# Patient Record
Sex: FEMALE | Race: WHITE | NOT HISPANIC OR LATINO | Employment: OTHER | URBAN - METROPOLITAN AREA
[De-identification: names, ages, dates, MRNs, and addresses within clinical notes are randomized per-mention and may not be internally consistent; named-entity substitution may affect disease eponyms.]

---

## 2018-07-05 PROBLEM — Z79.01 CHRONIC ANTICOAGULATION: Status: ACTIVE | Noted: 2018-07-05

## 2018-07-05 PROBLEM — E11.8 DM TYPE 2, CONTROLLED, WITH COMPLICATION: Status: ACTIVE | Noted: 2018-07-05

## 2018-07-05 PROBLEM — R58 ECCHYMOSIS: Status: ACTIVE | Noted: 2018-07-05

## 2018-07-05 PROBLEM — I48.20 CHRONIC ATRIAL FIBRILLATION: Status: ACTIVE | Noted: 2018-07-05

## 2018-07-05 PROBLEM — I10 HTN, GOAL BELOW 140/90: Status: ACTIVE | Noted: 2018-07-05

## 2018-07-05 PROBLEM — E03.9 ACQUIRED HYPOTHYROIDISM: Status: ACTIVE | Noted: 2018-07-05

## 2018-07-05 PROBLEM — R41.3 MEMORY IMPAIRMENT: Status: ACTIVE | Noted: 2018-07-05

## 2018-07-09 ENCOUNTER — HOSPITAL ENCOUNTER (OUTPATIENT)
Dept: CARDIOLOGY | Facility: HOSPITAL | Age: 83
Discharge: HOME | End: 2018-07-09
Attending: INTERNAL MEDICINE
Payer: COMMERCIAL

## 2018-07-09 DIAGNOSIS — I48.20 CHRONIC ATRIAL FIBRILLATION: ICD-10-CM

## 2018-07-09 LAB
AORTIC VALVE STENOSIS: ABNORMAL
ESTIMATED PA SYSTOLIC PRESSURE: 44.47
MITRAL VALVE REGURGITATION: ABNORMAL
RETIRED EF AND QEF - SEE NOTES: 55 (ref 55–65)
TRICUSPID VALVE REGURGITATION: ABNORMAL

## 2018-07-09 PROCEDURE — 93306 TTE W/DOPPLER COMPLETE: CPT

## 2018-07-09 PROCEDURE — 93306 TTE W/DOPPLER COMPLETE: CPT | Mod: 26,,, | Performed by: INTERNAL MEDICINE

## 2018-07-19 PROBLEM — F41.9 ANXIETY: Status: ACTIVE | Noted: 2018-07-19

## 2018-09-10 ENCOUNTER — HOSPITAL ENCOUNTER (OUTPATIENT)
Facility: HOSPITAL | Age: 83
Discharge: HOME | End: 2018-09-12
Attending: EMERGENCY MEDICINE | Admitting: INTERNAL MEDICINE
Payer: MEDICARE

## 2018-09-10 DIAGNOSIS — S09.90XA CLOSED HEAD INJURY, INITIAL ENCOUNTER: ICD-10-CM

## 2018-09-10 DIAGNOSIS — R00.0 RAPID HEART BEAT: ICD-10-CM

## 2018-09-10 DIAGNOSIS — S51.012A LACERATION OF LEFT ELBOW, INITIAL ENCOUNTER: ICD-10-CM

## 2018-09-10 DIAGNOSIS — I48.91 ATRIAL FIBRILLATION WITH RVR: ICD-10-CM
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
## 2018-09-10 DIAGNOSIS — I48.91 ATRIAL FIBRILLATION WITH RAPID VENTRICULAR RESPONSE: Primary | ICD-10-CM

## 2018-09-10 DIAGNOSIS — R00.0 TACHYCARDIA: ICD-10-CM

## 2018-09-10 DIAGNOSIS — R42 DIZZINESS: ICD-10-CM

## 2018-09-10 DIAGNOSIS — E03.9 ACQUIRED HYPOTHYROIDISM: ICD-10-CM

## 2018-09-10 LAB
ALBUMIN SERPL BCP-MCNC: 4.2 G/DL
ALP SERPL-CCNC: 97 U/L
ALT SERPL W/O P-5'-P-CCNC: 14 U/L
ANION GAP SERPL CALC-SCNC: 11 MMOL/L
AST SERPL-CCNC: 17 U/L
BASOPHILS # BLD AUTO: 0 K/UL
BASOPHILS NFR BLD: 0 %
BILIRUB SERPL-MCNC: 0.6 MG/DL
BUN SERPL-MCNC: 13 MG/DL
CALCIUM SERPL-MCNC: 10 MG/DL
CHLORIDE SERPL-SCNC: 96 MMOL/L
CO2 SERPL-SCNC: 29 MMOL/L
CREAT SERPL-MCNC: 0.9 MG/DL
DIFFERENTIAL METHOD: ABNORMAL
EOSINOPHIL # BLD AUTO: 0 K/UL
EOSINOPHIL NFR BLD: 0.2 %
ERYTHROCYTE [DISTWIDTH] IN BLOOD BY AUTOMATED COUNT: 13.3 %
EST. GFR  (AFRICAN AMERICAN): >60 ML/MIN/1.73 M^2
EST. GFR  (NON AFRICAN AMERICAN): 58 ML/MIN/1.73 M^2
GLUCOSE SERPL-MCNC: 106 MG/DL
HCT VFR BLD AUTO: 35.2 %
HGB BLD-MCNC: 11.4 G/DL
INR PPP: 2.7
LYMPHOCYTES # BLD AUTO: 1.3 K/UL
LYMPHOCYTES NFR BLD: 28.7 %
MCH RBC QN AUTO: 28.8 PG
MCHC RBC AUTO-ENTMCNC: 32.4 G/DL
MCV RBC AUTO: 89 FL
MONOCYTES # BLD AUTO: 0.9 K/UL
MONOCYTES NFR BLD: 21 %
NEUTROPHILS # BLD AUTO: 2.2 K/UL
NEUTROPHILS NFR BLD: 50.1 %
PLATELET # BLD AUTO: 145 K/UL
PMV BLD AUTO: 11 FL
POCT GLUCOSE: 120 MG/DL (ref 70–110)
POCT GLUCOSE: 162 MG/DL (ref 70–110)
POTASSIUM SERPL-SCNC: 4.8 MMOL/L
PROT SERPL-MCNC: 7.2 G/DL
PROTHROMBIN TIME: 28.7 SEC
RBC # BLD AUTO: 3.96 M/UL
SODIUM SERPL-SCNC: 136 MMOL/L
TROPONIN I SERPL DL<=0.01 NG/ML-MCNC: <0.006 NG/ML
WBC # BLD AUTO: 4.42 K/UL

## 2018-09-10 PROCEDURE — 93005 ELECTROCARDIOGRAM TRACING: CPT

## 2018-09-10 PROCEDURE — 85025 COMPLETE CBC W/AUTO DIFF WBC: CPT

## 2018-09-10 PROCEDURE — 36415 COLL VENOUS BLD VENIPUNCTURE: CPT

## 2018-09-10 PROCEDURE — 93010 ELECTROCARDIOGRAM REPORT: CPT | Mod: ,,, | Performed by: INTERNAL MEDICINE

## 2018-09-10 PROCEDURE — G0378 HOSPITAL OBSERVATION PER HR: HCPCS

## 2018-09-10 PROCEDURE — 99285 EMERGENCY DEPT VISIT HI MDM: CPT | Mod: 25

## 2018-09-10 PROCEDURE — 96376 TX/PRO/DX INJ SAME DRUG ADON: CPT

## 2018-09-10 PROCEDURE — 25000003 PHARM REV CODE 250: Performed by: PHYSICIAN ASSISTANT

## 2018-09-10 PROCEDURE — 25000003 PHARM REV CODE 250: Performed by: INTERNAL MEDICINE

## 2018-09-10 PROCEDURE — 12002 RPR S/N/AX/GEN/TRNK2.6-7.5CM: CPT

## 2018-09-10 PROCEDURE — 82962 GLUCOSE BLOOD TEST: CPT

## 2018-09-10 PROCEDURE — 85610 PROTHROMBIN TIME: CPT

## 2018-09-10 PROCEDURE — 25000003 PHARM REV CODE 250: Performed by: EMERGENCY MEDICINE

## 2018-09-10 PROCEDURE — 80053 COMPREHEN METABOLIC PANEL: CPT

## 2018-09-10 PROCEDURE — 84484 ASSAY OF TROPONIN QUANT: CPT

## 2018-09-10 PROCEDURE — 96374 THER/PROPH/DIAG INJ IV PUSH: CPT | Mod: 59

## 2018-09-10 RX ORDER — LISINOPRIL 20 MG/1
40 TABLET ORAL DAILY
Status: DISCONTINUED | OUTPATIENT
Start: 2018-09-11 | End: 2018-09-12 | Stop reason: HOSPADM

## 2018-09-10 RX ORDER — DILTIAZEM HYDROCHLORIDE 5 MG/ML
10 INJECTION INTRAVENOUS
Status: COMPLETED | OUTPATIENT
Start: 2018-09-10 | End: 2018-09-10

## 2018-09-10 RX ORDER — SIMVASTATIN 40 MG/1
40 TABLET, FILM COATED ORAL NIGHTLY
Status: DISCONTINUED | OUTPATIENT
Start: 2018-09-10 | End: 2018-09-12 | Stop reason: HOSPADM

## 2018-09-10 RX ORDER — IBUPROFEN 200 MG
24 TABLET ORAL
Status: DISCONTINUED | OUTPATIENT
Start: 2018-09-10 | End: 2018-09-12 | Stop reason: HOSPADM

## 2018-09-10 RX ORDER — IBUPROFEN 200 MG
16 TABLET ORAL
Status: DISCONTINUED | OUTPATIENT
Start: 2018-09-10 | End: 2018-09-12 | Stop reason: HOSPADM

## 2018-09-10 RX ORDER — LORAZEPAM 0.5 MG/1
0.5 TABLET ORAL 2 TIMES DAILY
Status: DISCONTINUED | OUTPATIENT
Start: 2018-09-10 | End: 2018-09-12 | Stop reason: HOSPADM

## 2018-09-10 RX ORDER — INSULIN ASPART 100 [IU]/ML
0-5 INJECTION, SOLUTION INTRAVENOUS; SUBCUTANEOUS
Status: DISCONTINUED | OUTPATIENT
Start: 2018-09-10 | End: 2018-09-12 | Stop reason: HOSPADM

## 2018-09-10 RX ORDER — ACETAMINOPHEN 325 MG/1
650 TABLET ORAL EVERY 6 HOURS PRN
Status: DISCONTINUED | OUTPATIENT
Start: 2018-09-10 | End: 2018-09-12 | Stop reason: HOSPADM

## 2018-09-10 RX ORDER — ESCITALOPRAM OXALATE 10 MG/1
10 TABLET ORAL DAILY
Status: DISCONTINUED | OUTPATIENT
Start: 2018-09-11 | End: 2018-09-12 | Stop reason: HOSPADM

## 2018-09-10 RX ORDER — GLUCAGON 1 MG
1 KIT INJECTION
Status: DISCONTINUED | OUTPATIENT
Start: 2018-09-10 | End: 2018-09-12 | Stop reason: HOSPADM

## 2018-09-10 RX ORDER — LEVOTHYROXINE SODIUM 137 UG/1
1 CAPSULE ORAL DAILY
Status: DISCONTINUED | OUTPATIENT
Start: 2018-09-11 | End: 2018-09-10

## 2018-09-10 RX ORDER — METOPROLOL SUCCINATE 50 MG/1
100 TABLET, EXTENDED RELEASE ORAL DAILY
Status: DISCONTINUED | OUTPATIENT
Start: 2018-09-11 | End: 2018-09-11

## 2018-09-10 RX ORDER — GABAPENTIN 300 MG/1
300 CAPSULE ORAL 2 TIMES DAILY
Status: DISCONTINUED | OUTPATIENT
Start: 2018-09-10 | End: 2018-09-12 | Stop reason: HOSPADM

## 2018-09-10 RX ORDER — WARFARIN SODIUM 5 MG/1
5 TABLET ORAL DAILY
Status: DISCONTINUED | OUTPATIENT
Start: 2018-09-10 | End: 2018-09-11

## 2018-09-10 RX ORDER — LIDOCAINE HYDROCHLORIDE AND EPINEPHRINE 10; 10 MG/ML; UG/ML
10 INJECTION, SOLUTION INFILTRATION; PERINEURAL
Status: COMPLETED | OUTPATIENT
Start: 2018-09-10 | End: 2018-09-10

## 2018-09-10 RX ORDER — DILTIAZEM HYDROCHLORIDE 30 MG/1
60 TABLET, FILM COATED ORAL
Status: COMPLETED | OUTPATIENT
Start: 2018-09-10 | End: 2018-09-10

## 2018-09-10 RX ORDER — PANTOPRAZOLE SODIUM 40 MG/1
40 TABLET, DELAYED RELEASE ORAL DAILY
Status: DISCONTINUED | OUTPATIENT
Start: 2018-09-11 | End: 2018-09-12 | Stop reason: HOSPADM

## 2018-09-10 RX ADMIN — DILTIAZEM HYDROCHLORIDE 10 MG: 5 INJECTION INTRAVENOUS at 04:09

## 2018-09-10 RX ADMIN — SIMVASTATIN 40 MG: 40 TABLET, FILM COATED ORAL at 09:09

## 2018-09-10 RX ADMIN — DILTIAZEM HYDROCHLORIDE 10 MG: 5 INJECTION INTRAVENOUS at 02:09

## 2018-09-10 RX ADMIN — LORAZEPAM 0.5 MG: 0.5 TABLET ORAL at 09:09

## 2018-09-10 RX ADMIN — LIDOCAINE HYDROCHLORIDE AND EPINEPHRINE 10 ML: 10; 10 INJECTION, SOLUTION INFILTRATION; PERINEURAL at 11:09

## 2018-09-10 RX ADMIN — GABAPENTIN 300 MG: 300 CAPSULE ORAL at 09:09

## 2018-09-10 RX ADMIN — WARFARIN SODIUM 5 MG: 5 TABLET ORAL at 06:09

## 2018-09-10 RX ADMIN — DILTIAZEM HYDROCHLORIDE 60 MG: 30 TABLET, FILM COATED ORAL at 04:09

## 2018-09-10 RX ADMIN — ACETAMINOPHEN 650 MG: 325 TABLET, FILM COATED ORAL at 09:09

## 2018-09-10 NOTE — ED NOTES
Spoke with Ugo in lab, notified of specimen being sent for Troponin without requisition due to being lab draw. Specimen collected via butterfly needle, pt tolerated well

## 2018-09-10 NOTE — ED PROVIDER NOTES
"Encounter Date: 9/10/2018    SORT:  86 y.o. female presenting to ED for fall after becoming dizzy. Denies CP. Limited triage exam:  Non-toxic. If orders were placed, they are pending.     Sherif Pham PA-C  09/10/2018  10:33 AM   SCRIBE #1 NOTE: I, Chaparro Steele, am scribing for, and in the presence of,  Jh Connell MD. I have scribed the following portions of the note - Other sections scribed: HPI and ROS.       History     Chief Complaint   Patient presents with    Fall     pt states "I fell this morning and his my head" pt states she got dizzy before the fall; pt states she is on blood thinners; pt also c/o some arm pain      CC: Fall    HPI: This is a 86 y.o. female with past medical history DM, HTN, and GERD who presents after falling at home. Patient states she was ambulating with her walker to the bathroom when she suddenly felt dizzy causing her to fall on her buttocks and head. No LOC. She did not feel dizzy after getting out of bed. Denies neck pain.      The history is provided by the patient. No  was used.     Review of patient's allergies indicates:   Allergen Reactions    Sulfa (sulfonamide antibiotics) Hives     Past Medical History:   Diagnosis Date    Allergy     Anxiety     Arthritis     Cataract     Clotting disorder     Depression     Diabetes mellitus     Encounter for blood transfusion     GERD (gastroesophageal reflux disease)     Heart murmur     Hypertension     Thyroid disease      Past Surgical History:   Procedure Laterality Date    HYSTERECTOMY      TONSILLECTOMY      TOTAL HIP ARTHROPLASTY Left      Family History   Problem Relation Age of Onset    No Known Problems Mother     Diabetes Father     Stroke Father     Diabetes Sister     Diabetes Brother      Social History     Tobacco Use    Smoking status: Former Smoker     Last attempt to quit: 1988     Years since quittin.2    Smokeless tobacco: Never Used   Substance Use " Topics    Alcohol use: Yes     Alcohol/week: 1.8 - 2.4 oz     Types: 1 Glasses of wine, 2 - 3 Standard drinks or equivalent per week    Drug use: No     Review of Systems   Constitutional: Negative for chills, diaphoresis and fever.   HENT: Negative for rhinorrhea and sore throat.    Eyes: Negative for visual disturbance.   Respiratory: Negative for cough and shortness of breath.    Cardiovascular: Negative for chest pain.   Gastrointestinal: Negative for abdominal pain, constipation, diarrhea, nausea and vomiting.   Genitourinary: Negative for dysuria.   Musculoskeletal: Positive for myalgias. Negative for neck pain.   Neurological: Negative for syncope and headaches.   All other systems reviewed and are negative.      Physical Exam     Initial Vitals [09/10/18 1030]   BP Pulse Resp Temp SpO2   (!) 184/81 67 19 98 °F (36.7 °C) 96 %      MAP       --         Physical Exam    Nursing note and vitals reviewed.  HENT:   Head: Atraumatic.   Eyes: Conjunctivae and EOM are normal.   Neck: Normal range of motion.   Cardiovascular: Exam reveals no gallop and no friction rub.    No murmur heard.  Pulmonary/Chest: Breath sounds normal. No respiratory distress.   Abdominal: Soft. There is no tenderness.   Musculoskeletal: Normal range of motion. She exhibits no edema.   Neurological: She is alert and oriented to person, place, and time.   Skin:   Laceration L elbow   Psychiatric: She has a normal mood and affect.         ED Course   Lac Repair  Date/Time: 9/10/2018 2:03 PM  Performed by: Jim Gonzalez PA-C  Authorized by: Jh Connell MD   Body area: upper extremity  Location details: left elbow  Laceration length: 3.5 cm  Foreign bodies: no foreign bodies  Tendon involvement: none  Nerve involvement: superficial  Vascular damage: no  Anesthesia: local infiltration    Anesthesia:  Local Anesthetic: lidocaine 1% with epinephrine  Anesthetic total: 4 mL  Patient sedated: no  Preparation: Patient was prepped and draped  in the usual sterile fashion.  Irrigation solution: saline  Irrigation method: syringe  Amount of cleaning: standard  Debridement: minimal  Degree of undermining: none  Skin closure: 5-0 nylon  Number of sutures: 5  Technique: simple  Approximation: close  Approximation difficulty: simple  Dressing: dressing applied, gauze roll and non-stick sterile dressing  Patient tolerance: Patient tolerated the procedure well with no immediate complications        Labs Reviewed   CBC W/ AUTO DIFFERENTIAL - Abnormal; Notable for the following components:       Result Value    RBC 3.96 (*)     Hemoglobin 11.4 (*)     Hematocrit 35.2 (*)     Platelets 145 (*)     Mono% 21.0 (*)     All other components within normal limits   COMPREHENSIVE METABOLIC PANEL - Abnormal; Notable for the following components:    eGFR if non  58 (*)     All other components within normal limits   PROTIME-INR - Abnormal; Notable for the following components:    Prothrombin Time 28.7 (*)     INR 2.7 (*)     All other components within normal limits          Imaging Results          X-Ray Hip 2 View Right (Final result)  Result time 09/10/18 12:41:25    Final result by Constantin Cesar MD (09/10/18 12:41:25)                 Impression:      Postoperative changes related to prior right hip arthroplasty with the metallic prosthesis appearing in satisfactory position.    Fixation plate and cerclage wires along the lateral aspect of the proximal right femur likely related to prior attempted internal fixation of a fracture of the greater trochanter.  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  This appears to be chronic.  Correlation with prior imaging studies is recommended.      Electronically signed by: Constantin Cesar MD  Date:    09/10/2018  Time:    12:41             Narrative:    EXAMINATION:  XR HIP 2 VIEW RIGHT    CLINICAL  HISTORY:  Unspecified fall, initial encounter    TECHNIQUE:  AP view of the pelvis and frog leg lateral view of the right hip were performed.    COMPARISON:  None    FINDINGS:  Patient is s/p right hip arthroplasty with the metallic prosthesis appearing in satisfactory position.  There is a fixation plate with multiple cerclage wires along the lateral aspect of the proximal right femur related to attempted fixation of the greater trochanter.  However, there are fractures of the inferior cerclage wires and there is a nonunion fracture of the greater trochanter with lucency along the fixation plate suggesting possible loosening or infection of the fixation plate.  These findings may all be chronic and correlation with prior imaging findings is suggested.  Soft tissues are unremarkable.                               X-Ray Elbow Complete Left (Final result)  Result time 09/10/18 11:53:16    Final result by Jermaine Benton MD (09/10/18 11:53:16)                 Impression:      No acute fracture      Electronically signed by: Jermaine Benton MD  Date:    09/10/2018  Time:    11:53             Narrative:    EXAMINATION:  XR ELBOW COMPLETE 3 VIEW LEFT    CLINICAL HISTORY:  Unspecified fall, initial encounter    TECHNIQUE:  AP, lateral, and oblique views of the left elbow were performed.    COMPARISON:  None    FINDINGS:  Left elbow three view study demonstrates degenerative changes, but no fracture is identified.  No joint effusion is seen.  Small osteophytes are present.  There are vascular calcifications of the forearm consistent with atherosclerosis.                               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  No acute findings are seen.  No hemorrhage      Electronically signed by: Jermaine  MD Serenity  Date:    09/10/2018  Time:    11:30             Narrative:    EXAMINATION:  CT HEAD WITHOUT CONTRAST    CLINICAL HISTORY:  fall;    TECHNIQUE:  Low dose axial images were obtained through the head.  Coronal and sagittal reformations were also performed. Contrast was not administered.    COMPARISON:  None.    FINDINGS:  CT of the head demonstrates evidence of remote infarction involving the right PCA territory including posteromedial temporal lobe and medial occipital lobe.  There is also a small area of remote infarction in the right parietal lobe cortex and subcortical white matter.  White matter changes are present bilaterally, slightly more on the right that likely reflects small vessel ischemic disease.    No acute infarct, hemorrhage, mass hydrocephalus, or other acute finding is seen.  Atria and temporal horn of the right lateral ventricle are enlarged due to surrounding volume loss.  No extra-axial collections are identified, and the skull base structures are within normal limits.                               CT Cervical Spine Without Contrast (Final result)  Result time 09/10/18 11:39:34    Final result by Rafael Wang MD (09/10/18 11:39:34)                 Impression:      1. No acute fracture.  Primary subluxation deformity C7-T1.  2. Advanced degenerative disc spondylosis facet joint arthropathy is discussed above.      Electronically signed by: Rafael Wang MD  Date:    09/10/2018  Time:    11:39             Narrative:    EXAMINATION:  CT CERVICAL SPINE WITHOUT CONTRAST    CLINICAL HISTORY:  fall;    TECHNIQUE:  Low dose axial images, sagittal and coronal reformations were performed though the cervical spine.  Contrast was not administered.    COMPARISON:  None    FINDINGS:  Calcified plaque great vessels carotid bifurcations, distal internal carotid arteries.  Moderate DJD right TMJ    Advanced degenerative disc spondylosis, facet joint arthropathy C2 through C6, less  prominent DJD changes C2 and C7 through T2 with prominent posterior calcified plaque T1-T2, with partial ossification posterior spinal ligamentous structures at transverse ligament C1 C2, C2 through C4 and primary anterior subluxation C7 on T1.  Airway and soft tissues otherwise intact.    Mild moderate foraminal and spinal stenosis C3 through C6 levels.  No severe spinal or foramen stenosis.                                 Medical Decision Making:   Initial Assessment:   86-year-old female presenting status post fall where she says she felt dizzy with her walker.  Following fall, she denies any dizziness, chest pain or shortness of breath. She hit her head as well as right hip.  Imaging is unremarkable without evidence of fracture.  EKG reviewed interpreted myself shows no evidence of acute ischemia.  Labs unremarkable. I do not think this patient needs further workup.  Laceration repaired.  Primary care follow-up for suture removal.  Return instructions given.  Patient verbalized understanding and agreement with plan.    4:39 PM  Prior to discharge, patient developed a rapid ventricular rate associated with her atrial fibrillation.  She was given multiple doses of diltiazem as well as an oral dose with rate control however given the degree of treatment required for rate control, will treat for further management.            Scribe Attestation:   Scribe #1: I performed the above scribed service and the documentation accurately describes the services I performed. I attest to the accuracy of the note.    Attending Attestation:           Physician Attestation for Scribe:  Physician Attestation Statement for Scribe #1: I, Jh Connell MD, reviewed documentation, as scribed by Chaparro Steele in my presence, and it is both accurate and complete.                    Clinical Impression:   The primary encounter diagnosis was Closed head injury, initial encounter. Diagnoses of Fall, Dizziness, Laceration of left elbow,  initial encounter, and Atrial fibrillation with RVR were also pertinent to this visit.                             Jh Connell MD  09/10/18 1419       Jh Connell MD  09/10/18 1419       Jh Connell MD  09/10/18 1640

## 2018-09-10 NOTE — PLAN OF CARE
Discharge planning assessment completed with patient's assistance.  Patient from home with daughter and son in law.  Patient used a walker to assist with ambulation and also has a cane.  Patient reported that she moved here from [place] about three months ago.  She has seen Dr. Garcia once.  She will continue to f/u with him.  Preferred pharmacy is [facility] but she's not certain which location.         09/10/18 1705   Discharge Assessment   Assessment Type Discharge Planning Assessment   Confirmed/corrected address and phone number on facesheet? Yes   Assessment information obtained from? Patient   Communicated expected length of stay with patient/caregiver no   Prior to hospitilization cognitive status: Alert/Oriented   Prior to hospitalization functional status: Assistive Equipment   Current cognitive status: Alert/Oriented   Current Functional Status: Assistive Equipment   Facility Arrived From: home   Lives With child(jose), adult  (daughter and son in law)   Able to Return to Prior Arrangements yes   Is patient able to care for self after discharge? Yes  (Assistance from family.)   Who are your caregiver(s) and their phone number(s)? Sariah Hernandes; [phone]; daughter   or son in law; Erick Hernandes; [phone]   Patient's perception of discharge disposition (TBD)   Readmission Within The Last 30 Days no previous admission in last 30 days   Patient currently being followed by outpatient case management? No   Patient currently receives any other outside agency services? No   Equipment Currently Used at Home walker, rolling;cane, straight   Do you have any problems affording any of your prescribed medications? No  (Not sure [facility] her daughter uses.)   Is the patient taking medications as prescribed? yes   Does the patient have transportation home? Yes   Transportation Available family or friend will provide   Dialysis Name and Scheduled days n/a   Does the patient receive services at the Coumadin  Clinic? No   Discharge Plan A Home with family   Discharge Plan B Home Health   Patient/Family In Agreement With Plan no   Maryanne Barnhart LMSW, NELLY-DAVID, CCM  9/10/2018

## 2018-09-10 NOTE — ED NOTES
Dr Connell notified of blood pressure 208/88 and 203/88, instructed to wait, putting in another order for Cardizem IVP

## 2018-09-10 NOTE — ED TRIAGE NOTES
PT states sat up in the bed this morning and felt fine, after taking a couple steps, got dizzy and fell, hit back of head on floor and left arm on bed frame

## 2018-09-10 NOTE — ED NOTES
While attempting to discharge patient, noticie pts HR was elevated and Oxygen sats 91%. Called pts primary nurses and informed them of findings. Alerted ED charge nurse ASHLEY who came to pts bedside.

## 2018-09-10 NOTE — ED NOTES
Nurse for Nicole Dial called to give report, states she is not ready to receive report to call back in 10 minutes. Charge nurse called to give report

## 2018-09-10 NOTE — ED NOTES
Dr. Connell made aware of bp 190/94 with discharge orders home, verbalized understanding, reports pt is ok for discharge with bp of 190/94

## 2018-09-11 PROBLEM — I48.91 ATRIAL FIBRILLATION WITH RAPID VENTRICULAR RESPONSE: Status: ACTIVE | Noted: 2018-09-11
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
## 2018-09-11 PROBLEM — I48.20 CHRONIC A-FIB: Status: ACTIVE | Noted: 2018-09-11

## 2018-09-11 PROBLEM — F03.B0 MODERATE DEMENTIA WITHOUT BEHAVIORAL DISTURBANCE: Status: ACTIVE | Noted: 2018-09-11

## 2018-09-11 LAB
ALBUMIN SERPL BCP-MCNC: 3.6 G/DL
ALP SERPL-CCNC: 84 U/L
ALT SERPL W/O P-5'-P-CCNC: 12 U/L
ANION GAP SERPL CALC-SCNC: 9 MMOL/L
AST SERPL-CCNC: 13 U/L
BASOPHILS # BLD AUTO: 0 K/UL
BASOPHILS NFR BLD: 0 %
BILIRUB SERPL-MCNC: 0.7 MG/DL
BNP SERPL-MCNC: 327 PG/ML
BUN SERPL-MCNC: 12 MG/DL
CALCIUM SERPL-MCNC: 9.7 MG/DL
CHLORIDE SERPL-SCNC: 97 MMOL/L
CO2 SERPL-SCNC: 30 MMOL/L
CREAT SERPL-MCNC: 0.8 MG/DL
DIFFERENTIAL METHOD: ABNORMAL
EOSINOPHIL # BLD AUTO: 0 K/UL
EOSINOPHIL NFR BLD: 0.3 %
ERYTHROCYTE [DISTWIDTH] IN BLOOD BY AUTOMATED COUNT: 13.3 %
EST. GFR  (AFRICAN AMERICAN): >60 ML/MIN/1.73 M^2
EST. GFR  (NON AFRICAN AMERICAN): >60 ML/MIN/1.73 M^2
GLUCOSE SERPL-MCNC: 109 MG/DL
HCT VFR BLD AUTO: 34.2 %
HGB BLD-MCNC: 10.7 G/DL
INR PPP: 3.3
LYMPHOCYTES # BLD AUTO: 1 K/UL
LYMPHOCYTES NFR BLD: 29.3 %
MCH RBC QN AUTO: 28.1 PG
MCHC RBC AUTO-ENTMCNC: 31.3 G/DL
MCV RBC AUTO: 90 FL
MONOCYTES # BLD AUTO: 0.9 K/UL
MONOCYTES NFR BLD: 24.9 %
NEUTROPHILS # BLD AUTO: 1.6 K/UL
NEUTROPHILS NFR BLD: 45.2 %
PLATELET # BLD AUTO: 126 K/UL
PMV BLD AUTO: 10.7 FL
POCT GLUCOSE: 120 MG/DL (ref 70–110)
POCT GLUCOSE: 147 MG/DL (ref 70–110)
POCT GLUCOSE: 153 MG/DL (ref 70–110)
POCT GLUCOSE: 155 MG/DL (ref 70–110)
POTASSIUM SERPL-SCNC: 4 MMOL/L
PROT SERPL-MCNC: 6.6 G/DL
PROTHROMBIN TIME: 35 SEC
RBC # BLD AUTO: 3.81 M/UL
SODIUM SERPL-SCNC: 136 MMOL/L
TROPONIN I SERPL DL<=0.01 NG/ML-MCNC: 0.02 NG/ML
TROPONIN I SERPL DL<=0.01 NG/ML-MCNC: <0.006 NG/ML
TSH SERPL DL<=0.005 MIU/L-ACNC: 0.45 UIU/ML
WBC # BLD AUTO: 3.45 K/UL

## 2018-09-11 PROCEDURE — 85025 COMPLETE CBC W/AUTO DIFF WBC: CPT

## 2018-09-11 PROCEDURE — 25000003 PHARM REV CODE 250: Performed by: INTERNAL MEDICINE

## 2018-09-11 PROCEDURE — 25000003 PHARM REV CODE 250: Performed by: EMERGENCY MEDICINE

## 2018-09-11 PROCEDURE — 80053 COMPREHEN METABOLIC PANEL: CPT

## 2018-09-11 PROCEDURE — 99204 OFFICE O/P NEW MOD 45 MIN: CPT | Mod: ,,, | Performed by: INTERNAL MEDICINE

## 2018-09-11 PROCEDURE — 84484 ASSAY OF TROPONIN QUANT: CPT | Mod: 91

## 2018-09-11 PROCEDURE — 83880 ASSAY OF NATRIURETIC PEPTIDE: CPT

## 2018-09-11 PROCEDURE — G0378 HOSPITAL OBSERVATION PER HR: HCPCS

## 2018-09-11 PROCEDURE — 36415 COLL VENOUS BLD VENIPUNCTURE: CPT

## 2018-09-11 PROCEDURE — 84443 ASSAY THYROID STIM HORMONE: CPT

## 2018-09-11 PROCEDURE — 85610 PROTHROMBIN TIME: CPT

## 2018-09-11 RX ORDER — METOPROLOL SUCCINATE 50 MG/1
200 TABLET, EXTENDED RELEASE ORAL DAILY
Status: DISCONTINUED | OUTPATIENT
Start: 2018-09-12 | End: 2018-09-12

## 2018-09-11 RX ORDER — WARFARIN SODIUM 5 MG/1
5 TABLET ORAL DAILY
Status: DISCONTINUED | OUTPATIENT
Start: 2018-09-12 | End: 2018-09-12 | Stop reason: HOSPADM

## 2018-09-11 RX ORDER — METOPROLOL SUCCINATE 50 MG/1
100 TABLET, EXTENDED RELEASE ORAL ONCE
Status: COMPLETED | OUTPATIENT
Start: 2018-09-11 | End: 2018-09-11

## 2018-09-11 RX ORDER — AMIODARONE HYDROCHLORIDE 100 MG/1
100 TABLET ORAL 2 TIMES DAILY
Status: DISCONTINUED | OUTPATIENT
Start: 2018-09-11 | End: 2018-09-11

## 2018-09-11 RX ADMIN — SIMVASTATIN 40 MG: 40 TABLET, FILM COATED ORAL at 09:09

## 2018-09-11 RX ADMIN — LORAZEPAM 0.5 MG: 0.5 TABLET ORAL at 08:09

## 2018-09-11 RX ADMIN — ACETAMINOPHEN 650 MG: 325 TABLET, FILM COATED ORAL at 06:09

## 2018-09-11 RX ADMIN — ACETAMINOPHEN 650 MG: 325 TABLET, FILM COATED ORAL at 09:09

## 2018-09-11 RX ADMIN — METOPROLOL SUCCINATE 100 MG: 50 TABLET, EXTENDED RELEASE ORAL at 08:09

## 2018-09-11 RX ADMIN — METOPROLOL SUCCINATE 100 MG: 50 TABLET, EXTENDED RELEASE ORAL at 09:09

## 2018-09-11 RX ADMIN — ESCITALOPRAM OXALATE 10 MG: 10 TABLET ORAL at 08:09

## 2018-09-11 RX ADMIN — GABAPENTIN 300 MG: 300 CAPSULE ORAL at 08:09

## 2018-09-11 RX ADMIN — LISINOPRIL 40 MG: 20 TABLET ORAL at 08:09

## 2018-09-11 RX ADMIN — LORAZEPAM 0.5 MG: 0.5 TABLET ORAL at 09:09

## 2018-09-11 RX ADMIN — PANTOPRAZOLE SODIUM 40 MG: 40 TABLET, DELAYED RELEASE ORAL at 08:09

## 2018-09-11 RX ADMIN — LEVOTHYROXINE SODIUM 137 MCG: 25 TABLET ORAL at 06:09

## 2018-09-11 RX ADMIN — GABAPENTIN 300 MG: 300 CAPSULE ORAL at 09:09

## 2018-09-11 NOTE — H&P
"[facility]  History & Physical    SUBJECTIVE:     Chief Complaint/Reason for Admission: AFib and Fall     History of Present Illness:    Mrs. Muñoz is a pleasant 86 y.o. Lady with multiple medical conditions including chronic Afib, DM, HTN, mil to moderate [place] Dementia and GERD. She was in her usual state of health until yesterday. While ambulating to the bathroom with her rolling walker, she felt ? Dizzy" leading to fall backward. Did not sustain any injury but was brought to [facility] ED for further evaluation. Just before pt got discharged, she developed Afib with RVR and improved with tx but continue to have intermittent RVR. No recent changes in medications. Has been complaint with all meds.         PTA Medications   Medication Sig    escitalopram oxalate (LEXAPRO) 10 MG tablet Take 1 tablet (10 mg total) by mouth once daily.    gabapentin (NEURONTIN) 300 MG capsule Take 300 mg by mouth 2 (two) times daily.    levothyroxine 137 mcg Cap Take 1 tablet by mouth once daily.    lisinopril (PRINIVIL,ZESTRIL) 40 MG tablet Take 1 tablet (40 mg total) by mouth once daily.    LORazepam (ATIVAN) 0.5 MG tablet Take 0.5 mg by mouth 2 (two) times daily. Take half in the am and a whole at bedtime    metFORMIN (GLUCOPHAGE) 500 MG tablet Take 500 mg by mouth 2 (two) times daily with meals.    metoprolol succinate (TOPROL-XL) 100 MG 24 hr tablet Take 100 mg by mouth once daily.    omeprazole (PRILOSEC) 20 MG capsule Take 20 mg by mouth once daily.    simvastatin (ZOCOR) 40 MG tablet Take 40 mg by mouth every evening.    warfarin (COUMADIN) 5 MG tablet Take 5 mg by mouth. Only  5 ml On mon and thur. 4 ml tues, wed, fri sat and sun.       Review of patient's allergies indicates:   Allergen Reactions    Sulfa (sulfonamide antibiotics) Hives       Past Medical History:   Diagnosis Date    Allergy     Anticoagulant long-term use     Coumadin    Anxiety     Arthritis     Atrial fibrillation     Cataract  "    Closed head injury 09/10/2018    Clotting disorder     Depression     Diabetes mellitus     Encounter for blood transfusion     Fall 09/10/2018    GERD (gastroesophageal reflux disease)     Heart murmur     [place] (hard of hearing)     wears bilateral hearing aids    Hypertension     Thyroid disease     Uses roller walker      Past Surgical History:   Procedure Laterality Date    HYSTERECTOMY      JOINT REPLACEMENT Right     Hip    TONSILLECTOMY      TOTAL HIP ARTHROPLASTY Left 2004     Family History   Problem Relation Age of Onset    No Known Problems Mother     Diabetes Father     Stroke Father     Diabetes Sister     Diabetes Brother      Social History     Tobacco Use    Smoking status: Former Smoker     Last attempt to quit: 1988     Years since quittin.2    Smokeless tobacco: Never Used   Substance Use Topics    Alcohol use: Yes     Alcohol/week: 1.8 - 2.4 oz     Types: 1 Glasses of wine, 2 - 3 Standard drinks or equivalent per week    Drug use: No        Review of Systems:    Not able to get details     Constitutional: + fatigue   Eyes: no visual changes   ENT: no nasal congestion.    Respiratory: Intermittent sob  Cardiovascular: see HPI  Gastrointestinal: no n/v  Hematologic/Lymphatic: on chronic anticoagulation   Musculoskeletal: difficulty with ambulation   Neurological: memory impairment   Skin: No rashes or lesions  Psych: insomnia    OBJECTIVE:     Vital Signs (Most Recent):  Temp: 97.8 °F (36.6 °C) (18)  Pulse: (!) 118 (18)  Resp: 18 (18)  BP: (!) 129/93 (18)  SpO2: 98 % (18)    Physical Exam:    General: NAD, resting in bed  Head: normocephalic, atraumatic   Eyes: conjunctivae pink, anicteric sclera.   Throat: No erythema or post nasal discharge   Neck: supple, no LAD   Lungs: crackles at the bases    Heart: S1, S2, irregular, tachycardic, aortic murmur   Abdomen: + BS x 4 quadrants, soft, non tender.     Extremities: tila leg edema    Skin: turgor normal, no erythema or rashes.   MS: move all extremities  Neuro: A&O x 2  Psy: calm     Laboratory:  CBC:   Recent Labs   Lab  09/11/18   0551   WBC  3.45*   RBC  3.81*   HGB  10.7*   HCT  34.2*   PLT  126*   MCV  90   MCH  28.1   MCHC  31.3*     CMP:   Recent Labs   Lab  09/11/18   0551   GLU  109   CALCIUM  9.7   ALBUMIN  3.6   PROT  6.6   NA  136   K  4.0   CO2  30*   CL  97   BUN  12   CREATININE  0.8   ALKPHOS  84   ALT  12   AST  13   BILITOT  0.7     Coagulation:   Recent Labs   Lab  09/10/18   1446   LABPROT  28.7*   INR  2.7*     Cardiac markers:   Recent Labs   Lab  09/11/18   0551   TROPONINI  <0.006     ABGs: No results for input(s): PH, PCO2, PO2, HCO3, POCSATURATED, BE in the last 168 hours.  Microbiology Results (last 7 days)     ** No results found for the last 168 hours. **        Specimen (12h ago, onward)    None        No results for input(s): COLORU, CLARITYU, SPECGRAV, PHUR, PROTEINUA, GLUCOSEU, BILIRUBINCON, BLOODU, WBCU, RBCU, BACTERIA, MUCUS, NITRITE, LEUKOCYTESUR, UROBILINOGEN, HYALINECASTS in the last 168 hours.    Diagnostic Results:      Current Facility-Administered Medications   Medication Dose Route Frequency Provider Last Rate Last Dose    acetaminophen tablet 650 mg  650 mg Oral Q6H PRN Juliane Smith MD   650 mg at 09/11/18 0609    dextrose 50% injection 12.5 g  12.5 g Intravenous PRN Jh Connell MD        dextrose 50% injection 25 g  25 g Intravenous PRN Jh Connell MD        escitalopram oxalate tablet 10 mg  10 mg Oral Daily Jh Connell MD   10 mg at 09/11/18 0843    gabapentin capsule 300 mg  300 mg Oral BID Jh Connell MD   300 mg at 09/11/18 0843    glucagon (human recombinant) injection 1 mg  1 mg Intramuscular PRN Jh Connell MD        glucose chewable tablet 16 g  16 g Oral PRN Jh Connell MD        glucose chewable tablet 24 g  24 g Oral PRN Jh Connell MD        insulin aspart  U-100 pen 0-5 Units  0-5 Units Subcutaneous QID (AC + HS) PRN Jh Connell MD        levothyroxine tablet 137 mcg  137 mcg Oral Before breakfast Harish Garcia MD   137 mcg at 09/11/18 0607    lisinopril tablet 40 mg  40 mg Oral Daily Jh Connell MD   40 mg at 09/11/18 0843    LORazepam tablet 0.5 mg  0.5 mg Oral BID Jh Connell MD   0.5 mg at 09/11/18 0843    metoprolol succinate (TOPROL-XL) 24 hr tablet 100 mg  100 mg Oral Daily Jh Connell MD   100 mg at 09/11/18 0843    pantoprazole EC tablet 40 mg  40 mg Oral Daily Jh Connell MD   40 mg at 09/11/18 0843    simvastatin tablet 40 mg  40 mg Oral QHS Jh Connell MD   40 mg at 09/10/18 2111    warfarin (COUMADIN) tablet 5 mg  5 mg Oral Daily hJ Connell MD   5 mg at 09/10/18 1810         ASSESSMENT/PLAN:     Active Hospital Problems    Diagnosis  POA    *Atrial fibrillation with rapid ventricular response [I48.91]  - pt had been on BB and CCB- Diltazem was discontinued but family started pt back on it again?  - on long acting BB- continue   - pt's fall may be a syncopal episode   - start low dose of Amio  - On Coumadin - INR 2.7  - check TSH and BNP    Yes    Fall [W19.XXXA]- no bleeding     Yes    Moderate dementia without behavioral disturbance [F03.90]  - aspiration and fall precaution   Yes    Closed head injury [S09.90XA]  - no acute pathology    Yes    Anxiety [F41.9]- intermittent - mostly due to progressive dementia    Yes    Ecchymosis [R58]- no bleeding    Yes    HTN, goal below 140/90 [I10]  - resume home medications    Yes    Chronic anticoagulation [Z79.01]  INR 2.7  Not Applicable    DM type 2, controlled, with complication [E11.8]  - hold off Metformin    Yes      Resolved Hospital Problems   No resolved problems to display.     Hypothyroidism: check TSH   - continue current dose of Levothyroxine     Doug LE edema: check BNP   - may need to repeat 2- D Echo     Harish Garcia M.D  Internal Medicine  & Geriatric Medicine  Hematology & Oncology  Palliative Medicine    [address], [address]  [place] [address]  [phone] (Office)  [phone] (Fax)

## 2018-09-11 NOTE — PROGRESS NOTES
Dr. Oro called. MD notified of patient INR at 3.3 and stated to hold dose of coumadin. Also notified MD of paperwork sent over via family about patient DNR status, MD stated that he already know about that. Will notify pharmacy about coumain order to be held today because they wanted to contact MD as well.

## 2018-09-11 NOTE — PLAN OF CARE
Problem: Diabetes, Type 2 (Adult)  Goal: Signs and Symptoms of Listed Potential Problems Will be Absent, Minimized or Managed (Diabetes, Type 2)  Signs and symptoms of listed potential problems will be absent, minimized or managed by discharge/transition of care (reference Diabetes, Type 2 (Adult) CPG).  Outcome: Ongoing (interventions implemented as appropriate)   09/11/18 0628   Diabetes, Type 2   Problems Assessed (Type 2 Diabetes) situational response;hyperglycemia   Problems Present (Type 2 Diabetes) situational response;hyperglycemia       Problem: Fall Risk (Adult)  Goal: Identify Related Risk Factors and Signs and Symptoms  Related risk factors and signs and symptoms are identified upon initiation of Human Response Clinical Practice Guideline (CPG)  Outcome: Ongoing (interventions implemented as appropriate)   09/11/18 0628   Fall Risk   Related Risk Factors (Fall Risk) age-related changes;gait/mobility problems;fear of falling;fatigue/slow reaction;sleep pattern alteration;environment unfamiliar;objects hard to reach   Signs and Symptoms (Fall Risk) presence of risk factors     Goal: Absence of Falls  Patient will demonstrate the desired outcomes by discharge/transition of care.  Outcome: Ongoing (interventions implemented as appropriate)   09/11/18 0628   Fall Risk (Adult)   Absence of Falls making progress toward outcome       Problem: Pain, Acute (Adult)  Goal: Identify Related Risk Factors and Signs and Symptoms  Related risk factors and signs and symptoms are identified upon initiation of Human Response Clinical Practice Guideline (CPG)  Outcome: Ongoing (interventions implemented as appropriate)   09/11/18 0628   Pain, Acute   Related Risk Factors (Acute Pain) knowledge deficit;patient perception;positioning   Signs and Symptoms (Acute Pain) fatigue/weakness;sleep pattern alteration;verbalization of pain descriptors;questions meaning of pain     Goal: Acceptable Pain Control/Comfort Level  Patient  will demonstrate the desired outcomes by discharge/transition of care.  Outcome: Ongoing (interventions implemented as appropriate)   09/11/18 0628   Pain, Acute (Adult)   Acceptable Pain Control/Comfort Level making progress toward outcome

## 2018-09-11 NOTE — NURSING TRANSFER
Nursing Transfer Note      9/10/2018     Transfer From: ED    Transfer via bed    Transfer with  to O2, cardiac monitoring    Transported by transporter    Medicines sent: no    Chart send with patient: Yes    Notified: daughter    Patient reassessed at: switching shifts will be reassessed on next shift    Upon arrival to floor: cardiac monitor applied, patient oriented to room, call bell in reach and bed in lowest position

## 2018-09-11 NOTE — HPI
"86 y.o. female with past medical history DM, HTN, and GERD who presents after falling at home. Patient states she was ambulating with her walker to the bathroom when she suddenly felt dizzy causing her to fall on her buttocks and head. No LOC. She did not feel dizzy after getting out of bed. Denies neck pain.    Pt reports a hx of chr AF ("for years") on warfarin, but has never seen a cardiologist.  She reports a nonsyncopal fall yesterday.  Was noted to initially be in AF with controlled VR, but then developed RVR for which she was hospitalized.  RVR persists this am, although pt reports only minimal sxs (palps).  "

## 2018-09-11 NOTE — PROGRESS NOTES
Dr. Garcia paged again. This time it is regarding patient PT/INR level. It is elevated INR 3.3, trying to see if we should home scheduled dose of coumadin. Pharmacy also stated they called and still awaiting a call back. Will continue to wait or will call Dr. Kline as back up.

## 2018-09-11 NOTE — CONSULTS
"[facility]  Cardiology  Consult Note    Patient Name: Vashti Muñoz  MRN: [id number]  Admission Date: 9/10/2018  Hospital Length of Stay: 0 days  Code Status: Full Code   Attending Provider: Harish Garcia MD   Consulting Provider: Ede Kline MD  Primary Care Physician: Harish Garcia MD  Principal Problem:Atrial fibrillation with rapid ventricular response    Patient information was obtained from patient and ER records.     Inpatient consult to Cardiology  Consult performed by: Ede Kline MD  Consult ordered by: Harish Garcia MD  Reason for consult: AF/RVR        Subjective:     Chief Complaint:  Fall     HPI:   86 y.o. female with past medical history DM, HTN, and GERD who presents after falling at home. Patient states she was ambulating with her walker to the bathroom when she suddenly felt dizzy causing her to fall on her buttocks and head. No LOC. She did not feel dizzy after getting out of bed. Denies neck pain.    Pt reports a hx of chr AF ("for years") on warfarin, but has never seen a cardiologist.  She reports a nonsyncopal fall yesterday.  Was noted to initially be in AF with controlled VR, but then developed RVR for which she was hospitalized.  RVR persists this am, although pt reports only minimal sxs (palps).    Past Medical History:   Diagnosis Date    Allergy     Anticoagulant long-term use     Coumadin    Anxiety     Arthritis     Atrial fibrillation     Cataract     Closed head injury 09/10/2018    Clotting disorder     Depression     Diabetes mellitus     Encounter for blood transfusion     Fall 09/10/2018    GERD (gastroesophageal reflux disease)     Heart murmur     [place] (hard of hearing)     wears bilateral hearing aids    Hypertension     Thyroid disease     Uses roller walker        Past Surgical History:   Procedure Laterality Date    HYSTERECTOMY      JOINT REPLACEMENT Right     Hip    TONSILLECTOMY      TOTAL HIP ARTHROPLASTY " Left        Review of patient's allergies indicates:   Allergen Reactions    Sulfa (sulfonamide antibiotics) Hives       No current facility-administered medications on file prior to encounter.      Current Outpatient Medications on File Prior to Encounter   Medication Sig    escitalopram oxalate (LEXAPRO) 10 MG tablet Take 1 tablet (10 mg total) by mouth once daily.    gabapentin (NEURONTIN) 300 MG capsule Take 300 mg by mouth 2 (two) times daily.    levothyroxine 137 mcg Cap Take 1 tablet by mouth once daily.    lisinopril (PRINIVIL,ZESTRIL) 40 MG tablet Take 1 tablet (40 mg total) by mouth once daily.    LORazepam (ATIVAN) 0.5 MG tablet Take 0.5 mg by mouth 2 (two) times daily. Take half in the am and a whole at bedtime    metFORMIN (GLUCOPHAGE) 500 MG tablet Take 500 mg by mouth 2 (two) times daily with meals.    metoprolol succinate (TOPROL-XL) 100 MG 24 hr tablet Take 100 mg by mouth once daily.    omeprazole (PRILOSEC) 20 MG capsule Take 20 mg by mouth once daily.    simvastatin (ZOCOR) 40 MG tablet Take 40 mg by mouth every evening.    warfarin (COUMADIN) 5 MG tablet Take 5 mg by mouth. Only  5 ml On mon and thur. 4 ml tu, wed, fri sat and sun.     Family History     Problem Relation (Age of Onset)    Diabetes Father, Sister, Brother    No Known Problems Mother    Stroke Father        Tobacco Use    Smoking status: Former Smoker     Last attempt to quit: 1988     Years since quittin.2    Smokeless tobacco: Never Used   Substance and Sexual Activity    Alcohol use: Yes     Alcohol/week: 1.8 - 2.4 oz     Types: 1 Glasses of wine, 2 - 3 Standard drinks or equivalent per week    Drug use: No    Sexual activity: Not on file     Review of Systems   Constitution: Negative for chills, diaphoresis, fever, weakness and malaise/fatigue.   HENT: Negative for nosebleeds.    Eyes: Negative for blurred vision and double vision.   Cardiovascular: Positive for palpitations. Negative for chest  pain, claudication, cyanosis, dyspnea on exertion, leg swelling, orthopnea, paroxysmal nocturnal dyspnea and syncope.   Respiratory: Negative for cough, shortness of breath and wheezing.    Skin: Negative for dry skin and poor wound healing.   Musculoskeletal: Negative for back pain, joint swelling and myalgias.   Gastrointestinal: Negative for abdominal pain, nausea and vomiting.   Genitourinary: Negative for hematuria.   Neurological: Negative for dizziness, headaches, numbness and seizures.   Psychiatric/Behavioral: Negative for altered mental status and depression.     Objective:     Vital Signs (Most Recent):  Temp: 97.8 °F (36.6 °C) (09/11/18 0714)  Pulse: (!) 118 (09/11/18 0714)  Resp: 18 (09/11/18 0714)  BP: (!) 129/93 (09/11/18 0714)  SpO2: 98 % (09/11/18 0714) Vital Signs (24h Range):  Temp:  [97.5 °F (36.4 °C)-98.6 °F (37 °C)] 97.8 °F (36.6 °C)  Pulse:  [] 118  Resp:  [16-20] 18  SpO2:  [91 %-100 %] 98 %  BP: (129-209)/() 129/93     Weight: 84.3 kg (185 lb 13.6 oz)  Body mass index is 33.99 kg/m².    SpO2: 98 %  O2 Device (Oxygen Therapy): nasal cannula      Intake/Output Summary (Last 24 hours) at 9/11/2018 0910  Last data filed at 9/11/2018 0600  Gross per 24 hour   Intake 240 ml   Output 700 ml   Net -460 ml       Lines/Drains/Airways     Peripheral Intravenous Line                 Peripheral IV - Single Lumen 09/10/18 1446 Left Hand less than 1 day                Physical Exam   Constitutional: She is oriented to person, place, and time. She appears well-developed and well-nourished. No distress.   HENT:   Head: Normocephalic and atraumatic.   Mouth/Throat: No oropharyngeal exudate.   Eyes: Conjunctivae and EOM are normal. Pupils are equal, round, and reactive to light. No scleral icterus.   Neck: Normal range of motion. Neck supple. No JVD present. No tracheal deviation present. No thyromegaly present.   Cardiovascular: S1 normal and S2 normal. An irregularly irregular rhythm present.  Tachycardia present. Exam reveals no gallop and no friction rub.   Murmur heard.   Systolic murmur is present with a grade of 2/6.  Pulmonary/Chest: Effort normal and breath sounds normal. No respiratory distress. She has no wheezes. She has no rales. She exhibits no tenderness.   Abdominal: Soft. She exhibits no distension.   Musculoskeletal: Normal range of motion. She exhibits no edema.   Neurological: She is alert and oriented to person, place, and time. No cranial nerve deficit.   Skin: Skin is warm and dry. She is not diaphoretic.   Psychiatric: She has a normal mood and affect. Her behavior is normal. Judgment normal.       Current Medications:   amiodarone  100 mg Oral BID    escitalopram oxalate  10 mg Oral Daily    gabapentin  300 mg Oral BID    levothyroxine  137 mcg Oral Before breakfast    lisinopril  40 mg Oral Daily    LORazepam  0.5 mg Oral BID    metoprolol succinate  100 mg Oral Daily    pantoprazole  40 mg Oral Daily    simvastatin  40 mg Oral QHS    warfarin  5 mg Oral Daily       acetaminophen, dextrose 50%, dextrose 50%, glucagon (human recombinant), glucose, glucose, insulin aspart U-100    Laboratory:  CBC:  Recent Labs   Lab  07/05/18   1430  09/10/18   1119  09/11/18   0551   WHITE BLOOD CELL COUNT  3.7  4.42  3.45 L   HEMOGLOBIN  12.1  11.4 L  10.7 L   HEMATOCRIT  38.6  35.2 L  34.2 L   PLATELETS  161  145 L  126 L       CHEMISTRIES:  Recent Labs   Lab  07/05/18   1430  09/10/18   1119  09/11/18   0551   GLUCOSE  126 H  106  109   SODIUM  138  136  136   POTASSIUM  4.8  4.8  4.0   BUN BLD  20  13  12   CREATININE  1.07 H  0.9  0.8   EGFR IF    --   >60  >60   EGFR IF NON-  47 L  58 A  >60   CALCIUM  9.7  10.0  9.7       CARDIAC BIOMARKERS:  Recent Labs   Lab  09/10/18   1720  09/10/18   2334  09/11/18   0551   TROPONIN I  <0.006  <0.006  <0.006       COAGS:  Recent Labs   Lab  07/05/18   1430  07/11/18   1201  07/19/18   1408  09/10/18   1446   INR   5.1 >  1.5 H  2.0 H  2.7 H       LIPIDS/LFTS:  Recent Labs   Lab  07/05/18   1430  09/10/18   1119  09/11/18   0551   AST  18  17  13   ALT  12  14  12       BNP:        TSH:  Recent Labs   Lab  07/05/18   1429   TSH  4.690 H       Free T4:        Diagnostic Results:  ECG (personally reviewed tracings):  9/10/18 1045 AF 72, PRWP (no prior tracings)    Echo: 7/9/18    1 - Normal left ventricular systolic function (EF 55-60%).     2 - Concentric remodeling.     3 - Mild left atrial enlargement.     4 - Pulmonary hypertension. The estimated PA systolic pressure is 44 mmHg.     5 - Mild to moderate aortic stenosis, RAD = 1.3 cm2, AVAi = 0.69 cm2/m2, peak velocity = 2.92 m/s, mean gradient = 17 mmHg.     6 - Moderate mitral regurgitation.     7 - Mild tricuspid regurgitation.       Assessment and Plan:     * Atrial fibrillation with rapid ventricular response    Has chronic AF on warfarin and amio  Will stop amio given chronic AF and attempt rate control with inc doses of toprol  Cont warfarin  No plan for repeat echo or WHITNEY/DCCV  Check TSH  Likely home in 24 hrs assuming better rate control of AF        DM type 2, controlled, with complication    Per IM        Chronic anticoagulation    Goal INR 2.0-3.0        HTN, goal below 140/90    Cont med rx        Fall    No LOC, pt reports slip and fall.            VTE Risk Mitigation (From admission, onward)        Ordered     warfarin (COUMADIN) tablet 5 mg  Daily      09/10/18 1700          Thank you for your consult. I will follow-up with patient. Please contact us if you have any additional questions.    Ede Kline MD  Cardiology   [facility]

## 2018-09-11 NOTE — PROGRESS NOTES
Patient transport to tele floor to room 340A. Will notify family and reporting off to oncoming nurse RADHA Green.

## 2018-09-11 NOTE — CONSULTS
Discharge planning assessment completed with patient's assistance.  Patient from home with daughter and son in law.  Patient used a walker to assist with ambulation and also has a cane.  Patient reported that she moved here from [place] about three months ago.  She has seen Dr. Garcia once.  She will continue to f/u with him.  Preferred pharmacy is [facility] but she's not certain which location.         09/10/18 1705   Discharge Assessment   Assessment Type Discharge Planning Assessment   Confirmed/corrected address and phone number on facesheet? Yes   Assessment information obtained from? Patient   Communicated expected length of stay with patient/caregiver no   Prior to hospitilization cognitive status: Alert/Oriented   Prior to hospitalization functional status: Assistive Equipment   Current cognitive status: Alert/Oriented   Current Functional Status: Assistive Equipment   Facility Arrived From: home   Lives With child(jose), adult  (daughter and son in law)   Able to Return to Prior Arrangements yes   Is patient able to care for self after discharge? Yes  (Assistance from family.)   Who are your caregiver(s) and their phone number(s)? Sariah Hernandes; [phone]; daughter   or son in law; Erick Hernandes; [phone]   Patient's perception of discharge disposition (TBD)   Readmission Within The Last 30 Days no previous admission in last 30 days   Patient currently being followed by outpatient case management? No   Patient currently receives any other outside agency services? No   Equipment Currently Used at Home walker, rolling;cane, straight   Do you have any problems affording any of your prescribed medications? No  (Not sure which [facility] her daughter uses.)   Is the patient taking medications as prescribed? yes   Does the patient have transportation home? Yes   Transportation Available family or friend will provide   Dialysis Name and Scheduled days n/a   Does the patient receive services at the Coumadin  Clinic? No   Discharge Plan A Home with family   Discharge Plan B Home Health   Patient/Family In Agreement With Plan no   Maryanne Barnhart LMSW, NELLY-DAVID, CCM  9/10/2018

## 2018-09-11 NOTE — PROGRESS NOTES
Report received from off going nurse, RADHA Green. Patient AAO. No signs of distress noted. Call light in reach. Bed low and locked. Will continue to monitor.

## 2018-09-11 NOTE — PLAN OF CARE
Problem: Diabetes, Type 2 (Adult)  Intervention: Support/Optimize Psychosocial Response to Condition   09/11/18 1418   Coping/Psychosocial Interventions   Supportive Measures active listening utilized   Environmental Support calm environment promoted     Intervention: Optimize Glycemic Control   09/11/18 1418   Nutrition Interventions   Glycemic Management blood glucose monitoring       Goal: Signs and Symptoms of Listed Potential Problems Will be Absent, Minimized or Managed (Diabetes, Type 2)  Signs and symptoms of listed potential problems will be absent, minimized or managed by discharge/transition of care (reference Diabetes, Type 2 (Adult) CPG).  Outcome: Ongoing (interventions implemented as appropriate)   09/11/18 1418   Diabetes, Type 2   Problems Assessed (Type 2 Diabetes) all   Problems Present (Type 2 Diabetes) none

## 2018-09-11 NOTE — NURSING
Received report from RAUL Rivera. The pt is lying in bed talking with her family at the bedside. Tele monitor in progress with alarms set. Safety precautions maintained and bed locked in lowest position. Side rails up X2. Call bell and personal items within reach. Will continue to monitor pt for any changes,

## 2018-09-11 NOTE — ASSESSMENT & PLAN NOTE
Has chronic AF on warfarin and amio  Will stop amio given chronic AF and attempt rate control with inc doses of toprol  Cont warfarin  No plan for repeat echo or WHITNEY/DCCV  Check TSH  Likely home in 24 hrs assuming better rate control of AF

## 2018-09-11 NOTE — SUBJECTIVE & OBJECTIVE
Past Medical History:   Diagnosis Date    Allergy     Anticoagulant long-term use     Coumadin    Anxiety     Arthritis     Atrial fibrillation     Cataract     Closed head injury 09/10/2018    Clotting disorder     Depression     Diabetes mellitus     Encounter for blood transfusion     Fall 09/10/2018    GERD (gastroesophageal reflux disease)     Heart murmur     [place] (hard of hearing)     wears bilateral hearing aids    Hypertension     Thyroid disease     Uses roller walker        Past Surgical History:   Procedure Laterality Date    HYSTERECTOMY      JOINT REPLACEMENT Right     Hip    TONSILLECTOMY      TOTAL HIP ARTHROPLASTY Left 2004       Review of patient's allergies indicates:   Allergen Reactions    Sulfa (sulfonamide antibiotics) Hives       No current facility-administered medications on file prior to encounter.      Current Outpatient Medications on File Prior to Encounter   Medication Sig    escitalopram oxalate (LEXAPRO) 10 MG tablet Take 1 tablet (10 mg total) by mouth once daily.    gabapentin (NEURONTIN) 300 MG capsule Take 300 mg by mouth 2 (two) times daily.    levothyroxine 137 mcg Cap Take 1 tablet by mouth once daily.    lisinopril (PRINIVIL,ZESTRIL) 40 MG tablet Take 1 tablet (40 mg total) by mouth once daily.    LORazepam (ATIVAN) 0.5 MG tablet Take 0.5 mg by mouth 2 (two) times daily. Take half in the am and a whole at bedtime    metFORMIN (GLUCOPHAGE) 500 MG tablet Take 500 mg by mouth 2 (two) times daily with meals.    metoprolol succinate (TOPROL-XL) 100 MG 24 hr tablet Take 100 mg by mouth once daily.    omeprazole (PRILOSEC) 20 MG capsule Take 20 mg by mouth once daily.    simvastatin (ZOCOR) 40 MG tablet Take 40 mg by mouth every evening.    warfarin (COUMADIN) 5 MG tablet Take 5 mg by mouth. Only  5 ml On mon and thur. 4 ml tues, wed, fri sat and sun.     Family History     Problem Relation (Age of Onset)    Diabetes Father, Sister, Brother    No  Known Problems Mother    Stroke Father        Tobacco Use    Smoking status: Former Smoker     Last attempt to quit: 1988     Years since quittin.2    Smokeless tobacco: Never Used   Substance and Sexual Activity    Alcohol use: Yes     Alcohol/week: 1.8 - 2.4 oz     Types: 1 Glasses of wine, 2 - 3 Standard drinks or equivalent per week    Drug use: No    Sexual activity: Not on file     Review of Systems   Constitution: Negative for chills, diaphoresis, fever, weakness and malaise/fatigue.   HENT: Negative for nosebleeds.    Eyes: Negative for blurred vision and double vision.   Cardiovascular: Positive for palpitations. Negative for chest pain, claudication, cyanosis, dyspnea on exertion, leg swelling, orthopnea, paroxysmal nocturnal dyspnea and syncope.   Respiratory: Negative for cough, shortness of breath and wheezing.    Skin: Negative for dry skin and poor wound healing.   Musculoskeletal: Negative for back pain, joint swelling and myalgias.   Gastrointestinal: Negative for abdominal pain, nausea and vomiting.   Genitourinary: Negative for hematuria.   Neurological: Negative for dizziness, headaches, numbness and seizures.   Psychiatric/Behavioral: Negative for altered mental status and depression.     Objective:     Vital Signs (Most Recent):  Temp: 97.8 °F (36.6 °C) (18)  Pulse: (!) 118 (18)  Resp: 18 (18)  BP: (!) 129/93 (18)  SpO2: 98 % (18) Vital Signs (24h Range):  Temp:  [97.5 °F (36.4 °C)-98.6 °F (37 °C)] 97.8 °F (36.6 °C)  Pulse:  [] 118  Resp:  [16-20] 18  SpO2:  [91 %-100 %] 98 %  BP: (129-209)/() 129/93     Weight: 84.3 kg (185 lb 13.6 oz)  Body mass index is 33.99 kg/m².    SpO2: 98 %  O2 Device (Oxygen Therapy): nasal cannula      Intake/Output Summary (Last 24 hours) at 2018 0910  Last data filed at 2018 0600  Gross per 24 hour   Intake 240 ml   Output 700 ml   Net -460 ml       Lines/Drains/Airways      Peripheral Intravenous Line                 Peripheral IV - Single Lumen 09/10/18 1446 Left Hand less than 1 day                Physical Exam   Constitutional: She is oriented to person, place, and time. She appears well-developed and well-nourished. No distress.   HENT:   Head: Normocephalic and atraumatic.   Mouth/Throat: No oropharyngeal exudate.   Eyes: Conjunctivae and EOM are normal. Pupils are equal, round, and reactive to light. No scleral icterus.   Neck: Normal range of motion. Neck supple. No JVD present. No tracheal deviation present. No thyromegaly present.   Cardiovascular: S1 normal and S2 normal. An irregularly irregular rhythm present. Tachycardia present. Exam reveals no gallop and no friction rub.   Murmur heard.   Systolic murmur is present with a grade of 2/6.  Pulmonary/Chest: Effort normal and breath sounds normal. No respiratory distress. She has no wheezes. She has no rales. She exhibits no tenderness.   Abdominal: Soft. She exhibits no distension.   Musculoskeletal: Normal range of motion. She exhibits no edema.   Neurological: She is alert and oriented to person, place, and time. No cranial nerve deficit.   Skin: Skin is warm and dry. She is not diaphoretic.   Psychiatric: She has a normal mood and affect. Her behavior is normal. Judgment normal.       Current Medications:   amiodarone  100 mg Oral BID    escitalopram oxalate  10 mg Oral Daily    gabapentin  300 mg Oral BID    levothyroxine  137 mcg Oral Before breakfast    lisinopril  40 mg Oral Daily    LORazepam  0.5 mg Oral BID    metoprolol succinate  100 mg Oral Daily    pantoprazole  40 mg Oral Daily    simvastatin  40 mg Oral QHS    warfarin  5 mg Oral Daily       acetaminophen, dextrose 50%, dextrose 50%, glucagon (human recombinant), glucose, glucose, insulin aspart U-100    Laboratory:  CBC:  Recent Labs   Lab  07/05/18   1430  09/10/18   1119  09/11/18   0551   WHITE BLOOD CELL COUNT  3.7  4.42  3.45 L   HEMOGLOBIN   12.1  11.4 L  10.7 L   HEMATOCRIT  38.6  35.2 L  34.2 L   PLATELETS  161  145 L  126 L       CHEMISTRIES:  Recent Labs   Lab  07/05/18   1430  09/10/18   1119  09/11/18   0551   GLUCOSE  126 H  106  109   SODIUM  138  136  136   POTASSIUM  4.8  4.8  4.0   BUN BLD  20  13  12   CREATININE  1.07 H  0.9  0.8   EGFR IF    --   >60  >60   EGFR IF NON-  47 L  58 A  >60   CALCIUM  9.7  10.0  9.7       CARDIAC BIOMARKERS:  Recent Labs   Lab  09/10/18   1720  09/10/18   2334  09/11/18   0551   TROPONIN I  <0.006  <0.006  <0.006       COAGS:  Recent Labs   Lab  07/05/18   1430  07/11/18   1201  07/19/18   1408  09/10/18   1446   INR  5.1 >  1.5 H  2.0 H  2.7 H       LIPIDS/LFTS:  Recent Labs   Lab  07/05/18   1430  09/10/18   1119  09/11/18   0551   AST  18  17  13   ALT  12  14  12       BNP:        TSH:  Recent Labs   Lab  07/05/18   1429   TSH  4.690 H       Free T4:        Diagnostic Results:  ECG (personally reviewed tracings):  9/10/18 1045 AF 72, PRWP (no prior tracings)    Echo: 7/9/18    1 - Normal left ventricular systolic function (EF 55-60%).     2 - Concentric remodeling.     3 - Mild left atrial enlargement.     4 - Pulmonary hypertension. The estimated PA systolic pressure is 44 mmHg.     5 - Mild to moderate aortic stenosis, RAD = 1.3 cm2, AVAi = 0.69 cm2/m2, peak velocity = 2.92 m/s, mean gradient = 17 mmHg.     6 - Moderate mitral regurgitation.     7 - Mild tricuspid regurgitation.

## 2018-09-11 NOTE — PROGRESS NOTES
Dr. Oro office called to pauline CABRERA. Message left to inform MD of patient current code status and need of order change, paperwork sent over from family. Awaiting call back.

## 2018-09-12 VITALS
TEMPERATURE: 98 F | RESPIRATION RATE: 18 BRPM | HEIGHT: 62 IN | SYSTOLIC BLOOD PRESSURE: 139 MMHG | BODY MASS INDEX: 34.2 KG/M2 | WEIGHT: 185.88 LBS | DIASTOLIC BLOOD PRESSURE: 76 MMHG | OXYGEN SATURATION: 99 % | HEART RATE: 93 BPM

## 2018-09-12 LAB
INR PPP: 2.4
POCT GLUCOSE: 131 MG/DL (ref 70–110)
POCT GLUCOSE: 161 MG/DL (ref 70–110)
POCT GLUCOSE: 183 MG/DL (ref 70–110)
PROTHROMBIN TIME: 25.1 SEC

## 2018-09-12 PROCEDURE — 36415 COLL VENOUS BLD VENIPUNCTURE: CPT

## 2018-09-12 PROCEDURE — 25000003 PHARM REV CODE 250: Performed by: EMERGENCY MEDICINE

## 2018-09-12 PROCEDURE — 27000221 HC OXYGEN, UP TO 24 HOURS

## 2018-09-12 PROCEDURE — 94761 N-INVAS EAR/PLS OXIMETRY MLT: CPT

## 2018-09-12 PROCEDURE — G0378 HOSPITAL OBSERVATION PER HR: HCPCS

## 2018-09-12 PROCEDURE — 25000003 PHARM REV CODE 250: Performed by: INTERNAL MEDICINE

## 2018-09-12 PROCEDURE — 99213 OFFICE O/P EST LOW 20 MIN: CPT | Mod: ,,, | Performed by: INTERNAL MEDICINE

## 2018-09-12 PROCEDURE — 85610 PROTHROMBIN TIME: CPT

## 2018-09-12 RX ORDER — METOPROLOL SUCCINATE 200 MG/1
400 TABLET, EXTENDED RELEASE ORAL DAILY
Qty: 60 TABLET | Refills: 11 | Status: ON HOLD | OUTPATIENT
Start: 2018-09-13 | End: 2020-01-09 | Stop reason: HOSPADM

## 2018-09-12 RX ORDER — METOPROLOL SUCCINATE 50 MG/1
400 TABLET, EXTENDED RELEASE ORAL DAILY
Status: DISCONTINUED | OUTPATIENT
Start: 2018-09-12 | End: 2018-09-12 | Stop reason: HOSPADM

## 2018-09-12 RX ADMIN — PANTOPRAZOLE SODIUM 40 MG: 40 TABLET, DELAYED RELEASE ORAL at 10:09

## 2018-09-12 RX ADMIN — METOPROLOL SUCCINATE 400 MG: 50 TABLET, EXTENDED RELEASE ORAL at 06:09

## 2018-09-12 RX ADMIN — LISINOPRIL 40 MG: 20 TABLET ORAL at 10:09

## 2018-09-12 RX ADMIN — LEVOTHYROXINE SODIUM 137 MCG: 25 TABLET ORAL at 06:09

## 2018-09-12 RX ADMIN — ACETAMINOPHEN 650 MG: 325 TABLET, FILM COATED ORAL at 06:09

## 2018-09-12 RX ADMIN — GABAPENTIN 300 MG: 300 CAPSULE ORAL at 10:09

## 2018-09-12 RX ADMIN — ESCITALOPRAM OXALATE 10 MG: 10 TABLET ORAL at 10:09

## 2018-09-12 RX ADMIN — LORAZEPAM 0.5 MG: 0.5 TABLET ORAL at 10:09

## 2018-09-12 NOTE — PLAN OF CARE
Problem: Diabetes, Type 2 (Adult)  Goal: Signs and Symptoms of Listed Potential Problems Will be Absent, Minimized or Managed (Diabetes, Type 2)  Signs and symptoms of listed potential problems will be absent, minimized or managed by discharge/transition of care (reference Diabetes, Type 2 (Adult) CPG).  Outcome: Ongoing (interventions implemented as appropriate)   09/11/18 1418   Diabetes, Type 2   Problems Assessed (Type 2 Diabetes) all   Problems Present (Type 2 Diabetes) none       Problem: Fall Risk (Adult)  Goal: Identify Related Risk Factors and Signs and Symptoms  Related risk factors and signs and symptoms are identified upon initiation of Human Response Clinical Practice Guideline (CPG)  Outcome: Ongoing (interventions implemented as appropriate)   09/11/18 0628   Fall Risk   Related Risk Factors (Fall Risk) age-related changes;gait/mobility problems;fear of falling;fatigue/slow reaction;sleep pattern alteration;environment unfamiliar;objects hard to reach   Signs and Symptoms (Fall Risk) presence of risk factors     Goal: Absence of Falls  Patient will demonstrate the desired outcomes by discharge/transition of care.  Outcome: Ongoing (interventions implemented as appropriate)   09/11/18 0628   Fall Risk (Adult)   Absence of Falls making progress toward outcome       Problem: Pain, Acute (Adult)  Goal: Identify Related Risk Factors and Signs and Symptoms  Related risk factors and signs and symptoms are identified upon initiation of Human Response Clinical Practice Guideline (CPG)  Outcome: Ongoing (interventions implemented as appropriate)   09/11/18 0628   Pain, Acute   Related Risk Factors (Acute Pain) knowledge deficit;patient perception;positioning   Signs and Symptoms (Acute Pain) fatigue/weakness;sleep pattern alteration;verbalization of pain descriptors;questions meaning of pain     Goal: Acceptable Pain Control/Comfort Level  Patient will demonstrate the desired outcomes by discharge/transition of  care.  Outcome: Ongoing (interventions implemented as appropriate)   09/11/18 0628   Pain, Acute (Adult)   Acceptable Pain Control/Comfort Level making progress toward outcome

## 2018-09-12 NOTE — PLAN OF CARE
Problem: Fall Risk (Adult)  Goal: Identify Related Risk Factors and Signs and Symptoms  Related risk factors and signs and symptoms are identified upon initiation of Human Response Clinical Practice Guideline (CPG)  Outcome: Ongoing (interventions implemented as appropriate)   09/11/18 0628   Fall Risk   Related Risk Factors (Fall Risk) age-related changes;gait/mobility problems;fear of falling;fatigue/slow reaction;sleep pattern alteration;environment unfamiliar;objects hard to reach   Signs and Symptoms (Fall Risk) presence of risk factors     Goal: Absence of Falls  Patient will demonstrate the desired outcomes by discharge/transition of care.  Outcome: Ongoing (interventions implemented as appropriate)   09/11/18 0628   Fall Risk (Adult)   Absence of Falls making progress toward outcome       Problem: Pain, Acute (Adult)  Goal: Identify Related Risk Factors and Signs and Symptoms  Related risk factors and signs and symptoms are identified upon initiation of Human Response Clinical Practice Guideline (CPG)  Outcome: Ongoing (interventions implemented as appropriate)   09/11/18 0628   Pain, Acute   Related Risk Factors (Acute Pain) knowledge deficit;patient perception;positioning   Signs and Symptoms (Acute Pain) fatigue/weakness;sleep pattern alteration;verbalization of pain descriptors;questions meaning of pain

## 2018-09-12 NOTE — PLAN OF CARE
Problem: Diabetes, Type 2 (Adult)  Goal: Signs and Symptoms of Listed Potential Problems Will be Absent, Minimized or Managed (Diabetes, Type 2)  Signs and symptoms of listed potential problems will be absent, minimized or managed by discharge/transition of care (reference Diabetes, Type 2 (Adult) CPG).  Outcome: Ongoing (interventions implemented as appropriate)   09/12/18 1806   Diabetes, Type 2   Problems Assessed (Type 2 Diabetes) all   Problems Present (Type 2 Diabetes) none       Problem: Pressure Ulcer Risk (Bertrand Scale) (Adult,Obstetrics,Pediatric)  Goal: Skin Integrity  Patient will demonstrate the desired outcomes by discharge/transition of care.  Outcome: Ongoing (interventions implemented as appropriate)   09/12/18 1806   Pressure Ulcer Risk (Bertrand Scale) (Adult,Obstetrics,Pediatric)   Skin Integrity making progress toward outcome       Problem: Patient Care Overview  Goal: Plan of Care Review  Outcome: Ongoing (interventions implemented as appropriate)   09/12/18 1806   Coping/Psychosocial   Plan Of Care Reviewed With patient       Problem: Fall Risk (Adult)  Goal: Absence of Falls  Patient will demonstrate the desired outcomes by discharge/transition of care.  Outcome: Ongoing (interventions implemented as appropriate)   09/12/18 1806   Fall Risk (Adult)   Absence of Falls making progress toward outcome       Problem: Pain, Acute (Adult)  Goal: Acceptable Pain Control/Comfort Level  Patient will demonstrate the desired outcomes by discharge/transition of care.  Outcome: Ongoing (interventions implemented as appropriate)   09/12/18 1806   Pain, Acute (Adult)   Acceptable Pain Control/Comfort Level making progress toward outcome

## 2018-09-12 NOTE — HOSPITAL COURSE
9/11/18: adm with nonsyncopal fall.  Chr AF with inc VR noted.    Interval Hx: no cp/sob.  Case d/w RN.    Tele: AF -120s (pers rev)

## 2018-09-12 NOTE — PROGRESS NOTES
Patient awake, alert, oriented resting comfortably. Report given to oncoming nursePeter RN. 12hour chart check complete.

## 2018-09-12 NOTE — PROGRESS NOTES
Progress Note    Admit Date: 9/10/2018   LOS: 0 days     SUBJECTIVE:     Follow-up For:  AFib with RVR    09/12/178: feeling better. Denies cp, sob. Seen by cardiology      Scheduled Meds:   escitalopram oxalate  10 mg Oral Daily    gabapentin  300 mg Oral BID    levothyroxine  137 mcg Oral Before breakfast    lisinopril  40 mg Oral Daily    LORazepam  0.5 mg Oral BID    metoprolol succinate  400 mg Oral Daily    pantoprazole  40 mg Oral Daily    simvastatin  40 mg Oral QHS    warfarin  5 mg Oral Daily     Continuous Infusions:  PRN Meds:acetaminophen, dextrose 50%, dextrose 50%, glucagon (human recombinant), glucose, glucose, insulin aspart U-100    Review of patient's allergies indicates:   Allergen Reactions    Sulfa (sulfonamide antibiotics) Hives       Review of Systems    Constitutional: + fatigue   Eyes: no visual changes   ENT: no nasal congestion.    Respiratory: Intermittent sob  Cardiovascular: see HPI  Gastrointestinal: no n/v  Hematologic/Lymphatic: on chronic anticoagulation   Musculoskeletal: difficulty with ambulation   Neurological: memory impairment   Skin: No rashes or lesions  Psych: insomnia    OBJECTIVE:     Vital Signs (Most Recent)  Temp: 97.5 °F (36.4 °C) (09/12/18 0434)  Pulse: 78 (09/12/18 0434)  Resp: 18 (09/12/18 0434)  BP: (!) 169/83 (09/12/18 0434)  SpO2: 98 % (09/12/18 0434)    Vital Signs Range (Last 24H):  Temp:  [97.5 °F (36.4 °C)-99.7 °F (37.6 °C)]   Pulse:  []   Resp:  [18]   BP: (106-169)/(69-93)   SpO2:  [97 %-100 %]     I & O (Last 24H):    Intake/Output Summary (Last 24 hours) at 9/12/2018 0614  Last data filed at 9/11/2018 1800  Gross per 24 hour   Intake 350 ml   Output --   Net 350 ml     Physical Exam:    General: NAD, resting in bed  Head: normocephalic, atraumatic   Eyes: conjunctivae pink, anicteric sclera.   Throat: No erythema or post nasal discharge   Neck: supple, no LAD   Lungs: crackles at the bases    Heart: S1, S2, irregular, tachycardic, aortic  murmur   Abdomen: + BS x 4 quadrants, soft, non tender.    Extremities: tila leg edema    Skin: turgor normal, no erythema or rashes.   MS: move all extremities  Neuro: A&O x 2  Psy: calm     Laboratory:  CBC:   Recent Labs   Lab  09/11/18   0551   WBC  3.45*   RBC  3.81*   HGB  10.7*   HCT  34.2*   PLT  126*   MCV  90   MCH  28.1   MCHC  31.3*     CMP:   Recent Labs   Lab  09/11/18   0551   GLU  109   CALCIUM  9.7   ALBUMIN  3.6   PROT  6.6   NA  136   K  4.0   CO2  30*   CL  97   BUN  12   CREATININE  0.8   ALKPHOS  84   ALT  12   AST  13   BILITOT  0.7     Coagulation:   Recent Labs   Lab  09/12/18   0511   LABPROT  25.1*   INR  2.4*     ABGs: No results for input(s): PH, PCO2, PO2, HCO3, POCSATURATED, BE in the last 168 hours.  No results for input(s): COLORU, CLARITYU, SPECGRAV, PHUR, PROTEINUA, GLUCOSEU, BILIRUBINCON, BLOODU, WBCU, RBCU, BACTERIA, MUCUS, NITRITE, LEUKOCYTESUR, UROBILINOGEN, HYALINECASTS in the last 168 hours.    Diagnostic Results:      ASSESSMENT/PLAN:     Active Hospital Problems     Diagnosis   POA    *Atrial fibrillation with rapid ventricular response [I48.91]  - pt had been on BB and CCB- Diltazem was discontinued but family started pt back on it again?  - on long acting BB- continue   - pt's fall may be a syncopal episode   - start low dose of Amio  - On Coumadin -INR elevated- coumadin held   - Cardiology consulted: Increased dose of Toprolol XL         Yes    Fall [W19.XXXA]- no bleeding       Yes    Moderate dementia without behavioral disturbance [F03.90]  - aspiration and fall precaution    Yes    Closed head injury [S09.90XA]  - no acute pathology      Yes    Anxiety [F41.9]- intermittent - mostly due to progressive dementia      Yes    Ecchymosis [R58]- no bleeding      Yes    HTN, goal below 140/90 [I10]  - resume home medications      Yes    Chronic anticoagulation [Z79.01]  INR 3.3     Not Applicable    DM type 2, controlled, with complication [E11.8]  - hold off  Metformin      Yes       Resolved Hospital Problems   No resolved problems to display.      Hypothyroidism: - continue current dose of Levothyroxine      Doug LE edema: improving     Discussed with daughter: DC home today    Harish Garcia M.D  Internal Medicine & Geriatric Medicine  Hematology & Oncology  Palliative Medicine    [address] [facility], [address]  [place] [address]  [phone] (Office)  [phone] (Fax)

## 2018-09-12 NOTE — SUBJECTIVE & OBJECTIVE
Past Medical History:   Diagnosis Date    Allergy     Anticoagulant long-term use     Coumadin    Anxiety     Arthritis     Atrial fibrillation     Cataract     Closed head injury 09/10/2018    Clotting disorder     Depression     Diabetes mellitus     Encounter for blood transfusion     Fall 09/10/2018    GERD (gastroesophageal reflux disease)     Heart murmur     [place] (hard of hearing)     wears bilateral hearing aids    Hypertension     Thyroid disease     Uses roller walker        Past Surgical History:   Procedure Laterality Date    HYSTERECTOMY      JOINT REPLACEMENT Right     Hip    TONSILLECTOMY      TOTAL HIP ARTHROPLASTY Left 2004       Review of patient's allergies indicates:   Allergen Reactions    Sulfa (sulfonamide antibiotics) Hives       No current facility-administered medications on file prior to encounter.      Current Outpatient Medications on File Prior to Encounter   Medication Sig    escitalopram oxalate (LEXAPRO) 10 MG tablet Take 1 tablet (10 mg total) by mouth once daily.    gabapentin (NEURONTIN) 300 MG capsule Take 300 mg by mouth 2 (two) times daily.    levothyroxine 137 mcg Cap Take 1 tablet by mouth once daily.    lisinopril (PRINIVIL,ZESTRIL) 40 MG tablet Take 1 tablet (40 mg total) by mouth once daily.    LORazepam (ATIVAN) 0.5 MG tablet Take 0.5 mg by mouth 2 (two) times daily. Take half in the am and a whole at bedtime    metFORMIN (GLUCOPHAGE) 500 MG tablet Take 500 mg by mouth 2 (two) times daily with meals.    metoprolol succinate (TOPROL-XL) 100 MG 24 hr tablet Take 100 mg by mouth once daily.    omeprazole (PRILOSEC) 20 MG capsule Take 20 mg by mouth once daily.    simvastatin (ZOCOR) 40 MG tablet Take 40 mg by mouth every evening.    warfarin (COUMADIN) 5 MG tablet Take 5 mg by mouth. Only  5 ml On mon and thur. 4 ml tues, wed, fri sat and sun.     Family History     Problem Relation (Age of Onset)    Diabetes Father, Sister, Brother    No  Known Problems Mother    Stroke Father        Tobacco Use    Smoking status: Former Smoker     Last attempt to quit: 1988     Years since quittin.2    Smokeless tobacco: Never Used   Substance and Sexual Activity    Alcohol use: Yes     Alcohol/week: 1.8 - 2.4 oz     Types: 1 Glasses of wine, 2 - 3 Standard drinks or equivalent per week    Drug use: No    Sexual activity: Not on file     Review of Systems   Gastrointestinal: Negative for melena.   Genitourinary: Negative for hematuria.     Objective:     Vital Signs (Most Recent):  Temp: 97.5 °F (36.4 °C) (18)  Pulse: 78 (18)  Resp: 18 (18)  BP: (!) 169/83 (18)  SpO2: 98 % (18) Vital Signs (24h Range):  Temp:  [97.5 °F (36.4 °C)-99.7 °F (37.6 °C)] 97.5 °F (36.4 °C)  Pulse:  [] 78  Resp:  [18] 18  SpO2:  [97 %-100 %] 98 %  BP: (106-169)/(69-93) 169/83     Weight: 84.3 kg (185 lb 13.6 oz)  Body mass index is 33.99 kg/m².    SpO2: 98 %  O2 Device (Oxygen Therapy): nasal cannula      Intake/Output Summary (Last 24 hours) at 2018 0610  Last data filed at 2018 1800  Gross per 24 hour   Intake 350 ml   Output --   Net 350 ml       Lines/Drains/Airways     Peripheral Intravenous Line                 Peripheral IV - Single Lumen 09/10/18 1446 Left Hand 1 day                Physical Exam   Constitutional: She is oriented to person, place, and time. She appears well-developed and well-nourished. No distress.   HENT:   Head: Normocephalic and atraumatic.   Mouth/Throat: No oropharyngeal exudate.   Eyes: Conjunctivae and EOM are normal. Pupils are equal, round, and reactive to light. No scleral icterus.   Neck: Normal range of motion. Neck supple. No JVD present. No tracheal deviation present. No thyromegaly present.   Cardiovascular: S1 normal and S2 normal. An irregularly irregular rhythm present. Tachycardia present. Exam reveals no gallop and no friction rub.   Murmur heard.   Systolic  murmur is present with a grade of 2/6.  Pulmonary/Chest: Effort normal and breath sounds normal. No respiratory distress. She has no wheezes. She has no rales. She exhibits no tenderness.   Abdominal: Soft. She exhibits no distension.   Musculoskeletal: Normal range of motion. She exhibits no edema.   Neurological: She is alert and oriented to person, place, and time. No cranial nerve deficit.   Skin: Skin is warm and dry. She is not diaphoretic.   Psychiatric: She has a normal mood and affect. Her behavior is normal. Judgment normal.       Current Medications:   escitalopram oxalate  10 mg Oral Daily    gabapentin  300 mg Oral BID    levothyroxine  137 mcg Oral Before breakfast    lisinopril  40 mg Oral Daily    LORazepam  0.5 mg Oral BID    metoprolol succinate  200 mg Oral Daily    pantoprazole  40 mg Oral Daily    simvastatin  40 mg Oral QHS    warfarin  5 mg Oral Daily       acetaminophen, dextrose 50%, dextrose 50%, glucagon (human recombinant), glucose, glucose, insulin aspart U-100    Laboratory:  CBC:  Recent Labs   Lab  07/05/18   1430  09/10/18   1119  09/11/18   0551   WHITE BLOOD CELL COUNT  3.7  4.42  3.45 L   HEMOGLOBIN  12.1  11.4 L  10.7 L   HEMATOCRIT  38.6  35.2 L  34.2 L   PLATELETS  161  145 L  126 L       CHEMISTRIES:  Recent Labs   Lab  07/05/18   1430  09/10/18   1119  09/11/18   0551   GLUCOSE  126 H  106  109   SODIUM  138  136  136   POTASSIUM  4.8  4.8  4.0   BUN BLD  20  13  12   CREATININE  1.07 H  0.9  0.8   EGFR IF    --   >60  >60   EGFR IF NON-  47 L  58 A  >60   CALCIUM  9.7  10.0  9.7       CARDIAC BIOMARKERS:  Recent Labs   Lab  09/10/18   1720  09/10/18   2334  09/11/18   0551  09/11/18   1104  09/11/18   1658   TROPONIN I  <0.006  <0.006  <0.006  <0.006  0.018       COAGS:  Recent Labs   Lab  07/05/18   1430  07/11/18   1201  07/19/18   1408  09/10/18   1446  09/11/18   0932   INR  5.1 >  1.5 H  2.0 H  2.7 H  3.3 H        LIPIDS/LFTS:  Recent Labs   Lab  07/05/18   1430  09/10/18   1119  09/11/18   0551   AST  18  17  13   ALT  12  14  12       BNP:  Recent Labs   Lab  09/11/18   0932   BNP  327 H       TSH:  Recent Labs   Lab  07/05/18   1429  09/11/18   0932   TSH  4.690 H  0.452       Free T4:        Diagnostic Results:  ECG (personally reviewed tracings):  9/10/18 1045 AF 72, PRWP (no prior tracings)    Echo: 7/9/18    1 - Normal left ventricular systolic function (EF 55-60%).     2 - Concentric remodeling.     3 - Mild left atrial enlargement.     4 - Pulmonary hypertension. The estimated PA systolic pressure is 44 mmHg.     5 - Mild to moderate aortic stenosis, RAD = 1.3 cm2, AVAi = 0.69 cm2/m2, peak velocity = 2.92 m/s, mean gradient = 17 mmHg.     6 - Moderate mitral regurgitation.     7 - Mild tricuspid regurgitation.

## 2018-09-12 NOTE — ASSESSMENT & PLAN NOTE
Has chronic AF on warfarin and amio  Amio stopped given Chr AF  Cont warfarin  No plan for repeat echo or WHITNEY/DCCV  Titrate toprol XL to 400mg qd  Next drug: dilt CD +/- digoxin  Likely home in 24 hrs assuming better rate control of AF

## 2018-09-13 NOTE — DISCHARGE SUMMARY
[facility]  Discharge Summary      Patient Name: Vashti Muñoz  MRN: [id number]  Admission Date: 9/10/2018  Hospital Length of Stay: 0 days  Discharge Date and Time: 9/12/2018  6:30 PM  Attending Physician: No att. providers found   Discharging Provider: Harish Garcia MD  Primary Care Provider: Harish Garcia MD    HPI:     * No surgery found *     Hospital Course:     During this hospitalization, these following conditions were addressed and managed along with other comorbid conditions:      Active Hospital Problems     Diagnosis   POA    *Atrial fibrillation with rapid ventricular response [I48.91]  - pt had been on BB and CCB- Diltazem was discontinued but family started pt back on it again?  - on long acting BB- continue   - pt's fall may be a syncopal episode   - start low dose of Amio  - On Coumadin -INR elevated- coumadin held   - Cardiology consulted: Increased dose of Toprolol XL 400mg daily       Yes    Fall [W19.XXXA]- no bleeding       Yes    Moderate dementia without behavioral disturbance [F03.90]  - aspiration and fall precaution    Yes    Closed head injury [S09.90XA]  - no acute pathology      Yes    Anxiety [F41.9]- intermittent - mostly due to progressive dementia      Yes    Ecchymosis [R58]- no bleeding      Yes    HTN, goal below 140/90 [I10]  - resume home medications      Yes    Chronic anticoagulation [Z79.01]  INR 3.3     Not Applicable    DM type 2, controlled, with complication [E11.8]  - hold off Metformin      Yes       Resolved Hospital Problems   No resolved problems to display.      Hypothyroidism: - continue current dose of Levothyroxine      Doug LE edema: improving     Discussed with daughter: DC home today      Consults:   Consults (From admission, onward)        Status Ordering Provider     Inpatient consult to Cardiology  Once     Provider:  Ede Kline MD    Completed HARISH GARCIA     IP consult to case management  Once      Provider:  (Not yet assigned)    ELSLEY Maria          Significant Diagnostic Studies: see above    Pending Diagnostic Studies:     None        Final Active Diagnoses:    Diagnosis Date Noted POA    PRINCIPAL PROBLEM:  Atrial fibrillation with rapid ventricular response [I48.91] 09/11/2018 Yes    Chronic a-fib [I48.2] 09/11/2018 Yes    Fall [W19.XXXA] 09/11/2018 Yes    Moderate dementia without behavioral disturbance [F03.90] 09/11/2018 Yes    Closed head injury [S09.90XA] 09/10/2018 Yes    Anxiety [F41.9] 07/19/2018 Yes    Ecchymosis [R58] 07/05/2018 Yes    HTN, goal below 140/90 [I10] 07/05/2018 Yes    Chronic anticoagulation [Z79.01] 07/05/2018 Not Applicable    DM type 2, controlled, with complication [E11.8] 07/05/2018 Yes      Problems Resolved During this Admission:      Discharged Condition: stable    Disposition: Home or Self Care     Activity: as tolerated- fall precaution    Diet: Cardiac, diabetic     Follow Up:  Follow-up Information     Harish Garcia MD On 9/17/2018.    Specialties:  Internal Medicine, Oncology, Hematology and Oncology  Why:  @11:00am, for Hospital Follow up  Contact information:  [address]  [address]  [place] [id number]  [phone]             Ede Kline MD On 10/1/2018.    Specialties:  Cardiology, INTERVENTIONAL CARDIOLOGY  Why:  @9:20am, for Cardiology follow up  Contact information:  [address] [facility] [address]  [address]  [place] [id number]  [phone]                 Patient Instructions:   No discharge procedures on file.  Medications:  Reconciled Home Medications:      Medication List      CHANGE how you take these medications    metoprolol succinate 200 MG 24 hr tablet  Commonly known as:  TOPROL-XL  Take 2 tablets (400 mg total) by mouth once daily.  What changed:    · medication strength  · how much to take        CONTINUE taking these medications    escitalopram oxalate 10 MG tablet  Commonly known as:  LEXAPRO  Take 1 tablet (10 mg  total) by mouth once daily.     gabapentin 300 MG capsule  Commonly known as:  NEURONTIN  Take 300 mg by mouth 2 (two) times daily.     levothyroxine 137 mcg Cap  Commonly known as:  TIROSINT  Take 1 tablet by mouth once daily.     lisinopril 40 MG tablet  Commonly known as:  PRINIVIL,ZESTRIL  Take 1 tablet (40 mg total) by mouth once daily.     LORazepam 0.5 MG tablet  Commonly known as:  ATIVAN  Take 0.5 mg by mouth 2 (two) times daily. Take half in the am and a whole at bedtime     metFORMIN 500 MG tablet  Commonly known as:  GLUCOPHAGE  Take 500 mg by mouth 2 (two) times daily with meals.     omeprazole 20 MG capsule  Commonly known as:  PRILOSEC  Take 20 mg by mouth once daily.     simvastatin 40 MG tablet  Commonly known as:  ZOCOR  Take 40 mg by mouth every evening.     warfarin 5 MG tablet  Commonly known as:  COUMADIN  Take 5 mg by mouth. Only  5 ml On mon and thur. 4 ml tues, wed, fri sat and sun.            Harish Garcia MD  Hematology/Oncology  [facility]

## 2018-10-01 ENCOUNTER — ANTI-COAG VISIT (OUTPATIENT)
Dept: CARDIOLOGY | Facility: CLINIC | Age: 83
End: 2018-10-01

## 2018-10-01 ENCOUNTER — OFFICE VISIT (OUTPATIENT)
Dept: CARDIOLOGY | Facility: CLINIC | Age: 83
End: 2018-10-01
Payer: COMMERCIAL

## 2018-10-01 VITALS
SYSTOLIC BLOOD PRESSURE: 104 MMHG | OXYGEN SATURATION: 91 % | WEIGHT: 185.88 LBS | BODY MASS INDEX: 33.99 KG/M2 | HEART RATE: 86 BPM | DIASTOLIC BLOOD PRESSURE: 68 MMHG | RESPIRATION RATE: 16 BRPM

## 2018-10-01 DIAGNOSIS — Z79.01 LONG TERM (CURRENT) USE OF ANTICOAGULANTS: ICD-10-CM

## 2018-10-01 DIAGNOSIS — I48.20 CHRONIC ATRIAL FIBRILLATION: ICD-10-CM

## 2018-10-01 DIAGNOSIS — I48.20 CHRONIC ATRIAL FIBRILLATION: Primary | ICD-10-CM

## 2018-10-01 DIAGNOSIS — E66.9 NON MORBID OBESITY, UNSPECIFIED OBESITY TYPE: ICD-10-CM

## 2018-10-01 DIAGNOSIS — E78.2 MIXED HYPERLIPIDEMIA: ICD-10-CM

## 2018-10-01 DIAGNOSIS — Z79.01 CHRONIC ANTICOAGULATION: ICD-10-CM

## 2018-10-01 DIAGNOSIS — R60.9 EDEMA, UNSPECIFIED TYPE: ICD-10-CM

## 2018-10-01 DIAGNOSIS — I10 HTN, GOAL BELOW 140/90: ICD-10-CM

## 2018-10-01 DIAGNOSIS — E11.8 DM TYPE 2, CONTROLLED, WITH COMPLICATION: ICD-10-CM

## 2018-10-01 PROBLEM — I48.91 ATRIAL FIBRILLATION WITH RAPID VENTRICULAR RESPONSE: Status: RESOLVED | Noted: 2018-09-11 | Resolved: 2018-10-01

## 2018-10-01 PROCEDURE — 93010 ELECTROCARDIOGRAM REPORT: CPT | Mod: S$PBB,,, | Performed by: INTERNAL MEDICINE

## 2018-10-01 PROCEDURE — 93005 ELECTROCARDIOGRAM TRACING: CPT | Mod: PBBFAC | Performed by: INTERNAL MEDICINE

## 2018-10-01 PROCEDURE — 99214 OFFICE O/P EST MOD 30 MIN: CPT | Mod: S$PBB,,, | Performed by: INTERNAL MEDICINE

## 2018-10-01 PROCEDURE — 99999 PR PBB SHADOW E&M-EST. PATIENT-LVL III: CPT | Mod: PBBFAC,,, | Performed by: INTERNAL MEDICINE

## 2018-10-01 PROCEDURE — 99213 OFFICE O/P EST LOW 20 MIN: CPT | Mod: PBBFAC,25 | Performed by: INTERNAL MEDICINE

## 2018-10-01 NOTE — PROGRESS NOTES
85yo F with PMHx: Afib, clotting disorder, hx of Falls, GERD, heart murmur, anxiety, arthritis, cataract, closed head injury, depression, DM, hard of hearing, aortic stenosis, dementia and thyroid disease.    Per her med card, she was Warfarin 5mg tablets.  LMFCB - 10:42am  No answer - 2:46pm

## 2018-10-01 NOTE — PROGRESS NOTES
CARDIOVASCULAR PROGRESS NOTE    REASON FOR CONSULT:   Vashti Muñoz is a 86 y.o. female who presents for follow up of Chr AF, AS.    PCP: Radha  HISTORY OF PRESENT ILLNESS:   The patient returns for follow-up.  She was recently hospitalized with a nonsyncopal fall and found to be in atrial fibrillation.  She continues to use warfarin without any complication.  She denies any angina or dyspnea.  There has been no palpitations, lightheadedness, dizziness, or syncope.  She denies PND, orthopnea, melena, hematuria, or claudicant symptoms.  She does report somewhat recent onset left lower extremity edema. She is accompanied by family member to today's office visit.    CARDIOVASCULAR HISTORY:   AF, chronic on warfarin  AS, mod (echo 7/2018)    PAST MEDICAL HISTORY:     Past Medical History:   Diagnosis Date    Allergy     Anticoagulant long-term use     Coumadin    Anxiety     Arthritis     Atrial fibrillation     Cataract     Closed head injury 09/10/2018    Clotting disorder     Depression     Diabetes mellitus     Encounter for blood transfusion     Fall 09/10/2018    GERD (gastroesophageal reflux disease)     Heart murmur     Elim IRA (hard of hearing)     wears bilateral hearing aids    Hypertension     Thyroid disease     Uses roller walker        PAST SURGICAL HISTORY:     Past Surgical History:   Procedure Laterality Date    HYSTERECTOMY      JOINT REPLACEMENT Right     Hip    TONSILLECTOMY      TOTAL HIP ARTHROPLASTY Left 2004       ALLERGIES AND MEDICATION:     Review of patient's allergies indicates:   Allergen Reactions    Sulfa (sulfonamide antibiotics) Hives        Medication List           Accurate as of 10/1/18  9:44 AM. If you have any questions, ask your nurse or doctor.               CONTINUE taking these medications    escitalopram oxalate 10 MG tablet  Commonly known as:  LEXAPRO  Take 1 tablet (10 mg total) by mouth once daily.     gabapentin 300 MG capsule  Commonly known as:   NEURONTIN     levothyroxine 137 mcg Cap  Commonly known as:  TIROSINT     lisinopril 40 MG tablet  Commonly known as:  PRINIVIL,ZESTRIL  Take 1 tablet (40 mg total) by mouth once daily.     LORazepam 0.5 MG tablet  Commonly known as:  ATIVAN     metFORMIN 500 MG tablet  Commonly known as:  GLUCOPHAGE     metoprolol succinate 200 MG 24 hr tablet  Commonly known as:  TOPROL-XL  Take 2 tablets (400 mg total) by mouth once daily.     omeprazole 20 MG capsule  Commonly known as:  PRILOSEC     simvastatin 40 MG tablet  Commonly known as:  ZOCOR     warfarin 5 MG tablet  Commonly known as:  COUMADIN              SOCIAL HISTORY:     Social History     Socioeconomic History    Marital status:      Spouse name: Not on file    Number of children: 2    Years of education: 12    Highest education level: Not on file   Social Needs    Financial resource strain: Not on file    Food insecurity - worry: Not on file    Food insecurity - inability: Not on file    Transportation needs - medical: Not on file    Transportation needs - non-medical: Not on file   Occupational History    Occupation: retired     Comment: Cook   Tobacco Use    Smoking status: Former Smoker     Last attempt to quit: 1988     Years since quittin.2    Smokeless tobacco: Never Used   Substance and Sexual Activity    Alcohol use: Yes     Alcohol/week: 1.8 - 2.4 oz     Types: 1 Glasses of wine, 2 - 3 Standard drinks or equivalent per week    Drug use: No    Sexual activity: Not on file   Other Topics Concern    Not on file   Social History Narrative    Not on file       FAMILY HISTORY:     Family History   Problem Relation Age of Onset    No Known Problems Mother     Diabetes Father     Stroke Father     Diabetes Sister     Diabetes Brother        REVIEW OF SYSTEMS:   Review of Systems   Constitutional: Negative for chills, diaphoresis and fever.   HENT: Negative for nosebleeds.    Eyes: Negative for blurred vision, double  vision and photophobia.   Respiratory: Negative for hemoptysis, shortness of breath and wheezing.    Cardiovascular: Positive for leg swelling. Negative for chest pain, palpitations, orthopnea, claudication and PND.   Gastrointestinal: Negative for abdominal pain, blood in stool, heartburn, melena, nausea and vomiting.   Genitourinary: Negative for flank pain and hematuria.   Musculoskeletal: Negative for falls, myalgias and neck pain.   Skin: Negative for rash.   Neurological: Negative for dizziness, seizures, loss of consciousness, weakness and headaches.   Endo/Heme/Allergies: Negative for polydipsia. Does not bruise/bleed easily.   Psychiatric/Behavioral: Negative for depression and memory loss. The patient is not nervous/anxious.        PHYSICAL EXAM:     Vitals:    10/01/18 0926   BP: 104/68   Pulse: 86   Resp: 16    Body mass index is 33.99 kg/m².  Weight: 84.3 kg (185 lb 13.6 oz)         Physical Exam   Constitutional: She is oriented to person, place, and time. She appears well-developed and well-nourished. She is cooperative.  Non-toxic appearance. No distress.   HENT:   Head: Normocephalic and atraumatic.   Eyes: Conjunctivae and EOM are normal. Pupils are equal, round, and reactive to light. No scleral icterus.   Neck: Trachea normal and normal range of motion. Neck supple. Normal carotid pulses and no JVD present. Carotid bruit is not present. No neck rigidity. No tracheal deviation and no edema present. No thyromegaly present.   Cardiovascular: Normal rate, S1 normal and S2 normal. An irregularly irregular rhythm present. PMI is not displaced. Exam reveals no gallop and no friction rub.   No murmur heard.  Pulses:       Carotid pulses are 2+ on the right side, and 2+ on the left side.  Pulmonary/Chest: Effort normal and breath sounds normal. No stridor. No respiratory distress. She has no wheezes. She has no rales. She exhibits no tenderness.   Abdominal: Soft. She exhibits no distension. There is no  hepatosplenomegaly.   obese   Musculoskeletal: She exhibits no edema or tenderness.   Feet:   Right Foot:   Skin Integrity: Negative for ulcer.   Left Foot:   Skin Integrity: Negative for ulcer.   Neurological: She is alert and oriented to person, place, and time. No cranial nerve deficit.   Skin: Skin is warm and dry. No rash noted. No erythema.   Psychiatric: She has a normal mood and affect. Her speech is normal and behavior is normal.   Vitals reviewed.      DATA:   EKG: (personally reviewed tracing)  10/1/18 AF 86, PRWP/LAD, similar to 9/10/18    Laboratory:  CBC:  Recent Labs   Lab  07/05/18   1430  09/10/18   1119  09/11/18   0551   WHITE BLOOD CELL COUNT  3.7  4.42  3.45 L   HEMOGLOBIN  12.1  11.4 L  10.7 L   HEMATOCRIT  38.6  35.2 L  34.2 L   PLATELETS  161  145 L  126 L       CHEMISTRIES:  Recent Labs   Lab  07/05/18   1430  09/10/18   1119  09/11/18   0551   GLUCOSE  126 H  106  109   SODIUM  138  136  136   POTASSIUM  4.8  4.8  4.0   BUN BLD  20  13  12   CREATININE  1.07 H  0.9  0.8   EGFR IF    --   >60  >60   EGFR IF NON-  47 L  58 A  >60   CALCIUM  9.7  10.0  9.7       CARDIAC BIOMARKERS:  Recent Labs   Lab  09/11/18   0551  09/11/18   1104  09/11/18   1658   TROPONIN I  <0.006  <0.006  0.018       COAGS:  Recent Labs   Lab  09/10/18   1446  09/11/18   0932  09/12/18   0511   INR  2.7 H  3.3 H  2.4 H       LIPIDS/LFTS:  Recent Labs   Lab  07/05/18   1430  09/10/18   1119  09/11/18   0551   AST  18  17  13   ALT  12  14  12     Lab Results   Component Value Date    TSH 0.452 09/11/2018         Cardiovascular Testing:  Echo: 7/9/18    1 - Normal left ventricular systolic function (EF 55-60%).     2 - Concentric remodeling.     3 - Mild left atrial enlargement.     4 - Pulmonary hypertension. The estimated PA systolic pressure is 44 mmHg.     5 - Mild to moderate aortic stenosis, RAD = 1.3 cm2, AVAi = 0.69 cm2/m2, peak velocity = 2.92 m/s, mean gradient = 17 mmHg.     6 -  Moderate mitral regurgitation.     7 - Mild tricuspid regurgitation.     ASSESSMENT:   # AF, chronic, on coumadin, rates acceptable  # AS, mod  # HTN, controlled  # DM  # HLP on simva 40mg  # LLE edema  # BMI 34    PLAN:   Cont med rx  Enroll in coumadin clinic  Check LE venous US r/o DVT  RTC 6  Months or sooner prn  Repeat echo 7/2019      Ede Kline MD, FACC

## 2018-10-04 NOTE — PROGRESS NOTES
S/w daughter, Sariah, who reports patient is in the [facility] system and is being managed by them. They do not wish to change coumadin provider. She reports they were contacted today regarding recent INR by them and is active in their system. They were advised to contact Dr. Kline if anything changes and we will gladly enroll her into our services.

## 2019-02-12 ENCOUNTER — OFFICE VISIT (OUTPATIENT)
Dept: PAIN MEDICINE | Facility: CLINIC | Age: 84
End: 2019-02-12
Payer: COMMERCIAL

## 2019-02-12 ENCOUNTER — INITIAL CONSULT (OUTPATIENT)
Dept: OPHTHALMOLOGY | Facility: CLINIC | Age: 84
End: 2019-02-12
Payer: COMMERCIAL

## 2019-02-12 VITALS
SYSTOLIC BLOOD PRESSURE: 162 MMHG | DIASTOLIC BLOOD PRESSURE: 88 MMHG | BODY MASS INDEX: 35.75 KG/M2 | HEIGHT: 62 IN | WEIGHT: 194.25 LBS | TEMPERATURE: 98 F | HEART RATE: 66 BPM

## 2019-02-12 VITALS — SYSTOLIC BLOOD PRESSURE: 128 MMHG | HEART RATE: 67 BPM | DIASTOLIC BLOOD PRESSURE: 74 MMHG

## 2019-02-12 DIAGNOSIS — M48.061 SPINAL STENOSIS OF LUMBAR REGION WITHOUT NEUROGENIC CLAUDICATION: ICD-10-CM

## 2019-02-12 DIAGNOSIS — M54.50 LOW BACK PAIN, NON-SPECIFIC: ICD-10-CM

## 2019-02-12 DIAGNOSIS — H43.813 POSTERIOR VITREOUS DETACHMENT, BILATERAL: ICD-10-CM

## 2019-02-12 DIAGNOSIS — H35.3134 NONEXUDATIVE AGE-RELATED MACULAR DEGENERATION, BILATERAL, ADVANCED ATROPHIC WITH SUBFOVEAL INVOLVEMENT: ICD-10-CM

## 2019-02-12 DIAGNOSIS — M47.816 LUMBAR SPONDYLOSIS: ICD-10-CM

## 2019-02-12 DIAGNOSIS — H35.3232 EXUDATIVE AGE-RELATED MACULAR DEGENERATION OF BOTH EYES WITH INACTIVE CHOROIDAL NEOVASCULARIZATION: Primary | ICD-10-CM

## 2019-02-12 PROCEDURE — 92004 COMPRE OPH EXAM NEW PT 1/>: CPT | Mod: S$GLB,,, | Performed by: OPHTHALMOLOGY

## 2019-02-12 PROCEDURE — 92004 PR EYE EXAM, NEW PATIENT,COMPREHESV: ICD-10-PCS | Mod: S$GLB,,, | Performed by: OPHTHALMOLOGY

## 2019-02-12 PROCEDURE — 92134 CPTRZ OPH DX IMG PST SGM RTA: CPT | Mod: S$GLB,,, | Performed by: OPHTHALMOLOGY

## 2019-02-12 PROCEDURE — 92134 POSTERIOR SEGMENT OCT RETINA (OCULAR COHERENCE TOMOGRAPHY)-BOTH EYES: ICD-10-PCS | Mod: S$GLB,,, | Performed by: OPHTHALMOLOGY

## 2019-02-12 PROCEDURE — 99204 PR OFFICE/OUTPT VISIT, NEW, LEVL IV, 45-59 MIN: ICD-10-PCS | Mod: S$GLB,,, | Performed by: ANESTHESIOLOGY

## 2019-02-12 PROCEDURE — 99204 OFFICE O/P NEW MOD 45 MIN: CPT | Mod: S$GLB,,, | Performed by: ANESTHESIOLOGY

## 2019-02-12 PROCEDURE — 92225 PR SPECIAL EYE EXAM, INITIAL: CPT | Mod: RT,S$GLB,, | Performed by: OPHTHALMOLOGY

## 2019-02-12 PROCEDURE — 99999 PR PBB SHADOW E&M-EST. PATIENT-LVL IV: CPT | Mod: PBBFAC,,, | Performed by: ANESTHESIOLOGY

## 2019-02-12 PROCEDURE — 92225 PR SPECIAL EYE EXAM, INITIAL: ICD-10-PCS | Mod: RT,S$GLB,, | Performed by: OPHTHALMOLOGY

## 2019-02-12 PROCEDURE — 99999 PR PBB SHADOW E&M-EST. PATIENT-LVL IV: ICD-10-PCS | Mod: PBBFAC,,, | Performed by: ANESTHESIOLOGY

## 2019-02-12 PROCEDURE — 99999 PR PBB SHADOW E&M-EST. PATIENT-LVL III: ICD-10-PCS | Mod: PBBFAC,,, | Performed by: OPHTHALMOLOGY

## 2019-02-12 PROCEDURE — 99999 PR PBB SHADOW E&M-EST. PATIENT-LVL III: CPT | Mod: PBBFAC,,, | Performed by: OPHTHALMOLOGY

## 2019-02-12 RX ORDER — MEMANTINE HYDROCHLORIDE 5 MG/1
5 TABLET ORAL 2 TIMES DAILY
COMMUNITY

## 2019-02-12 RX ORDER — ACETAMINOPHEN 500 MG
500 TABLET ORAL EVERY 6 HOURS PRN
Status: ON HOLD | COMMUNITY
End: 2020-02-07 | Stop reason: SDUPTHER

## 2019-02-12 RX ORDER — CHOLECALCIFEROL (VITAMIN D3) 25 MCG
2000 TABLET ORAL DAILY
COMMUNITY

## 2019-02-12 RX ORDER — FLUTICASONE PROPIONATE 50 MCG
1 SPRAY, SUSPENSION (ML) NASAL DAILY
COMMUNITY

## 2019-02-12 NOTE — LETTER
February 12, 2019      Joaquina Pollock, APRN  [address] [facility] [address]           [facility] [address]  [address] [facility] [address]  Phone: [phone]  Fax: [phone]          Patient: Vashti Muñoz   MR Number: [id number]   YOB: 1932   Date of Visit: 2/12/2019       Dear Joaquina Pollock:    Thank you for referring Vashti Muñoz to me for evaluation. Attached you will find relevant portions of my assessment and plan of care.    If you have questions, please do not hesitate to call me. I look forward to following Vashti Muñoz along with you.    Sincerely,    Clayton Valle MD    Enclosure  CC:  No Recipients    If you would like to receive this communication electronically, please contact [email or web address] or [phone] to request more information on [employer] Link access.    For providers and/or their staff who would like to refer a patient to [facility], please contact us through our one-stop-shop provider referral line, [facility], at [phone].    If you feel you have received this communication in error or would no longer like to receive these types of communications, please e-mail [email or web address]

## 2019-02-12 NOTE — LETTER
February 12, 2019      Joaquina Pollock, APRN  [address] [facility] [address]           [facility]  [address]  [place] [address]  Phone: [phone]  Fax: [phone]          Patient: Vashti Muñoz   MR Number: [id number]   YOB: 1932   Date of Visit: 2/12/2019       Dear Joaquina Pollock:    Thank you for referring Vashti Muñoz to me for evaluation. Attached you will find relevant portions of my assessment and plan of care.    If you have questions, please do not hesitate to call me. I look forward to following Vashti Muñoz along with you.    Sincerely,    CASSIE Gallego MD    Enclosure  CC:  No Recipients    If you would like to receive this communication electronically, please contact [email or web address] or [phone] to request more information on [employer] Link access.    For providers and/or their staff who would like to refer a patient to [facility], please contact us through our one-stop-shop provider referral line, [facility], at [phone].    If you feel you have received this communication in error or would no longer like to receive these types of communications, please e-mail [email or web address]

## 2019-02-12 NOTE — PROGRESS NOTES
Chronic Pain - New Consult    Referring Physician: Joaquina Pollock APRN    Chief Complaint:   Chief Complaint   Patient presents with    Low-back Pain        SUBJECTIVE:    Vashti Muñoz presents to the clinic for the evaluation of back pain. The pain started years ago following no specific incident and symptoms have been unchanged.The pain is located in the lower back area and is localized .  The pain is described as aching, dull, sharp, tight band and constant and is rated as 7/10. The pain is rated with a score of  6/10 on the BEST day and a score of 9/10 on the WORST day.  Symptoms interfere with daily activity and sleeping. The pain is exacerbated by standing, walking, nighttime, and morning .  The pain is mitigated by injections. She reports spending 4-5 hours per day reclining. The patient reports 6-7 hours of uninterrupted sleep per night.    Patient has PMHx of Afib (on coumadin), clotting disorder, hx of Falls, GERD, heart murmur, anxiety, arthritis, cataract, closed head injury, depression, DM2, hard of hearing, aortic stenosis, dementia and thyroid disease. She presents to clinic as new patient for complaint of low back pain. She says low back pain is always there and is aggravated by activity. She gets around using a walker. It does not wake her up from sleep. She takes gabapentin and tylenol for the pain. She has had one PRANAV in the past which really helped her. Pain is worse on the right than on the left. No shooting pain or numbness down legs.       Patient denies night fever/night sweats, urinary incontinence, bowel incontinence, significant weight loss, significant motor weakness and loss of sensations.    Physical Therapy/Home Exercise: no      Pain Disability Index Review:  Last 3 PDI Scores 2/12/2019   Pain Disability Index (PDI) 36       Pain Medications:    - Opioids: None  - Adjuvant Medications: Neurontin (Gabapentin)  - Anti-Coagulants: Coumadin ( Warfarin)  - Others: See med list      report:  Not applicable    Pain Procedures: PRANAV- Jorge Doug    Imaging:   X-Ray Hip 2 View Right    Narrative     EXAMINATION:  XR HIP 2 VIEW RIGHT    CLINICAL HISTORY:  Unspecified fall, initial encounter    TECHNIQUE:  AP view of the pelvis and frog leg lateral view of the right hip were performed.    COMPARISON:  None    FINDINGS:  Patient is s/p right hip arthroplasty with the metallic prosthesis appearing in satisfactory position.  There is a fixation plate with multiple cerclage wires along the lateral aspect of the proximal right femur related to attempted fixation of the greater trochanter.  However, there are fractures of the inferior cerclage wires and there is a nonunion fracture of the greater trochanter with lucency along the fixation plate suggesting possible loosening or infection of the fixation plate.  These findings may all be chronic and correlation with prior imaging findings is suggested.  Soft tissues are unremarkable.      Impression       Postoperative changes related to prior right hip arthroplasty with the metallic prosthesis appearing in satisfactory position.    Fixation plate and cerclage wires along the lateral aspect of the proximal right femur likely related to prior attempted internal fixation of a fracture of the greater trochanter.  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  This appears to be chronic.  Correlation with prior imaging studies is recommended.      Electronically signed by: Constantin Cesar MD  Date: 09/10/2018  Time: 12:41         Past Medical History:   Diagnosis Date    Allergy     Anticoagulant long-term use     Coumadin    Anxiety     Arthritis     Atrial fibrillation     Cataract     Closed head injury 09/10/2018    Clotting disorder     Depression     Diabetes mellitus     Encounter for blood transfusion     Fall 09/10/2018    GERD  (gastroesophageal reflux disease)     Heart murmur     [place] (hard of hearing)     wears bilateral hearing aids    Hypertension     Thyroid disease     Uses roller walker      Past Surgical History:   Procedure Laterality Date    HYSTERECTOMY      JOINT REPLACEMENT Right     Hip    TONSILLECTOMY      TOTAL HIP ARTHROPLASTY Left 2004     Social History     Socioeconomic History    Marital status:      Spouse name: Not on file    Number of children: 2    Years of education: 12    Highest education level: Not on file   Social Needs    Financial resource strain: Not on file    Food insecurity - worry: Not on file    Food insecurity - inability: Not on file    Transportation needs - medical: Not on file    Transportation needs - non-medical: Not on file   Occupational History    Occupation: retired     Comment: [employer]   Tobacco Use    Smoking status: Former Smoker     Last attempt to quit: 1988     Years since quittin.6    Smokeless tobacco: Never Used   Substance and Sexual Activity    Alcohol use: Yes     Alcohol/week: 1.8 - 2.4 oz     Types: 1 Glasses of wine, 2 - 3 Standard drinks or equivalent per week    Drug use: No    Sexual activity: Not on file   Other Topics Concern    Not on file   Social History Narrative    Not on file     Family History   Problem Relation Age of Onset    No Known Problems Mother     Diabetes Father     Stroke Father     Diabetes Sister     Diabetes Brother        Review of patient's allergies indicates:   Allergen Reactions    Sulfa (sulfonamide antibiotics) Hives       Current Outpatient Medications   Medication Sig    escitalopram oxalate (LEXAPRO) 10 MG tablet Take 1 tablet (10 mg total) by mouth once daily.    gabapentin (NEURONTIN) 300 MG capsule Take 300 mg by mouth 2 (two) times daily.    levothyroxine 137 mcg Cap Take 1 tablet by mouth once daily.    lisinopril (PRINIVIL,ZESTRIL) 40 MG tablet Take 1 tablet (40 mg total) by mouth once  "daily.    LORazepam (ATIVAN) 0.5 MG tablet Take 0.5 mg by mouth 2 (two) times daily. Take half in the am and a whole at bedtime    metFORMIN (GLUCOPHAGE) 500 MG tablet Take 500 mg by mouth 2 (two) times daily with meals.    metoprolol succinate (TOPROL-XL) 200 MG 24 hr tablet Take 2 tablets (400 mg total) by mouth once daily.    omeprazole (PRILOSEC) 20 MG capsule Take 20 mg by mouth once daily.    simvastatin (ZOCOR) 40 MG tablet Take 40 mg by mouth every evening.    warfarin (COUMADIN) 5 MG tablet Take 5 mg by mouth. Only  5 ml On mon and thur. 4 ml tues, wed, fri sat and sun.     No current facility-administered medications for this visit.        REVIEW OF SYSTEMS:    GENERAL:  No weight loss, malaise or fevers.  HEENT:  Negative for frequent or significant headaches.  NECK:  Negative for lumps, goiter, pain and significant neck swelling.  RESPIRATORY:  Negative for cough, wheezing or shortness of breath.  CARDIOVASCULAR:  Negative for chest pain, leg swelling or palpitations.  GI:  Negative for abdominal discomfort, blood in stools or black stools or change in bowel habits.  MUSCULOSKELETAL:  See HPI.  SKIN:  Negative for lesions, rash, and itching.  PSYCH:  Negative for sleep disturbance, mood disorder and recent psychosocial stressors.  HEMATOLOGY/LYMPHOLOGY:  Negative for prolonged bleeding, bruising easily or swollen nodes.  NEURO:   No history of headaches, syncope, paralysis, seizures or tremors.  All other reviewed and negative other than HPI.    OBJECTIVE:    BP (!) 162/88   Pulse 66   Temp 98.3 °F (36.8 °C)   Ht 5' 2" (1.575 m)   Wt 88.1 kg (194 lb 3.6 oz)   LMP  (LMP Unknown)   BMI 35.52 kg/m²     PHYSICAL EXAMINATION:    General appearance: Well appearing, in no acute distress, alert and oriented x3.  Psych:  Mood and affect appropriate.  Skin: Skin color, texture, turgor normal, no rashes or lesions, in both upper and lower body.  Head/face:  Normocephalic, atraumatic. No palpable lymph " nodes.  Neck: No pain to palpation over the cervical paraspinous muscles. Spurling Negative. No pain with neck flexion, extension, or lateral flexion.   Cor: RRR  Pulm: CTA  GI:  Soft and non-tender.  Back: Point tenderness along lumbar spine; positive lumbar facet loading R>L. Limited ROM of the lumbar spine   Extremities: Peripheral joint ROM is full and pain free without obvious instability or laxity in all four extremities. No deformities, edema, or skin discoloration. Good capillary refill.  Musculoskeletal: Shoulder, hip, sacroiliac and knee provocative maneuvers are negative. Bilateral upper and lower extremity strength is normal and symmetric.  No atrophy or tone abnormalities are noted.  Neuro: Bilateral upper and lower extremity coordination and muscle stretch reflexes are physiologic and symmetric.  Plantar response are downgoing. No loss of sensation is noted.  Gait: Uses walker for ambulation.     ASSESSMENT: 86 y.o. year old female with PMHx of Afib (on coumadin), clotting disorder, hx of Falls, GERD, heart murmur, anxiety, arthritis, cataract, closed head injury, depression, DM2, hard of hearing, aortic stenosis, dementia and thyroid disease. She presents to clinic as new patient for complaint of low back pain. She says low back pain is always there and is aggravated by activity. She gets around using a walker. It does not wake her up from sleep. She takes gabapentin and tylenol for the pain. She has had one PRANAV in the past which really helped her.     1. Low back pain, non-specific  MRI Lumbar Spine Without Contrast    Ambulatory Referral to Physical/Occupational Therapy   2. Spinal stenosis of lumbar region without neurogenic claudication     3. Lumbar spondylosis           PLAN:     - I have stressed the importance of physical activity and a home exercise plan to help with pain and improve health.  - Patient can continue with medications for now since they are providing benefits, using them  appropriately, and without side effects.  - Patient and daughter will try to track down MRI records from when they lived in [place]  - Tentatively scheduled MRI Lumbar Spine which they can cancel if they find records  - Patient doesn't need Gabapentin refills currently  - Ambulatory referral for Outpatient PT  - Likely has facet arthropathy and lumbar arthritis and will need PRANAV and possible RFA in future  - RTC 4 weeks  - Counseled patient regarding the importance of physical therapy.    The above plan and management options were discussed at length with patient. Patient is in agreement with the above and verbalized understanding. It will be communicated with the referring physician via electronic record, fax, or mail.    Parth Royal MD  [facility]    I have reviewed and concur with the resident's history, physical, assessment, and plan.  I have personally interviewed and examined the patient at bedside.  See below addendum for my evaluation and additional findings.  86-year-old female with past medical history of atrial fibrillation on Coumadin, clotting disorder, diabetes mellitus, aortic stenosis and dementia presenting today with chronic lower back pain consistent with lumbar spinal stenosis and lumbar spondylosis.  On physical exam there is limited range of motion of lumbar spine with positive facet loading right more than left.  Patient is currently on gabapentin and Tylenol and reports adequate relief from this regimen with no side effects.  We asked the patient to obtain outside medical records including lumbar spine MRI to review at the next visit.  Referred her to physical therapy and ordered lumbar spine MRI without contrast if she can't obtain outside medical records.  We will consider lumbar epidural steroid injection. She will need to hold the Coumadin prior to epidural steroid injection after obtaining approval from the prescribing physician.  Considering positive facet  loading right more than left at the lumbar spine area consistent with lumbar spondylosis, we will consider lumbar medial branch block and possible lumbar medial branch radiofrequency ablation in the future.  Will follow up in 4 weeks.    Clayton Valle MD  2/12/2019

## 2019-02-12 NOTE — PROGRESS NOTES
HPI     Eye Problem      Additional comments: macular degeneration              Comments     Patient here to establish retina care. She moved from [place]. She was receiving injections OU. She is not sure of the medication. Her last injection July 2018.           Last edited by Ynes Ndiaye on 2/12/2019 11:09 AM. (History)        OCT - central atrophy OU  NO SRF      A/P    1. Wet AMD OU  Prior q 3 month injections in [place]  Stable since 7/18     Observe today    2. Dry AMD OU  AREDS/AG    3. PCIOL     4. PVD OU    5. Afib  On coumadin      6 month OCT

## 2019-02-13 ENCOUNTER — TELEPHONE (OUTPATIENT)
Dept: PAIN MEDICINE | Facility: CLINIC | Age: 84
End: 2019-02-13

## 2019-02-13 NOTE — TELEPHONE ENCOUNTER
----- Message from Katelynn Pace sent at 2/13/2019  1:19 PM CST -----  Contact: Chuyita Zuniga  Name of Who is Calling: Chuyita Zuniga      What is the request in detail: Chuyita is calling in regards to giving correct name of Doctor that order Pt MRI in [place]. Doctor name is Jorge Marsh. Please contact to further discuss and advise.       Can the clinic reply by [facility]: N      What Number to Call Back if not in [facility]: [phone]

## 2019-03-11 ENCOUNTER — HOSPITAL ENCOUNTER (OUTPATIENT)
Dept: RADIOLOGY | Facility: HOSPITAL | Age: 84
Discharge: HOME | End: 2019-03-11
Attending: ANESTHESIOLOGY
Payer: COMMERCIAL

## 2019-03-11 DIAGNOSIS — M54.50 LOW BACK PAIN, NON-SPECIFIC: ICD-10-CM

## 2019-03-11 PROCEDURE — 72148 MRI LUMBAR SPINE WITHOUT CONTRAST: ICD-10-PCS | Mod: 26,,, | Performed by: RADIOLOGY

## 2019-03-11 PROCEDURE — 72148 MRI LUMBAR SPINE W/O DYE: CPT | Mod: TC

## 2019-03-11 PROCEDURE — 72148 MRI LUMBAR SPINE W/O DYE: CPT | Mod: 26,,, | Performed by: RADIOLOGY

## 2019-03-12 ENCOUNTER — OFFICE VISIT (OUTPATIENT)
Dept: PAIN MEDICINE | Facility: CLINIC | Age: 84
End: 2019-03-12
Payer: COMMERCIAL

## 2019-03-12 VITALS
HEART RATE: 94 BPM | TEMPERATURE: 99 F | RESPIRATION RATE: 18 BRPM | BODY MASS INDEX: 35.3 KG/M2 | WEIGHT: 191.81 LBS | SYSTOLIC BLOOD PRESSURE: 173 MMHG | DIASTOLIC BLOOD PRESSURE: 93 MMHG | HEIGHT: 62 IN

## 2019-03-12 DIAGNOSIS — M48.061 SPINAL STENOSIS OF LUMBAR REGION WITHOUT NEUROGENIC CLAUDICATION: ICD-10-CM

## 2019-03-12 DIAGNOSIS — M47.816 LUMBAR FACET ARTHROPATHY: ICD-10-CM

## 2019-03-12 DIAGNOSIS — M47.816 LUMBAR SPONDYLOSIS: Primary | ICD-10-CM

## 2019-03-12 PROCEDURE — 99999 PR PBB SHADOW E&M-EST. PATIENT-LVL V: ICD-10-PCS | Mod: PBBFAC,,, | Performed by: ANESTHESIOLOGY

## 2019-03-12 PROCEDURE — 99213 PR OFFICE/OUTPT VISIT, EST, LEVL III, 20-29 MIN: ICD-10-PCS | Mod: S$GLB,,, | Performed by: ANESTHESIOLOGY

## 2019-03-12 PROCEDURE — 99213 OFFICE O/P EST LOW 20 MIN: CPT | Mod: S$GLB,,, | Performed by: ANESTHESIOLOGY

## 2019-03-12 PROCEDURE — 99999 PR PBB SHADOW E&M-EST. PATIENT-LVL V: CPT | Mod: PBBFAC,,, | Performed by: ANESTHESIOLOGY

## 2019-03-12 NOTE — H&P (VIEW-ONLY)
Chronic patient Established Note (Follow up visit)      SUBJECTIVE:    Vashti Muñoz presents to the clinic for the evaluation of back pain. The pain started years ago following no specific incident and symptoms have been unchanged.The pain is located in the lower back area and is localized .  The pain is described as aching, dull, sharp, tight band and constant and is rated as 7/10. The pain is rated with a score of  6/10 on the BEST day and a score of 9/10 on the WORST day.  Symptoms interfere with daily activity and sleeping. The pain is exacerbated by standing, walking, nighttime, and morning .  The pain is mitigated by injections. She reports spending 4-5 hours per day reclining. The patient reports 6-7 hours of uninterrupted sleep per night.     Patient has PMHx of Afib (on coumadin), clotting disorder, hx of Falls, GERD, heart murmur, anxiety, arthritis, cataract, closed head injury, depression, DM2, hard of hearing, aortic stenosis, dementia and thyroid disease. She presents to clinic as new patient for complaint of low back pain. She says low back pain is always there and is aggravated by activity. She gets around using a walker. It does not wake her up from sleep. She takes gabapentin and tylenol for the pain. She has had one PRANAV in the past which really helped her. Pain is worse on the right than on the left. No shooting pain or numbness down legs.     Interval history 3/12/2019:    Vashti Muñoz presents to the clinic for a follow-up appointment for 4 week follow up. Since the last visit, Vashti Muñoz states the pain has been persistant. Current pain intensity is 7/10.    Pain Disability Index Review:  Last 3 PDI Scores 3/12/2019 2/12/2019   Pain Disability Index (PDI) 9 36       Pain Medications:    - Opioids: None  - Adjuvant Medications: Neurontin (Gabapentin)  - Anti-Coagulants: Coumadin ( Warfarin)  - Others: See med list       Opioid Contract: not applicable     report:  Not  applicable    Pain Procedures:  [facility]      Physical Therapy/Home Exercise: no      Imaging:  X-Ray Hip 2 View Right     Narrative       EXAMINATION:  XR HIP 2 VIEW RIGHT    CLINICAL HISTORY:  Unspecified fall, initial encounter    TECHNIQUE:  AP view of the pelvis and frog leg lateral view of the right hip were performed.    COMPARISON:  None    FINDINGS:  Patient is s/p right hip arthroplasty with the metallic prosthesis appearing in satisfactory position.  There is a fixation plate with multiple cerclage wires along the lateral aspect of the proximal right femur related to attempted fixation of the greater trochanter.  However, there are fractures of the inferior cerclage wires and there is a nonunion fracture of the greater trochanter with lucency along the fixation plate suggesting possible loosening or infection of the fixation plate.  These findings may all be chronic and correlation with prior imaging findings is suggested.  Soft tissues are unremarkable.       Impression         Postoperative changes related to prior right hip arthroplasty with the metallic prosthesis appearing in satisfactory position.    Fixation plate and cerclage wires along the lateral aspect of the proximal right femur likely related to prior attempted internal fixation of a fracture of the greater trochanter.  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  This appears to be chronic.  Correlation with prior imaging studies is recommended.          Allergies:   Review of patient's allergies indicates:   Allergen Reactions    Sulfa (sulfonamide antibiotics) Hives       Current Medications:   Current Outpatient Medications   Medication Sig Dispense Refill    acetaminophen (TYLENOL) 500 MG tablet Take 500 mg by mouth every 6 (six) hours as needed for Pain.      escitalopram oxalate (LEXAPRO) 10 MG tablet Take 1 tablet  (10 mg total) by mouth once daily. 90 tablet 3    fluticasone (FLONASE) 50 mcg/actuation nasal spray 1 spray by Each Nare route once daily.      gabapentin (NEURONTIN) 300 MG capsule Take 300 mg by mouth 2 (two) times daily.      levothyroxine 137 mcg Cap Take 1 tablet by mouth once daily.      lisinopril (PRINIVIL,ZESTRIL) 40 MG tablet Take 1 tablet (40 mg total) by mouth once daily. 90 tablet 1    LORazepam (ATIVAN) 0.5 MG tablet Take 0.5 mg by mouth 2 (two) times daily. Take half in the am and a whole at bedtime      memantine (NAMENDA) 5 MG Tab Take 5 mg by mouth 2 (two) times daily.      menthol/camphor (BIOFREEZE-ILEX TOP) Apply topically.      metFORMIN (GLUCOPHAGE) 500 MG tablet Take 500 mg by mouth 2 (two) times daily with meals.      metoprolol succinate (TOPROL-XL) 200 MG 24 hr tablet Take 2 tablets (400 mg total) by mouth once daily. 60 tablet 11    simvastatin (ZOCOR) 40 MG tablet Take 40 mg by mouth every evening.      vitamin D (VITAMIN D3) 1000 units Tab Take 2,000 Units by mouth once daily.      warfarin (COUMADIN) 5 MG tablet Take 5 mg by mouth. Only  5 ml On mon and thur. 4 ml tues, wed, fri sat and sun.       No current facility-administered medications for this visit.        REVIEW OF SYSTEMS:    GENERAL:  No weight loss, malaise or fevers.  HEENT:  Negative for frequent or significant headaches.  NECK:  Negative for lumps, goiter, pain and significant neck swelling.  RESPIRATORY:  Negative for cough, wheezing or shortness of breath.  CARDIOVASCULAR:  Negative for chest pain, leg swelling or palpitations.  GI:  Negative for abdominal discomfort, blood in stools or black stools or change in bowel habits.  MUSCULOSKELETAL:  See HPI.  SKIN:  Negative for lesions, rash, and itching.  PSYCH:  Negative for sleep disturbance, mood disorder and recent psychosocial stressors.  HEMATOLOGY/LYMPHOLOGY:  Negative for prolonged bleeding, bruising easily or swollen nodes.  NEURO:   No history of  headaches, syncope, paralysis, seizures or tremors.  All other reviewed and negative other than HPI.    Past Medical History:  Past Medical History:   Diagnosis Date    Allergy     Anticoagulant long-term use     Coumadin    Anxiety     Arthritis     Atrial fibrillation     Cataract     Closed head injury 09/10/2018    Clotting disorder     Depression     Diabetes mellitus     Encounter for blood transfusion     Fall 09/10/2018    GERD (gastroesophageal reflux disease)     Heart murmur     [place] (hard of hearing)     wears bilateral hearing aids    Hypertension     Thyroid disease     Uses roller walker        Past Surgical History:  Past Surgical History:   Procedure Laterality Date    HYSTERECTOMY      JOINT REPLACEMENT Right     Hip    TONSILLECTOMY      TOTAL HIP ARTHROPLASTY Left 2004       Family History:  Family History   Problem Relation Age of Onset    No Known Problems Mother     Diabetes Father     Stroke Father     Diabetes Sister     Diabetes Brother        Social History:  Social History     Socioeconomic History    Marital status:      Spouse name: None    Number of children: 2    Years of education: 12    Highest education level: None   Social Needs    Financial resource strain: None    Food insecurity - worry: None    Food insecurity - inability: None    Transportation needs - medical: None    Transportation needs - non-medical: None   Occupational History    Occupation: retired     Comment: Cook   Tobacco Use    Smoking status: Former Smoker     Last attempt to quit: 1988     Years since quittin.7    Smokeless tobacco: Never Used   Substance and Sexual Activity    Alcohol use: Yes     Alcohol/week: 1.8 - 2.4 oz     Types: 1 Glasses of wine, 2 - 3 Standard drinks or equivalent per week    Drug use: No    Sexual activity: None   Other Topics Concern    None   Social History Narrative    None       OBJECTIVE:    BP (!) 173/93   Pulse 94    "Temp 98.5 °F (36.9 °C) (Oral)   Resp 18   Ht 5' 2" (1.575 m)   Wt 87 kg (191 lb 12.8 oz)   LMP  (LMP Unknown)   BMI 35.08 kg/m²     PHYSICAL EXAMINATION:    General appearance: Well appearing, in no acute distress, alert and oriented x3.  Psych:  Mood and affect appropriate.  Skin: Skin color, texture, turgor normal, no rashes or lesions, in both upper and lower body.  Head/face:  Atraumatic, normocephalic. No palpable lymph nodes  Neck: No pain to palpation over the cervical paraspinous muscles. Spurling Negative. No pain with neck flexion, extension, or lateral flexion. .  Cor: RRR  Pulm: CTA  GI: Abdomen soft and non-tender.  Back: Straight leg raising in the sitting and supine positions is negative to radicular pain. Positive pain to palpation over bilateral lumbar paraspinal muscles. Limited range of motion with positive facet loading bilateral.  Extremities: Peripheral joint ROM is full and pain free without obvious instability or laxity in all four extremities. No deformities, edema, or skin discoloration. Good capillary refill.  Musculoskeletal: Shoulder, hip, sacroiliac and knee provocative maneuvers are negative. Bilateral upper and lower extremity strength is normal and symmetric.  No atrophy or tone abnormalities are noted.  Neuro: Bilateral upper and lower extremity coordination and muscle stretch reflexes are physiologic and symmetric.  Plantar response are downgoing. No loss of sensation is noted.  Gait: Normal.    ASSESSMENT: 86 y.o. year old female with chronic lower back pain consistent with lumbar spondylosis.  Lumbar spine MRI results were reviewed today consistent with lumbar spinal stenosis and lumbar facet arthropathy significantly at L4/L5 levels with fluid in the facet joints.  We will schedule her for bilateral L4/L5 facet joint injection.  She is currently on Coumadin for anticoagulation.  She needs to hold Coumadin 5 days prior to the procedure after obtaining approval from the " prescribing physician.        1. Lumbar spondylosis     2. Lumbar facet arthropathy     3. Spinal stenosis of lumbar region without neurogenic claudication           PLAN:     - I have stressed the importance of physical activity and a home exercise plan to help with pain and improve health.  - continue current regimen for pain control.  - schedule for bilateral L4/L5 facet joint injection.   -referral was placed for physical therapy.  - RTC in 2 weeks with NP.   - Counseled patient regarding the importance of activity modification and physical therapy.    The above plan and management options were discussed at length with patient. Patient is in agreement with the above and verbalized understanding.    Clayton Valle  03/12/2019

## 2019-03-12 NOTE — PROGRESS NOTES
Chronic patient Established Note (Follow up visit)      SUBJECTIVE:    Vashti Muñoz presents to the clinic for the evaluation of back pain. The pain started years ago following no specific incident and symptoms have been unchanged.The pain is located in the lower back area and is localized .  The pain is described as aching, dull, sharp, tight band and constant and is rated as 7/10. The pain is rated with a score of  6/10 on the BEST day and a score of 9/10 on the WORST day.  Symptoms interfere with daily activity and sleeping. The pain is exacerbated by standing, walking, nighttime, and morning .  The pain is mitigated by injections. She reports spending 4-5 hours per day reclining. The patient reports 6-7 hours of uninterrupted sleep per night.     Patient has PMHx of Afib (on coumadin), clotting disorder, hx of Falls, GERD, heart murmur, anxiety, arthritis, cataract, closed head injury, depression, DM2, hard of hearing, aortic stenosis, dementia and thyroid disease. She presents to clinic as new patient for complaint of low back pain. She says low back pain is always there and is aggravated by activity. She gets around using a walker. It does not wake her up from sleep. She takes gabapentin and tylenol for the pain. She has had one PRANAV in the past which really helped her. Pain is worse on the right than on the left. No shooting pain or numbness down legs.     Interval history 3/12/2019:    Vashti Muñoz presents to the clinic for a follow-up appointment for 4 week follow up. Since the last visit, Vashti Muñoz states the pain has been persistant. Current pain intensity is 7/10.    Pain Disability Index Review:  Last 3 PDI Scores 3/12/2019 2/12/2019   Pain Disability Index (PDI) 9 36       Pain Medications:    - Opioids: None  - Adjuvant Medications: Neurontin (Gabapentin)  - Anti-Coagulants: Coumadin ( Warfarin)  - Others: See med list       Opioid Contract: not applicable     report:  Not  applicable    Pain Procedures:  [facility]      Physical Therapy/Home Exercise: no      Imaging:  X-Ray Hip 2 View Right     Narrative       EXAMINATION:  XR HIP 2 VIEW RIGHT    CLINICAL HISTORY:  Unspecified fall, initial encounter    TECHNIQUE:  AP view of the pelvis and frog leg lateral view of the right hip were performed.    COMPARISON:  None    FINDINGS:  Patient is s/p right hip arthroplasty with the metallic prosthesis appearing in satisfactory position.  There is a fixation plate with multiple cerclage wires along the lateral aspect of the proximal right femur related to attempted fixation of the greater trochanter.  However, there are fractures of the inferior cerclage wires and there is a nonunion fracture of the greater trochanter with lucency along the fixation plate suggesting possible loosening or infection of the fixation plate.  These findings may all be chronic and correlation with prior imaging findings is suggested.  Soft tissues are unremarkable.       Impression         Postoperative changes related to prior right hip arthroplasty with the metallic prosthesis appearing in satisfactory position.    Fixation plate and cerclage wires along the lateral aspect of the proximal right femur likely related to prior attempted internal fixation of a fracture of the greater trochanter.  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  This appears to be chronic.  Correlation with prior imaging studies is recommended.          Allergies:   Review of patient's allergies indicates:   Allergen Reactions    Sulfa (sulfonamide antibiotics) Hives       Current Medications:   Current Outpatient Medications   Medication Sig Dispense Refill    acetaminophen (TYLENOL) 500 MG tablet Take 500 mg by mouth every 6 (six) hours as needed for Pain.      escitalopram oxalate (LEXAPRO) 10 MG tablet Take 1 tablet  (10 mg total) by mouth once daily. 90 tablet 3    fluticasone (FLONASE) 50 mcg/actuation nasal spray 1 spray by Each Nare route once daily.      gabapentin (NEURONTIN) 300 MG capsule Take 300 mg by mouth 2 (two) times daily.      levothyroxine 137 mcg Cap Take 1 tablet by mouth once daily.      lisinopril (PRINIVIL,ZESTRIL) 40 MG tablet Take 1 tablet (40 mg total) by mouth once daily. 90 tablet 1    LORazepam (ATIVAN) 0.5 MG tablet Take 0.5 mg by mouth 2 (two) times daily. Take half in the am and a whole at bedtime      memantine (NAMENDA) 5 MG Tab Take 5 mg by mouth 2 (two) times daily.      menthol/camphor (BIOFREEZE-ILEX TOP) Apply topically.      metFORMIN (GLUCOPHAGE) 500 MG tablet Take 500 mg by mouth 2 (two) times daily with meals.      metoprolol succinate (TOPROL-XL) 200 MG 24 hr tablet Take 2 tablets (400 mg total) by mouth once daily. 60 tablet 11    simvastatin (ZOCOR) 40 MG tablet Take 40 mg by mouth every evening.      vitamin D (VITAMIN D3) 1000 units Tab Take 2,000 Units by mouth once daily.      warfarin (COUMADIN) 5 MG tablet Take 5 mg by mouth. Only  5 ml On mon and thur. 4 ml tues, wed, fri sat and sun.       No current facility-administered medications for this visit.        REVIEW OF SYSTEMS:    GENERAL:  No weight loss, malaise or fevers.  HEENT:  Negative for frequent or significant headaches.  NECK:  Negative for lumps, goiter, pain and significant neck swelling.  RESPIRATORY:  Negative for cough, wheezing or shortness of breath.  CARDIOVASCULAR:  Negative for chest pain, leg swelling or palpitations.  GI:  Negative for abdominal discomfort, blood in stools or black stools or change in bowel habits.  MUSCULOSKELETAL:  See HPI.  SKIN:  Negative for lesions, rash, and itching.  PSYCH:  Negative for sleep disturbance, mood disorder and recent psychosocial stressors.  HEMATOLOGY/LYMPHOLOGY:  Negative for prolonged bleeding, bruising easily or swollen nodes.  NEURO:   No history of  headaches, syncope, paralysis, seizures or tremors.  All other reviewed and negative other than HPI.    Past Medical History:  Past Medical History:   Diagnosis Date    Allergy     Anticoagulant long-term use     Coumadin    Anxiety     Arthritis     Atrial fibrillation     Cataract     Closed head injury 09/10/2018    Clotting disorder     Depression     Diabetes mellitus     Encounter for blood transfusion     Fall 09/10/2018    GERD (gastroesophageal reflux disease)     Heart murmur     Torres Martinez (hard of hearing)     wears bilateral hearing aids    Hypertension     Thyroid disease     Uses roller walker        Past Surgical History:  Past Surgical History:   Procedure Laterality Date    HYSTERECTOMY      JOINT REPLACEMENT Right     Hip    TONSILLECTOMY      TOTAL HIP ARTHROPLASTY Left 2004       Family History:  Family History   Problem Relation Age of Onset    No Known Problems Mother     Diabetes Father     Stroke Father     Diabetes Sister     Diabetes Brother        Social History:  Social History     Socioeconomic History    Marital status:      Spouse name: None    Number of children: 2    Years of education: 12    Highest education level: None   Social Needs    Financial resource strain: None    Food insecurity - worry: None    Food insecurity - inability: None    Transportation needs - medical: None    Transportation needs - non-medical: None   Occupational History    Occupation: retired     Comment: Cook   Tobacco Use    Smoking status: Former Smoker     Last attempt to quit: 1988     Years since quittin.7    Smokeless tobacco: Never Used   Substance and Sexual Activity    Alcohol use: Yes     Alcohol/week: 1.8 - 2.4 oz     Types: 1 Glasses of wine, 2 - 3 Standard drinks or equivalent per week    Drug use: No    Sexual activity: None   Other Topics Concern    None   Social History Narrative    None       OBJECTIVE:    BP (!) 173/93   Pulse 94    "Temp 98.5 °F (36.9 °C) (Oral)   Resp 18   Ht 5' 2" (1.575 m)   Wt 87 kg (191 lb 12.8 oz)   LMP  (LMP Unknown)   BMI 35.08 kg/m²     PHYSICAL EXAMINATION:    General appearance: Well appearing, in no acute distress, alert and oriented x3.  Psych:  Mood and affect appropriate.  Skin: Skin color, texture, turgor normal, no rashes or lesions, in both upper and lower body.  Head/face:  Atraumatic, normocephalic. No palpable lymph nodes  Neck: No pain to palpation over the cervical paraspinous muscles. Spurling Negative. No pain with neck flexion, extension, or lateral flexion. .  Cor: RRR  Pulm: CTA  GI: Abdomen soft and non-tender.  Back: Straight leg raising in the sitting and supine positions is negative to radicular pain. Positive pain to palpation over bilateral lumbar paraspinal muscles. Limited range of motion with positive facet loading bilateral.  Extremities: Peripheral joint ROM is full and pain free without obvious instability or laxity in all four extremities. No deformities, edema, or skin discoloration. Good capillary refill.  Musculoskeletal: Shoulder, hip, sacroiliac and knee provocative maneuvers are negative. Bilateral upper and lower extremity strength is normal and symmetric.  No atrophy or tone abnormalities are noted.  Neuro: Bilateral upper and lower extremity coordination and muscle stretch reflexes are physiologic and symmetric.  Plantar response are downgoing. No loss of sensation is noted.  Gait: Normal.    ASSESSMENT: 86 y.o. year old female with chronic lower back pain consistent with lumbar spondylosis.  Lumbar spine MRI results were reviewed today consistent with lumbar spinal stenosis and lumbar facet arthropathy significantly at L4/L5 levels with fluid in the facet joints.  We will schedule her for bilateral L4/L5 facet joint injection.  She is currently on Coumadin for anticoagulation.  She needs to hold Coumadin 5 days prior to the procedure after obtaining approval from the " prescribing physician.        1. Lumbar spondylosis     2. Lumbar facet arthropathy     3. Spinal stenosis of lumbar region without neurogenic claudication           PLAN:     - I have stressed the importance of physical activity and a home exercise plan to help with pain and improve health.  - continue current regimen for pain control.  - schedule for bilateral L4/L5 facet joint injection.   -referral was placed for physical therapy.  - RTC in 2 weeks with NP.   - Counseled patient regarding the importance of activity modification and physical therapy.    The above plan and management options were discussed at length with patient. Patient is in agreement with the above and verbalized understanding.    Clayton Valle  03/12/2019

## 2019-03-14 ENCOUNTER — TELEPHONE (OUTPATIENT)
Dept: PAIN MEDICINE | Facility: CLINIC | Age: 84
End: 2019-03-14

## 2019-03-14 NOTE — TELEPHONE ENCOUNTER
----- Message from Elena Camacho sent at 3/14/2019  8:37 AM CDT -----  Left message asking pt. To call to schedule procedure with Dr. Valle.

## 2019-03-27 DIAGNOSIS — Z79.01 LONG TERM (CURRENT) USE OF ANTICOAGULANTS: ICD-10-CM

## 2019-03-27 DIAGNOSIS — D68.318 CIRCULATING ANTICOAGULANT DISORDER: Primary | ICD-10-CM

## 2019-04-05 ENCOUNTER — HOSPITAL ENCOUNTER (OUTPATIENT)
Facility: OTHER | Age: 84
Discharge: HOME | End: 2019-04-05
Attending: ANESTHESIOLOGY | Admitting: ANESTHESIOLOGY
Payer: COMMERCIAL

## 2019-04-05 VITALS
TEMPERATURE: 98 F | WEIGHT: 190 LBS | DIASTOLIC BLOOD PRESSURE: 87 MMHG | HEIGHT: 62 IN | SYSTOLIC BLOOD PRESSURE: 183 MMHG | HEART RATE: 72 BPM | RESPIRATION RATE: 18 BRPM | OXYGEN SATURATION: 95 % | BODY MASS INDEX: 34.96 KG/M2

## 2019-04-05 DIAGNOSIS — M47.816 LUMBAR FACET ARTHROPATHY: Primary | ICD-10-CM

## 2019-04-05 DIAGNOSIS — G89.29 CHRONIC PAIN: ICD-10-CM

## 2019-04-05 LAB — POCT GLUCOSE: 103 MG/DL (ref 70–110)

## 2019-04-05 PROCEDURE — 64493 PR INJ DX/THER AGNT PARAVERT FACET JOINT,IMG GUIDE,LUMBAR/SAC,1ST LVL: ICD-10-PCS | Mod: 50,,, | Performed by: ANESTHESIOLOGY

## 2019-04-05 PROCEDURE — 64493 INJ PARAVERT F JNT L/S 1 LEV: CPT | Mod: 50,,, | Performed by: ANESTHESIOLOGY

## 2019-04-05 PROCEDURE — 99152 PR MOD CONSCIOUS SEDATION, SAME PHYS, 5+ YRS, FIRST 15 MIN: ICD-10-PCS | Mod: ,,, | Performed by: ANESTHESIOLOGY

## 2019-04-05 PROCEDURE — 99152 MOD SED SAME PHYS/QHP 5/>YRS: CPT | Mod: ,,, | Performed by: ANESTHESIOLOGY

## 2019-04-05 PROCEDURE — 64493 INJ PARAVERT F JNT L/S 1 LEV: CPT | Mod: 50 | Performed by: ANESTHESIOLOGY

## 2019-04-05 PROCEDURE — 25500020 PHARM REV CODE 255: Performed by: ANESTHESIOLOGY

## 2019-04-05 PROCEDURE — 63600175 PHARM REV CODE 636 W HCPCS: Performed by: ANESTHESIOLOGY

## 2019-04-05 PROCEDURE — 25000003 PHARM REV CODE 250: Performed by: ANESTHESIOLOGY

## 2019-04-05 RX ORDER — TRIAMCINOLONE ACETONIDE 40 MG/ML
INJECTION, SUSPENSION INTRA-ARTICULAR; INTRAMUSCULAR
Status: DISCONTINUED | OUTPATIENT
Start: 2019-04-05 | End: 2019-04-05 | Stop reason: HOSPADM

## 2019-04-05 RX ORDER — FENTANYL CITRATE 50 UG/ML
INJECTION, SOLUTION INTRAMUSCULAR; INTRAVENOUS
Status: DISCONTINUED | OUTPATIENT
Start: 2019-04-05 | End: 2019-04-05 | Stop reason: HOSPADM

## 2019-04-05 RX ORDER — BUPIVACAINE HYDROCHLORIDE 2.5 MG/ML
INJECTION, SOLUTION EPIDURAL; INFILTRATION; INTRACAUDAL
Status: DISCONTINUED | OUTPATIENT
Start: 2019-04-05 | End: 2019-04-05 | Stop reason: HOSPADM

## 2019-04-05 RX ORDER — SODIUM CHLORIDE 9 MG/ML
500 INJECTION, SOLUTION INTRAVENOUS CONTINUOUS
Status: DISCONTINUED | OUTPATIENT
Start: 2019-04-05 | End: 2019-04-05 | Stop reason: HOSPADM

## 2019-04-05 RX ORDER — MIDAZOLAM HYDROCHLORIDE 1 MG/ML
INJECTION INTRAMUSCULAR; INTRAVENOUS
Status: DISCONTINUED | OUTPATIENT
Start: 2019-04-05 | End: 2019-04-05 | Stop reason: HOSPADM

## 2019-04-05 RX ORDER — LIDOCAINE HYDROCHLORIDE 10 MG/ML
INJECTION INFILTRATION; PERINEURAL
Status: DISCONTINUED | OUTPATIENT
Start: 2019-04-05 | End: 2019-04-05 | Stop reason: HOSPADM

## 2019-04-05 RX ADMIN — SODIUM CHLORIDE 500 ML: 0.9 INJECTION, SOLUTION INTRAVENOUS at 10:04

## 2019-04-05 NOTE — PROGRESS NOTES
Pt's O2 is around 90-91%. Pt is alert and oriented, daughter is at bedside. Pt is in no acute distress and has no complaints and does not feel SOB. Pt is sitting on the side of the bed. Dr. Conrad is aware of pt's O2 sats and states when pt is consistently at 94% she can be discharged home.

## 2019-04-05 NOTE — DISCHARGE SUMMARY
Discharge Note  Short Stay      SUMMARY     Admit Date: 4/5/2019    Attending Physician: Roger Zarco      Discharge Physician: Roger Zarco      Discharge Date: 4/5/2019 11:02 AM    Procedure(s) (LRB):  INJECTION, FACET JOINT, L4-L5 (Bilateral)    Final Diagnosis: Lumbar spondylosis [M47.816]    Disposition: Home or self care    Patient Instructions:   Current Discharge Medication List      CONTINUE these medications which have NOT CHANGED    Details   acetaminophen (TYLENOL) 500 MG tablet Take 500 mg by mouth every 6 (six) hours as needed for Pain.      escitalopram oxalate (LEXAPRO) 10 MG tablet Take 1 tablet (10 mg total) by mouth once daily.  Qty: 90 tablet, Refills: 3    Associated Diagnoses: Anxiety      fluticasone (FLONASE) 50 mcg/actuation nasal spray 1 spray by Each Nare route once daily.      gabapentin (NEURONTIN) 300 MG capsule Take 300 mg by mouth 2 (two) times daily.      levothyroxine 137 mcg Cap Take 1 tablet by mouth once daily.      lisinopril (PRINIVIL,ZESTRIL) 40 MG tablet Take 1 tablet (40 mg total) by mouth once daily.  Qty: 90 tablet, Refills: 1    Associated Diagnoses: HTN, goal below 140/90      LORazepam (ATIVAN) 0.5 MG tablet Take 0.5 mg by mouth 2 (two) times daily. Take half in the am and a whole at bedtime      memantine (NAMENDA) 5 MG Tab Take 5 mg by mouth 2 (two) times daily.      menthol/camphor (BIOFREEZE-ILEX TOP) Apply topically.      metoprolol succinate (TOPROL-XL) 200 MG 24 hr tablet Take 2 tablets (400 mg total) by mouth once daily.  Qty: 60 tablet, Refills: 11    Associated Diagnoses: Atrial fibrillation with rapid ventricular response      simvastatin (ZOCOR) 40 MG tablet Take 40 mg by mouth every evening.      vitamin D (VITAMIN D3) 1000 units Tab Take 2,000 Units by mouth once daily.      warfarin (COUMADIN) 5 MG tablet Take 5 mg by mouth. Only  5 ml On mon and thur. 4 ml tues, wed, fri sat and sun.         STOP taking these medications       metFORMIN  (GLUCOPHAGE) 500 MG tablet Comments:   Reason for Stopping:                   Discharge Diagnosis: Lumbar spondylosis [M47.816]  Condition on Discharge: Stable with no complications to procedure   Diet on Discharge: Same as before.  Activity: as per instruction sheet.  Discharge to: Home with a responsible adult.  Follow up: 2-4 weeks

## 2019-04-05 NOTE — OP NOTE
"Patient Name: Vashti Muñoz  MRN: [id number]    INFORMED CONSENT: The procedure, risks, benefits and options were discussed with patient. There are no contraindications to the procedure. The patient expressed understanding and agreed to proceed. The personnel performing the procedure was discussed. I verify that I personally obtained Vashti's consent prior to the start of the procedure and the signed consent can be found on the patient's chart.    Procedure Date: 04/05/2019    Anesthesia: Topical    Pre Procedure diagnosis: Lumbar spondylosis [M47.816]  1. Lumbar facet arthropathy    2. Chronic pain      Post-Procedure diagnosis: SAME      Moderate Sedation: Yes - Fentanyl 50 mcg and Midazolam 1 mg    PROCEDURE: BL L4/5 FACET JOINT INJECTION      DESCRIPTION OF PROCEDURE:The patient was brought to the procedure room.  After performing time out. IV access was obtained prior to the procedure. The patient was positioned prone on the fluoroscopy table. Continuous hemodynamic monitoring was initiated including blood pressure, EKG, and pulse oximetry. The area of the lumbar spine was prepped chlorhexidine and draped into a sterile field.Fluoroscopy was used to identify the location of bilateral L4-5  joints respectivly. Skin anesthesia was achieved using 3 cc of Lidocaine 1% over the injection sites. A 25 gauge, 3 1/2" spinal needle was slowly inserted at each joint using APand oblique fluoroscopic imaging. Negative aspiration for blood or CSF was confirmed. 0.5 cc of Omnipaque  dye was inserted to confirm placement 1.5 ml of a  solution containing 2 ml of  bupivacaine 0.25% and 40 mg Kenalog was injected at each joint. The needles were removed and bleeding was nil. A sterile dressing was applied. No specimens collected. Vashti was taken back to the recovery room for further observation.     Blood Loss: Nill  Specimen: None    Discharge Diagnosis: Same as Pre and Post Procedure  Condition on Discharge: Stable.  Diet on " Discharge: Same as before.  Activity: as per instruction sheet.  Discharge to: Home with a responsible adult.  Follow up: as per Discharge instructions    Roger Zarco MD     I have reviewed the notes, assessments, and/or procedures performed by ESPINOZA, I concur with her/his documentation of Vashti Muñoz.    Clayton Valle MD

## 2019-04-05 NOTE — DISCHARGE INSTRUCTIONS
Thank you for allowing us to care for you today. You may receive a survey about the care we provided. Your feedback is valuable and helps us provide excellent care throughout the community. Adult Procedural Sedation Instructions    Recovery After Procedural Sedation (Adult)  You have been given medicine by vein to make you sleep during your surgery. This may have included both a pain medicine and sleeping medicine. Most of the effects have worn off. But you may still have some drowsiness for the next 6 to 8 hours.  Home care  Follow these guidelines when you get home:  · For the next 8 hours, you should be watched by a responsible adult. This person should make sure your condition is not getting worse.  · Don't drink any alcohol for the next 24 hours.  · Don't drive, operate dangerous machinery, or make important business or personal decisions during the next 24 hours.  Note: Your healthcare provider may tell you not to take any medicine by mouth for pain or sleep in the next 4 hours. These medicines may react with the medicines you were given in the hospital. This could cause a much stronger response than usual.  Follow-up care  Follow up with your healthcare provider if you are not alert and back to your usual level of activity within 12 hours.  When to seek medical advice  Call your healthcare provider right away if any of these occur:  · Drowsiness gets worse  · Weakness or dizziness gets worse  · Repeated vomiting  · You can't be awakened   Date Last Reviewed: 10/18/2016  © 0522-5021 [employer]. [address] [place] [address]. All rights reserved. This information is not intended as a substitute for professional medical care. Always follow your healthcare professional's instructions. Home Care Instructions Pain Management:    1. DIET:   You may resume your normal diet today.   2. BATHING:   You may shower with luke warm water.  3. DRESSING:   You may remove your bandage today.   4.  ACTIVITY LEVEL:   You may resume your normal activities 24 hrs after your procedure.  5. MEDICATIONS:   You may resume your normal medications today.   6. SPECIAL INSTRUCTIONS:   No heat to the injection site for 24 hrs including, bath or shower, heating pad, moist heat, or hot tubs.    Use ice pack to injection site for any pain or discomfort.  Apply ice packs for 20 minute intervals as needed.   If you have received any sedatives by mouth today you may not drive for 12 hours.    If you have received any sedation through your IV, you may not drive for 24 hrs.     PLEASE CALL YOUR DOCTOR IF:  1. Redness or swelling around the injection site.  2. Fever of 101 degrees  3. Drainage (pus) from the injection site.  4. For any continuous bleeding (some dried blood over the incision is normal.)    FOR EMERGENCIES:   If any unusual problems or difficulties occur during clinic hours, call [phone] or [phone].   Adult Procedural Sedation Instructions

## 2019-04-16 ENCOUNTER — OFFICE VISIT (OUTPATIENT)
Dept: CARDIOLOGY | Facility: CLINIC | Age: 84
End: 2019-04-16
Payer: COMMERCIAL

## 2019-04-16 VITALS
HEART RATE: 69 BPM | HEIGHT: 62 IN | DIASTOLIC BLOOD PRESSURE: 72 MMHG | BODY MASS INDEX: 34.98 KG/M2 | OXYGEN SATURATION: 96 % | RESPIRATION RATE: 15 BRPM | WEIGHT: 190.06 LBS | SYSTOLIC BLOOD PRESSURE: 136 MMHG

## 2019-04-16 DIAGNOSIS — Z79.01 CHRONIC ANTICOAGULATION: ICD-10-CM

## 2019-04-16 DIAGNOSIS — E66.9 NON MORBID OBESITY, UNSPECIFIED OBESITY TYPE: ICD-10-CM

## 2019-04-16 DIAGNOSIS — I48.20 CHRONIC ATRIAL FIBRILLATION: Primary | ICD-10-CM

## 2019-04-16 DIAGNOSIS — E78.2 MIXED HYPERLIPIDEMIA: ICD-10-CM

## 2019-04-16 DIAGNOSIS — I35.0 NONRHEUMATIC AORTIC VALVE STENOSIS: ICD-10-CM

## 2019-04-16 PROCEDURE — 99214 PR OFFICE/OUTPT VISIT, EST, LEVL IV, 30-39 MIN: ICD-10-PCS | Mod: S$GLB,,, | Performed by: INTERNAL MEDICINE

## 2019-04-16 PROCEDURE — 93000 ELECTROCARDIOGRAM COMPLETE: CPT | Mod: S$GLB,,, | Performed by: INTERNAL MEDICINE

## 2019-04-16 PROCEDURE — 99999 PR PBB SHADOW E&M-EST. PATIENT-LVL III: ICD-10-PCS | Mod: PBBFAC,,, | Performed by: INTERNAL MEDICINE

## 2019-04-16 PROCEDURE — 99214 OFFICE O/P EST MOD 30 MIN: CPT | Mod: S$GLB,,, | Performed by: INTERNAL MEDICINE

## 2019-04-16 PROCEDURE — 99999 PR PBB SHADOW E&M-EST. PATIENT-LVL III: CPT | Mod: PBBFAC,,, | Performed by: INTERNAL MEDICINE

## 2019-04-16 PROCEDURE — 93000 EKG 12-LEAD: ICD-10-PCS | Mod: S$GLB,,, | Performed by: INTERNAL MEDICINE

## 2019-04-16 NOTE — PROGRESS NOTES
CARDIOVASCULAR PROGRESS NOTE    REASON FOR CONSULT:   Vashti Muñoz is a 86 y.o. female who presents for follow up of Chr AF, AS.    PCP: Radha  HISTORY OF PRESENT ILLNESS:   The patient returns for follow-up.  She reports generally asymptomatic status without angina or dyspnea.  She has had no further left lower extremity swelling, apparently did not get her left lower extremity venous ultrasound as previously ordered.  She otherwise denies palpitations, lightheadedness, dizziness, or syncope.  There has been no PND, orthopnea, melena, hematuria, or claudicant symptoms.  She appears to be tolerating her anticoagulation without complications.  Her main complaint today appears to be low back pain for which she recently received a steroid injection.    CARDIOVASCULAR HISTORY:   AF, chronic on warfarin  AS, mod (echo 7/2018)    PAST MEDICAL HISTORY:     Past Medical History:   Diagnosis Date    Allergy     Anticoagulant long-term use     Coumadin    Anxiety     Arthritis     Atrial fibrillation     Cataract     Closed head injury 09/10/2018    Clotting disorder     Depression     Diabetes mellitus     Encounter for blood transfusion     Fall 09/10/2018    GERD (gastroesophageal reflux disease)     Heart murmur     [place] (hard of hearing)     wears bilateral hearing aids    Hypertension     Thyroid disease     Uses roller walker        PAST SURGICAL HISTORY:     Past Surgical History:   Procedure Laterality Date    HYSTERECTOMY      INJECTION, FACET JOINT, L4-L5 Bilateral 4/5/2019    Performed by Clayton Valle MD at [facility] PAIN MGT    JOINT REPLACEMENT Right     Hip    TONSILLECTOMY      TOTAL HIP ARTHROPLASTY Left 2004       ALLERGIES AND MEDICATION:     Review of patient's allergies indicates:   Allergen Reactions    Sulfa (sulfonamide antibiotics) Hives        Medication List           Accurate as of 4/16/19 11:54 AM. If you have any questions, ask your nurse or doctor.                CONTINUE taking these medications    acetaminophen 500 MG tablet  Commonly known as:  TYLENOL     BIOFREEZE-ILEX TOP     escitalopram oxalate 10 MG tablet  Commonly known as:  LEXAPRO  Take 1 tablet (10 mg total) by mouth once daily.     fluticasone 50 mcg/actuation nasal spray  Commonly known as:  FLONASE     gabapentin 300 MG capsule  Commonly known as:  NEURONTIN     levothyroxine 137 mcg Cap  Commonly known as:  TIROSINT     lisinopril 40 MG tablet  Commonly known as:  PRINIVIL,ZESTRIL  Take 1 tablet (40 mg total) by mouth once daily.     LORazepam 0.5 MG tablet  Commonly known as:  ATIVAN     memantine 5 MG Tab  Commonly known as:  NAMENDA     metoprolol succinate 200 MG 24 hr tablet  Commonly known as:  TOPROL-XL  Take 2 tablets (400 mg total) by mouth once daily.     simvastatin 40 MG tablet  Commonly known as:  ZOCOR     vitamin D 1000 units Tab  Commonly known as:  VITAMIN D3     warfarin 5 MG tablet  Commonly known as:  COUMADIN              SOCIAL HISTORY:     Social History     Socioeconomic History    Marital status:      Spouse name: Not on file    Number of children: 2    Years of education: 12    Highest education level: Not on file   Occupational History    Occupation: retired     Comment: [employer]   Social Needs    Financial resource strain: Not on file    Food insecurity:     Worry: Not on file     Inability: Not on file    Transportation needs:     Medical: Not on file     Non-medical: Not on file   Tobacco Use    Smoking status: Former Smoker     Last attempt to quit: 1988     Years since quittin.8    Smokeless tobacco: Never Used   Substance and Sexual Activity    Alcohol use: Yes     Alcohol/week: 1.8 - 2.4 oz     Types: 1 Glasses of wine, 2 - 3 Standard drinks or equivalent per week    Drug use: No    Sexual activity: Not on file   Lifestyle    Physical activity:     Days per week: Not on file     Minutes per session: Not on file    Stress: Not on file  "  Relationships    Social connections:     Talks on phone: Not on file     Gets together: Not on file     Attends Voodoo service: Not on file     Active member of club or organization: Not on file     Attends meetings of clubs or organizations: Not on file     Relationship status: Not on file   Other Topics Concern    Not on file   Social History Narrative    Not on file       FAMILY HISTORY:     Family History   Problem Relation Age of Onset    No Known Problems Mother     Diabetes Father     Stroke Father     Diabetes Sister     Diabetes Brother        REVIEW OF SYSTEMS:   Review of Systems   Constitutional: Negative for chills, diaphoresis and fever.   HENT: Negative for nosebleeds.    Eyes: Negative for blurred vision, double vision and photophobia.   Respiratory: Negative for hemoptysis, shortness of breath and wheezing.    Cardiovascular: Negative for chest pain, palpitations, orthopnea, claudication, leg swelling and PND.   Gastrointestinal: Negative for abdominal pain, blood in stool, heartburn, melena, nausea and vomiting.   Genitourinary: Negative for flank pain and hematuria.   Musculoskeletal: Negative for falls, myalgias and neck pain.   Skin: Negative for rash.   Neurological: Negative for dizziness, seizures, loss of consciousness, weakness and headaches.   Endo/Heme/Allergies: Negative for polydipsia. Does not bruise/bleed easily.   Psychiatric/Behavioral: Negative for depression and memory loss. The patient is not nervous/anxious.        PHYSICAL EXAM:     Vitals:    04/16/19 1133   BP: 136/72   Pulse: 69   Resp: 15    Body mass index is 34.76 kg/m².  Weight: 86.2 kg (190 lb 0.6 oz)   Height: 5' 2" (157.5 cm)     Physical Exam   Constitutional: She is oriented to person, place, and time. She appears well-developed and well-nourished. She is cooperative.  Non-toxic appearance. No distress.   HENT:   Head: Normocephalic and atraumatic.   Eyes: Pupils are equal, round, and reactive to light. " Conjunctivae and EOM are normal. No scleral icterus.   Neck: Trachea normal and normal range of motion. Neck supple. Normal carotid pulses and no JVD present. Carotid bruit is not present. No neck rigidity. No tracheal deviation and no edema present. No thyromegaly present.   Cardiovascular: Normal rate, S1 normal and S2 normal. An irregularly irregular rhythm present. PMI is not displaced. Exam reveals no gallop and no friction rub.   Murmur heard.   Systolic murmur is present with a grade of 2/6.  Pulses:       Carotid pulses are 2+ on the right side, and 2+ on the left side.  Pulmonary/Chest: Effort normal and breath sounds normal. No stridor. No respiratory distress. She has no wheezes. She has no rales. She exhibits no tenderness.   Abdominal: Soft. She exhibits no distension. There is no hepatosplenomegaly.   obese   Musculoskeletal: She exhibits no edema or tenderness.   Feet:   Right Foot:   Skin Integrity: Negative for ulcer.   Left Foot:   Skin Integrity: Negative for ulcer.   Neurological: She is alert and oriented to person, place, and time. No cranial nerve deficit.   Skin: Skin is warm and dry. No rash noted. No erythema.   Psychiatric: She has a normal mood and affect. Her speech is normal and behavior is normal.   Vitals reviewed.      DATA:   EKG: (personally reviewed tracing)  4/16/19 AF 69, PRWP/LAD, similar to 10/1/18    Laboratory:  CBC:  Recent Labs   Lab 07/05/18  1430 09/10/18  1119 09/11/18  0551   WHITE BLOOD CELL COUNT 3.7 4.42 3.45 L   HEMOGLOBIN 12.1 11.4 L 10.7 L   HEMATOCRIT 38.6 35.2 L 34.2 L   PLATELETS 161 145 L 126 L       CHEMISTRIES:  Recent Labs   Lab 07/05/18  1430 09/10/18  1119 09/11/18  0551   GLUCOSE 126 H 106 109   SODIUM 138 136 136   POTASSIUM 4.8 4.8 4.0   BUN BLD 20 13 12   CREATININE 1.07 H 0.9 0.8   EGFR IF   --  >60 >60   EGFR IF NON- 47 L 58 A >60   CALCIUM 9.7 10.0 9.7       CARDIAC BIOMARKERS:  Recent Labs   Lab 09/11/18  0551  09/11/18  1104 09/11/18  1658   TROPONIN I <0.006 <0.006 0.018       COAGS:  Recent Labs   Lab 09/11/18  0932 09/12/18  0511 04/05/19  0857   INR 3.3 H 2.4 H 1.0       LIPIDS/LFTS:  Recent Labs   Lab 07/05/18  1430 09/10/18  1119 09/11/18  0551   AST 18 17 13   ALT 12 14 12     Lab Results   Component Value Date    TSH 0.452 09/11/2018         Cardiovascular Testing:  Echo: 7/9/18    1 - Normal left ventricular systolic function (EF 55-60%).     2 - Concentric remodeling.     3 - Mild left atrial enlargement.     4 - Pulmonary hypertension. The estimated PA systolic pressure is 44 mmHg.     5 - Mild to moderate aortic stenosis, RAD = 1.3 cm2, AVAi = 0.69 cm2/m2, peak velocity = 2.92 m/s, mean gradient = 17 mmHg.     6 - Moderate mitral regurgitation.     7 - Mild tricuspid regurgitation.     ASSESSMENT:   # AF, chronic, on coumadin, rates acceptable  # AS, mod  # HTN, controlled  # DM  # HLP on simva 40mg  # BMI 35, up 1 unit(s) vs last OV    PLAN:   Cont med rx  RTC 6  Months or sooner prn  Consider repeat echo after next OV    Ede Kline MD, FACC

## 2019-07-09 ENCOUNTER — TELEPHONE (OUTPATIENT)
Dept: OPHTHALMOLOGY | Facility: CLINIC | Age: 84
End: 2019-07-09

## 2019-07-09 NOTE — TELEPHONE ENCOUNTER
Spoke with patient's daughter, and informed him that Dr. Barrera has passed away. And, that someone will be calling them to reschedule her mother's appointment, and establish a new provider.

## 2019-07-24 ENCOUNTER — TELEPHONE (OUTPATIENT)
Dept: SLEEP MEDICINE | Facility: CLINIC | Age: 84
End: 2019-07-24

## 2019-08-13 ENCOUNTER — OFFICE VISIT (OUTPATIENT)
Dept: OPHTHALMOLOGY | Facility: CLINIC | Age: 84
End: 2019-08-13
Payer: COMMERCIAL

## 2019-08-13 VITALS — SYSTOLIC BLOOD PRESSURE: 127 MMHG | DIASTOLIC BLOOD PRESSURE: 79 MMHG | HEART RATE: 76 BPM

## 2019-08-13 DIAGNOSIS — H35.3232 EXUDATIVE AGE-RELATED MACULAR DEGENERATION OF BOTH EYES WITH INACTIVE CHOROIDAL NEOVASCULARIZATION: Primary | ICD-10-CM

## 2019-08-13 DIAGNOSIS — H35.3134 NONEXUDATIVE AGE-RELATED MACULAR DEGENERATION, BILATERAL, ADVANCED ATROPHIC WITH SUBFOVEAL INVOLVEMENT: ICD-10-CM

## 2019-08-13 DIAGNOSIS — H43.813 POSTERIOR VITREOUS DETACHMENT, BILATERAL: ICD-10-CM

## 2019-08-13 PROCEDURE — 99999 PR PBB SHADOW E&M-EST. PATIENT-LVL III: CPT | Mod: PBBFAC,,, | Performed by: OPHTHALMOLOGY

## 2019-08-13 PROCEDURE — 67028 PR INJECT INTRAVITREAL PHARMCOLOGIC: ICD-10-PCS | Mod: LT,S$GLB,, | Performed by: OPHTHALMOLOGY

## 2019-08-13 PROCEDURE — 92134 CPTRZ OPH DX IMG PST SGM RTA: CPT | Mod: S$GLB,,, | Performed by: OPHTHALMOLOGY

## 2019-08-13 PROCEDURE — 92014 COMPRE OPH EXAM EST PT 1/>: CPT | Mod: 25,S$GLB,, | Performed by: OPHTHALMOLOGY

## 2019-08-13 PROCEDURE — 92014 PR EYE EXAM, EST PATIENT,COMPREHESV: ICD-10-PCS | Mod: 25,S$GLB,, | Performed by: OPHTHALMOLOGY

## 2019-08-13 PROCEDURE — 99999 PR PBB SHADOW E&M-EST. PATIENT-LVL III: ICD-10-PCS | Mod: PBBFAC,,, | Performed by: OPHTHALMOLOGY

## 2019-08-13 PROCEDURE — 67028 INJECTION EYE DRUG: CPT | Mod: LT,S$GLB,, | Performed by: OPHTHALMOLOGY

## 2019-08-13 PROCEDURE — 92134 POSTERIOR SEGMENT OCT RETINA (OCULAR COHERENCE TOMOGRAPHY)-BOTH EYES: ICD-10-PCS | Mod: S$GLB,,, | Performed by: OPHTHALMOLOGY

## 2019-08-13 RX ADMIN — Medication 1.25 MG: at 11:08

## 2019-08-20 ENCOUNTER — OFFICE VISIT (OUTPATIENT)
Dept: OTOLARYNGOLOGY | Facility: CLINIC | Age: 84
End: 2019-08-20
Payer: COMMERCIAL

## 2019-08-20 ENCOUNTER — CLINICAL SUPPORT (OUTPATIENT)
Dept: AUDIOLOGY | Facility: CLINIC | Age: 84
End: 2019-08-20
Payer: COMMERCIAL

## 2019-08-20 VITALS
DIASTOLIC BLOOD PRESSURE: 76 MMHG | WEIGHT: 196 LBS | BODY MASS INDEX: 35.85 KG/M2 | HEART RATE: 61 BPM | SYSTOLIC BLOOD PRESSURE: 143 MMHG

## 2019-08-20 DIAGNOSIS — H90.3 SENSORINEURAL HEARING LOSS, BILATERAL: Primary | ICD-10-CM

## 2019-08-20 DIAGNOSIS — H90.3 SENSORINEURAL HEARING LOSS (SNHL) OF BOTH EARS: Primary | ICD-10-CM

## 2019-08-20 DIAGNOSIS — H61.23 BILATERAL IMPACTED CERUMEN: ICD-10-CM

## 2019-08-20 DIAGNOSIS — H91.90 HEARING LOSS, UNSPECIFIED HEARING LOSS TYPE, UNSPECIFIED LATERALITY: ICD-10-CM

## 2019-08-20 PROCEDURE — 69210 EAR CERUMEN REMOVAL: ICD-10-PCS | Mod: S$GLB,,, | Performed by: NURSE PRACTITIONER

## 2019-08-20 PROCEDURE — 99999 PR PBB SHADOW E&M-EST. PATIENT-LVL III: CPT | Mod: PBBFAC,,, | Performed by: NURSE PRACTITIONER

## 2019-08-20 PROCEDURE — 92567 PR TYMPA2METRY: ICD-10-PCS | Mod: S$GLB,,, | Performed by: AUDIOLOGIST

## 2019-08-20 PROCEDURE — 92557 COMPREHENSIVE HEARING TEST: CPT | Mod: S$GLB,,, | Performed by: AUDIOLOGIST

## 2019-08-20 PROCEDURE — 69210 REMOVE IMPACTED EAR WAX UNI: CPT | Mod: S$GLB,,, | Performed by: NURSE PRACTITIONER

## 2019-08-20 PROCEDURE — 99203 PR OFFICE/OUTPT VISIT, NEW, LEVL III, 30-44 MIN: ICD-10-PCS | Mod: 25,S$GLB,, | Performed by: NURSE PRACTITIONER

## 2019-08-20 PROCEDURE — 99203 OFFICE O/P NEW LOW 30 MIN: CPT | Mod: 25,S$GLB,, | Performed by: NURSE PRACTITIONER

## 2019-08-20 PROCEDURE — 92557 PR COMPREHENSIVE HEARING TEST: ICD-10-PCS | Mod: S$GLB,,, | Performed by: AUDIOLOGIST

## 2019-08-20 PROCEDURE — 99999 PR PBB SHADOW E&M-EST. PATIENT-LVL III: ICD-10-PCS | Mod: PBBFAC,,, | Performed by: NURSE PRACTITIONER

## 2019-08-20 PROCEDURE — 92567 TYMPANOMETRY: CPT | Mod: S$GLB,,, | Performed by: AUDIOLOGIST

## 2019-08-20 NOTE — LETTER
August 20, 2019      Jael Anglin, BERNIEP-C  [address]  [facility] [address]           [facility] - Otorhinolaryngology  [address]  [facility] [address]  Phone: [phone]  Fax: [phone]          Patient: Vashti Muñoz   MR Number: [id number]   YOB: 1932   Date of Visit: 8/20/2019       Dear Jael Anglin:    Thank you for referring Vashti Muñoz to me for evaluation. Attached you will find relevant portions of my assessment and plan of care.    If you have questions, please do not hesitate to call me. I look forward to following Vashti Muñoz along with you.    Sincerely,    Rogers Sesay, NP    Enclosure  CC:  No Recipients    If you would like to receive this communication electronically, please contact [email or web address] or [phone] to request more information on [employer] Mobile Link access.    For providers and/or their staff who would like to refer a patient to [facility], please contact us through our one-stop-shop provider referral line, [facility], at [phone].    If you feel you have received this communication in error or would no longer like to receive these types of communications, please e-mail [email or web address]

## 2019-08-20 NOTE — PROGRESS NOTES
Vashti Muñoz was seen today in the clinic for an audiologic evaluation.  Pts main complaint was hearing loss.  Pt purchased hearing aids approximately one year ago when living in [place].  Pt has lost one aid.  She was referred to [facility] where they work with [employer] hearing aids.         Tympanometry revealed Type A in the right ear and Type A in the left ear.  Audiogram results revealed mild sloping to severe sensorineural hearing loss (SNHL) in the right ear and moderate sloping to severe SNHL in the left ear.  Speech reception thresholds were noted at 55 dB in the right ear and 70 dB in the left ear.  Speech discrimination scores were 60% in the right ear and 64% in the left ear.    Recommendations:  1. Otologic evaluation  2. Annual audiogram  3. Noise protection when in noise  4. Hearing aid follow-up

## 2019-08-20 NOTE — PROGRESS NOTES
Subjective:      Vashti Muñoz is a 87 y.o. female who was referred to me by Jael Anglin in consultation for hearing loss.    Ms. Muñoz reports difficulty with hearing for many years. She wears hearing aids and states she has lost her left hearing aid. She has had her pair of hearing aids for many years and got them in [place]. She has associated tinnitus. She denies ear pain or drainage.  There is not a family history of hearing loss at a young age.  There is not a prior history of ear surgery.  There is not a prior history of ear infections .  She denies a history of significant noise exposure.  She does wear hearing aids currently.  She has not had a hearing test recently.        Past Medical History  She has a past medical history of Allergy, Anticoagulant long-term use, Anxiety, Arthritis, Atrial fibrillation, Cataract, Closed head injury, Clotting disorder, Depression, Diabetes mellitus, Encounter for blood transfusion, Fall, GERD (gastroesophageal reflux disease), Heart murmur, [place] (hard of hearing), Hypertension, Thyroid disease, and Uses roller walker.    Past Surgical History  She has a past surgical history that includes Hysterectomy; Tonsillectomy; Total hip arthroplasty (Left, 2004); Joint replacement (Right); and Injection of facet joint (Bilateral, 4/5/2019).    Family History  Her family history includes Diabetes in her brother, father, and sister; No Known Problems in her mother; Stroke in her father.    Social History  She reports that she quit smoking about 31 years ago. She has never used smokeless tobacco. She reports that she drinks about 1.8 - 2.4 oz of alcohol per week. She reports that she does not use drugs.    Allergies  She is allergic to sulfa (sulfonamide antibiotics).    Medications  She has a current medication list which includes the following prescription(s): acetaminophen, fluticasone propionate, gabapentin, levothyroxine, lisinopril, lorazepam, memantine,  menthol/camphor, metoprolol succinate, simvastatin, vitamin d, warfarin, and escitalopram oxalate.    Review of Systems   Constitutional: Negative for chills, fever and unexpected weight change.   HENT: Positive for hearing loss and tinnitus. Negative for congestion, ear discharge, ear pain, facial swelling, postnasal drip, sinus pressure, sore throat and trouble swallowing.    Eyes: Negative for pain and visual disturbance.   Respiratory: Negative for apnea and shortness of breath.    Cardiovascular: Negative for chest pain and palpitations.   Gastrointestinal: Negative for abdominal pain and nausea.   Endocrine: Negative for cold intolerance and heat intolerance.   Musculoskeletal: Negative for joint swelling and neck stiffness.   Skin: Negative for color change and rash.   Neurological: Negative for dizziness, facial asymmetry and headaches.   Hematological: Negative for adenopathy. Does not bruise/bleed easily.   Psychiatric/Behavioral: Negative for agitation. The patient is not nervous/anxious.           Objective:     BP (!) 143/76   Pulse 61   Wt 88.9 kg (196 lb)   LMP  (LMP Unknown)   BMI 35.85 kg/m²      Constitutional:   Vital signs are normal. She appears well-developed and well-nourished.     Head:  Normocephalic and atraumatic.     Ears:    Right Ear: No lacerations. No drainage, swelling or tenderness. No foreign bodies. No mastoid tenderness. Tympanic membrane is not injected, not scarred, not perforated, not erythematous, not retracted and not bulging. Tympanic membrane mobility is normal. No middle ear effusion. No hemotympanum. Decreased hearing is noted.   Left Ear: No lacerations. No drainage, swelling or tenderness. No foreign bodies. No mastoid tenderness. Tympanic membrane is not injected, not scarred, not perforated, not erythematous, not retracted and not bulging. Tympanic membrane mobility is normal.  No middle ear effusion. No hemotympanum. Decreased hearing is noted.    Ears:      Nose:  Nose normal including turbinates, nasal mucosa, sinuses and nasal septum.     Neck:  Neck normal without thyromegaly masses, asymmetry, normal tracheal structure, crepitus, and tenderness and no adenopathy.     Cardiovascular:   Normal heart sounds and normal pulses.      Pulmonary/Chest:   Effort normal and breath sounds normal.     Psychiatric:   She has a normal mood and affect.       Procedure    Cerumen removal performed.  See procedure note.        Data Reviewed    WBC (K/uL)   Date Value   09/11/2018 3.45 (L)     Platelets (K/uL)   Date Value   09/11/2018 126 (L)      Creatinine (mg/dL)   Date Value   09/11/2018 0.8     TSH (uIU/mL)   Date Value   09/11/2018 0.452     Glucose (mg/dL)   Date Value   09/11/2018 109     Hemoglobin A1C (%)   Date Value   07/05/2018 5.7 (H)       I independently reviewed the tracings of the complete audiometric evaluation performed today.  I reviewed the audiogram with the patient as well.  Pertinent findings include bilateral down sloping moderate to severe SNHL with normal tymps.         Assessment:     1. Sensorineural hearing loss (SNHL) of both ears    2. Bilateral impacted cerumen    3. Hearing loss, unspecified hearing loss type, unspecified laterality         Plan:     I had a long discussion with the patient regarding her condition and the further workup and management options.    Cerumen impaction removed under microscope.  I recommend hearing aid evaluation for left hearing aid. Dee Jo's card provided to patient.  Follow up in one year with audiogram.    Follow up in about 1 year (around 8/20/2020).

## 2019-08-20 NOTE — PROCEDURES
Ear Cerumen Removal  Date/Time: 8/20/2019 11:07 AM  Performed by: Rogers Sesay NP  Authorized by: Rogers Sesay NP     Location details:  Both ears  Procedure type: curette    Cerumen  Removal Results:  Cerumen completely removed  Patient tolerance:  Patient tolerated the procedure well with no immediate complications     Procedure Note:    The patient was brought to the minor procedure room and placed under the operating microscope of the left ear canal which was cleaned of ceruminous debris. Using a combination of suction, curettes and cup forceps the patient's cerumen impaction was removed. The tympanic membrane was evaluated and was unremarkable. The patient tolerated the procedure well. There were no complications.  Procedure Note:    Patient was brought to the minor procedure room and using the operating microscope of the right ear canal which was cleaned of ceruminous debris. There was a significant cerumen impaction.  Using a combination of suction, curettes and cup forceps the patient's cerumen impaction was removed. Tympanic membrane intact. Pt tolerated well. There were no complications.

## 2019-09-10 ENCOUNTER — TELEPHONE (OUTPATIENT)
Dept: PAIN MEDICINE | Facility: CLINIC | Age: 84
End: 2019-09-10

## 2019-09-10 NOTE — TELEPHONE ENCOUNTER
----- Message from Julia Lopez sent at 9/10/2019  1:16 PM CDT -----  Contact: Donna bermudez/[employer]            Name of Who is Calling: Donna bermudez/[employer]      What is the request in detail: Donna states the pt had a recent joint injection and she needs to schedule a post op appt. Please contact to further discuss and advise.        Can the clinic reply by [facility]: N      What [facility] to [facility] Back [facility]: [phone]

## 2019-09-10 NOTE — TELEPHONE ENCOUNTER
----- Message from Julia Lopez sent at 9/10/2019  1:16 PM CDT -----  Contact: Donna bermudez/[employer]            Name of Who is Calling: Donna bermudez/[employer]      What is the request in detail: Donna states the pt had a recent joint injection and she needs to schedule a post op appt. Please contact to further discuss and advise.        Can the clinic reply by [facility]: N      What Number to Call Back [facility] not [facility]: [phone]

## 2019-09-25 ENCOUNTER — TELEPHONE (OUTPATIENT)
Dept: AUDIOLOGY | Facility: CLINIC | Age: 84
End: 2019-09-25

## 2019-09-26 ENCOUNTER — CLINICAL SUPPORT (OUTPATIENT)
Dept: AUDIOLOGY | Facility: CLINIC | Age: 84
End: 2019-09-26
Payer: COMMERCIAL

## 2019-09-26 DIAGNOSIS — H90.3 SENSORINEURAL HEARING LOSS, BILATERAL: Primary | ICD-10-CM

## 2019-09-26 PROCEDURE — 99499 UNLISTED E&M SERVICE: CPT | Mod: S$GLB,,, | Performed by: AUDIOLOGIST

## 2019-09-26 PROCEDURE — 99499 NO LOS: ICD-10-PCS | Mod: S$GLB,,, | Performed by: AUDIOLOGIST

## 2019-09-26 NOTE — PROGRESS NOTES
Ms. Muñoz was seen for a hearing aid consultation. She currently wears [employer] hearing aids but recently lost the left hearing aid. Discussed replacing both vs. replacing one. Discussed replacing LHA with unitron vs. another . Discussed that we do not have the software to program Unitron hearing aids. Patient elected to go to [place] where Dr. Cherri Garcia has experience with Unitron and software to adjust instrument. Patient requested information be shared with her helper to facilitate transportation for her appointment. Ms. Muñoz was encouraged to call if she has any issues/needs assistance prior to her appointment in [place].

## 2019-09-30 ENCOUNTER — OFFICE VISIT (OUTPATIENT)
Dept: PAIN MEDICINE | Facility: CLINIC | Age: 84
End: 2019-09-30
Payer: COMMERCIAL

## 2019-09-30 VITALS
WEIGHT: 201.06 LBS | TEMPERATURE: 98 F | HEIGHT: 62 IN | BODY MASS INDEX: 37 KG/M2 | DIASTOLIC BLOOD PRESSURE: 74 MMHG | RESPIRATION RATE: 18 BRPM | SYSTOLIC BLOOD PRESSURE: 135 MMHG | HEART RATE: 73 BPM

## 2019-09-30 DIAGNOSIS — M46.1 SACROILIITIS: Primary | ICD-10-CM

## 2019-09-30 DIAGNOSIS — M47.816 LUMBAR SPONDYLOSIS: ICD-10-CM

## 2019-09-30 DIAGNOSIS — G89.4 CHRONIC PAIN SYNDROME: ICD-10-CM

## 2019-09-30 DIAGNOSIS — M47.816 LUMBAR FACET ARTHROPATHY: ICD-10-CM

## 2019-09-30 PROCEDURE — 99214 OFFICE O/P EST MOD 30 MIN: CPT | Mod: S$GLB,,, | Performed by: ANESTHESIOLOGY

## 2019-09-30 PROCEDURE — 99214 PR OFFICE/OUTPT VISIT, EST, LEVL IV, 30-39 MIN: ICD-10-PCS | Mod: S$GLB,,, | Performed by: ANESTHESIOLOGY

## 2019-09-30 PROCEDURE — 99999 PR PBB SHADOW E&M-EST. PATIENT-LVL IV: CPT | Mod: PBBFAC,,, | Performed by: ANESTHESIOLOGY

## 2019-09-30 PROCEDURE — 99999 PR PBB SHADOW E&M-EST. PATIENT-LVL IV: ICD-10-PCS | Mod: PBBFAC,,, | Performed by: ANESTHESIOLOGY

## 2019-09-30 NOTE — PROGRESS NOTES
Chronic patient Established Note (Follow up visit)    SUBJECTIVE:     Vashti Muñoz presents to the clinic for the evaluation of back pain. The pain started years ago following no specific incident and symptoms have been unchanged.The pain is located in the lower back area and is localized .  The pain is described as aching, dull, sharp, tight band and constant and is rated as 7/10. The pain is rated with a score of  6/10 on the BEST day and a score of 9/10 on the WORST day.  Symptoms interfere with daily activity and sleeping. The pain is exacerbated by standing, walking, nighttime, and morning .  The pain is mitigated by injections. She reports spending 4-5 hours per day reclining. The patient reports 6-7 hours of uninterrupted sleep per night.     Patient has PMHx of Afib (on coumadin), clotting disorder, hx of Falls, GERD, heart murmur, anxiety, arthritis, cataract, closed head injury, depression, DM2, hard of hearing, aortic stenosis, dementia and thyroid disease. She presents to clinic as new patient for complaint of low back pain. She says low back pain is always there and is aggravated by activity. She gets around using a walker. It does not wake her up from sleep. She takes gabapentin and tylenol for the pain. She has had one PRANAV in the past which really helped her. Pain is worse on the right than on the left. No shooting pain or numbness down legs.      Interval history 3/12/2019:     Vashti Muñoz presents to the clinic for a follow-up appointment for 4 week follow up. Since the last visit, Vashti Muñoz states the pain has been persistant. Current pain intensity is 7/10.    Interval History 9/30/2019:      SUBJECTIVE:    Vashti Muñoz presents to the clinic for a follow-up appointment for back pain. Since the last visit, Vashti Muñoz states the pain has been improving. Current pain intensity is 8/10.    Pain Disability Index Review:  Last 3 PDI Scores 3/12/2019 2/12/2019   Pain Disability Index  (PDI) 9 36       Pain Medications:     - Opioids: None  - Adjuvant Medications: Neurontin (Gabapentin)  - Anti-Coagulants: Coumadin ( Warfarin)  - Others: See med list      Opioid Contract: not applicable     report:  Not applicable    Pain Procedures:   [facility]    Physical Therapy/Home Exercise: NO    Imaging:   X-Ray Hip 2 View Right     Narrative       EXAMINATION:  XR HIP 2 VIEW RIGHT    CLINICAL HISTORY:  Unspecified fall, initial encounter    TECHNIQUE:  AP view of the pelvis and frog leg lateral view of the right hip were performed.    COMPARISON:  None    FINDINGS:  Patient is s/p right hip arthroplasty with the metallic prosthesis appearing in satisfactory position.  There is a fixation plate with multiple cerclage wires along the lateral aspect of the proximal right femur related to attempted fixation of the greater trochanter.  However, there are fractures of the inferior cerclage wires and there is a nonunion fracture of the greater trochanter with lucency along the fixation plate suggesting possible loosening or infection of the fixation plate.  These findings may all be chronic and correlation with prior imaging findings is suggested.  Soft tissues are unremarkable.       Impression         Postoperative changes related to prior right hip arthroplasty with the metallic prosthesis appearing in satisfactory position.    Fixation plate and cerclage wires along the lateral aspect of the proximal right femur likely related to prior attempted internal fixation of a fracture of the greater trochanter.  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  This appears to be chronic.  Correlation with prior imaging studies is recommended.              Allergies:   Review of patient's allergies indicates:   Allergen Reactions    Sulfa (sulfonamide antibiotics) Hives       Current Medications:    Current Outpatient Medications   Medication Sig Dispense Refill    acetaminophen (TYLENOL) 500 MG tablet Take 500 mg by mouth every 6 (six) hours as needed for Pain.      fluticasone (FLONASE) 50 mcg/actuation nasal spray 1 spray by Each Nare route once daily.      gabapentin (NEURONTIN) 300 MG capsule Take 300 mg by mouth 2 (two) times daily.      levothyroxine 137 mcg Cap Take 1 tablet by mouth once daily.      lisinopril (PRINIVIL,ZESTRIL) 40 MG tablet Take 1 tablet (40 mg total) by mouth once daily. 90 tablet 1    LORazepam (ATIVAN) 0.5 MG tablet Take 0.5 mg by mouth 2 (two) times daily. Take half in the am and a whole at bedtime      memantine (NAMENDA) 5 MG Tab Take 5 mg by mouth 2 (two) times daily.      menthol/camphor (BIOFREEZE-ILEX TOP) Apply topically.      simvastatin (ZOCOR) 40 MG tablet Take 40 mg by mouth every evening.      vitamin D (VITAMIN D3) 1000 units Tab Take 2,000 Units by mouth once daily.      escitalopram oxalate (LEXAPRO) 10 MG tablet Take 1 tablet (10 mg total) by mouth once daily. 90 tablet 3    metoprolol succinate (TOPROL-XL) 200 MG 24 hr tablet Take 2 tablets (400 mg total) by mouth once daily. 60 tablet 11    warfarin (COUMADIN) 5 MG tablet Take 5 mg by mouth. Only  5 ml On mon and thur. 4 ml tues, wed, fri sat and sun.       No current facility-administered medications for this visit.        REVIEW OF SYSTEMS:    GENERAL:  No weight loss, malaise or fevers.  HEENT:  Negative for frequent or significant headaches.  NECK:  Negative for lumps, goiter, pain and significant neck swelling.  RESPIRATORY:  Negative for cough, wheezing or shortness of breath.  CARDIOVASCULAR:  Negative for chest pain, leg swelling or palpitations.  GI:  Negative for abdominal discomfort, blood in stools or black stools or change in bowel habits.  MUSCULOSKELETAL:  Low back pain.  SKIN:  Negative for lesions, rash, and itching.  PSYCH:  Negative for sleep disturbance, mood disorder and recent  psychosocial stressors. + Anxiety  HEMATOLOGY/LYMPHOLOGY:  Negative for prolonged bleeding, bruising easily or swollen nodes.  NEURO:   No history of headaches, syncope, paralysis, seizures or tremors. +Memory impairment.  All other reviewed and negative other than HPI.    Past Medical History:  Past Medical History:   Diagnosis Date    Allergy     Anticoagulant long-term use     Coumadin    Anxiety     Arthritis     Atrial fibrillation     Cataract     Closed head injury 09/10/2018    Clotting disorder     Depression     Diabetes mellitus     Encounter for blood transfusion     Fall 09/10/2018    GERD (gastroesophageal reflux disease)     Heart murmur     [place] (hard of hearing)     wears bilateral hearing aids    Hypertension     Thyroid disease     Uses roller walker        Past Surgical History:  Past Surgical History:   Procedure Laterality Date    HYSTERECTOMY      INJECTION OF FACET JOINT Bilateral 4/5/2019    Procedure: INJECTION, FACET JOINT, L4-L5;  Surgeon: Clayton Valle MD;  Location: [facility];  Service: Pain Management;  Laterality: Bilateral;    JOINT REPLACEMENT Right     Hip    TONSILLECTOMY      TOTAL HIP ARTHROPLASTY Left 2004       Family History:  Family History   Problem Relation Age of Onset    No Known Problems Mother     Diabetes Father     Stroke Father     Diabetes Sister     Diabetes Brother        Social History:  Social History     Socioeconomic History    Marital status:      Spouse name: Not on file    Number of children: 2    Years of education: 12    Highest education level: Not on file   Occupational History    Occupation: retired     Comment: [employer]   Social Needs    Financial resource strain: Not on file    Food insecurity:     Worry: Not on file     Inability: Not on file    Transportation needs:     Medical: Not on file     Non-medical: Not on file   Tobacco Use    Smoking status: Former Smoker     Last attempt to quit: 7/5/1988     " Years since quittin.2    Smokeless tobacco: Never Used   Substance and Sexual Activity    Alcohol use: Yes     Alcohol/week: 3.0 - 4.0 standard drinks     Types: 1 Glasses of wine, 2 - 3 Standard drinks or equivalent per week    Drug use: No    Sexual activity: Not on file   Lifestyle    Physical activity:     Days per week: Not on file     Minutes per session: Not on file    Stress: Not on file   Relationships    Social connections:     Talks on phone: Not on file     Gets together: Not on file     Attends Congregation service: Not on file     Active member of club or organization: Not on file     Attends meetings of clubs or organizations: Not on file     Relationship status: Not on file   Other Topics Concern    Not on file   Social History Narrative    Not on file       OBJECTIVE:    /74   Pulse 73   Temp 98 °F (36.7 °C)   Resp 18   Ht 5' 2" (1.575 m)   Wt 91.2 kg (201 lb 1 oz)   LMP  (LMP Unknown)   BMI 36.77 kg/m²     PHYSICAL EXAMINATION:    General appearance: Well appearing, in no acute distress, alert and oriented x3.  Psych:  Mood and affect appropriate.  Skin: Skin color, texture, turgor normal, no rashes or lesions, in both upper and lower body.  Head/face:  Atraumatic, normocephalic. No palpable lymph nodes  Neck: No pain to palpation over the cervical paraspinous muscles. Spurling Negative. No pain with neck flexion, extension, or lateral flexion. .  Cor: RRR  Pulm: CTA  GI: Abdomen soft and non-tender.  Back: Straight leg raising in the sitting and supine positions is negative to radicular pain. Tenderness to palpation over bilateral lumbar paraspinal muscles.  Limited range of motion of lumbar spine with positive facet loading bilateral.    Extremities: Peripheral joint ROM is full and pain free without obvious instability or laxity in all four extremities. No deformities, edema, or skin discoloration. Good capillary refill.  Musculoskeletal: Shoulder, hip and knee " provocative maneuvers are negative. Bilateral upper and lower extremity strength is normal and symmetric.  No atrophy or tone abnormalities are noted. Positive tenderness to palpation over bilateral SI joint area with positive Nicky bilateral.  Neuro: Bilateral upper and lower extremity coordination and muscle stretch reflexes are physiologic and symmetric.  Plantar response are downgoing. No loss of sensation is noted.  Gait:  Antalgic.    ASSESSMENT: 87 y.o. year old female with chronic lower back pain consistent with lumbar facet arthropathy, lumbar spondylosis and sacroiliitis.  She is here today for follow-up status post bilateral L4/L5 facet joint injection with more than 50% pain relief.  She continues to attend physical therapy.  She reports his usual pain and bilateral buttock area consistent with sacroiliitis.  We will schedule her for bilateral SI joint injection.     1. Sacroiliitis     2. Lumbar facet arthropathy     3. Lumbar spondylosis     4. Chronic pain syndrome           PLAN:     - I have stressed the importance of physical activity and a home exercise plan to help with pain and improve health.  - Schedule for bilateral SI joint injection.  - We will consider bilateral lumbar medial branch block and possible lumbar medial branch radiofrequency ablation in the future.  - RTC in 2 weeks after the procedure.   - Counseled patient regarding the importance of activity modification and physical therapy.    The above plan and management options were discussed at length with patient. Patient is in agreement with the above and verbalized understanding.    Clayton Valle  09/30/2019

## 2019-10-03 ENCOUNTER — CLINICAL SUPPORT (OUTPATIENT)
Dept: OTOLARYNGOLOGY | Facility: CLINIC | Age: 84
End: 2019-10-03
Payer: COMMERCIAL

## 2019-10-03 DIAGNOSIS — H90.3 SENSORINEURAL HEARING LOSS (SNHL), BILATERAL: Primary | ICD-10-CM

## 2019-10-03 PROCEDURE — 99499 NO LOS: ICD-10-PCS | Mod: S$GLB,,, | Performed by: AUDIOLOGIST-HEARING AID FITTER

## 2019-10-03 PROCEDURE — 99499 UNLISTED E&M SERVICE: CPT | Mod: S$GLB,,, | Performed by: AUDIOLOGIST-HEARING AID FITTER

## 2019-10-03 NOTE — PROGRESS NOTES
Jim Ga, CCC-A  [facility] [address]  [address]  [place] [address]      Patient: Vashti Muñoz   MRN: [id number]   : 1932  HURST: 10/03/2019    HEARING AID FOLLOW-UP    Vashti Muñoz was seen on the above date for a hearing aid visit. She was sent here by [facility] as they do not have an account with [employer]. She was fit binaurally with [employer] in [place]. She lost her left hearing aid. Fortunately, it is still covered under the Loss and Damage warranty. Her LD and repair  21. I will fax the L&D form today and receive a PO to order.     Update:  Patient changed her mind as she is not sure she can afford the $300 replacement fee. She would like to call her daughter, Audrey Hernandes to discuss finances first.    Will hold on order until patient calls me back to advise.       Jim Ga, CCC-A

## 2019-10-10 RX ORDER — LEVOTHYROXINE SODIUM 125 UG/1
TABLET ORAL
Qty: 90 TABLET | Refills: 0 | OUTPATIENT
Start: 2019-10-10

## 2019-10-11 ENCOUNTER — HOSPITAL ENCOUNTER (EMERGENCY)
Facility: OTHER | Age: 84
Discharge: HOME | End: 2019-10-11
Attending: EMERGENCY MEDICINE
Payer: COMMERCIAL

## 2019-10-11 VITALS
WEIGHT: 201.06 LBS | DIASTOLIC BLOOD PRESSURE: 98 MMHG | TEMPERATURE: 98 F | RESPIRATION RATE: 94 BRPM | SYSTOLIC BLOOD PRESSURE: 144 MMHG | OXYGEN SATURATION: 96 % | HEIGHT: 62 IN | HEART RATE: 109 BPM | BODY MASS INDEX: 37 KG/M2

## 2019-10-11 DIAGNOSIS — R00.0 TACHYCARDIA: ICD-10-CM

## 2019-10-11 DIAGNOSIS — I48.91 ATRIAL FIBRILLATION WITH RVR: Primary | ICD-10-CM

## 2019-10-11 DIAGNOSIS — R06.02 SHORTNESS OF BREATH: ICD-10-CM

## 2019-10-11 LAB
ALBUMIN SERPL BCP-MCNC: 4.1 G/DL (ref 3.5–5.2)
ALP SERPL-CCNC: 102 U/L (ref 55–135)
ALT SERPL W/O P-5'-P-CCNC: 10 U/L (ref 10–44)
ANION GAP SERPL CALC-SCNC: 13 MMOL/L (ref 8–16)
AST SERPL-CCNC: 17 U/L (ref 10–40)
BASOPHILS # BLD AUTO: 0 K/UL (ref 0–0.2)
BASOPHILS NFR BLD: 0 % (ref 0–1.9)
BILIRUB SERPL-MCNC: 1.1 MG/DL (ref 0.1–1)
BNP SERPL-MCNC: 377 PG/ML (ref 0–99)
BUN SERPL-MCNC: 11 MG/DL (ref 8–23)
CALCIUM SERPL-MCNC: 9.9 MG/DL (ref 8.7–10.5)
CHLORIDE SERPL-SCNC: 95 MMOL/L (ref 95–110)
CO2 SERPL-SCNC: 28 MMOL/L (ref 23–29)
CREAT SERPL-MCNC: 1 MG/DL (ref 0.5–1.4)
DIFFERENTIAL METHOD: ABNORMAL
EOSINOPHIL # BLD AUTO: 0 K/UL (ref 0–0.5)
EOSINOPHIL NFR BLD: 0 % (ref 0–8)
ERYTHROCYTE [DISTWIDTH] IN BLOOD BY AUTOMATED COUNT: 14.9 % (ref 11.5–14.5)
EST. GFR  (AFRICAN AMERICAN): 59 ML/MIN/1.73 M^2
EST. GFR  (NON AFRICAN AMERICAN): 51 ML/MIN/1.73 M^2
GIANT PLATELETS BLD QL SMEAR: PRESENT
GLUCOSE SERPL-MCNC: 140 MG/DL (ref 70–110)
HCT VFR BLD AUTO: 38.6 % (ref 37–48.5)
HGB BLD-MCNC: 12 G/DL (ref 12–16)
IMM GRANULOCYTES # BLD AUTO: 0.03 K/UL (ref 0–0.04)
IMM GRANULOCYTES NFR BLD AUTO: 0.7 % (ref 0–0.5)
INR PPP: 1 (ref 0.8–1.2)
LYMPHOCYTES # BLD AUTO: 1 K/UL (ref 1–4.8)
LYMPHOCYTES NFR BLD: 21.7 % (ref 18–48)
MCH RBC QN AUTO: 27.3 PG (ref 27–31)
MCHC RBC AUTO-ENTMCNC: 31.1 G/DL (ref 32–36)
MCV RBC AUTO: 88 FL (ref 82–98)
MONOCYTES # BLD AUTO: 0.8 K/UL (ref 0.3–1)
MONOCYTES NFR BLD: 17 % (ref 4–15)
NEUTROPHILS # BLD AUTO: 2.8 K/UL (ref 1.8–7.7)
NEUTROPHILS NFR BLD: 60.6 % (ref 38–73)
NRBC BLD-RTO: 0 /100 WBC
PLATELET # BLD AUTO: ABNORMAL K/UL (ref 150–350)
PLATELET BLD QL SMEAR: ABNORMAL
PMV BLD AUTO: ABNORMAL FL (ref 9.2–12.9)
POTASSIUM SERPL-SCNC: 4.3 MMOL/L (ref 3.5–5.1)
PROT SERPL-MCNC: 7.6 G/DL (ref 6–8.4)
PROTHROMBIN TIME: 10.7 SEC (ref 9–12.5)
RBC # BLD AUTO: 4.4 M/UL (ref 4–5.4)
SODIUM SERPL-SCNC: 136 MMOL/L (ref 136–145)
TROPONIN I SERPL DL<=0.01 NG/ML-MCNC: 0.01 NG/ML (ref 0–0.03)
WBC # BLD AUTO: 4.6 K/UL (ref 3.9–12.7)

## 2019-10-11 PROCEDURE — 25000003 PHARM REV CODE 250: Performed by: EMERGENCY MEDICINE

## 2019-10-11 PROCEDURE — 99284 EMERGENCY DEPT VISIT MOD MDM: CPT | Mod: 25

## 2019-10-11 PROCEDURE — 93010 EKG 12-LEAD: ICD-10-PCS | Mod: ,,, | Performed by: INTERNAL MEDICINE

## 2019-10-11 PROCEDURE — 96374 THER/PROPH/DIAG INJ IV PUSH: CPT

## 2019-10-11 PROCEDURE — 93010 ELECTROCARDIOGRAM REPORT: CPT | Mod: ,,, | Performed by: INTERNAL MEDICINE

## 2019-10-11 PROCEDURE — 85025 COMPLETE CBC W/AUTO DIFF WBC: CPT

## 2019-10-11 PROCEDURE — 80053 COMPREHEN METABOLIC PANEL: CPT

## 2019-10-11 PROCEDURE — 96376 TX/PRO/DX INJ SAME DRUG ADON: CPT

## 2019-10-11 PROCEDURE — 83880 ASSAY OF NATRIURETIC PEPTIDE: CPT

## 2019-10-11 PROCEDURE — 85610 PROTHROMBIN TIME: CPT

## 2019-10-11 PROCEDURE — 93005 ELECTROCARDIOGRAM TRACING: CPT

## 2019-10-11 PROCEDURE — 84484 ASSAY OF TROPONIN QUANT: CPT

## 2019-10-11 PROCEDURE — 96375 TX/PRO/DX INJ NEW DRUG ADDON: CPT

## 2019-10-11 RX ORDER — DILTIAZEM HYDROCHLORIDE 5 MG/ML
10 INJECTION INTRAVENOUS
Status: COMPLETED | OUTPATIENT
Start: 2019-10-11 | End: 2019-10-11

## 2019-10-11 RX ORDER — METOPROLOL SUCCINATE 50 MG/1
200 TABLET, EXTENDED RELEASE ORAL DAILY
Status: DISCONTINUED | OUTPATIENT
Start: 2019-10-11 | End: 2019-10-11 | Stop reason: HOSPADM

## 2019-10-11 RX ORDER — METOPROLOL TARTRATE 1 MG/ML
5 INJECTION, SOLUTION INTRAVENOUS
Status: COMPLETED | OUTPATIENT
Start: 2019-10-11 | End: 2019-10-11

## 2019-10-11 RX ORDER — ACETAMINOPHEN 325 MG/1
650 TABLET ORAL
Status: COMPLETED | OUTPATIENT
Start: 2019-10-11 | End: 2019-10-11

## 2019-10-11 RX ADMIN — METOPROLOL TARTRATE 5 MG: 1 INJECTION, SOLUTION INTRAVENOUS at 09:10

## 2019-10-11 RX ADMIN — ACETAMINOPHEN 650 MG: 325 TABLET, FILM COATED ORAL at 10:10

## 2019-10-11 RX ADMIN — METOPROLOL TARTRATE 5 MG: 1 INJECTION, SOLUTION INTRAVENOUS at 10:10

## 2019-10-11 RX ADMIN — METOPROLOL TARTRATE 5 MG: 1 INJECTION, SOLUTION INTRAVENOUS at 11:10

## 2019-10-11 RX ADMIN — METOPROLOL SUCCINATE 200 MG: 50 TABLET, EXTENDED RELEASE ORAL at 12:10

## 2019-10-11 RX ADMIN — DILTIAZEM HYDROCHLORIDE 10 MG: 5 INJECTION INTRAVENOUS at 12:10

## 2019-10-11 NOTE — ED NOTES
Pt tolerated diltiazem iv well. Heart rate dropped to low of 78 . Pt denies chest pain or sob. Daughter at bedside.

## 2019-10-11 NOTE — ED PROVIDER NOTES
"Encounter Date: 10/11/2019    SCRIBE #1 NOTE: I, Carlos Cueto, am scribing for, and in the presence of, Dr Serrato.       History     Chief Complaint   Patient presents with    Tachycardia     Pt escorted to Ed per pain mgt RN. Pt unable to have procedure due to tachycardia, P= 140s. Pt did not take beta blocker this am. Pt c/o feeling "nervous & shaky." Denies CP & SOB.     Time seen by provider: 9:17 AM    This is a 87 y.o. female who presents with complaint of tachycardia noted this morning causing her to be unable to have joint injection procedure done at pain management. The patient has a history of chronic atrial fibrillation, aortic stenosis, and is anticoagulated on Eliquis and taking metoprolol. The patient's daughter reports patient was complaining of back pain and fatigue from not sleeping well last night upon picking her up this morning from the center that she lives in. She also reports that the center did not yet administer the patient's normal medications this morning. The patient denies eating this morning. The patient denies fever, chills, rhinorrhea, sore throat, chest pain, shortness of breath, nausea, vomiting, or diarrhea. The patient reports history of HTN which she is compliant with medications and DM which is diet controlled. She denies history of heart disease or pulmonary difficulties. She reports surgical history of hysterectomy and hip replacement. She reports sulfa allergy. She reports tobacco use many years ago and denies alcohol or drug use.    The history is provided by the patient, a relative and medical records (Daughter.).     Review of patient's allergies indicates:   Allergen Reactions    Sulfa (sulfonamide antibiotics) Hives     Past Medical History:   Diagnosis Date    Allergy     Anticoagulant long-term use     Coumadin    Anxiety     Arthritis     Atrial fibrillation     Cataract     Closed head injury 09/10/2018    Clotting disorder     Depression     Diabetes " mellitus     Encounter for blood transfusion     Fall 09/10/2018    GERD (gastroesophageal reflux disease)     Heart murmur     [place] (hard of hearing)     wears bilateral hearing aids    Hypertension     Thyroid disease     Uses roller walker      Past Surgical History:   Procedure Laterality Date    HYSTERECTOMY      INJECTION OF FACET JOINT Bilateral 2019    Procedure: INJECTION, FACET JOINT, L4-L5;  Surgeon: Clayton Valle MD;  Location: [facility];  Service: Pain Management;  Laterality: Bilateral;    JOINT REPLACEMENT Right     Hip    TONSILLECTOMY      TOTAL HIP ARTHROPLASTY Left      Family History   Problem Relation Age of Onset    No Known Problems Mother     Diabetes Father     Stroke Father     Diabetes Sister     Diabetes Brother      Social History     Tobacco Use    Smoking status: Former Smoker     Last attempt to quit: 1988     Years since quittin.2    Smokeless tobacco: Never Used   Substance Use Topics    Alcohol use: Yes     Alcohol/week: 3.0 - 4.0 standard drinks     Types: 1 Glasses of wine, 2 - 3 Standard drinks or equivalent per week    Drug use: No     Review of Systems   Constitutional: Positive for fatigue. Negative for chills and fever.   HENT: Negative for rhinorrhea and sore throat.    Respiratory: Negative for cough and shortness of breath.    Cardiovascular: Negative for chest pain.        + Tachycardia.   Gastrointestinal: Negative for diarrhea, nausea and vomiting.   Genitourinary: Negative for dysuria.   Musculoskeletal: Positive for back pain.   Skin: Negative for rash.   Neurological: Negative for weakness.   Hematological: Does not bruise/bleed easily.   All other systems reviewed and are negative.      Physical Exam     Initial Vitals [10/11/19 0857]   BP Pulse Resp Temp SpO2   139/88 (!) 147 18 97.9 °F (36.6 °C) 96 %      MAP       --         Physical Exam    Nursing note and vitals reviewed.  Constitutional: She appears well-developed  and well-nourished. She is not diaphoretic. No distress.   HENT:   Head: Normocephalic and atraumatic.   Right Ear: External ear normal.   Left Ear: External ear normal.   Mouth/Throat: Oropharynx is clear and moist. No oropharyngeal exudate.   Eyes: Conjunctivae and EOM are normal. No scleral icterus.   Neck: Normal range of motion. Neck supple. No JVD present.   Cardiovascular: Tachycardia present.    Murmur heard.   Systolic murmur is present.  Pulmonary/Chest: Breath sounds normal. No respiratory distress. She has no wheezes. She has no rhonchi. She has no rales.   Musculoskeletal: Normal range of motion. She exhibits no edema or tenderness.   Neurological: She is alert.   Skin: Skin is warm and dry. No rash noted. No erythema. No pallor.         ED Course   Procedures  Labs Reviewed   CBC W/ AUTO DIFFERENTIAL - Abnormal; Notable for the following components:       Result Value    Mean Corpuscular Hemoglobin Conc 31.1 (*)     RDW 14.9 (*)     Immature Granulocytes 0.7 (*)     Mono% 17.0 (*)     Platelet Estimate Clumped (*)     All other components within normal limits   COMPREHENSIVE METABOLIC PANEL - Abnormal; Notable for the following components:    Glucose 140 (*)     Total Bilirubin 1.1 (*)     eGFR if  59 (*)     eGFR if non  51 (*)     All other components within normal limits   B-TYPE NATRIURETIC PEPTIDE - Abnormal; Notable for the following components:     (*)     All other components within normal limits   TROPONIN I   PROTIME-INR     EKG Readings: (Independently Interpreted)   Atrial fibrillation with rapid ventricular response at a rate of 140 bpm. No STEMI. Similar to previous on file.       Imaging Results          X-Ray Chest AP Portable (Final result)  Result time 10/11/19 10:23:19    Final result by Heidi Torres MD (10/11/19 10:23:19)                 Impression:      As above      Electronically signed by: Heidi Torres  MD  Date:    10/11/2019  Time:    10:23             Narrative:    EXAMINATION:  XR CHEST AP PORTABLE    CLINICAL HISTORY:  CHF;    TECHNIQUE:  Single frontal view of the chest was performed.    COMPARISON:  None    FINDINGS:  Heart size is mildly enlarged.  There is calcification along the wall of the aorta.  No significant pleural effusion.  No focal consolidation.  Mild prominence of interstitial markings in the lower lung zones is favored to relate to portable technique, with no prior exam for comparison.  Osseous structures show degenerative change.                              X-Rays:   Independently Interpreted Readings:   Chest X-Ray: Enlarged cardiac silhouette with central vascular prominence. No lobar infiltrate, effusion, or pneumothorax. Will defer to radiology.     Medical Decision Making:   Independently Interpreted Test(s):   I have ordered and independently interpreted X-rays - see prior notes.  I have ordered and independently interpreted EKG Reading(s) - see prior notes  Clinical Tests:   Lab Tests: Ordered and Reviewed  Radiological Study: Ordered and Reviewed  Medical Tests: Ordered and Reviewed  ED Management:  Emergent evaluation of 87-year-old female who presents with complaint of tachycardia noted at pain management clinic arrival.  Patient has history of AFib and is anticoagulated on Eliquis.  She did not take her home medications this morning which includes 400 mg of long-acting metoprolol.  Vital signs reveal heart rate 140s, afebrile.  EKG confirms atrial fibrillation with RVR.  Since she takes metoprolol at home she was given several doses of IV metoprolol with minimal improvement in rate.  She was then given IV Cardizem with improvement in rate.  Workup reveals mildly elevated BNP but no florid heart failure, negative troponin.  She was then given her oral metoprolol and was discharged in improved condition with heart rate in the 90s.  She was advised to take her home medications as  prescribed and to follow closely with her PCP and cardiologist, return to the ED for new or worsening symptoms.            Scribe Attestation:   Scribe #1: I performed the above scribed service and the documentation accurately describes the services I performed. I attest to the accuracy of the note.    Attending Attestation:           Physician Attestation for Scribe:  Physician Attestation Statement for Scribe #1: I, Dr. Serrato, reviewed documentation, as scribed by Carlos Cueto in my presence, and it is both accurate and complete.                    Clinical Impression:     1. Atrial fibrillation with RVR    2. Tachycardia    3. Shortness of breath                                   Jael Serrato MD  10/11/19 7200

## 2019-10-11 NOTE — ED NOTES
Pt to er with c/o tachycardia in pain clinic . Pt denies sob, chest pain or palpations. Pt aaox3 skin warm and dry pt hard of hearing .  Pt with  Fair air movement lower loads. Heart rate rapid and irregular blowing murmur noted the patient  With + 2 pitting edema  Above mark hose. Abdomin soft non tender.

## 2019-10-11 NOTE — ED NOTES
Pt resting quietly . Pt with c/o being hungry .  Third dose of iv metoprolol given sivp. Pt tolerated well.

## 2019-10-18 ENCOUNTER — OFFICE VISIT (OUTPATIENT)
Dept: CARDIOLOGY | Facility: CLINIC | Age: 84
End: 2019-10-18
Payer: COMMERCIAL

## 2019-10-18 VITALS
RESPIRATION RATE: 15 BRPM | DIASTOLIC BLOOD PRESSURE: 98 MMHG | OXYGEN SATURATION: 94 % | HEART RATE: 132 BPM | WEIGHT: 204.5 LBS | SYSTOLIC BLOOD PRESSURE: 160 MMHG | HEIGHT: 62 IN | BODY MASS INDEX: 37.63 KG/M2

## 2019-10-18 DIAGNOSIS — Z79.01 CHRONIC ANTICOAGULATION: ICD-10-CM

## 2019-10-18 DIAGNOSIS — E11.8 DM TYPE 2, CONTROLLED, WITH COMPLICATION: ICD-10-CM

## 2019-10-18 DIAGNOSIS — I35.0 NONRHEUMATIC AORTIC VALVE STENOSIS: ICD-10-CM

## 2019-10-18 DIAGNOSIS — I10 HTN, GOAL BELOW 140/90: ICD-10-CM

## 2019-10-18 DIAGNOSIS — I48.20 CHRONIC ATRIAL FIBRILLATION: Primary | ICD-10-CM

## 2019-10-18 DIAGNOSIS — E78.2 MIXED HYPERLIPIDEMIA: ICD-10-CM

## 2019-10-18 DIAGNOSIS — E66.9 NON MORBID OBESITY, UNSPECIFIED OBESITY TYPE: ICD-10-CM

## 2019-10-18 PROCEDURE — 99214 OFFICE O/P EST MOD 30 MIN: CPT | Mod: S$GLB,,, | Performed by: INTERNAL MEDICINE

## 2019-10-18 PROCEDURE — 99214 PR OFFICE/OUTPT VISIT, EST, LEVL IV, 30-39 MIN: ICD-10-PCS | Mod: S$GLB,,, | Performed by: INTERNAL MEDICINE

## 2019-10-18 PROCEDURE — 99999 PR PBB SHADOW E&M-EST. PATIENT-LVL III: CPT | Mod: PBBFAC,,, | Performed by: INTERNAL MEDICINE

## 2019-10-18 PROCEDURE — 99999 PR PBB SHADOW E&M-EST. PATIENT-LVL III: ICD-10-PCS | Mod: PBBFAC,,, | Performed by: INTERNAL MEDICINE

## 2019-10-18 RX ORDER — DILTIAZEM HYDROCHLORIDE 240 MG/1
240 CAPSULE, EXTENDED RELEASE ORAL DAILY
Qty: 90 CAPSULE | Refills: 3 | Status: SHIPPED | OUTPATIENT
Start: 2019-10-18 | End: 2019-12-12

## 2019-10-18 NOTE — PROGRESS NOTES
CARDIOVASCULAR PROGRESS NOTE    REASON FOR CONSULT:   Vashti Muñoz is a 87 y.o. female who presents for follow up of Chr AF, AS.    PCP: Sarika ([facility])  HISTORY OF PRESENT ILLNESS:   The patient returns for follow-up.  She was last seen in April this year.  She now apparently is in the [facility].  She was recently sent to the emergency room for an elevated heart rate and noted to be in rapid atrial fibrillation.  This was controlled with diltiazem, although the patient was not sent home on oral diltiazem.  She tells me that she has been taking her medications, but cannot tell me exactly what they are.  Metoprolol ER 40 mg daily is also on her medication list.  At present, she appears to be in rapid atrial fibrillation, although is asymptomatic from this perspective.  She specifically denies angina, palpitations, dyspnea, lightheadedness, or syncope.  There has been no PND, orthopnea, or lower extremity edema.  She denies melena, hematuria, or claudicant symptoms.  I did suggest the possibility of sending her back to the ER for acute rate control, but the patient is not particularly interested in doing this.  To that end, I suggested we start an oral calcium channel blocker in addition to her beta-blocker and I will send a prescription for diltiazem  mg daily with the patient today.  The patient's attendant was informed as to the need for this addition to the patient's medication regimen.    [facility] list [facility] via phone call today:  Eliquis 5 mg twice daily  Simvastatin 40 mg q.h.s.  Synthroid 125 mcg daily  Namenda 5 mg b.i.d.  Metoprolol 400 mg b.i.d.    CARDIOVASCULAR HISTORY:   AF, chronic on eliquis  AS, mod (echo 7/2018)    PAST MEDICAL HISTORY:     Past Medical History:   Diagnosis Date    Allergy     Anticoagulant long-term use     Coumadin    Anxiety     Arthritis     Atrial fibrillation     Cataract     Closed head injury 09/10/2018    Clotting disorder     Depression      Diabetes mellitus     Encounter for blood transfusion     Fall 09/10/2018    GERD (gastroesophageal reflux disease)     Heart murmur     [place] (hard of hearing)     wears bilateral hearing aids    Hypertension     Thyroid disease     Uses roller walker        PAST SURGICAL HISTORY:     Past Surgical History:   Procedure Laterality Date    HYSTERECTOMY      INJECTION OF FACET JOINT Bilateral 4/5/2019    Procedure: INJECTION, FACET JOINT, L4-L5;  Surgeon: Clayton Valle MD;  Location: [facility];  Service: Pain Management;  Laterality: Bilateral;    JOINT REPLACEMENT Right     Hip    TONSILLECTOMY      TOTAL HIP ARTHROPLASTY Left 2004       ALLERGIES AND MEDICATION:     Review of patient's allergies indicates:   Allergen Reactions    Sulfa (sulfonamide antibiotics) Hives        Medication List           Accurate as of October 18, 2019 10:36 AM. If you have any questions, ask your nurse or doctor.               CONTINUE taking these medications    acetaminophen 500 MG tablet  Commonly known as:  TYLENOL     BIOFREEZE-ILEX TOP     escitalopram oxalate 10 MG tablet  Commonly known as:  LEXAPRO  Take 1 tablet (10 mg total) by mouth once daily.     fluticasone propionate 50 mcg/actuation nasal spray  Commonly known as:  FLONASE     gabapentin 300 MG capsule  Commonly known as:  NEURONTIN     levothyroxine 137 mcg Cap  Commonly known as:  TIROSINT     lisinopril 40 MG tablet  Commonly known as:  PRINIVIL,ZESTRIL  Take 1 tablet (40 mg total) by mouth once daily.     LORazepam 0.5 MG tablet  Commonly known as:  ATIVAN     memantine 5 MG Tab  Commonly known as:  NAMENDA     metoprolol succinate 200 MG 24 hr tablet  Commonly known as:  TOPROL-XL  Take 2 tablets (400 mg total) by mouth once daily.     simvastatin 40 MG tablet  Commonly known as:  ZOCOR     vitamin D 1000 units Tab  Commonly known as:  VITAMIN D3     warfarin 5 MG tablet  Commonly known as:  COUMADIN              SOCIAL HISTORY:     Social  History     Socioeconomic History    Marital status:      Spouse name: Not on file    Number of children: 2    Years of education: 12    Highest education level: Not on file   Occupational History    Occupation: retired     Comment: [employer]   Social Needs    Financial resource strain: Not on file    Food insecurity:     Worry: Not on file     Inability: Not on file    Transportation needs:     Medical: Not on file     Non-medical: Not on file   Tobacco Use    Smoking status: Former Smoker     Last attempt to quit: 1988     Years since quittin.3    Smokeless tobacco: Never Used   Substance and Sexual Activity    Alcohol use: Yes     Alcohol/week: 3.0 - 4.0 standard drinks     Types: 1 Glasses of wine, 2 - 3 Standard drinks or equivalent per week    Drug use: No    Sexual activity: Not on file   Lifestyle    Physical activity:     Days per week: Not on file     Minutes per session: Not on file    Stress: Not on file   Relationships    Social connections:     Talks on phone: Not on file     Gets together: Not on file     Attends Mosque service: Not on file     Active member of club or organization: Not on file     Attends meetings of clubs or organizations: Not on file     Relationship status: Not on file   Other Topics Concern    Not on file   Social History Narrative    Not on file       FAMILY HISTORY:     Family History   Problem Relation Age of Onset    No Known Problems Mother     Diabetes Father     Stroke Father     Diabetes Sister     Diabetes Brother        REVIEW OF SYSTEMS:   Review of Systems   Constitutional: Negative for chills, diaphoresis and fever.   HENT: Negative for nosebleeds.    Eyes: Negative for blurred vision, double vision and photophobia.   Respiratory: Negative for hemoptysis, shortness of breath and wheezing.    Cardiovascular: Negative for chest pain, palpitations, orthopnea, claudication, leg swelling and PND.   Gastrointestinal: Negative for  "abdominal pain, blood in stool, heartburn, melena, nausea and vomiting.   Genitourinary: Negative for flank pain and hematuria.   Musculoskeletal: Negative for falls, myalgias and neck pain.   Skin: Negative for rash.   Neurological: Negative for dizziness, seizures, loss of consciousness, weakness and headaches.   Endo/Heme/Allergies: Negative for polydipsia. Does not bruise/bleed easily.   Psychiatric/Behavioral: Negative for depression and memory loss. The patient is not nervous/anxious.        PHYSICAL EXAM:     Vitals:    10/18/19 1024   BP: (!) 160/98   Pulse: (!) 132   Resp: 15    Body mass index is 37.4 kg/m².  Weight: 92.8 kg (204 lb 7.6 oz)   Height: 5' 2" (157.5 cm)     Physical Exam   Constitutional: She is oriented to person, place, and time. She appears well-developed and well-nourished. She is cooperative.  Non-toxic appearance. No distress.   HENT:   Head: Normocephalic and atraumatic.   Eyes: Pupils are equal, round, and reactive to light. Conjunctivae and EOM are normal. No scleral icterus.   Neck: Trachea normal and normal range of motion. Neck supple. Normal carotid pulses and no JVD present. Carotid bruit is not present. No neck rigidity. No tracheal deviation and no edema present. No thyromegaly present.   Cardiovascular: S1 normal and S2 normal. An irregularly irregular rhythm present. Tachycardia present. PMI is not displaced. Exam reveals no gallop and no friction rub.   Murmur heard.   Systolic murmur is present with a grade of 2/6.  Pulses:       Carotid pulses are 2+ on the right side, and 2+ on the left side.  Pulmonary/Chest: Effort normal and breath sounds normal. No stridor. No respiratory distress. She has no wheezes. She has no rales. She exhibits no tenderness.   Abdominal: Soft. She exhibits no distension. There is no hepatosplenomegaly.   obese   Musculoskeletal: She exhibits no edema or tenderness.   Feet:   Right Foot:   Skin Integrity: Negative for ulcer.   Left Foot:   Skin " Integrity: Negative for ulcer.   Neurological: She is alert and oriented to person, place, and time. No cranial nerve deficit.   Skin: Skin is warm and dry. No rash noted. No erythema.   Psychiatric: She has a normal mood and affect. Her speech is normal and behavior is normal.   Vitals reviewed.      DATA:   EKG: (personally reviewed tracing)  10/11/19 , septal MI-age indet    Laboratory:  CBC:  Recent Labs   Lab 09/10/18  1119 09/11/18  0551 10/11/19  0927   WBC 4.42 3.45 L 4.60   Hemoglobin 11.4 L 10.7 L 12.0   Hematocrit 35.2 L 34.2 L 38.6   Platelets 145 L 126 L SEE COMMENT       CHEMISTRIES:  Recent Labs   Lab 09/10/18  1119 09/11/18  0551 10/11/19  0927   Glucose 106 109 140 H   Sodium 136 136 136   Potassium 4.8 4.0 4.3   BUN, Bld 13 12 11   Creatinine 0.9 0.8 1.0   eGFR if  >60 >60 59 A   eGFR if non  58 A >60 51 A   Calcium 10.0 9.7 9.9       CARDIAC BIOMARKERS:  Recent Labs   Lab 09/11/18  1104 09/11/18  1658 10/11/19  0927   Troponin I <0.006 0.018 0.012       COAGS:  Recent Labs   Lab 09/12/18  0511 04/05/19  0857 10/11/19  0927   INR 2.4 H 1.0 1.0       LIPIDS/LFTS:  Recent Labs   Lab 09/10/18  1119 09/11/18  0551 10/11/19  0927   AST 17 13 17   ALT 14 12 10     Lab Results   Component Value Date    TSH 0.452 09/11/2018         Cardiovascular Testing:  Echo: 7/9/18    1 - Normal left ventricular systolic function (EF 55-60%).     2 - Concentric remodeling.     3 - Mild left atrial enlargement.     4 - Pulmonary hypertension. The estimated PA systolic pressure is 44 mmHg.     5 - Mild to moderate aortic stenosis, RAD = 1.3 cm2, AVAi = 0.69 cm2/m2, peak velocity = 2.92 m/s, mean gradient = 17 mmHg.     6 - Moderate mitral regurgitation.     7 - Mild tricuspid regurgitation.     ASSESSMENT:   # AF, chronic, on eliquis, HR elevated, but asymptomatic.  Recent ER visit for same (10/11/19), pt does not want to got back to ER.  # AS, mod  # HTN, uncontrolled  # DM  # HLP  on simva 40mg  # BMI 37, up 2 unit(s) vs last OV    PLAN:   Cont med rx  Cont eliquis 5mg bid  Cont toprol XL 400mg qd  Add dilt CD 240mg qd  RTC 1-2 weeks for reassessment of HR control  Consider repeat echo after next OV when HR better controlled    Ede Kline MD, FACC

## 2019-11-01 ENCOUNTER — HOSPITAL ENCOUNTER (OUTPATIENT)
Facility: OTHER | Age: 84
Discharge: HOME | End: 2019-11-01
Attending: ANESTHESIOLOGY | Admitting: ANESTHESIOLOGY
Payer: COMMERCIAL

## 2019-11-01 VITALS
OXYGEN SATURATION: 96 % | RESPIRATION RATE: 18 BRPM | WEIGHT: 204 LBS | DIASTOLIC BLOOD PRESSURE: 94 MMHG | SYSTOLIC BLOOD PRESSURE: 137 MMHG | HEIGHT: 62 IN | TEMPERATURE: 98 F | HEART RATE: 118 BPM | BODY MASS INDEX: 37.54 KG/M2

## 2019-11-01 DIAGNOSIS — G89.29 CHRONIC PAIN: ICD-10-CM

## 2019-11-01 DIAGNOSIS — M46.1 SACROILIITIS: Primary | ICD-10-CM

## 2019-11-01 LAB — POCT GLUCOSE: 105 MG/DL (ref 70–110)

## 2019-11-01 PROCEDURE — 27096 INJECT SACROILIAC JOINT: CPT | Mod: 50,,, | Performed by: ANESTHESIOLOGY

## 2019-11-01 PROCEDURE — 25500020 PHARM REV CODE 255: Performed by: ANESTHESIOLOGY

## 2019-11-01 PROCEDURE — 25000003 PHARM REV CODE 250: Performed by: ANESTHESIOLOGY

## 2019-11-01 PROCEDURE — 27096 PR INJECTION,SACROILIAC JOINT: ICD-10-PCS | Mod: 50,,, | Performed by: ANESTHESIOLOGY

## 2019-11-01 PROCEDURE — 63600175 PHARM REV CODE 636 W HCPCS: Performed by: ANESTHESIOLOGY

## 2019-11-01 PROCEDURE — 27096 INJECT SACROILIAC JOINT: CPT | Mod: 50 | Performed by: ANESTHESIOLOGY

## 2019-11-01 RX ORDER — TRIAMCINOLONE ACETONIDE 40 MG/ML
INJECTION, SUSPENSION INTRA-ARTICULAR; INTRAMUSCULAR
Status: DISCONTINUED | OUTPATIENT
Start: 2019-11-01 | End: 2019-11-01 | Stop reason: HOSPADM

## 2019-11-01 RX ORDER — BUPIVACAINE HYDROCHLORIDE 2.5 MG/ML
INJECTION, SOLUTION EPIDURAL; INFILTRATION; INTRACAUDAL
Status: DISCONTINUED | OUTPATIENT
Start: 2019-11-01 | End: 2019-11-01 | Stop reason: HOSPADM

## 2019-11-01 RX ORDER — LIDOCAINE HYDROCHLORIDE 10 MG/ML
INJECTION INFILTRATION; PERINEURAL
Status: DISCONTINUED | OUTPATIENT
Start: 2019-11-01 | End: 2019-11-01 | Stop reason: HOSPADM

## 2019-11-01 RX ORDER — ALPRAZOLAM 0.5 MG/1
0.5 TABLET ORAL
Status: COMPLETED | OUTPATIENT
Start: 2019-11-01 | End: 2019-11-01

## 2019-11-01 RX ORDER — ALPRAZOLAM 0.5 MG/1
0.5 TABLET ORAL
Status: DISCONTINUED | OUTPATIENT
Start: 2019-11-01 | End: 2019-11-01 | Stop reason: HOSPADM

## 2019-11-01 RX ADMIN — ALPRAZOLAM 0.5 MG: 0.5 TABLET ORAL at 11:11

## 2019-11-01 NOTE — DISCHARGE INSTRUCTIONS

## 2019-11-01 NOTE — OP NOTE
Patient Name: Vashti Muñoz  MRN: [id number]    INFORMED CONSENT: The procedure, risks, benefits and options were discussed with patient. There are no contraindications to the procedure. The patient expressed understanding and agreed to proceed. The personnel performing the procedure was discussed. I verify that I personally obtained Vashti's consent prior to the start of the procedure and the signed consent can be found on the patient's chart.    Procedure Date: 11/01/2019    Anesthesia: Topical    Pre Procedure diagnosis: Bilateral sacroiliitis [M46.1]  1. Sacroiliitis    2. Chronic pain      Post-Procedure diagnosis: SAME      Sedation: None    PROCEDURE: Bilateral SACROILIAC JOINT INJECTION    DESCRIPTION OF PROCEDURE: The patient was brought to the fluoroscopy suite. After performing time out  IV access was obtained prior to the procedure. The patient was positioned prone on the fluoroscopy table. Continuous hemodynamic monitoring was initiated including blood pressure, EKG, and pulse oximetry.  The lumbosacral area was prepped with chlorhexidine three times and draped into a sterile field. The skin overlying the bilateral side sacroiliac joint was anesthetized using 3cc of lidocaine 1%. The inferior pole of the sacroiliac joint was identified by cephalo-oblique fluoroscopy. A 25 gauge, 3 1/2 inch spinal needle was slowly advanced through the sacroiliac joint capsule under fluoroscopic guidance. The needle position was confirmed using oblique, AP and lateral fluoroscopic imaging.  Negative aspiration was confirmed. 0.5 cc of Omnipaque 300 was injected confirming intra-articular contrast spread. A combination of 3 cc of  A solution containing 7 ml Bupivacaine 0.25% and 40 mg kenalog was easily injected at each site. The needle was removed and bleeding was nil.  A sterile dressing was applied. PATIENT was taken to the Post-block Recovery Area for further observation.      Blood Loss: Nill  Specimen:  None    Clayton Valle MD

## 2019-11-01 NOTE — DISCHARGE SUMMARY
Discharge Note  Short Stay      SUMMARY     Admit Date: 11/1/2019    Attending Physician: Clayton Valle      Discharge Physician: Clayton Valle      Discharge Date: 11/1/2019 1:19 PM    Procedure(s) (LRB):  INJECTION, SACROILIAC (SI) JOINT need cosnent (Bilateral)    Final Diagnosis: Bilateral sacroiliitis [M46.1]    Disposition: Home or self care    Patient Instructions:   Discharge Medication List as of 11/1/2019 11:54 AM      CONTINUE these medications which have NOT CHANGED    Details   acetaminophen (TYLENOL) 500 MG tablet Take 500 mg by mouth every 6 (six) hours as needed for Pain., Historical Med      apixaban (ELIQUIS) 5 mg Tab Take 1 tablet (5 mg total) by mouth 2 (two) times daily., Starting Fri 10/18/2019, No Print      diltiaZEM (DILACOR XR) 240 MG CDCR Take 1 capsule (240 mg total) by mouth once daily., Starting Fri 10/18/2019, Until Sat 10/17/2020, Print      escitalopram oxalate (LEXAPRO) 10 MG tablet Take 1 tablet (10 mg total) by mouth once daily., Starting Thu 7/19/2018, Until Fri 10/11/2019, Normal      fluticasone (FLONASE) 50 mcg/actuation nasal spray 1 spray by Each Nare route once daily., Historical Med      gabapentin (NEURONTIN) 300 MG capsule Take 300 mg by mouth 2 (two) times daily., Historical Med      levothyroxine 137 mcg Cap Take 1 tablet by mouth once daily., Historical Med      lisinopril (PRINIVIL,ZESTRIL) 40 MG tablet Take 1 tablet (40 mg total) by mouth once daily., Starting Thu 7/19/2018, Normal      LORazepam (ATIVAN) 0.5 MG tablet Take 0.5 mg by mouth 2 (two) times daily. Take half in the am and a whole at bedtime, Historical Med      memantine (NAMENDA) 5 MG Tab Take 5 mg by mouth 2 (two) times daily., Historical Med      menthol/camphor (BIOFREEZE-ILEX TOP) Apply topically., Historical Med      metoprolol succinate (TOPROL-XL) 200 MG 24 hr tablet Take 2 tablets (400 mg total) by mouth once daily., Starting Thu 9/13/2018, Until Fri 10/11/2019, Normal      simvastatin  (ZOCOR) 40 MG tablet Take 40 mg by mouth every evening., Historical Med      vitamin D (VITAMIN D3) 1000 units Tab Take 2,000 Units by mouth once daily., Historical Med                 Discharge Diagnosis: Bilateral sacroiliitis [M46.1]  Condition on Discharge: Stable with no complications to procedure   Diet on Discharge: Same as before.  Activity: as per instruction sheet.  Discharge to: Home with a responsible adult.  Follow up: 2-4 weeks

## 2019-11-19 ENCOUNTER — PROCEDURE VISIT (OUTPATIENT)
Dept: OPHTHALMOLOGY | Facility: CLINIC | Age: 84
End: 2019-11-19
Payer: COMMERCIAL

## 2019-11-19 VITALS — HEART RATE: 107 BPM | SYSTOLIC BLOOD PRESSURE: 129 MMHG | DIASTOLIC BLOOD PRESSURE: 75 MMHG

## 2019-11-19 DIAGNOSIS — H35.3232 EXUDATIVE AGE-RELATED MACULAR DEGENERATION OF BOTH EYES WITH INACTIVE CHOROIDAL NEOVASCULARIZATION: Primary | ICD-10-CM

## 2019-11-19 DIAGNOSIS — H35.3134 NONEXUDATIVE AGE-RELATED MACULAR DEGENERATION, BILATERAL, ADVANCED ATROPHIC WITH SUBFOVEAL INVOLVEMENT: ICD-10-CM

## 2019-11-19 DIAGNOSIS — H43.813 POSTERIOR VITREOUS DETACHMENT, BILATERAL: ICD-10-CM

## 2019-11-19 PROCEDURE — 92134 CPTRZ OPH DX IMG PST SGM RTA: CPT | Mod: S$GLB,,, | Performed by: OPHTHALMOLOGY

## 2019-11-19 PROCEDURE — 92014 COMPRE OPH EXAM EST PT 1/>: CPT | Mod: 25,S$GLB,, | Performed by: OPHTHALMOLOGY

## 2019-11-19 PROCEDURE — 92014 PR EYE EXAM, EST PATIENT,COMPREHESV: ICD-10-PCS | Mod: 25,S$GLB,, | Performed by: OPHTHALMOLOGY

## 2019-11-19 PROCEDURE — 92134 POSTERIOR SEGMENT OCT RETINA (OCULAR COHERENCE TOMOGRAPHY)-BOTH EYES: ICD-10-PCS | Mod: S$GLB,,, | Performed by: OPHTHALMOLOGY

## 2019-11-19 PROCEDURE — 67028 PR INJECT INTRAVITREAL PHARMCOLOGIC: ICD-10-PCS | Mod: LT,S$GLB,, | Performed by: OPHTHALMOLOGY

## 2019-11-19 PROCEDURE — 67028 INJECTION EYE DRUG: CPT | Mod: LT,S$GLB,, | Performed by: OPHTHALMOLOGY

## 2019-11-19 RX ADMIN — Medication 1.25 MG: at 11:11

## 2019-11-19 NOTE — PROGRESS NOTES
HPI     3 mo OCT  DLS- 08/13/2019 Julián     Pt sts can see about the same but distance vision is still not good and   reading even worse   Denies pain   (-)Flashes (+)Floaters occasionally 1-2 x week   (-)Photophobia  (-)Glare    No gtts       HPI     DLS: 02/19/2019 Dr. Gallego    Patient states her vision has been stable since her last visit. (-)flashes   or floaters.       OCT - central atrophy OU  OS - small increase in heme and SRF      A/P    1. Wet AMD OU  Prior q 3 month injections in [place]  S/p Resumed Avastin OS x 1  Stable since 7/18 8/19 - increased SRF, SRF    Resume q3 months injections OS today    Avastin OS       2. Dry AMD OU  AREDS/AG    3. PCIOL     4. PVD OU    5. Afib  On coumadin      3 months OCT    Risks, benefits, and alternatives to treatment discussed in detail with the patient.  The patient voiced understanding and wished to proceed with the procedure    Injection Procedure Note:  Diagnosis: Wet AMD OS    Patient Identified and Time Out complete  Topical Proparacaine and Betadine.  Inject Avastin OS at 6:00 @ 3.5-4mm posterior to limbus  Post Operative Dx: Same  Complications: None  Follow up as above.

## 2019-12-10 ENCOUNTER — TELEPHONE (OUTPATIENT)
Dept: PAIN MEDICINE | Facility: CLINIC | Age: 84
End: 2019-12-10

## 2019-12-10 NOTE — TELEPHONE ENCOUNTER
Contact and left a message on voice mail for patient informing patient to report to the Neurology suite 8[address] on the 8th floor for appointment with CARLO Jane.

## 2019-12-11 ENCOUNTER — TELEPHONE (OUTPATIENT)
Dept: PAIN MEDICINE | Facility: CLINIC | Age: 84
End: 2019-12-11

## 2019-12-11 ENCOUNTER — OFFICE VISIT (OUTPATIENT)
Dept: PAIN MEDICINE | Facility: CLINIC | Age: 84
End: 2019-12-11
Payer: COMMERCIAL

## 2019-12-11 VITALS
TEMPERATURE: 98 F | OXYGEN SATURATION: 100 % | SYSTOLIC BLOOD PRESSURE: 175 MMHG | BODY MASS INDEX: 35.99 KG/M2 | HEIGHT: 62 IN | DIASTOLIC BLOOD PRESSURE: 101 MMHG | WEIGHT: 195.56 LBS | RESPIRATION RATE: 18 BRPM | HEART RATE: 116 BPM

## 2019-12-11 DIAGNOSIS — M47.816 LUMBAR SPONDYLOSIS: ICD-10-CM

## 2019-12-11 DIAGNOSIS — G89.4 CHRONIC PAIN SYNDROME: ICD-10-CM

## 2019-12-11 DIAGNOSIS — M47.816 FACET ARTHRITIS OF LUMBAR REGION: ICD-10-CM

## 2019-12-11 DIAGNOSIS — M46.1 SACROILIITIS: Primary | ICD-10-CM

## 2019-12-11 DIAGNOSIS — M51.36 DDD (DEGENERATIVE DISC DISEASE), LUMBAR: ICD-10-CM

## 2019-12-11 PROCEDURE — 99999 PR PBB SHADOW E&M-EST. PATIENT-LVL III: ICD-10-PCS | Mod: PBBFAC,,, | Performed by: NURSE PRACTITIONER

## 2019-12-11 PROCEDURE — 99999 PR PBB SHADOW E&M-EST. PATIENT-LVL III: CPT | Mod: PBBFAC,,, | Performed by: NURSE PRACTITIONER

## 2019-12-11 PROCEDURE — 1125F AMNT PAIN NOTED PAIN PRSNT: CPT | Mod: S$GLB,,, | Performed by: NURSE PRACTITIONER

## 2019-12-11 PROCEDURE — 1159F PR MEDICATION LIST DOCUMENTED IN MEDICAL RECORD: ICD-10-PCS | Mod: S$GLB,,, | Performed by: NURSE PRACTITIONER

## 2019-12-11 PROCEDURE — 1125F PR PAIN SEVERITY QUANTIFIED, PAIN PRESENT: ICD-10-PCS | Mod: S$GLB,,, | Performed by: NURSE PRACTITIONER

## 2019-12-11 PROCEDURE — 99213 OFFICE O/P EST LOW 20 MIN: CPT | Mod: S$GLB,,, | Performed by: NURSE PRACTITIONER

## 2019-12-11 PROCEDURE — 99213 PR OFFICE/OUTPT VISIT, EST, LEVL III, 20-29 MIN: ICD-10-PCS | Mod: S$GLB,,, | Performed by: NURSE PRACTITIONER

## 2019-12-11 PROCEDURE — 1159F MED LIST DOCD IN RCRD: CPT | Mod: S$GLB,,, | Performed by: NURSE PRACTITIONER

## 2019-12-11 NOTE — PROGRESS NOTES
Chronic patient Established Note (Follow up visit)    SUBJECTIVE:    Vashti Muñoz returns to clinic today for follow up. She is s/p bilateral SI joint injections on 11/1/2019. She reports 80% relief for 3 weeks. Since then, her pain has gradually returned to baseline. She continues to report low back and bilateral buttock pain. She denies any radiating leg pain. Her pain is worse with prolonged sitting, standing, and walking. She does endorse morning stiffness. She denies any other health changes. Her pain today is 5/10.      Pain Disability Index Review:  Last 3 PDI Scores 3/12/2019 2/12/2019   Pain Disability Index (PDI) 9 36       Pain Medications:     - Opioids: None  - Adjuvant Medications: Neurontin (Gabapentin)  - Anti-Coagulants: Coumadin ( Warfarin)  - Others: See med list      Opioid Contract: not applicable     report:  Not applicable    Pain Procedures:   PRANAV- Jorge Cmaacho  4/5/2019- Bilateral L4-5 facet joint injection  11/1/2019- Bilateral facet joint injections    Physical Therapy/Home Exercise: No    Imaging:   MRI Lumbar Spine 3/11/2019:  FINDINGS:  There is straightening of the normal lumbar lordosis.  The lumbar alignment is otherwise unremarkable.    The vertebral body heights are maintained.  The bone marrow signal is within normal limits.  There is hypertrophy of the posterior elements.  The sacroiliac joints are unremarkable.    The conus terminates at the level L1.  The conus is normal in morphology.  There is thickening of the cauda equina nerve roots.    There is multilevel disc desiccation.  This is most significant at the L1-L2 and the L5-S1 level.  Evaluation of the individual disc levels reveals the following:    T11-T12, there is a central disc protrusion.  No significant spinal canal narrowing is identified.  There is mild bilateral neural foraminal narrowing.    T12-L1, there is a disc bulge along with facet hypertrophy and ligamentum flavum hypertrophy.  There is superimposed  right paracentral disc protrusion.  There is mild spinal canal narrowing.  There is mild bilateral neural foraminal narrowing.    L1-L2, there is diffuse disc bulge along with facet hypertrophy and ligamentum flavum hypertrophy.  There is superimposed central disc protrusion.  There is mild narrowing of the spinal canal.  There is moderate bilateral neural foraminal narrowing.    L2-L3, there is diffuse disc bulge along with facet hypertrophy and ligamentum flavum hypertrophy.  There is moderate narrowing of the spinal canal.  There is moderate bilateral neural foraminal narrowing.    L3-L4, there is diffuse disc bulge along with facet hypertrophy and ligamentum flavum hypertrophy.  There is moderate narrowing of the spinal canal.  There is mild right and moderate left neural foraminal narrowing.    L4-L5, there is diffuse disc bulge along with facet hypertrophy and ligamentum flavum hypertrophy.  There is fluid within the facet joints.  There is moderate narrowing of the spinal canal.  There is moderate right and severe left neural foraminal narrowing.    L5-S1, there is a disc bulge along with facet hypertrophy and ligamentum flavum hypertrophy.  The spinal canal and neural foramina are within normal limits.    There is atrophy involving the paraspinous muscles.  There is no evidence of lymphadenopathy in the retroperitoneum.  The remainder of the paraspinal soft tissues are within normal limits.      Impression       No evidence of a compression fracture or abnormal bone marrow signal.    Advanced multilevel degenerative changes of the lumbar spine with moderate narrowing of the spinal canal extending from L1-L2 through the L4-L5 levels secondary to disc protrusions and facet/ligamentum flavum hypertrophy.    Marked facet hypertrophy and fluid within the facet joints at the L4-L5 level, in keeping with advanced disease.              Allergies:   Review of patient's allergies indicates:   Allergen Reactions     Sulfa (sulfonamide antibiotics) Hives       Current Medications:   Current Outpatient Medications   Medication Sig Dispense Refill    acetaminophen (TYLENOL) 500 MG tablet Take 500 mg by mouth every 6 (six) hours as needed for Pain.      apixaban (ELIQUIS) 5 mg Tab Take 1 tablet (5 mg total) by mouth 2 (two) times daily. 180 tablet 3    diltiaZEM (DILACOR XR) 240 MG CDCR Take 1 capsule (240 mg total) by mouth once daily. 90 capsule 3    escitalopram oxalate (LEXAPRO) 10 MG tablet Take 1 tablet (10 mg total) by mouth once daily. 90 tablet 3    fluticasone (FLONASE) 50 mcg/actuation nasal spray 1 spray by Each Nare route once daily.      gabapentin (NEURONTIN) 300 MG capsule Take 300 mg by mouth 2 (two) times daily.      levothyroxine 137 mcg Cap Take 1 tablet by mouth once daily.      lisinopril (PRINIVIL,ZESTRIL) 40 MG tablet Take 1 tablet (40 mg total) by mouth once daily. 90 tablet 1    LORazepam (ATIVAN) 0.5 MG tablet Take 0.5 mg by mouth 2 (two) times daily. Take half in the am and a whole at bedtime      memantine (NAMENDA) 5 MG Tab Take 5 mg by mouth 2 (two) times daily.      menthol/camphor (BIOFREEZE-ILEX TOP) Apply topically.      metoprolol succinate (TOPROL-XL) 200 MG 24 hr tablet Take 2 tablets (400 mg total) by mouth once daily. 60 tablet 11    simvastatin (ZOCOR) 40 MG tablet Take 40 mg by mouth every evening.      vitamin D (VITAMIN D3) 1000 units Tab Take 2,000 Units by mouth once daily.       No current facility-administered medications for this visit.        REVIEW OF SYSTEMS:    GENERAL:  No weight loss, malaise or fevers.  HEENT:  Negative for frequent or significant headaches.  NECK:  Negative for lumps, goiter, pain and significant neck swelling.  RESPIRATORY:  Negative for cough, wheezing or shortness of breath.  CARDIOVASCULAR:  Negative for chest pain, leg swelling or palpitations. A-fib, HTN, on Eliquis.   GI:  Negative for abdominal discomfort, blood in stools or black stools  or change in bowel habits.  MUSCULOSKELETAL:    SKIN:  Negative for lesions, rash, and itching.  PSYCH:  Negative for sleep disturbance, mood disorder and recent psychosocial stressors. + Anxiety  HEMATOLOGY/LYMPHOLOGY:  Negative for prolonged bleeding, bruising easily or swollen nodes. Eliquis.   NEURO:   No history of headaches, syncope, paralysis, seizures or tremors. +Memory impairment.  All other reviewed and negative other than HPI.    Past Medical History:  Past Medical History:   Diagnosis Date    Allergy     Anticoagulant long-term use     Coumadin    Anxiety     Arthritis     Atrial fibrillation     Cataract     Closed head injury 09/10/2018    Clotting disorder     Depression     Diabetes mellitus     Encounter for blood transfusion     Fall 09/10/2018    GERD (gastroesophageal reflux disease)     Heart murmur     [place] (hard of hearing)     wears bilateral hearing aids    Hypertension     Thyroid disease     Uses roller walker        Past Surgical History:  Past Surgical History:   Procedure Laterality Date    HYSTERECTOMY      INJECTION OF FACET JOINT Bilateral 4/5/2019    Procedure: INJECTION, FACET JOINT, L4-L5;  Surgeon: Clayton Valle MD;  Location: [facility];  Service: Pain Management;  Laterality: Bilateral;    JOINT REPLACEMENT Right     Hip    TONSILLECTOMY      TOTAL HIP ARTHROPLASTY Left 2004       Family History:  Family History   Problem Relation Age of Onset    No Known Problems Mother     Diabetes Father     Stroke Father     Diabetes Sister     Diabetes Brother        Social History:  Social History     Socioeconomic History    Marital status:      Spouse name: Not on file    Number of children: 2    Years of education: 12    Highest education level: Not on file   Occupational History    Occupation: retired     Comment: [employer]   Social Needs    Financial resource strain: Not on file    Food insecurity:     Worry: Not on file     Inability: Not  "on file    Transportation needs:     Medical: Not on file     Non-medical: Not on file   Tobacco Use    Smoking status: Former Smoker     Last attempt to quit: 1988     Years since quittin.4    Smokeless tobacco: Never Used   Substance and Sexual Activity    Alcohol use: Yes     Alcohol/week: 3.0 - 4.0 standard drinks     Types: 1 Glasses of wine, 2 - 3 Standard drinks or equivalent per week    Drug use: No    Sexual activity: Not on file   Lifestyle    Physical activity:     Days per week: Not on file     Minutes per session: Not on file    Stress: Not on file   Relationships    Social connections:     Talks on phone: Not on file     Gets together: Not on file     Attends Anabaptist service: Not on file     Active member of club or organization: Not on file     Attends meetings of clubs or organizations: Not on file     Relationship status: Not on file   Other Topics Concern    Not on file   Social History Narrative    Not on file       OBJECTIVE:    BP (!) 175/101   Pulse (!) 116   Temp 97.6 °F (36.4 °C)   Resp 18   Ht 5' 2" (1.575 m)   Wt 88.7 kg (195 lb 8.8 oz)   LMP  (LMP Unknown)   SpO2 100%   BMI 35.77 kg/m²     PHYSICAL EXAMINATION:    General appearance: Well appearing, in no acute distress, alert and oriented x3.  Psych:  Mood and affect appropriate.  Skin: Skin color, texture, turgor normal, no rashes or lesions, in both upper and lower body.  Head/face:  Atraumatic, normocephalic. No palpable lymph nodes.  Cor: RRR  Pulm: CTA  GI: Abdomen soft and non-tender.  Back: Straight leg raising in the sitting position is negative to radicular pain. There is pain with palpation over lumbar spine. Limited ROM with pain on extension. Positive facet loading bilaterally.   Extremities: Peripheral joint ROM is full and pain free without obvious instability or laxity in all four extremities. No deformities, edema, or skin discoloration. Good capillary refill.  Musculoskeletal: Shoulder, hip " and knee provocative maneuvers are negative. There is pain with palpation over bilateral SI joints. FABERs is positive bilaterally. Bilateral lower extremity strength is normal and symmetric.  No atrophy or tone abnormalities are noted.   Neuro: Bilateral lower extremity coordination and muscle stretch reflexes are physiologic and symmetric.  Plantar response are downgoing. No loss of sensation is noted.  Gait:  Antalgic- ambulates with rollator.    ASSESSMENT: 87 y.o. year old female with low back pain, consistent with the followin. Sacroiliitis     2. Lumbar spondylosis     3. Facet arthritis of lumbar region     4. DDD (degenerative disc disease), lumbar     5. Chronic pain syndrome           PLAN:     - Previous imaging was reviewed and discussed with the patient today.    - Schedule for right then left L5-S3 RFA, one side at a time, two weeks apart.   The procedure, risks, benefits and options were discussed with patient. There are no contraindications to the procedure. The patient expressed understanding and agreed to proceed.  Consent obtained today.    - She may be a candidate for SI fusion in the future.     - Consider lumbar medial branch blocks in the future.     - I have stressed the importance of physical activity and a home exercise plan to help with pain and improve health.    - Continue current medications.     - RTC 3 weeks after above procedures.     - Counseled patient regarding the importance of activity modification and physical therapy.    - Dr. Valle was consulted on the patient and agrees with this plan.    The above plan and management options were discussed at length with patient. Patient is in agreement with the above and verbalized understanding.    Dee Law NP  2019

## 2019-12-12 ENCOUNTER — OFFICE VISIT (OUTPATIENT)
Dept: CARDIOLOGY | Facility: CLINIC | Age: 84
End: 2019-12-12
Payer: COMMERCIAL

## 2019-12-12 VITALS
DIASTOLIC BLOOD PRESSURE: 90 MMHG | WEIGHT: 199.06 LBS | SYSTOLIC BLOOD PRESSURE: 162 MMHG | BODY MASS INDEX: 36.63 KG/M2 | OXYGEN SATURATION: 95 % | HEIGHT: 62 IN | RESPIRATION RATE: 15 BRPM | HEART RATE: 70 BPM

## 2019-12-12 DIAGNOSIS — I35.0 NONRHEUMATIC AORTIC VALVE STENOSIS: ICD-10-CM

## 2019-12-12 DIAGNOSIS — I48.20 CHRONIC ATRIAL FIBRILLATION: ICD-10-CM

## 2019-12-12 DIAGNOSIS — E78.2 MIXED HYPERLIPIDEMIA: ICD-10-CM

## 2019-12-12 DIAGNOSIS — E66.9 NON MORBID OBESITY, UNSPECIFIED OBESITY TYPE: ICD-10-CM

## 2019-12-12 DIAGNOSIS — E11.8 DM TYPE 2, CONTROLLED, WITH COMPLICATION: ICD-10-CM

## 2019-12-12 DIAGNOSIS — I10 HTN, GOAL BELOW 140/90: Primary | ICD-10-CM

## 2019-12-12 PROCEDURE — 93000 ELECTROCARDIOGRAM COMPLETE: CPT | Mod: S$GLB,,, | Performed by: INTERNAL MEDICINE

## 2019-12-12 PROCEDURE — 1126F AMNT PAIN NOTED NONE PRSNT: CPT | Mod: S$GLB,,, | Performed by: INTERNAL MEDICINE

## 2019-12-12 PROCEDURE — 99214 OFFICE O/P EST MOD 30 MIN: CPT | Mod: S$GLB,,, | Performed by: INTERNAL MEDICINE

## 2019-12-12 PROCEDURE — 99214 PR OFFICE/OUTPT VISIT, EST, LEVL IV, 30-39 MIN: ICD-10-PCS | Mod: S$GLB,,, | Performed by: INTERNAL MEDICINE

## 2019-12-12 PROCEDURE — 93000 EKG 12-LEAD: ICD-10-PCS | Mod: S$GLB,,, | Performed by: INTERNAL MEDICINE

## 2019-12-12 PROCEDURE — 1126F PR PAIN SEVERITY QUANTIFIED, NO PAIN PRESENT: ICD-10-PCS | Mod: S$GLB,,, | Performed by: INTERNAL MEDICINE

## 2019-12-12 PROCEDURE — 99999 PR PBB SHADOW E&M-EST. PATIENT-LVL III: CPT | Mod: PBBFAC,,, | Performed by: INTERNAL MEDICINE

## 2019-12-12 PROCEDURE — 99999 PR PBB SHADOW E&M-EST. PATIENT-LVL III: ICD-10-PCS | Mod: PBBFAC,,, | Performed by: INTERNAL MEDICINE

## 2019-12-12 PROCEDURE — 1159F PR MEDICATION LIST DOCUMENTED IN MEDICAL RECORD: ICD-10-PCS | Mod: S$GLB,,, | Performed by: INTERNAL MEDICINE

## 2019-12-12 PROCEDURE — 1159F MED LIST DOCD IN RCRD: CPT | Mod: S$GLB,,, | Performed by: INTERNAL MEDICINE

## 2019-12-12 RX ORDER — DILTIAZEM HYDROCHLORIDE 360 MG/1
360 CAPSULE, EXTENDED RELEASE ORAL DAILY
Qty: 90 CAPSULE | Refills: 3 | Status: ON HOLD | OUTPATIENT
Start: 2019-12-12 | End: 2020-01-09 | Stop reason: HOSPADM

## 2019-12-12 NOTE — PROGRESS NOTES
CARDIOVASCULAR PROGRESS NOTE    REASON FOR CONSULT:   Vashti Muñoz is a 87 y.o. female who presents for follow up of Chr AF, AS.    PCP: Sarika ([facility])  HISTORY OF PRESENT ILLNESS:   PATIENT IN [facility]    The patient returns for follow-up accompanied by her daughter.  She reports generally asymptomatic status without angina or dyspnea.  There has been no palpitations, lightheadedness, dizziness, or syncope.  She denies PND, orthopnea, lower extremity edema.  There has been no melena, hematuria, or claudicant symptoms.    Medication list per [facility] 12/11/19:  Cardizem  mg daily  Lorazepam 0.5 mg q.h.s.  Lorazepam 0.25 mg q.a.m.  Toprol XL 40 mg daily  Biofreeze gel  Lisinopril 40 mg daily  Cholecalciferol 2000 units daily  Escitalopram 10 mg daily  Colace 100 mg daily  Flonase 1 spray each nostril daily  Lidocaine cream applied a lower back daily  Synthroid 125 mcg daily  Melatonin 5 mg q.h.s.  Simvastatin 40 mg q.h.s.  Multivitamin 1 tablet twice daily  Neurontin 300 mg twice daily  Apixaban 5 mg twice daily  Memantine 5 mg b.i.d.  Tylenol p.r.n.  Preparation H p.r.n.    CARDIOVASCULAR HISTORY:   AF, chronic on eliquis  AS, mod (echo 7/2018)    PAST MEDICAL HISTORY:     Past Medical History:   Diagnosis Date    Allergy     Anticoagulant long-term use     Coumadin    Anxiety     Arthritis     Atrial fibrillation     Cataract     Closed head injury 09/10/2018    Clotting disorder     Depression     Diabetes mellitus     Encounter for blood transfusion     Fall 09/10/2018    GERD (gastroesophageal reflux disease)     Heart murmur     [place] (hard of hearing)     wears bilateral hearing aids    Hypertension     Thyroid disease     Uses roller walker        PAST SURGICAL HISTORY:     Past Surgical History:   Procedure Laterality Date    HYSTERECTOMY      INJECTION OF FACET JOINT Bilateral 4/5/2019    Procedure: INJECTION, FACET JOINT, L4-L5;  Surgeon: Clayton Valle MD;   [facility];  Service: Pain Management;  Laterality: Bilateral;    JOINT REPLACEMENT Right     Hip    TONSILLECTOMY      TOTAL HIP ARTHROPLASTY Left 2004       ALLERGIES AND MEDICATION:     Review of patient's allergies indicates:   Allergen Reactions    Sulfa (sulfonamide antibiotics) Hives        Medication List           Accurate as of December 12, 2019 11:17 AM. If you have any questions, ask your nurse or doctor.               CONTINUE taking these medications    acetaminophen 500 MG tablet  Commonly known as:  TYLENOL     apixaban 5 mg Tab  Commonly known as:  ELIQUIS  Take 1 tablet (5 mg total) by mouth 2 (two) times daily.     BIOFREEZE-ILEX TOP     diltiaZEM 240 MG Cdcr  Commonly known as:  DILACOR XR  Take 1 capsule (240 mg total) by mouth once daily.     escitalopram oxalate 10 MG tablet  Commonly known as:  Lexapro  Take 1 tablet (10 mg total) by mouth once daily.     fluticasone propionate 50 mcg/actuation nasal spray  Commonly known as:  FLONASE     gabapentin 300 MG capsule  Commonly known as:  NEURONTIN     levothyroxine 137 mcg Cap  Commonly known as:  TIROSINT     lisinopril 40 MG tablet  Commonly known as:  PRINIVIL,ZESTRIL  Take 1 tablet (40 mg total) by mouth once daily.     LORazepam 0.5 MG tablet  Commonly known as:  ATIVAN     memantine 5 MG Tab  Commonly known as:  NAMENDA     metoprolol succinate 200 MG 24 hr tablet  Commonly known as:  TOPROL-XL  Take 2 tablets (400 mg total) by mouth once daily.     simvastatin 40 MG tablet  Commonly known as:  ZOCOR     vitamin D 1000 units Tab  Commonly known as:  VITAMIN D3              SOCIAL HISTORY:     Social History     Socioeconomic History    Marital status:      Spouse name: Not on file    Number of children: 2    Years of education: 12    Highest education level: Not on file   Occupational History    Occupation: retired     Comment: [employer]   Social Needs    Financial resource strain: Not on file    Food insecurity:      Worry: Not on file     Inability: Not on file    Transportation needs:     Medical: Not on file     Non-medical: Not on file   Tobacco Use    Smoking status: Former Smoker     Last attempt to quit: 1988     Years since quittin.4    Smokeless tobacco: Never Used   Substance and Sexual Activity    Alcohol use: Yes     Alcohol/week: 3.0 - 4.0 standard drinks     Types: 1 Glasses of wine, 2 - 3 Standard drinks or equivalent per week    Drug use: No    Sexual activity: Not on file   Lifestyle    Physical activity:     Days per week: Not on file     Minutes per session: Not on file    Stress: Not on file   Relationships    Social connections:     Talks on phone: Not on file     Gets together: Not on file     Attends Worship service: Not on file     Active member of club or organization: Not on file     Attends meetings of clubs or organizations: Not on file     Relationship status: Not on file   Other Topics Concern    Not on file   Social History Narrative    Not on file       FAMILY HISTORY:     Family History   Problem Relation Age of Onset    No Known Problems Mother     Diabetes Father     Stroke Father     Diabetes Sister     Diabetes Brother        REVIEW OF SYSTEMS:   Review of Systems   Constitutional: Negative for chills, diaphoresis and fever.   HENT: Negative for nosebleeds.    Eyes: Negative for blurred vision, double vision and photophobia.   Respiratory: Negative for hemoptysis, shortness of breath and wheezing.    Cardiovascular: Negative for chest pain, palpitations, orthopnea, claudication, leg swelling and PND.   Gastrointestinal: Negative for abdominal pain, blood in stool, heartburn, melena, nausea and vomiting.   Genitourinary: Negative for flank pain and hematuria.   Musculoskeletal: Negative for falls, myalgias and neck pain.   Skin: Negative for rash.   Neurological: Negative for dizziness, seizures, loss of consciousness, weakness and headaches.  "  Endo/Heme/Allergies: Negative for polydipsia. Does not bruise/bleed easily.   Psychiatric/Behavioral: Negative for depression and memory loss. The patient is not nervous/anxious.        PHYSICAL EXAM:     Vitals:    12/12/19 1049   BP: (!) 162/90   Pulse: 70   Resp: 15    Body mass index is 36.41 kg/m².  Weight: 90.3 kg (199 lb 1.2 oz)   Height: 5' 2" (157.5 cm)     Physical Exam   Constitutional: She is oriented to person, place, and time. She appears well-developed and well-nourished. She is cooperative.  Non-toxic appearance. No distress.   HENT:   Head: Normocephalic and atraumatic.   Eyes: Pupils are equal, round, and reactive to light. Conjunctivae and EOM are normal. No scleral icterus.   Neck: Trachea normal and normal range of motion. Neck supple. Normal carotid pulses and no JVD present. Carotid bruit is not present. No neck rigidity. No tracheal deviation and no edema present. No thyromegaly present.   Cardiovascular: Normal rate, S1 normal and S2 normal. An irregularly irregular rhythm present. PMI is not displaced. Exam reveals no gallop and no friction rub.   Murmur heard.   Systolic murmur is present with a grade of 2/6.  Pulses:       Carotid pulses are 2+ on the right side, and 2+ on the left side.  Pulmonary/Chest: Effort normal and breath sounds normal. No stridor. No respiratory distress. She has no wheezes. She has no rales. She exhibits no tenderness.   Abdominal: Soft. She exhibits no distension. There is no hepatosplenomegaly.   obese   Musculoskeletal: She exhibits no edema or tenderness.   Feet:   Right Foot:   Skin Integrity: Negative for ulcer.   Left Foot:   Skin Integrity: Negative for ulcer.   Neurological: She is alert and oriented to person, place, and time. No cranial nerve deficit.   Skin: Skin is warm and dry. No rash noted. No erythema.   Psychiatric: She has a normal mood and affect. Her speech is normal and behavior is normal.   Vitals reviewed.      DATA:   EKG: (personally " reviewed tracing)  12/12/19 AF 70, low volt, PRWP    Laboratory:  CBC:  Recent Labs   Lab 09/10/18  1119 09/11/18  0551 10/11/19  0927   WBC 4.42 3.45 L 4.60   Hemoglobin 11.4 L 10.7 L 12.0   Hematocrit 35.2 L 34.2 L 38.6   Platelets 145 L 126 L SEE COMMENT       CHEMISTRIES:  Recent Labs   Lab 09/10/18  1119 09/11/18  0551 10/11/19  0927   Glucose 106 109 140 H   Sodium 136 136 136   Potassium 4.8 4.0 4.3   BUN, Bld 13 12 11   Creatinine 0.9 0.8 1.0   eGFR if  >60 >60 59 A   eGFR if non  58 A >60 51 A   Calcium 10.0 9.7 9.9       CARDIAC BIOMARKERS:  Recent Labs   Lab 09/11/18  1104 09/11/18  1658 10/11/19  0927   Troponin I <0.006 0.018 0.012       COAGS:  Recent Labs   Lab 09/12/18  0511 04/05/19  0857 10/11/19  0927   INR 2.4 H 1.0 1.0       LIPIDS/LFTS:  Recent Labs   Lab 09/10/18  1119 09/11/18  0551 10/11/19  0927   AST 17 13 17   ALT 14 12 10     Lab Results   Component Value Date    TSH 0.452 09/11/2018         Cardiovascular Testing:  Echo: 7/9/18    1 - Normal left ventricular systolic function (EF 55-60%).     2 - Concentric remodeling.     3 - Mild left atrial enlargement.     4 - Pulmonary hypertension. The estimated PA systolic pressure is 44 mmHg.     5 - Mild to moderate aortic stenosis, RAD = 1.3 cm2, AVAi = 0.69 cm2/m2, peak velocity = 2.92 m/s, mean gradient = 17 mmHg.     6 - Moderate mitral regurgitation.     7 - Mild tricuspid regurgitation.     ASSESSMENT:   # AF, chronic, on eliquis, HR controlled, asymptomatic.   # AS, mod (echo 7/2018)  # HTN, uncontrolled  # DM  # HLP on simva 40mg  # BMI 36, down 1 unit(s) vs last OV    PLAN:   Cont med rx  Cont eliquis 5mg bid  Cont toprol XL 400mg qd  Inc dilt CD 360mg qd (can titrate further if HR will allow)  Next drug(s): HCTZ +/- aldactone  RTC 6 months with echo (June 2020)    Ede Kline MD, FACC

## 2019-12-31 ENCOUNTER — HOSPITAL ENCOUNTER (INPATIENT)
Facility: HOSPITAL | Age: 84
LOS: 9 days | Discharge: NURSING, REHAB OR LONG-TERM CARE | DRG: 682 | End: 2020-01-09
Attending: EMERGENCY MEDICINE | Admitting: HOSPITALIST
Payer: COMMERCIAL

## 2019-12-31 ENCOUNTER — TELEPHONE (OUTPATIENT)
Dept: CARDIOLOGY | Facility: CLINIC | Age: 84
End: 2019-12-31

## 2019-12-31 DIAGNOSIS — I10 HTN, GOAL BELOW 140/90: ICD-10-CM

## 2019-12-31 DIAGNOSIS — E87.5 HYPERKALEMIA: ICD-10-CM

## 2019-12-31 DIAGNOSIS — R06.02 SOB (SHORTNESS OF BREATH) ON EXERTION: ICD-10-CM

## 2019-12-31 DIAGNOSIS — I48.91 ATRIAL FIBRILLATION: ICD-10-CM

## 2019-12-31 DIAGNOSIS — R00.1 SYMPTOMATIC BRADYCARDIA: Primary | ICD-10-CM

## 2019-12-31 DIAGNOSIS — R00.1 BRADYCARDIA: ICD-10-CM

## 2019-12-31 DIAGNOSIS — E87.1 HYPONATREMIA: ICD-10-CM

## 2019-12-31 LAB
ALBUMIN SERPL BCP-MCNC: 3.4 G/DL (ref 3.5–5.2)
ALLENS TEST: ABNORMAL
ALP SERPL-CCNC: 127 U/L (ref 55–135)
ALT SERPL W/O P-5'-P-CCNC: 343 U/L (ref 10–44)
ANION GAP SERPL CALC-SCNC: 13 MMOL/L (ref 8–16)
ANION GAP SERPL CALC-SCNC: 14 MMOL/L (ref 8–16)
AST SERPL-CCNC: 387 U/L (ref 10–40)
BASOPHILS # BLD AUTO: 0.01 K/UL (ref 0–0.2)
BASOPHILS NFR BLD: 0.2 % (ref 0–1.9)
BILIRUB SERPL-MCNC: 1.4 MG/DL (ref 0.1–1)
BNP SERPL-MCNC: 439 PG/ML (ref 0–99)
BUN SERPL-MCNC: 28 MG/DL (ref 8–23)
BUN SERPL-MCNC: 30 MG/DL (ref 6–30)
CALCIUM SERPL-MCNC: 8.4 MG/DL (ref 8.7–10.5)
CHLORIDE SERPL-SCNC: 91 MMOL/L (ref 95–110)
CHLORIDE SERPL-SCNC: 92 MMOL/L (ref 95–110)
CO2 SERPL-SCNC: 20 MMOL/L (ref 23–29)
CREAT SERPL-MCNC: 2 MG/DL (ref 0.5–1.4)
CREAT SERPL-MCNC: 2 MG/DL (ref 0.5–1.4)
DELSYS: ABNORMAL
DIFFERENTIAL METHOD: ABNORMAL
EOSINOPHIL # BLD AUTO: 0 K/UL (ref 0–0.5)
EOSINOPHIL NFR BLD: 0 % (ref 0–8)
ERYTHROCYTE [DISTWIDTH] IN BLOOD BY AUTOMATED COUNT: 16.2 % (ref 11.5–14.5)
EST. GFR  (AFRICAN AMERICAN): 25 ML/MIN/1.73 M^2
EST. GFR  (NON AFRICAN AMERICAN): 22 ML/MIN/1.73 M^2
FIO2: 100
FLOW: 15
GLUCOSE SERPL-MCNC: 192 MG/DL (ref 70–110)
GLUCOSE SERPL-MCNC: 202 MG/DL (ref 70–110)
GLUCOSE SERPL-MCNC: 315 MG/DL (ref 70–110)
HCO3 UR-SCNC: 24.1 MMOL/L (ref 24–28)
HCT VFR BLD AUTO: 36.5 % (ref 37–48.5)
HCT VFR BLD CALC: 34 %PCV (ref 36–54)
HGB BLD-MCNC: 10.8 G/DL (ref 12–16)
IMM GRANULOCYTES # BLD AUTO: 0.3 K/UL (ref 0–0.04)
IMM GRANULOCYTES NFR BLD AUTO: 4.6 % (ref 0–0.5)
LYMPHOCYTES # BLD AUTO: 2.8 K/UL (ref 1–4.8)
LYMPHOCYTES NFR BLD: 43 % (ref 18–48)
MAGNESIUM SERPL-MCNC: 2.1 MG/DL (ref 1.6–2.6)
MCH RBC QN AUTO: 27.2 PG (ref 27–31)
MCHC RBC AUTO-ENTMCNC: 29.6 G/DL (ref 32–36)
MCV RBC AUTO: 92 FL (ref 82–98)
MODE: ABNORMAL
MONOCYTES # BLD AUTO: 0.4 K/UL (ref 0.3–1)
MONOCYTES NFR BLD: 5.4 % (ref 4–15)
NEUTROPHILS # BLD AUTO: 3 K/UL (ref 1.8–7.7)
NEUTROPHILS NFR BLD: 46.8 % (ref 38–73)
NRBC BLD-RTO: 0 /100 WBC
PCO2 BLDA: 69.6 MMHG (ref 35–45)
PH SMN: 7.15 [PH] (ref 7.35–7.45)
PHOSPHATE SERPL-MCNC: 7.6 MG/DL (ref 2.7–4.5)
PLATELET # BLD AUTO: 180 K/UL (ref 150–350)
PMV BLD AUTO: 10.8 FL (ref 9.2–12.9)
PO2 BLDA: 408 MMHG (ref 80–100)
POC BE: -6 MMOL/L
POC IONIZED CALCIUM: 1.09 MMOL/L (ref 1.06–1.42)
POC SATURATED O2: 100 % (ref 95–100)
POC TCO2 (MEASURED): 26 MMOL/L (ref 23–27)
POC TCO2: 26 MMOL/L (ref 23–27)
POCT GLUCOSE: 199 MG/DL (ref 70–110)
POCT GLUCOSE: 315 MG/DL (ref 70–110)
POTASSIUM BLD-SCNC: 6.1 MMOL/L (ref 3.5–5.1)
POTASSIUM SERPL-SCNC: 6.6 MMOL/L (ref 3.5–5.1)
PROT SERPL-MCNC: 6.4 G/DL (ref 6–8.4)
RBC # BLD AUTO: 3.97 M/UL (ref 4–5.4)
SAMPLE: ABNORMAL
SAMPLE: ABNORMAL
SITE: ABNORMAL
SODIUM BLD-SCNC: 124 MMOL/L (ref 136–145)
SODIUM SERPL-SCNC: 125 MMOL/L (ref 136–145)
SP02: 94
TROPONIN I SERPL DL<=0.01 NG/ML-MCNC: 0.01 NG/ML (ref 0–0.03)
WBC # BLD AUTO: 6.49 K/UL (ref 3.9–12.7)

## 2019-12-31 PROCEDURE — 25000242 PHARM REV CODE 250 ALT 637 W/ HCPCS: Performed by: EMERGENCY MEDICINE

## 2019-12-31 PROCEDURE — 84295 ASSAY OF SERUM SODIUM: CPT

## 2019-12-31 PROCEDURE — 25000003 PHARM REV CODE 250: Performed by: EMERGENCY MEDICINE

## 2019-12-31 PROCEDURE — 25000003 PHARM REV CODE 250: Performed by: INTERNAL MEDICINE

## 2019-12-31 PROCEDURE — 99291 CRITICAL CARE FIRST HOUR: CPT | Mod: 25

## 2019-12-31 PROCEDURE — 83735 ASSAY OF MAGNESIUM: CPT

## 2019-12-31 PROCEDURE — 99291 CRITICAL CARE FIRST HOUR: CPT | Mod: ,,, | Performed by: INTERNAL MEDICINE

## 2019-12-31 PROCEDURE — 99900035 HC TECH TIME PER 15 MIN (STAT)

## 2019-12-31 PROCEDURE — 82330 ASSAY OF CALCIUM: CPT

## 2019-12-31 PROCEDURE — 84132 ASSAY OF SERUM POTASSIUM: CPT

## 2019-12-31 PROCEDURE — 85025 COMPLETE CBC W/AUTO DIFF WBC: CPT

## 2019-12-31 PROCEDURE — 27000221 HC OXYGEN, UP TO 24 HOURS

## 2019-12-31 PROCEDURE — 82962 GLUCOSE BLOOD TEST: CPT

## 2019-12-31 PROCEDURE — 99292 CRITICAL CARE ADDL 30 MIN: CPT

## 2019-12-31 PROCEDURE — 63600175 PHARM REV CODE 636 W HCPCS: Performed by: EMERGENCY MEDICINE

## 2019-12-31 PROCEDURE — 82565 ASSAY OF CREATININE: CPT

## 2019-12-31 PROCEDURE — 20000000 HC ICU ROOM

## 2019-12-31 PROCEDURE — 84100 ASSAY OF PHOSPHORUS: CPT

## 2019-12-31 PROCEDURE — C1894 INTRO/SHEATH, NON-LASER: HCPCS | Performed by: INTERNAL MEDICINE

## 2019-12-31 PROCEDURE — 33210 PR INSER HEART TEMP PACER ONE CHMBR: ICD-10-PCS | Mod: ,,, | Performed by: INTERNAL MEDICINE

## 2019-12-31 PROCEDURE — 33210 INSERT ELECTRD/PM CATH SNGL: CPT | Mod: ,,, | Performed by: INTERNAL MEDICINE

## 2019-12-31 PROCEDURE — 80053 COMPREHEN METABOLIC PANEL: CPT

## 2019-12-31 PROCEDURE — 36600 WITHDRAWAL OF ARTERIAL BLOOD: CPT

## 2019-12-31 PROCEDURE — 99291 PR CRITICAL CARE, E/M 30-74 MINUTES: ICD-10-PCS | Mod: ,,, | Performed by: INTERNAL MEDICINE

## 2019-12-31 PROCEDURE — 33210 INSERT ELECTRD/PM CATH SNGL: CPT | Performed by: INTERNAL MEDICINE

## 2019-12-31 PROCEDURE — 85014 HEMATOCRIT: CPT

## 2019-12-31 PROCEDURE — 84484 ASSAY OF TROPONIN QUANT: CPT

## 2019-12-31 PROCEDURE — 82803 BLOOD GASES ANY COMBINATION: CPT

## 2019-12-31 PROCEDURE — 94640 AIRWAY INHALATION TREATMENT: CPT

## 2019-12-31 PROCEDURE — 12000002 HC ACUTE/MED SURGE SEMI-PRIVATE ROOM

## 2019-12-31 PROCEDURE — 96375 TX/PRO/DX INJ NEW DRUG ADDON: CPT

## 2019-12-31 PROCEDURE — 83880 ASSAY OF NATRIURETIC PEPTIDE: CPT

## 2019-12-31 PROCEDURE — 96374 THER/PROPH/DIAG INJ IV PUSH: CPT

## 2019-12-31 RX ORDER — DEXTROSE 50 % IN WATER (D50W) INTRAVENOUS SYRINGE
50
Status: COMPLETED | OUTPATIENT
Start: 2019-12-31 | End: 2019-12-31

## 2019-12-31 RX ORDER — LIDOCAINE HYDROCHLORIDE 10 MG/ML
INJECTION INFILTRATION; PERINEURAL
Status: DISCONTINUED | OUTPATIENT
Start: 2019-12-31 | End: 2020-01-01 | Stop reason: HOSPADM

## 2019-12-31 RX ORDER — ATROPINE SULFATE 0.1 MG/ML
1 INJECTION INTRAVENOUS ONCE
Status: COMPLETED | OUTPATIENT
Start: 2019-12-31 | End: 2019-12-31

## 2019-12-31 RX ORDER — CALCIUM GLUCONATE 98 MG/ML
1 INJECTION, SOLUTION INTRAVENOUS
Status: COMPLETED | OUTPATIENT
Start: 2019-12-31 | End: 2019-12-31

## 2019-12-31 RX ORDER — ASPIRIN 325 MG
325 TABLET ORAL
Status: COMPLETED | OUTPATIENT
Start: 2019-12-31 | End: 2019-12-31

## 2019-12-31 RX ORDER — ACETAMINOPHEN 325 MG/1
325 TABLET ORAL EVERY 4 HOURS PRN
Status: DISCONTINUED | OUTPATIENT
Start: 2020-01-01 | End: 2020-01-01

## 2019-12-31 RX ORDER — ALBUTEROL SULFATE 2.5 MG/.5ML
10 SOLUTION RESPIRATORY (INHALATION)
Status: COMPLETED | OUTPATIENT
Start: 2019-12-31 | End: 2019-12-31

## 2019-12-31 RX ORDER — OXYCODONE AND ACETAMINOPHEN 5; 325 MG/1; MG/1
1 TABLET ORAL EVERY 4 HOURS PRN
Status: DISCONTINUED | OUTPATIENT
Start: 2020-01-01 | End: 2020-01-01

## 2019-12-31 RX ADMIN — ALBUTEROL SULFATE 10 MG: 2.5 SOLUTION RESPIRATORY (INHALATION) at 10:12

## 2019-12-31 RX ADMIN — ASPIRIN 325 MG ORAL TABLET 325 MG: 325 PILL ORAL at 10:12

## 2019-12-31 RX ADMIN — INSULIN HUMAN 8 UNITS: 100 INJECTION, SOLUTION PARENTERAL at 11:12

## 2019-12-31 RX ADMIN — Medication 50 G: at 11:12

## 2019-12-31 RX ADMIN — CALCIUM GLUCONATE 1 G: 98 INJECTION, SOLUTION INTRAVENOUS at 10:12

## 2019-12-31 RX ADMIN — ATROPINE SULFATE 1 MG: 0.1 INJECTION PARENTERAL at 09:12

## 2019-12-31 NOTE — TELEPHONE ENCOUNTER
----- Message from Lilian Silva sent at 12/31/2019  1:31 PM CST -----  Requesting clearance of Eliquis for 48 hrs prior. Patient will be scheduled for a sacral RFA with Dr Valle.    Please advise if patient may hold medication prior.    Respectfully,  Lilian

## 2020-01-01 PROBLEM — D63.8 ANEMIA OF CHRONIC DISEASE: Status: ACTIVE | Noted: 2020-01-01

## 2020-01-01 PROBLEM — F03.90 DEMENTIA WITHOUT BEHAVIORAL DISTURBANCE: Status: ACTIVE | Noted: 2020-01-01

## 2020-01-01 PROBLEM — F03.90 DEMENTIA WITHOUT BEHAVIORAL DISTURBANCE: Chronic | Status: ACTIVE | Noted: 2020-01-01

## 2020-01-01 PROBLEM — E87.5 HYPERKALEMIA: Status: ACTIVE | Noted: 2020-01-01

## 2020-01-01 PROBLEM — I48.20 CHRONIC ATRIAL FIBRILLATION: Chronic | Status: ACTIVE | Noted: 2018-07-05

## 2020-01-01 PROBLEM — E78.2 MIXED HYPERLIPIDEMIA: Chronic | Status: ACTIVE | Noted: 2018-10-01

## 2020-01-01 PROBLEM — Z79.01 CHRONIC ANTICOAGULATION: Chronic | Status: ACTIVE | Noted: 2018-07-05

## 2020-01-01 PROBLEM — I10 ESSENTIAL HYPERTENSION: Chronic | Status: ACTIVE | Noted: 2018-07-05

## 2020-01-01 PROBLEM — E03.9 ACQUIRED HYPOTHYROIDISM: Chronic | Status: ACTIVE | Noted: 2018-07-05

## 2020-01-01 PROBLEM — N17.9 ACUTE RENAL FAILURE: Status: ACTIVE | Noted: 2020-01-01

## 2020-01-01 PROBLEM — D63.8 ANEMIA OF CHRONIC DISEASE: Chronic | Status: ACTIVE | Noted: 2020-01-01

## 2020-01-01 LAB
ALBUMIN SERPL BCP-MCNC: 4 G/DL (ref 3.5–5.2)
ALP SERPL-CCNC: 156 U/L (ref 55–135)
ALT SERPL W/O P-5'-P-CCNC: 1759 U/L (ref 10–44)
ANION GAP SERPL CALC-SCNC: 10 MMOL/L (ref 8–16)
ANION GAP SERPL CALC-SCNC: 10 MMOL/L (ref 8–16)
ANION GAP SERPL CALC-SCNC: 13 MMOL/L (ref 8–16)
ANION GAP SERPL CALC-SCNC: 13 MMOL/L (ref 8–16)
AORTIC ROOT ANNULUS: 2.25 CM
AORTIC VALVE CUSP SEPERATION: 1.37 CM
APTT BLDCRRT: 35.6 SEC (ref 21–32)
ASCENDING AORTA: 2.47 CM
AST SERPL-CCNC: 2372 U/L (ref 10–40)
AV INDEX (PROSTH): 0.22
AV MEAN GRADIENT: 35 MMHG
AV PEAK GRADIENT: 60 MMHG
AV VALVE AREA: 0.64 CM2
AV VELOCITY RATIO: 0.21
BACTERIA #/AREA URNS HPF: ABNORMAL /HPF
BASOPHILS # BLD AUTO: 0.01 K/UL (ref 0–0.2)
BASOPHILS # BLD AUTO: 0.02 K/UL (ref 0–0.2)
BASOPHILS NFR BLD: 0.1 % (ref 0–1.9)
BASOPHILS NFR BLD: 0.1 % (ref 0–1.9)
BILIRUB SERPL-MCNC: 1 MG/DL (ref 0.1–1)
BILIRUB UR QL STRIP: NEGATIVE
BSA FOR ECHO PROCEDURE: 2.12 M2
BUN SERPL-MCNC: 28 MG/DL (ref 8–23)
BUN SERPL-MCNC: 29 MG/DL (ref 8–23)
BUN SERPL-MCNC: 30 MG/DL (ref 8–23)
BUN SERPL-MCNC: 30 MG/DL (ref 8–23)
CALCIUM SERPL-MCNC: 8.1 MG/DL (ref 8.7–10.5)
CALCIUM SERPL-MCNC: 8.7 MG/DL (ref 8.7–10.5)
CALCIUM SERPL-MCNC: 8.9 MG/DL (ref 8.7–10.5)
CALCIUM SERPL-MCNC: 9.1 MG/DL (ref 8.7–10.5)
CHLORIDE SERPL-SCNC: 93 MMOL/L (ref 95–110)
CHLORIDE SERPL-SCNC: 94 MMOL/L (ref 95–110)
CLARITY UR: ABNORMAL
CO2 SERPL-SCNC: 21 MMOL/L (ref 23–29)
CO2 SERPL-SCNC: 22 MMOL/L (ref 23–29)
CO2 SERPL-SCNC: 23 MMOL/L (ref 23–29)
CO2 SERPL-SCNC: 24 MMOL/L (ref 23–29)
COLOR UR: ABNORMAL
CREAT SERPL-MCNC: 2 MG/DL (ref 0.5–1.4)
CREAT SERPL-MCNC: 2 MG/DL (ref 0.5–1.4)
CREAT SERPL-MCNC: 2.1 MG/DL (ref 0.5–1.4)
CREAT SERPL-MCNC: 2.1 MG/DL (ref 0.5–1.4)
CREAT UR-MCNC: 61.7 MG/DL (ref 15–325)
CV ECHO LV RWT: 0.73 CM
DIFFERENTIAL METHOD: ABNORMAL
DIFFERENTIAL METHOD: ABNORMAL
DOP CALC AO PEAK VEL: 3.86 M/S
DOP CALC AO VTI: 111.88 CM
DOP CALC LVOT AREA: 3 CM2
DOP CALC LVOT DIAMETER: 1.95 CM
DOP CALC LVOT PEAK VEL: 0.81 M/S
DOP CALC LVOT STROKE VOLUME: 71.85 CM3
DOP CALCLVOT PEAK VEL VTI: 24.07 CM
E WAVE DECELERATION TIME: 147.63 MSEC
E/A RATIO: 1.97
E/E' RATIO: 61.67 M/S
ECHO LV POSTERIOR WALL: 1.42 CM (ref 0.6–1.1)
EOSINOPHIL # BLD AUTO: 0 K/UL (ref 0–0.5)
EOSINOPHIL # BLD AUTO: 0 K/UL (ref 0–0.5)
EOSINOPHIL NFR BLD: 0 % (ref 0–8)
EOSINOPHIL NFR BLD: 0.1 % (ref 0–8)
ERYTHROCYTE [DISTWIDTH] IN BLOOD BY AUTOMATED COUNT: 16 % (ref 11.5–14.5)
ERYTHROCYTE [DISTWIDTH] IN BLOOD BY AUTOMATED COUNT: 16.1 % (ref 11.5–14.5)
EST. GFR  (AFRICAN AMERICAN): 24 ML/MIN/1.73 M^2
EST. GFR  (AFRICAN AMERICAN): 24 ML/MIN/1.73 M^2
EST. GFR  (AFRICAN AMERICAN): 25 ML/MIN/1.73 M^2
EST. GFR  (AFRICAN AMERICAN): 25 ML/MIN/1.73 M^2
EST. GFR  (NON AFRICAN AMERICAN): 21 ML/MIN/1.73 M^2
EST. GFR  (NON AFRICAN AMERICAN): 21 ML/MIN/1.73 M^2
EST. GFR  (NON AFRICAN AMERICAN): 22 ML/MIN/1.73 M^2
EST. GFR  (NON AFRICAN AMERICAN): 22 ML/MIN/1.73 M^2
FRACTIONAL SHORTENING: 40 % (ref 28–44)
GLUCOSE SERPL-MCNC: 129 MG/DL (ref 70–110)
GLUCOSE SERPL-MCNC: 142 MG/DL (ref 70–110)
GLUCOSE SERPL-MCNC: 147 MG/DL (ref 70–110)
GLUCOSE SERPL-MCNC: 216 MG/DL (ref 70–110)
GLUCOSE UR QL STRIP: NEGATIVE
HCT VFR BLD AUTO: 34.9 % (ref 37–48.5)
HCT VFR BLD AUTO: 37.1 % (ref 37–48.5)
HGB BLD-MCNC: 10.5 G/DL (ref 12–16)
HGB BLD-MCNC: 11.3 G/DL (ref 12–16)
HGB UR QL STRIP: ABNORMAL
HYALINE CASTS #/AREA URNS LPF: 2 /LPF
IMM GRANULOCYTES # BLD AUTO: 0.22 K/UL (ref 0–0.04)
IMM GRANULOCYTES # BLD AUTO: 0.24 K/UL (ref 0–0.04)
IMM GRANULOCYTES NFR BLD AUTO: 1.4 % (ref 0–0.5)
IMM GRANULOCYTES NFR BLD AUTO: 2.1 % (ref 0–0.5)
INR PPP: 1.4 (ref 0.8–1.2)
INTERVENTRICULAR SEPTUM: 1.4 CM (ref 0.6–1.1)
IVRT: 0.1 MSEC
KETONES UR QL STRIP: ABNORMAL
LA MAJOR: 5.85 CM
LA MINOR: 4.88 CM
LA WIDTH: 4.03 CM
LEFT ATRIUM SIZE: 3.41 CM
LEFT ATRIUM VOLUME INDEX: 30.9 ML/M2
LEFT ATRIUM VOLUME: 62.16 CM3
LEFT INTERNAL DIMENSION IN SYSTOLE: 2.35 CM (ref 2.1–4)
LEFT VENTRICLE DIASTOLIC VOLUME INDEX: 33.02 ML/M2
LEFT VENTRICLE DIASTOLIC VOLUME: 66.49 ML
LEFT VENTRICLE MASS INDEX: 102 G/M2
LEFT VENTRICLE SYSTOLIC VOLUME INDEX: 9.5 ML/M2
LEFT VENTRICLE SYSTOLIC VOLUME: 19.03 ML
LEFT VENTRICULAR INTERNAL DIMENSION IN DIASTOLE: 3.91 CM (ref 3.5–6)
LEFT VENTRICULAR MASS: 204.48 G
LEUKOCYTE ESTERASE UR QL STRIP: ABNORMAL
LV LATERAL E/E' RATIO: 92.5 M/S
LV SEPTAL E/E' RATIO: 46.25 M/S
LYMPHOCYTES # BLD AUTO: 0.4 K/UL (ref 1–4.8)
LYMPHOCYTES # BLD AUTO: 0.5 K/UL (ref 1–4.8)
LYMPHOCYTES NFR BLD: 3.1 % (ref 18–48)
LYMPHOCYTES NFR BLD: 3.8 % (ref 18–48)
MAGNESIUM SERPL-MCNC: 1.8 MG/DL (ref 1.6–2.6)
MCH RBC QN AUTO: 26.7 PG (ref 27–31)
MCH RBC QN AUTO: 27.1 PG (ref 27–31)
MCHC RBC AUTO-ENTMCNC: 30.1 G/DL (ref 32–36)
MCHC RBC AUTO-ENTMCNC: 30.5 G/DL (ref 32–36)
MCV RBC AUTO: 88 FL (ref 82–98)
MCV RBC AUTO: 90 FL (ref 82–98)
MICROSCOPIC COMMENT: ABNORMAL
MONOCYTES # BLD AUTO: 1 K/UL (ref 0.3–1)
MONOCYTES # BLD AUTO: 1.6 K/UL (ref 0.3–1)
MONOCYTES NFR BLD: 10 % (ref 4–15)
MONOCYTES NFR BLD: 8.4 % (ref 4–15)
MV PEAK A VEL: 0.94 M/S
MV PEAK E VEL: 1.85 M/S
NEUTROPHILS # BLD AUTO: 13.6 K/UL (ref 1.8–7.7)
NEUTROPHILS # BLD AUTO: 9.7 K/UL (ref 1.8–7.7)
NEUTROPHILS NFR BLD: 85.3 % (ref 38–73)
NEUTROPHILS NFR BLD: 85.6 % (ref 38–73)
NITRITE UR QL STRIP: NEGATIVE
NRBC BLD-RTO: 0 /100 WBC
NRBC BLD-RTO: 0 /100 WBC
PH UR STRIP: 7 [PH] (ref 5–8)
PHOSPHATE SERPL-MCNC: 4.9 MG/DL (ref 2.7–4.5)
PISA TR MAX VEL: 3.93 M/S
PLATELET # BLD AUTO: 163 K/UL (ref 150–350)
PLATELET # BLD AUTO: 178 K/UL (ref 150–350)
PMV BLD AUTO: 10.9 FL (ref 9.2–12.9)
PMV BLD AUTO: 10.9 FL (ref 9.2–12.9)
POCT GLUCOSE: 133 MG/DL (ref 70–110)
POTASSIUM SERPL-SCNC: 5.4 MMOL/L (ref 3.5–5.1)
POTASSIUM SERPL-SCNC: 5.5 MMOL/L (ref 3.5–5.1)
POTASSIUM SERPL-SCNC: 5.6 MMOL/L (ref 3.5–5.1)
POTASSIUM SERPL-SCNC: 5.6 MMOL/L (ref 3.5–5.1)
PROT SERPL-MCNC: 7.7 G/DL (ref 6–8.4)
PROT UR QL STRIP: ABNORMAL
PROTHROMBIN TIME: 15 SEC (ref 9–12.5)
PULM VEIN S/D RATIO: 0.8
PV PEAK D VEL: 0.41 M/S
PV PEAK S VEL: 0.33 M/S
PV PEAK VELOCITY: 0.81 CM/S
RA MAJOR: 5.99 CM
RA PRESSURE: 8 MMHG
RA WIDTH: 4.25 CM
RBC # BLD AUTO: 3.87 M/UL (ref 4–5.4)
RBC # BLD AUTO: 4.24 M/UL (ref 4–5.4)
RBC #/AREA URNS HPF: 10 /HPF (ref 0–4)
RIGHT VENTRICULAR END-DIASTOLIC DIMENSION: 3.27 CM
RV TISSUE DOPPLER FREE WALL SYSTOLIC VELOCITY 1 (APICAL 4 CHAMBER VIEW): 10.17 CM/S
SINUS: 2.79 CM
SODIUM SERPL-SCNC: 126 MMOL/L (ref 136–145)
SODIUM SERPL-SCNC: 127 MMOL/L (ref 136–145)
SODIUM SERPL-SCNC: 128 MMOL/L (ref 136–145)
SODIUM SERPL-SCNC: 130 MMOL/L (ref 136–145)
SODIUM UR-SCNC: 43 MMOL/L (ref 20–250)
SP GR UR STRIP: 1.01 (ref 1–1.03)
STJ: 2.11 CM
TDI LATERAL: 0.02 M/S
TDI SEPTAL: 0.04 M/S
TDI: 0.03 M/S
TR MAX PG: 62 MMHG
TRICUSPID ANNULAR PLANE SYSTOLIC EXCURSION: 1.14 CM
TROPONIN I SERPL DL<=0.01 NG/ML-MCNC: 0.04 NG/ML (ref 0–0.03)
TV REST PULMONARY ARTERY PRESSURE: 70 MMHG
URN SPEC COLLECT METH UR: ABNORMAL
UROBILINOGEN UR STRIP-ACNC: NEGATIVE EU/DL
WBC # BLD AUTO: 11.32 K/UL (ref 3.9–12.7)
WBC # BLD AUTO: 15.95 K/UL (ref 3.9–12.7)
WBC #/AREA URNS HPF: >100 /HPF (ref 0–5)

## 2020-01-01 PROCEDURE — 63600175 PHARM REV CODE 636 W HCPCS: Performed by: INTERNAL MEDICINE

## 2020-01-01 PROCEDURE — 36415 COLL VENOUS BLD VENIPUNCTURE: CPT

## 2020-01-01 PROCEDURE — 87088 URINE BACTERIA CULTURE: CPT

## 2020-01-01 PROCEDURE — 63600175 PHARM REV CODE 636 W HCPCS: Performed by: EMERGENCY MEDICINE

## 2020-01-01 PROCEDURE — 93010 ELECTROCARDIOGRAM REPORT: CPT | Mod: ,,, | Performed by: INTERNAL MEDICINE

## 2020-01-01 PROCEDURE — 87077 CULTURE AEROBIC IDENTIFY: CPT

## 2020-01-01 PROCEDURE — 85730 THROMBOPLASTIN TIME PARTIAL: CPT

## 2020-01-01 PROCEDURE — 87186 SC STD MICRODIL/AGAR DIL: CPT

## 2020-01-01 PROCEDURE — 80048 BASIC METABOLIC PNL TOTAL CA: CPT

## 2020-01-01 PROCEDURE — 83735 ASSAY OF MAGNESIUM: CPT

## 2020-01-01 PROCEDURE — 27000221 HC OXYGEN, UP TO 24 HOURS

## 2020-01-01 PROCEDURE — 81000 URINALYSIS NONAUTO W/SCOPE: CPT

## 2020-01-01 PROCEDURE — 82570 ASSAY OF URINE CREATININE: CPT

## 2020-01-01 PROCEDURE — 84100 ASSAY OF PHOSPHORUS: CPT

## 2020-01-01 PROCEDURE — 80048 BASIC METABOLIC PNL TOTAL CA: CPT | Mod: 91

## 2020-01-01 PROCEDURE — 25000003 PHARM REV CODE 250: Performed by: INTERNAL MEDICINE

## 2020-01-01 PROCEDURE — 85610 PROTHROMBIN TIME: CPT

## 2020-01-01 PROCEDURE — 85025 COMPLETE CBC W/AUTO DIFF WBC: CPT | Mod: 91

## 2020-01-01 PROCEDURE — 93010 EKG 12-LEAD: ICD-10-PCS | Mod: ,,, | Performed by: INTERNAL MEDICINE

## 2020-01-01 PROCEDURE — 25000242 PHARM REV CODE 250 ALT 637 W/ HCPCS: Performed by: HOSPITALIST

## 2020-01-01 PROCEDURE — 94761 N-INVAS EAR/PLS OXIMETRY MLT: CPT

## 2020-01-01 PROCEDURE — 87086 URINE CULTURE/COLONY COUNT: CPT

## 2020-01-01 PROCEDURE — 93005 ELECTROCARDIOGRAM TRACING: CPT

## 2020-01-01 PROCEDURE — 20000000 HC ICU ROOM

## 2020-01-01 PROCEDURE — 80053 COMPREHEN METABOLIC PANEL: CPT

## 2020-01-01 PROCEDURE — 99233 SBSQ HOSP IP/OBS HIGH 50: CPT | Mod: 25,GT,, | Performed by: INTERNAL MEDICINE

## 2020-01-01 PROCEDURE — 99233 PR SUBSEQUENT HOSPITAL CARE,LEVL III: ICD-10-PCS | Mod: 25,GT,, | Performed by: INTERNAL MEDICINE

## 2020-01-01 PROCEDURE — 84484 ASSAY OF TROPONIN QUANT: CPT

## 2020-01-01 PROCEDURE — 84300 ASSAY OF URINE SODIUM: CPT

## 2020-01-01 RX ORDER — SIMVASTATIN 40 MG/1
40 TABLET, FILM COATED ORAL NIGHTLY
Status: DISCONTINUED | OUTPATIENT
Start: 2020-01-01 | End: 2020-01-08

## 2020-01-01 RX ORDER — DOPAMINE HYDROCHLORIDE 160 MG/100ML
INJECTION, SOLUTION INTRAVENOUS
Status: DISPENSED
Start: 2020-01-01 | End: 2020-01-01

## 2020-01-01 RX ORDER — TALC
9 POWDER (GRAM) TOPICAL NIGHTLY PRN
Status: DISCONTINUED | OUTPATIENT
Start: 2020-01-01 | End: 2020-01-09 | Stop reason: HOSPADM

## 2020-01-01 RX ORDER — MEMANTINE HYDROCHLORIDE 5 MG/1
5 TABLET ORAL 2 TIMES DAILY
Status: DISCONTINUED | OUTPATIENT
Start: 2020-01-01 | End: 2020-01-02

## 2020-01-01 RX ORDER — ACETAMINOPHEN 500 MG
500 TABLET ORAL EVERY 6 HOURS PRN
Status: DISPENSED | OUTPATIENT
Start: 2020-01-01 | End: 2020-01-04

## 2020-01-01 RX ORDER — MICONAZOLE NITRATE 2 %
POWDER (GRAM) TOPICAL 2 TIMES DAILY
Status: DISCONTINUED | OUTPATIENT
Start: 2020-01-01 | End: 2020-01-09 | Stop reason: HOSPADM

## 2020-01-01 RX ORDER — IPRATROPIUM BROMIDE AND ALBUTEROL SULFATE 2.5; .5 MG/3ML; MG/3ML
3 SOLUTION RESPIRATORY (INHALATION) EVERY 6 HOURS
Status: DISCONTINUED | OUTPATIENT
Start: 2020-01-01 | End: 2020-01-03

## 2020-01-01 RX ORDER — SODIUM CHLORIDE 0.9 % (FLUSH) 0.9 %
10 SYRINGE (ML) INJECTION
Status: DISCONTINUED | OUTPATIENT
Start: 2020-01-01 | End: 2020-01-01

## 2020-01-01 RX ORDER — ESCITALOPRAM OXALATE 10 MG/1
10 TABLET ORAL DAILY
Status: DISCONTINUED | OUTPATIENT
Start: 2020-01-01 | End: 2020-01-09 | Stop reason: HOSPADM

## 2020-01-01 RX ORDER — DOPAMINE HYDROCHLORIDE 160 MG/100ML
5 INJECTION, SOLUTION INTRAVENOUS CONTINUOUS
Status: DISCONTINUED | OUTPATIENT
Start: 2020-01-01 | End: 2020-01-02

## 2020-01-01 RX ORDER — AMOXICILLIN 250 MG
1 CAPSULE ORAL 2 TIMES DAILY PRN
Status: DISCONTINUED | OUTPATIENT
Start: 2020-01-01 | End: 2020-01-09 | Stop reason: HOSPADM

## 2020-01-01 RX ORDER — HEPARIN SODIUM 5000 [USP'U]/ML
5000 INJECTION, SOLUTION INTRAVENOUS; SUBCUTANEOUS EVERY 12 HOURS
Status: DISCONTINUED | OUTPATIENT
Start: 2020-01-01 | End: 2020-01-01

## 2020-01-01 RX ORDER — SODIUM CHLORIDE 9 MG/ML
1000 INJECTION, SOLUTION INTRAVENOUS CONTINUOUS
Status: DISCONTINUED | OUTPATIENT
Start: 2020-01-01 | End: 2020-01-02

## 2020-01-01 RX ORDER — ONDANSETRON 2 MG/ML
8 INJECTION INTRAMUSCULAR; INTRAVENOUS EVERY 8 HOURS PRN
Status: DISCONTINUED | OUTPATIENT
Start: 2020-01-01 | End: 2020-01-09 | Stop reason: HOSPADM

## 2020-01-01 RX ORDER — LEVOTHYROXINE SODIUM 137 UG/1
1 CAPSULE ORAL DAILY
Status: DISCONTINUED | OUTPATIENT
Start: 2020-01-01 | End: 2020-01-01 | Stop reason: CLARIF

## 2020-01-01 RX ORDER — SODIUM CHLORIDE 9 MG/ML
1000 INJECTION, SOLUTION INTRAVENOUS CONTINUOUS
Status: DISCONTINUED | OUTPATIENT
Start: 2020-01-01 | End: 2020-01-01

## 2020-01-01 RX ORDER — CLONIDINE HYDROCHLORIDE 0.1 MG/1
0.1 TABLET ORAL 3 TIMES DAILY PRN
Status: DISCONTINUED | OUTPATIENT
Start: 2020-01-01 | End: 2020-01-01

## 2020-01-01 RX ADMIN — IPRATROPIUM BROMIDE AND ALBUTEROL SULFATE 3 ML: .5; 3 SOLUTION RESPIRATORY (INHALATION) at 08:01

## 2020-01-01 RX ADMIN — SODIUM CHLORIDE 1000 ML: 0.9 INJECTION, SOLUTION INTRAVENOUS at 12:01

## 2020-01-01 RX ADMIN — SODIUM CHLORIDE 1000 ML: 0.9 INJECTION, SOLUTION INTRAVENOUS at 07:01

## 2020-01-01 RX ADMIN — SODIUM CHLORIDE 1000 ML: 0.9 INJECTION, SOLUTION INTRAVENOUS at 03:01

## 2020-01-01 RX ADMIN — ONDANSETRON HYDROCHLORIDE 8 MG: 2 SOLUTION INTRAMUSCULAR; INTRAVENOUS at 09:01

## 2020-01-01 RX ADMIN — DOPAMINE HYDROCHLORIDE IN DEXTROSE 7 MCG/KG/MIN: 1.6 INJECTION, SOLUTION INTRAVENOUS at 10:01

## 2020-01-01 RX ADMIN — CEFTRIAXONE 1 G: 1 INJECTION, SOLUTION INTRAVENOUS at 11:01

## 2020-01-01 RX ADMIN — SODIUM POLYSTYRENE SULFONATE 15 G: 15 SUSPENSION ORAL; RECTAL at 11:01

## 2020-01-01 RX ADMIN — DOPAMINE HYDROCHLORIDE IN DEXTROSE 10 MCG/KG/MIN: 1.6 INJECTION, SOLUTION INTRAVENOUS at 07:01

## 2020-01-01 RX ADMIN — Medication: at 11:01

## 2020-01-01 RX ADMIN — DOPAMINE HYDROCHLORIDE IN DEXTROSE 10 MCG/KG/MIN: 1.6 INJECTION, SOLUTION INTRAVENOUS at 03:01

## 2020-01-01 RX ADMIN — SIMVASTATIN 40 MG: 40 TABLET, FILM COATED ORAL at 09:01

## 2020-01-01 RX ADMIN — SODIUM POLYSTYRENE SULFONATE 15 G: 15 SUSPENSION ORAL; RECTAL at 03:01

## 2020-01-01 RX ADMIN — DOPAMINE HYDROCHLORIDE IN DEXTROSE 5 MCG/KG/MIN: 1.6 INJECTION, SOLUTION INTRAVENOUS at 01:01

## 2020-01-01 RX ADMIN — MEMANTINE HYDROCHLORIDE 5 MG: 5 TABLET ORAL at 09:01

## 2020-01-01 RX ADMIN — APIXABAN 5 MG: 5 TABLET, FILM COATED ORAL at 09:01

## 2020-01-01 RX ADMIN — Medication: at 09:01

## 2020-01-01 NOTE — ED NOTES
Per MD, began pacing patient: 70 mA and 70 PPM. Pt with positive response, became much more alert and talkative. Oriented to self and location.

## 2020-01-01 NOTE — ASSESSMENT & PLAN NOTE
Patient's urinalysis is significant for a specific gravity of 1.010, 3+ protein, trace ketones, 2+ occult blood, 2+ leukocytes, > 100 WBCs, and many bacteria.  Urine output has been fair.  Will obtain additional urine studies; provide aggressive IV fluid hydration; monitor the urine output; recheck the renal function in the morning; and avoid nephrotoxins.

## 2020-01-01 NOTE — ED PROVIDER NOTES
Encounter Date: 12/31/2019    SCRIBE #1 NOTE: I, Moshe Murphy, am scribing for, and in the presence of,  Parth Jin MD. I have scribed the following portions of the note - Other sections scribed: HPI, ROS.       History     Chief Complaint   Patient presents with    Bradycardia     from [facility]; last seen normal at 4:30 pm, called for AMS, no radial or pedal pulses upon EMS arrival; baseline is oriented to name and place; erasto in the 20s; 1 mg of atropine en route; on 4 L O2 NC, unable to obtain pulse ox; pt has DNR but not signed by MD     This is a 87 y.o. female with a PMHx of Chronic Atrial Fibrillation and Hypertension who presents to the Emergency Department with a cc of bradycardia that began five hours (1630) pta.  Pt arrived to the Emergency Department via EMS from [facility] with altered metal status, as well.  Pt had no radial pulses or pedal pulses upon arrival.  Pt was also AAOx1.  Pt's bradycardia was in the 20s and heart rate in the 40s upon EMS arrival.  Medical staff began pacing the pt with 70 MA and 70 PPM.  Pt became more talkative and alert, and oriented to self, time, and location.  Pt reports associated epigastric pain and left should pain.  PT has DNR, but documents have not been signed until it is discussed with the pt's family members.      The history is provided by the EMS personnel.     Review of patient's allergies indicates:   Allergen Reactions    Sulfa (sulfonamide antibiotics) Hives     Past Medical History:   Diagnosis Date    Allergy     Anticoagulant long-term use     Coumadin    Anxiety     Arthritis     Atrial fibrillation     Cataract     Closed head injury 09/10/2018    Clotting disorder     Depression     Diabetes mellitus     Encounter for blood transfusion     Fall 09/10/2018    GERD (gastroesophageal reflux disease)     Heart murmur     [place] (hard of hearing)     wears bilateral hearing aids    Hypertension     Thyroid disease      Uses roller walker      Past Surgical History:   Procedure Laterality Date    HYSTERECTOMY      INJECTION OF FACET JOINT Bilateral 2019    Procedure: INJECTION, FACET JOINT, L4-L5;  Surgeon: Clayton Valle MD;  [facility];  Service: Pain Management;  Laterality: Bilateral;    JOINT REPLACEMENT Right     Hip    TONSILLECTOMY      TOTAL HIP ARTHROPLASTY Left      Family History   Problem Relation Age of Onset    No Known Problems Mother     Diabetes Father     Stroke Father     Diabetes Sister     Diabetes Brother      Social History     Tobacco Use    Smoking status: Former Smoker     Last attempt to quit: 1988     Years since quittin.5    Smokeless tobacco: Never Used   Substance Use Topics    Alcohol use: Yes     Alcohol/week: 3.0 - 4.0 standard drinks     Types: 1 Glasses of wine, 2 - 3 Standard drinks or equivalent per week    Drug use: No     Review of Systems   Unable to perform ROS: Mental status change   Gastrointestinal: Abdominal pain: epigastric.   Musculoskeletal: Arthralgias: left shoulder.   Psychiatric/Behavioral: Confusion: AAOx1 upon EMS arrival.       Physical Exam     Initial Vitals   BP Pulse Resp Temp SpO2   19 2130 19 2130 19 2130 19 2157 19 2130   117/86 (!) 41 (!) 24 96.6 °F (35.9 °C) (!) 85 %      MAP       --                Physical Exam    Nursing note and vitals reviewed.  Constitutional: She is diaphoretic. She appears distressed.   HENT:   Head: Normocephalic and atraumatic.   Right Ear: External ear normal.   Left Ear: External ear normal.   Mouth/Throat: Oropharynx is clear and moist. No oropharyngeal exudate.   Eyes: Conjunctivae and EOM are normal. Pupils are equal, round, and reactive to light. Right eye exhibits no discharge. Left eye exhibits no discharge. No scleral icterus.   Neck: Normal range of motion. Neck supple. No thyromegaly present. No tracheal deviation present. No JVD present.    Cardiovascular: Normal heart sounds. Exam reveals no gallop and no friction rub.    No murmur heard.  HR in the 40's with a weak but palpable femoral pulse   Pulmonary/Chest: Breath sounds normal. No stridor. She is in respiratory distress. She has no wheezes. She has no rhonchi. She has no rales. She exhibits no tenderness.   Abdominal: Soft. She exhibits no distension and no mass. There is no tenderness. There is no rebound and no guarding.   Musculoskeletal: Normal range of motion. She exhibits no edema or tenderness.   Lymphadenopathy:     She has no cervical adenopathy.   Neurological: She is alert. She has normal strength.   LIANA with NGND's.  Will answer simple questions but confused   Skin: Skin is warm. Capillary refill takes less than 2 seconds. No rash and no abscess noted. No erythema. No pallor.   Psychiatric:   Confused but answering simple questions         ED Course   Critical Care  Date/Time: 12/31/2019 11:30 PM  Performed by: Parth Jin MD  Authorized by: Parth Jin MD   Direct patient critical care time: 45 minutes  Additional history critical care time: 10 minutes  Ordering / reviewing critical care time: 10 minutes  Documentation critical care time: 5 minutes  Consulting other physicians critical care time: 10 minutes  Consult with family critical care time: 10 minutes  Total critical care time (exclusive of procedural time) : 90 minutes  Critical care was necessary to treat or prevent imminent or life-threatening deterioration of the following conditions: cardiac failure, shock and metabolic crisis.  Critical care was time spent personally by me on the following activities: discussions with consultants, evaluation of patient's response to treatment, obtaining history from patient or surrogate, ordering and review of laboratory studies, pulse oximetry, review of old charts, development of treatment plan with patient or surrogate, interpretation of cardiac output measurements,  examination of patient, ordering and performing treatments and interventions, ordering and review of radiographic studies, re-evaluation of patient's condition and transcutaneous pacing.        Labs Reviewed   CBC W/ AUTO DIFFERENTIAL - Abnormal; Notable for the following components:       Result Value    RBC 3.97 (*)     Hemoglobin 10.8 (*)     Hematocrit 36.5 (*)     Mean Corpuscular Hemoglobin Conc 29.6 (*)     RDW 16.2 (*)     Immature Granulocytes 4.6 (*)     Immature Grans (Abs) 0.30 (*)     All other components within normal limits   COMPREHENSIVE METABOLIC PANEL - Abnormal; Notable for the following components:    Sodium 125 (*)     Potassium 6.6 (*)     Chloride 91 (*)     CO2 20 (*)     Glucose 192 (*)     BUN, Bld 28 (*)     Creatinine 2.0 (*)     Calcium 8.4 (*)     Albumin 3.4 (*)     Total Bilirubin 1.4 (*)      (*)      (*)     eGFR if  25 (*)     eGFR if non  22 (*)     All other components within normal limits    Narrative:     Potassium critical result(s) called and verbal readback obtained from   Terri Davila  by NELLY 12/31/2019 22:50   B-TYPE NATRIURETIC PEPTIDE - Abnormal; Notable for the following components:     (*)     All other components within normal limits   PHOSPHORUS - Abnormal; Notable for the following components:    Phosphorus 7.6 (*)     All other components within normal limits   POCT GLUCOSE - Abnormal; Notable for the following components:    POCT Glucose 199 (*)     All other components within normal limits   ISTAT PROCEDURE - Abnormal; Notable for the following components:    POC PH 7.148 (*)     POC PCO2 69.6 (*)     POC PO2 408 (*)     All other components within normal limits   ISTAT PROCEDURE - Abnormal; Notable for the following components:    POC Glucose 202 (*)     POC Creatinine 2.0 (*)     POC Sodium 124 (*)     POC Potassium 6.1 (*)     POC Chloride 92 (*)     POC Hematocrit 34 (*)     All other components within  normal limits   POCT GLUCOSE - Abnormal; Notable for the following components:    POCT Glucose 315 (*)     All other components within normal limits   TROPONIN I   MAGNESIUM   TROPONIN I   URINALYSIS, REFLEX TO URINE CULTURE   ISTAT CHEM8   POCT GLUCOSE MONITORING CONTINUOUS          Imaging Results          X-Ray Chest AP Portable (Final result)  Result time 12/31/19 22:08:20    Final result by Antonio Cruz MD (12/31/19 22:08:20)                 Impression:      No acute cardiopulmonary process identified.      Electronically signed by: Antonio Cruz MD  Date:    12/31/2019  Time:    22:08             Narrative:    EXAMINATION:  XR CHEST AP PORTABLE    CLINICAL HISTORY:  Chest Pain;    TECHNIQUE:  Single frontal view of the chest was performed.    COMPARISON:  10/11/2019.    FINDINGS:  Support pads overlie the left chest wall.  Cardiac silhouette is stable in size.  Lungs are symmetrically expanded.  No evidence of focal consolidative process, pneumothorax, or significant effusion.  No acute osseous abnormality identified.                                Pt arrived confused with SaO2 and BP undetectable with HR in the 40's.  Pt appeared in no respiratory distress and was breathing comfortably upon arrival.  Atropine given and transcutaneous pacing initiated.  BP improved to 120's / 60's and SpO2 improved to 100% on several liters of O2 via NC.  Pt's mental status improved as well.  Care discussed with family who did not want her DNR given there was a possible reversible condition.  Cardiology (Dr. Loza) called and requested we activate the cath lab for transvenous pacer placement.  K returned at 6.6 so Ca gluconate, insulin, D50 and albuterol given.  Hyperkalemia is likely secondary to dehydration given the hyponatremia in conjunction with the elevated BUN / Cr so will give IVF's and trend with serial BMP's.  Pt discussed with Dr. Tena and admitted to ICU for further evaluation and management.    Parth STOUT  Annabel CABRERA                      Scribe Attestation:   Scribe #1: I performed the above scribed service and the documentation accurately describes the services I performed. I attest to the accuracy of the note.                          Clinical Impression:       ICD-10-CM ICD-9-CM   1. Symptomatic bradycardia R00.1 427.89   2. Bradycardia R00.1 427.89   3. Hyperkalemia E87.5 276.7   4. Hyponatremia E87.1 276.1             I, Parth Jin, personally performed the services described in this documentation. All medical record entries made by the scribe were at my direction and in my presence.  I have reviewed the chart and agree that the record reflects my personal performance and is accurate and complete.                   Parth Jin MD  12/31/19 0881

## 2020-01-01 NOTE — PROCEDURES
Vashti Muñoz is a 87 y.o. female patient.    Temp: 97.6 °F (36.4 °C) (12/31/19 2318)  Pulse: (!) 59 (12/31/19 2318)  Resp: 19 (12/31/19 2304)  BP: 121/60 (12/31/19 2318)  SpO2: 97 % (12/31/19 2318)       Procedures     Temp pacing wire   Dr Loza  Pre-op bradycardia  Post-op same  Specimen none  EBL < 50 cc    12/31/19 Transvenous temporary pacing wire placed through right FV  Rate 80 MA 3    Correct electrolytes  Hold all rate lowering medication    Gregg Loza  12/31/2019

## 2020-01-01 NOTE — PROGRESS NOTES
[facility]  Cardiology  Progress Note    Patient Name: Vashti Muñoz  MRN: [id number]  Admission Date: 12/31/2019  Hospital Length of Stay: 1 days  Code Status: Full Code   Attending Physician: Harmeet Hollingsworth MD   Primary Care Physician: Alesha Baum MD  Expected Discharge Date:   Principal Problem:Symptomatic bradycardia    Subjective:     Hospital Course:   1-1 HR improved 70s. Denies CP    Interval History:     Review of Systems   Unable to perform ROS: dementia     Objective:     Vital Signs (Most Recent):  Temp: 96 °F (35.6 °C) (01/01/20 0700)  Pulse: 77 (01/01/20 0830)  Resp: 18 (01/01/20 0830)  BP: (!) 140/70 (01/01/20 0830)  SpO2: (!) 93 % (01/01/20 0830) Vital Signs (24h Range):  Temp:  [96 °F (35.6 °C)-97.9 °F (36.6 °C)] 96 °F (35.6 °C)  Pulse:  [36-95] 77  Resp:  [9-32] 18  SpO2:  [85 %-100 %] 93 %  BP: (105-160)/(58-92) 140/70     Weight: 102.5 kg (226 lb)  Body mass index is 41.34 kg/m².     SpO2: (!) 93 %  O2 Device (Oxygen Therapy): nasal cannula      Intake/Output Summary (Last 24 hours) at 1/1/2020 0919  Last data filed at 1/1/2020 0800  Gross per 24 hour   Intake 1170.1 ml   Output 60 ml   Net 1110.1 ml       Lines/Drains/Airways     Drain                 Urethral Catheter 01/01/20 0030 16 Fr. less than 1 day          Line                 Pacer Wires 01/01/20 0015 less than 1 day          Peripheral Intravenous Line                 Peripheral IV - Single Lumen 12/31/19 2140 18 G Left Hand less than 1 day         Peripheral IV - Single Lumen 12/31/19 2140 20 G Right;Anterior Forearm less than 1 day         Peripheral IV - Single Lumen 12/31/19 2143 18 G Right;Posterior Forearm less than 1 day                Physical Exam   Constitutional: She is oriented to person, place, and time. She appears well-developed and well-nourished.   HENT:   Head: Normocephalic and atraumatic.   Eyes: Pupils are equal, round, and reactive to light. Conjunctivae are normal.   Neck: Normal range of  motion. Neck supple.   Cardiovascular: Normal rate, normal heart sounds and intact distal pulses. An irregularly irregular rhythm present.   Pulmonary/Chest: Effort normal and breath sounds normal.   Abdominal: Soft. Bowel sounds are normal.   Musculoskeletal: Normal range of motion.   Neurological: She is alert and oriented to person, place, and time.   Skin: Skin is warm and dry.       Significant Labs: All pertinent lab results from the last 24 hours have been reviewed.    Significant Imaging: Echocardiogram:   2D echo with color flow doppler:   Results for orders placed or performed during the hospital encounter of 07/09/18   2D Echo w/ Color Flow Doppler   Result Value Ref Range    QEF 55 55 - 65    Mitral Valve Regurgitation MODERATE (A)     Aortic Valve Stenosis MILD TO MODERATE (A)     Est. PA Systolic Pressure 44.47 (A)     Tricuspid Valve Regurgitation MILD     Narrative    Date of Procedure: 07/09/2018        TEST DESCRIPTION       Aorta: The aortic root is normal in size, measuring 2.1 cm at sinotubular junction and 2.8 cm at Sinuses of Valsalva. The proximal ascending aorta is normal in size, measuring 3.0 cm across.     Left Atrium: The left atrial volume index is mildly enlarged, measuring 36.36 cc/m2.     Left Ventricle: The left ventricle is normal in size, with an end-diastolic diameter of 4.0 cm, and an end-systolic diameter of 2.7 cm. LV wall thickness is normal, with the septum and the posterior wall each measuring 1.0 cm across. Relative wall   thickness was increased at 0.50, and the LV mass index was 76.4 g/m2 consistent with concentric remodeling. There are no regional wall motion abnormalities. Left ventricular systolic function appears normal. Visually estimated ejection fraction is   55-60%. The LV Doppler derived stroke volume equals 68.0 ccs.     Diastolic indices: Diastolic function is indeterminate.     Right Atrium: The right atrium is normal in size, measuring 4.7 cm in length and  3.4 cm in width in the apical view.     Right Ventricle: The right ventricle is normal in size measuring 3.7 cm at the base in the apical right ventricle-focused view. Global right ventricular systolic function appears normal. Tricuspid annular plane systolic excursion (TAPSE) is 1.9 cm. The   estimated PA systolic pressure is 44 mmHg.     Aortic Valve:  The aortic valve is moderately sclerotic. The peak velocity obtained across the aortic valve is 2.92 m/s, which translates to a peak gradient of 34 mmHg. The mean gradient is 17 mmHg. Using a left ventricular outflow tract diameter of 2.1   cm, a left ventricular outflow tract velocity time integral of 19 cm, and a peak instantaneous transvalvular velocity time integral of 53 cm, the calculated aortic valve area is 1.3 cm2(AVAi is 0.69 cm2/m2), consistent with mild to moderate aortic   stenosis.     Mitral Valve:  There is moderate mitral regurgitation.     Tricuspid Valve:  There is mild tricuspid regurgitation.     IVC: IVC is normal in size and collapses > 50% with a sniff, suggesting normal right atrial pressure of 3 mmHg.     Intracavitary: There is no evidence of pericardial effusion, intracavity mass, thrombi, or vegetation.         CONCLUSIONS     1 - Normal left ventricular systolic function (EF 55-60%).     2 - Concentric remodeling.     3 - Mild left atrial enlargement.     4 - Pulmonary hypertension. The estimated PA systolic pressure is 44 mmHg.     5 - Mild to moderate aortic stenosis, RAD = 1.3 cm2, AVAi = 0.69 cm2/m2, peak velocity = 2.92 m/s, mean gradient = 17 mmHg.     6 - Moderate mitral regurgitation.     7 - Mild tricuspid regurgitation.             This document has been electronically    SIGNED BY: Gregg Loza MD On: 07/09/2018 16:41     Assessment and Plan:     Brief HPI:     Acute renal failure  K 6.6 initially - likely contributing to bradycardia - improved now    Mixed hyperlipidemia  On statin    Essential hypertension  Stable whiled  paced    DM type 2, controlled, with complication  Per primary    Memory impairment  Per primary    Chronic atrial fibrillation  Rates improved off cardizem and metoprolol - will add back as needed. Temp pacing wire removed. No need for PPM at this point. Check echo if stable will f/u prn        VTE Risk Mitigation (From admission, onward)         Ordered     heparin (porcine) injection 5,000 Units  Every 12 hours      01/01/20 0141     IP VTE HIGH RISK PATIENT  Once      01/01/20 0141                Gregg Loza MD  Cardiology  [facility]

## 2020-01-01 NOTE — CARE UPDATE
[facility]  ICU Multidisciplinary Bedside Rounds   SUMMARY     Date: 1/1/2020    Prehospitalization: Nursing Home  Admit Date / LOS : 12/31/2019/ 1 days    Diagnosis: Symptomatic bradycardia    Consults:        Active: Cardio       Needed: Palliative     Code Status: Full Code   Advanced Directive: Received    LDA: Salazar       Central Lines/Site/Justification:Patient Does Not Have Central Line       Urinary Cath/Order/Justification:Required Immobilization    Vasopressors/Infusions:    sodium chloride 0.9% 150 mL/hr at 01/01/20 0501    DOPamine 10.006 mcg/kg/min (01/01/20 0501)          GOALS: Volume/ Hemodynamic: MAP > 65                     RASS: 0  alert and calm    CAM ICU: Negative  Pain Management: none       Pain Controlled: not applicable     Rhythm: paced    Respiratory Device: Nasal Cannula                  Most Recent SBT/ SAT: Does not meet criteria       MOVE Screen: FAIL    VTE Prophylaxis: Mechanical  Mobility: Bedrest  Stress Ulcer Prophylaxis: No    Dietary: NPO  Tolerance: not applicable  /  Advancement: no    Isolation: No active isolations    Restraints: Yes    Significant Dates:  Post Op Date:12/31 Cath Lab- transvenous pacemaker placed  Rescue Date: N/A  Imaging/ Diagnostics:01/01/20 Post Cath Lab_ transvenous pacemaker is dislodged    Noteworthy Labs:  K+:5.5, AST:2372, ALT:1759    CBC/Anemia Labs: Coags:    Recent Labs   Lab 12/31/19 2142 12/31/19 2142 01/01/20 0051 01/01/20  0431   WBC 6.49  --  11.32 15.95*   HGB 10.8*  --  10.5* 11.3*   HCT 36.5* 34* 34.9* 37.1     --  163 178   MCV 92  --  90 88   RDW 16.2*  --  16.1* 16.0*    Recent Labs   Lab 01/01/20 0051   INR 1.4*   APTT 35.6*        Chemistries:   Recent Labs   Lab 12/31/19 2142 01/01/20 0051 01/01/20  0431   * 130* 127*   K 6.6* 5.6* 5.5*   CL 91* 94* 93*   CO2 20* 23 21*   BUN 28* 28* 29*   CREATININE 2.0* 2.0* 2.1*   CALCIUM 8.4* 8.9 9.1   PROT 6.4  --  7.7   BILITOT 1.4*  --  1.0   ALKPHOS  127  --  156*   *  --  1,759*   *  --  2,372*   MG 2.1  --  1.8   PHOS 7.6*  --  4.9*        Cardiac Enzymes: Ejection Fractions:    Recent Labs     12/31/19 2142 01/01/20  0051   TROPONINI 0.008 0.038*    No results found for: EF     POCT Glucose: HbA1c:    Recent Labs   Lab 12/31/19 2132 12/31/19  2309   POCTGLUCOSE 199* 315*    Hemoglobin A1C   Date Value Ref Range Status   07/05/2018 5.7 (H) 4.8 - 5.6 % Final     Comment:              Pre-diabetes: 5.7 - 6.4           Diabetes: >6.4           Glycemic control for adults with diabetes: <7.0          Needs from Care Team: Permanent Pacemaker or Palliative Care Consult, Change code status. Address Liver Enzymes, and need accu-checks with sliding scale.      ICU LOS 5h  Level of Care: Critical Care

## 2020-01-01 NOTE — ASSESSMENT & PLAN NOTE
Rates improved off cardizem and metoprolol - will add back as needed. Temp pacing wire removed. No need for PPM at this point. Check echo if stable will f/u prn

## 2020-01-01 NOTE — ASSESSMENT & PLAN NOTE
Patient's blood pressure is fairly-controlled; will hold her home regimen of diltiazem and metoprolol due to symptomatic bradycardia, and hold her lisinopril due to acute renal failure. Will provide as-needed clonidine.

## 2020-01-01 NOTE — ASSESSMENT & PLAN NOTE
Patient's potassium was found to be 6.6 mmol/L in the setting of acute renal failure.  Etiology is likely bradycardia causing on the perfusion of the kidneys and subsequent hyperkalemia.  This is also likely the cause of her bradycardia.  She has been given insulin/dextrose, albuterol, and calcium gluconate.  Repeat potassium was much better at 5.6 mmlol/L.  Will recheck her potassium level in the morning.

## 2020-01-01 NOTE — SUBJECTIVE & OBJECTIVE
Past Medical History:   Diagnosis Date    Acute renal failure 1/1/2020    Allergy     Anemia of chronic disease 1/1/2020    Anticoagulant long-term use     Coumadin    Anxiety     Arthritis     Atrial fibrillation     Cataract     Closed head injury 09/10/2018    Clotting disorder     Depression     Diabetes mellitus     Encounter for blood transfusion     Fall 09/10/2018    GERD (gastroesophageal reflux disease)     Heart murmur     [place] (hard of hearing)     wears bilateral hearing aids    Hypertension     Thyroid disease     Uses roller walker        Past Surgical History:   Procedure Laterality Date    HYSTERECTOMY      INJECTION OF FACET JOINT Bilateral 4/5/2019    Procedure: INJECTION, FACET JOINT, L4-L5;  Surgeon: Clayton Valle MD;  Location: [facility] PAIN [facility];  Service: Pain Management;  Laterality: Bilateral;    JOINT REPLACEMENT Right     Hip    TONSILLECTOMY      TOTAL HIP ARTHROPLASTY Left 2004       Review of patient's allergies indicates:   Allergen Reactions    Sulfa (sulfonamide antibiotics) Hives       No current facility-administered medications on file prior to encounter.      Current Outpatient Medications on File Prior to Encounter   Medication Sig    acetaminophen (TYLENOL) 500 MG tablet Take 500 mg by mouth every 6 (six) hours as needed for Pain.    apixaban (ELIQUIS) 5 mg Tab Take 1 tablet (5 mg total) by mouth 2 (two) times daily.    diltiaZEM (CARDIZEM CD) 360 MG 24 hr capsule Take 1 capsule (360 mg total) by mouth once daily.    escitalopram oxalate (LEXAPRO) 10 MG tablet Take 1 tablet (10 mg total) by mouth once daily.    fluticasone (FLONASE) 50 mcg/actuation nasal spray 1 spray by Each Nare route once daily.    gabapentin (NEURONTIN) 300 MG capsule Take 300 mg by mouth 2 (two) times daily.    levothyroxine 137 mcg Cap Take 1 tablet by mouth once daily.    lisinopril (PRINIVIL,ZESTRIL) 40 MG tablet Take 1 tablet (40 mg total) by mouth once daily.     LORazepam (ATIVAN) 0.5 MG tablet Take 0.5 mg by mouth 2 (two) times daily. Take half in the am and a whole at bedtime    memantine (NAMENDA) 5 MG Tab Take 5 mg by mouth 2 (two) times daily.    menthol/camphor (BIOFREEZE-ILEX TOP) Apply topically.    metoprolol succinate (TOPROL-XL) 200 MG 24 hr tablet Take 2 tablets (400 mg total) by mouth once daily.    simvastatin (ZOCOR) 40 MG tablet Take 40 mg by mouth every evening.    vitamin D (VITAMIN D3) 1000 units Tab Take 2,000 Units by mouth once daily.     Family History     Problem Relation (Age of Onset)    Diabetes Father, Sister, Brother    No Known Problems Mother    Stroke Father        Tobacco Use    Smoking status: Former Smoker     Last attempt to quit: 1988     Years since quittin.5    Smokeless tobacco: Never Used   Substance and Sexual Activity    Alcohol use: Yes     Alcohol/week: 3.0 - 4.0 standard drinks     Types: 1 Glasses of wine, 2 - 3 Standard drinks or equivalent per week    Drug use: No    Sexual activity: Not on file     Review of Systems   Unable to perform ROS: Dementia     Objective:     Vital Signs (Most Recent):  Temp: 97.4 °F (36.3 °C) (20 0012)  Pulse: (!) 41 (20)  Resp: 14 (20)  BP: (!) 160/90 (20 0100)  SpO2: 95 % (20) Vital Signs (24h Range):  Temp:  [96.6 °F (35.9 °C)-97.6 °F (36.4 °C)] 97.4 °F (36.3 °C)  Pulse:  [41-95] 41  Resp:  [10-32] 14  SpO2:  [85 %-100 %] 95 %  BP: (105-160)/(58-92) 160/90     Weight: 102.6 kg (226 lb 3.1 oz)  Body mass index is 41.37 kg/m².    Physical Exam   Constitutional: She appears well-developed and well-nourished. No distress.   HENT:   Head: Normocephalic and atraumatic.   Right Ear: External ear normal.   Left Ear: External ear normal.   Nose: Nose normal.   Eyes: Right eye exhibits no discharge. Left eye exhibits no discharge.   Neck: Normal range of motion.   Cardiovascular:   Irregularly irregular, no murmurs or gallops, being  transcutaneously paced   Pulmonary/Chest: Effort normal and breath sounds normal. No stridor. No respiratory distress. She has no wheezes. She has no rales. She exhibits no tenderness.   Abdominal: Soft. Bowel sounds are normal. She exhibits no distension. There is no tenderness. There is no rebound and no guarding.   Musculoskeletal: Normal range of motion. She exhibits no edema.   Neurological:   Minimally verbal, but alert   Skin: Skin is warm and dry. She is not diaphoretic. No erythema.   Nursing note and vitals reviewed.          Significant Labs: All pertinent labs within the past 24 hours have been reviewed.    Significant Imaging: I have reviewed and interpreted all pertinent imaging results/findings within the past 24 hours.

## 2020-01-01 NOTE — NURSING
Around 0115 the transvenous pacemaker that was recently placed stopped capturing on cardiac monitor and HR was in the 40's.  was made aware, and new order rec'd to temporary pace patient and turn PPM to 70.  0137  at bedside and ordered a Dopamine drip and a STAT CXR. CXR interpreted by  and transvenous pacemaker not in correct position.

## 2020-01-01 NOTE — H&P
[facility] Hospital Medicine  History & Physical    Patient Name: Vashti Muñoz  MRN: [id number]  Admission Date: 12/31/2019  Attending Physician: Anne Marie Kidd MD   Primary Care Provider: Alesha Baum MD         Patient information was obtained from ER records.     Subjective:     Principal Problem:Symptomatic bradycardia    Chief Complaint:  Confusion today.    HPI: Mrs. Vashti Muñoz is a 87 y.o. female [facility] resident with essential hypertension, hyperlipidemia (LDL unknown), chronic atrial fibrillation ([id number] score 4) on chronic anticoagulation, hypothyroidism (TSH 0.452 Sept 2018), anemia chronic disease, and dementia who presents to [facility] ED with complaints of altered mental status today.  She was noted to be bradycardic for which EMS was activated.  She was last seen in her normal state of health at around 4:30 PM today.  She was given atropine with positive response in her heart rate and thereafter transported to this facility.  She was then started on transcutaneous pacing with improvement of her mentation.  She was brought to the Catheterization Laband was placed a transvenous pacer per Cardiology.  Further history is otherwise limited at this time.    Chart Review:  Previous Hospitalizations  Date Hospital Diagnosis   Sept 2018 [facility] AFib with RVR     Outpatient Follow-Up  Date of Visit Physician Service   Dec 2019 Ede Kline MD Cardiology   Dec 2019 Dee Law NP Pain Medicine   Aug 2019 Rogers Sesay NP Otorhinolaryngology   Jul 2018 Harish Garcia MD Primary care     Past Medical History:   Diagnosis Date    Acute renal failure 1/1/2020    Allergy     Anemia of chronic disease 1/1/2020    Anticoagulant long-term use     Coumadin    Anxiety     Arthritis     Atrial fibrillation     Cataract     Closed head injury 09/10/2018    Clotting disorder     Depression     Diabetes mellitus     Encounter for blood transfusion     Fall 09/10/2018     GERD (gastroesophageal reflux disease)     Heart murmur     [place] (hard of hearing)     wears bilateral hearing aids    Hypertension     Thyroid disease     Uses roller walker        Past Surgical History:   Procedure Laterality Date    HYSTERECTOMY      INJECTION OF FACET JOINT Bilateral 4/5/2019    Procedure: INJECTION, FACET JOINT, L4-L5;  Surgeon: Clayton Valle MD;  Location: [facility] PAIN [facility];  Service: Pain Management;  Laterality: Bilateral;    JOINT REPLACEMENT Right     Hip    TONSILLECTOMY      TOTAL HIP ARTHROPLASTY Left 2004       Review of patient's allergies indicates:   Allergen Reactions    Sulfa (sulfonamide antibiotics) Hives       No current facility-administered medications on file prior to encounter.      Current Outpatient Medications on File Prior to Encounter   Medication Sig    acetaminophen (TYLENOL) 500 MG tablet Take 500 mg by mouth every 6 (six) hours as needed for Pain.    apixaban (ELIQUIS) 5 mg Tab Take 1 tablet (5 mg total) by mouth 2 (two) times daily.    diltiaZEM (CARDIZEM CD) 360 MG 24 hr capsule Take 1 capsule (360 mg total) by mouth once daily.    escitalopram oxalate (LEXAPRO) 10 MG tablet Take 1 tablet (10 mg total) by mouth once daily.    fluticasone (FLONASE) 50 mcg/actuation nasal spray 1 spray by Each Nare route once daily.    gabapentin (NEURONTIN) 300 MG capsule Take 300 mg by mouth 2 (two) times daily.    levothyroxine 137 mcg Cap Take 1 tablet by mouth once daily.    lisinopril (PRINIVIL,ZESTRIL) 40 MG tablet Take 1 tablet (40 mg total) by mouth once daily.    LORazepam (ATIVAN) 0.5 MG tablet Take 0.5 mg by mouth 2 (two) times daily. Take half in the am and a whole at bedtime    memantine (NAMENDA) 5 MG Tab Take 5 mg by mouth 2 (two) times daily.    menthol/camphor (BIOFREEZE-ILEX TOP) Apply topically.    metoprolol succinate (TOPROL-XL) 200 MG 24 hr tablet Take 2 tablets (400 mg total) by mouth once daily.    simvastatin (ZOCOR) 40 MG tablet  Take 40 mg by mouth every evening.    vitamin D (VITAMIN D3) 1000 units Tab Take 2,000 Units by mouth once daily.     Family History     Problem Relation (Age of Onset)    Diabetes Father, Sister, Brother    No Known Problems Mother    Stroke Father        Tobacco Use    Smoking status: Former Smoker     Last attempt to quit: 1988     Years since quittin.5    Smokeless tobacco: Never Used   Substance and Sexual Activity    Alcohol use: Yes     Alcohol/week: 3.0 - 4.0 standard drinks     Types: 1 Glasses of wine, 2 - 3 Standard drinks or equivalent per week    Drug use: No    Sexual activity: Not on file     Review of Systems   Unable to perform ROS: Dementia     Objective:     Vital Signs (Most Recent):  Temp: 97.4 °F (36.3 °C) (20 0012)  Pulse: (!) 41 (20)  Resp: 14 (20)  BP: (!) 160/90 (20 0100)  SpO2: 95 % (20) Vital Signs (24h Range):  Temp:  [96.6 °F (35.9 °C)-97.6 °F (36.4 °C)] 97.4 °F (36.3 °C)  Pulse:  [41-95] 41  Resp:  [10-32] 14  SpO2:  [85 %-100 %] 95 %  BP: (105-160)/(58-92) 160/90     Weight: 102.6 kg (226 lb 3.1 oz)  Body mass index is 41.37 kg/m².    Physical Exam   Constitutional: She appears well-developed and well-nourished. No distress.   HENT:   Head: Normocephalic and atraumatic.   Right Ear: External ear normal.   Left Ear: External ear normal.   Nose: Nose normal.   Eyes: Right eye exhibits no discharge. Left eye exhibits no discharge.   Neck: Normal range of motion.   Cardiovascular:   Irregularly irregular, no murmurs or gallops, being transcutaneously paced   Pulmonary/Chest: Effort normal and breath sounds normal. No stridor. No respiratory distress. She has no wheezes. She has no rales. She exhibits no tenderness.   Abdominal: Soft. Bowel sounds are normal. She exhibits no distension. There is no tenderness. There is no rebound and no guarding.   Musculoskeletal: Normal range of motion. She exhibits no edema.   Neurological:    Minimally verbal, but alert   Skin: Skin is warm and dry. She is not diaphoretic. No erythema.   Nursing note and vitals reviewed.          Significant Labs: All pertinent labs within the past 24 hours have been reviewed.    Significant Imaging: I have reviewed and interpreted all pertinent imaging results/findings within the past 24 hours.    Assessment/Plan:     * Symptomatic bradycardia  Patient was noted to be very bradycardic in the field and was given atropine with some response.  She was then transcutaneously paced in the ED with improvement of her mentation.  She was brought to the Catheterization Lab for transvenous pacer with Cardiology, Dr. Gregg Loza.  I believe the etiology is likely secondary to hyperkalemia as well as being on a couple AV teresita blockers.  Her metoprolol and diltiazem will be held for now and she has been given IV fluids for treatment of renal failure and by extension hyperkalemia.  She had also been given dextrose/insulin, albuterol, and calcium gluconate.  Will await further recommendations from Cardiology in the morning.    Acute renal failure  Patient's urinalysis is significant for a specific gravity of 1.010, 3+ protein, trace ketones, 2+ occult blood, 2+ leukocytes, > 100 WBCs, and many bacteria.  Urine output has been fair.  Will obtain additional urine studies; provide aggressive IV fluid hydration; monitor the urine output; recheck the renal function in the morning; and avoid nephrotoxins.    Hyperkalemia  Patient's potassium was found to be 6.6 mmol/L in the setting of acute renal failure.  Etiology is likely bradycardia causing on the perfusion of the kidneys and subsequent hyperkalemia.  This is also likely the cause of her bradycardia.  She has been given insulin/dextrose, albuterol, and calcium gluconate.  Repeat potassium was much better at 5.6 mmlol/L.  Will recheck her potassium level in the morning.    Essential hypertension  Patient's blood pressure is  fairly-controlled; will hold her home regimen of diltiazem and metoprolol due to symptomatic bradycardia, and hold her lisinopril due to acute renal failure. Will provide as-needed clonidine.    Mixed hyperlipidemia  Will continue her home regimen of simvastatin.    Chronic atrial fibrillation  As addressed above.    Chronic anticoagulation  As addressed above; will continue her home regimen of apixaban.    Acquired hypothyroidism  Well controlled; will continue her home regimen of levothyroxine.    Anemia of chronic disease  The patient's H/H is stable and consistent with previous laboratory measurements, and the patient exhibits no signs or symptoms of acute bleeding; there is no indication for transfusion.  Will continue to monitor.    Dementia without behavioral disturbance  Stable; will continue her home regimen of memantine.    VTE Risk Mitigation (From admission, onward)         Ordered     heparin (porcine) injection 5,000 Units  Every 12 hours      01/01/20 0141     IP VTE HIGH RISK PATIENT  Once      01/01/20 0141              Critical care time spent on the evaluation and treatment of severe organ dysfunction, review of pertinent labs and imaging studies, discussions with consulting providers and discussions with patient/family: 60 minutes.           The patient will be placed in inpatient status to the ICU.          Makenzie Tena M.D.  [facility]  Department of Hospital Medicine  [facility]  Pager: [phone]          N.B.: Portions of this note was dictated using M*Modal Fluency Direct--there may be voice recognition errors occasionally missed on review.

## 2020-01-01 NOTE — PROGRESS NOTES
Results reported to Dr Jin.   Results for AFSHAN TREVINO (MRN [id number]) as of 12/31/2019 22:44   Ref. Range 12/31/2019 21:43   POC PH Latest Ref Range: 7.35 - 7.45  7.148 (LL)   POC PCO2 Latest Ref Range: 35 - 45 mmHg 69.6 (HH)   POC PO2 Latest Ref Range: 80 - 100 mmHg 408 (H)   POC BE Latest Ref Range: -2 to 2 mmol/L -6   POC HCO3 Latest Ref Range: 24 - 28 mmol/L 24.1   POC SATURATED O2 Latest Ref Range: 95 - 100 % 100   POC TCO2 Latest Ref Range: 23 - 27 mmol/L 26   FiO2 Unknown 100   Flow Unknown 15   Sample Unknown ARTERIAL   DelSys Unknown NRB   Allens Test Unknown Pass   Site Unknown LR   Mode Unknown SPONT

## 2020-01-01 NOTE — PROGRESS NOTES
Results reported to Dr Jin.   Results for AFSHAN TREVINO (MRN [id number]) as of 12/31/2019 22:44   Ref. Range 12/31/2019 21:42   POC Sodium Latest Ref Range: 136 - 145 mmol/L 124 (L)   POC Potassium Latest Ref Range: 3.5 - 5.1 mmol/L 6.1 (H)   POC Chloride Latest Ref Range: 95 - 110 mmol/L 92 (L)   POC Anion Gap Latest Ref Range: 8 - 16 mmol/L 13   POC BUN Latest Ref Range: 6 - 30 mg/dL 30   POC Creatinine Latest Ref Range: 0.5 - 1.4 mg/dL 2.0 (H)   POC Glucose Latest Ref Range: 70 - 110 mg/dL 202 (H)   POC Ionized Calcium Latest Ref Range: 1.06 - 1.42 mmol/L 1.09   POC Hematocrit Latest Ref Range: 36 - 54 %PCV 34 (L)   POC TCO2 (MEASURED) Latest Ref Range: 23 - 27 mmol/L 26   Sample Unknown ARTERIAL

## 2020-01-01 NOTE — SUBJECTIVE & OBJECTIVE
Interval History:     Review of Systems   Unable to perform ROS: dementia     Objective:     Vital Signs (Most Recent):  Temp: 96 °F (35.6 °C) (01/01/20 0700)  Pulse: 77 (01/01/20 0830)  Resp: 18 (01/01/20 0830)  BP: (!) 140/70 (01/01/20 0830)  SpO2: (!) 93 % (01/01/20 0830) Vital Signs (24h Range):  Temp:  [96 °F (35.6 °C)-97.9 °F (36.6 °C)] 96 °F (35.6 °C)  Pulse:  [36-95] 77  Resp:  [9-32] 18  SpO2:  [85 %-100 %] 93 %  BP: (105-160)/(58-92) 140/70     Weight: 102.5 kg (226 lb)  Body mass index is 41.34 kg/m².     SpO2: (!) 93 %  O2 Device (Oxygen Therapy): nasal cannula      Intake/Output Summary (Last 24 hours) at 1/1/2020 0919  Last data filed at 1/1/2020 0800  Gross per 24 hour   Intake 1170.1 ml   Output 60 ml   Net 1110.1 ml       Lines/Drains/Airways     Drain                 Urethral Catheter 01/01/20 0030 16 Fr. less than 1 day          Line                 Pacer Wires 01/01/20 0015 less than 1 day          Peripheral Intravenous Line                 Peripheral IV - Single Lumen 12/31/19 2140 18 G Left Hand less than 1 day         Peripheral IV - Single Lumen 12/31/19 2140 20 G Right;Anterior Forearm less than 1 day         Peripheral IV - Single Lumen 12/31/19 2143 18 G Right;Posterior Forearm less than 1 day                Physical Exam   Constitutional: She is oriented to person, place, and time. She appears well-developed and well-nourished.   HENT:   Head: Normocephalic and atraumatic.   Eyes: Pupils are equal, round, and reactive to light. Conjunctivae are normal.   Neck: Normal range of motion. Neck supple.   Cardiovascular: Normal rate, normal heart sounds and intact distal pulses. An irregularly irregular rhythm present.   Pulmonary/Chest: Effort normal and breath sounds normal.   Abdominal: Soft. Bowel sounds are normal.   Musculoskeletal: Normal range of motion.   Neurological: She is alert and oriented to person, place, and time.   Skin: Skin is warm and dry.       Significant Labs: All  pertinent lab results from the last 24 hours have been reviewed.    Significant Imaging: Echocardiogram:   2D echo with color flow doppler:   Results for orders placed or performed during the hospital encounter of 07/09/18   2D Echo w/ Color Flow Doppler   Result Value Ref Range    QEF 55 55 - 65    Mitral Valve Regurgitation MODERATE (A)     Aortic Valve Stenosis MILD TO MODERATE (A)     Est. PA Systolic Pressure 44.47 (A)     Tricuspid Valve Regurgitation MILD     Narrative    Date of Procedure: 07/09/2018        TEST DESCRIPTION       Aorta: The aortic root is normal in size, measuring 2.1 cm at sinotubular junction and 2.8 cm at Sinuses of Valsalva. The proximal ascending aorta is normal in size, measuring 3.0 cm across.     Left Atrium: The left atrial volume index is mildly enlarged, measuring 36.36 cc/m2.     Left Ventricle: The left ventricle is normal in size, with an end-diastolic diameter of 4.0 cm, and an end-systolic diameter of 2.7 cm. LV wall thickness is normal, with the septum and the posterior wall each measuring 1.0 cm across. Relative wall   thickness was increased at 0.50, and the LV mass index was 76.4 g/m2 consistent with concentric remodeling. There are no regional wall motion abnormalities. Left ventricular systolic function appears normal. Visually estimated ejection fraction is   55-60%. The LV Doppler derived stroke volume equals 68.0 ccs.     Diastolic indices: Diastolic function is indeterminate.     Right Atrium: The right atrium is normal in size, measuring 4.7 cm in length and 3.4 cm in width in the apical view.     Right Ventricle: The right ventricle is normal in size measuring 3.7 cm at the base in the apical right ventricle-focused view. Global right ventricular systolic function appears normal. Tricuspid annular plane systolic excursion (TAPSE) is 1.9 cm. The   estimated PA systolic pressure is 44 mmHg.     Aortic Valve:  The aortic valve is moderately sclerotic. The peak  velocity obtained across the aortic valve is 2.92 m/s, which translates to a peak gradient of 34 mmHg. The mean gradient is 17 mmHg. Using a left ventricular outflow tract diameter of 2.1   cm, a left ventricular outflow tract velocity time integral of 19 cm, and a peak instantaneous transvalvular velocity time integral of 53 cm, the calculated aortic valve area is 1.3 cm2(AVAi is 0.69 cm2/m2), consistent with mild to moderate aortic   stenosis.     Mitral Valve:  There is moderate mitral regurgitation.     Tricuspid Valve:  There is mild tricuspid regurgitation.     IVC: IVC is normal in size and collapses > 50% with a sniff, suggesting normal right atrial pressure of 3 mmHg.     Intracavitary: There is no evidence of pericardial effusion, intracavity mass, thrombi, or vegetation.         CONCLUSIONS     1 - Normal left ventricular systolic function (EF 55-60%).     2 - Concentric remodeling.     3 - Mild left atrial enlargement.     4 - Pulmonary hypertension. The estimated PA systolic pressure is 44 mmHg.     5 - Mild to moderate aortic stenosis, RAD = 1.3 cm2, AVAi = 0.69 cm2/m2, peak velocity = 2.92 m/s, mean gradient = 17 mmHg.     6 - Moderate mitral regurgitation.     7 - Mild tricuspid regurgitation.             This document has been electronically    SIGNED BY: Gregg Loza MD On: 07/09/2018 16:41

## 2020-01-01 NOTE — CONSULTS
REQUESTING CONSULT:  Harmeet Hollingsworth M.D.    REASON FOR CONSULTATION:  Acute renal failure, hyperkalemia, _11____ acute   tubular necrosis due to inadequate cardiac output due to marked bradycardia.    HISTORY OF PRESENT ILLNESS:  This is an 87-year-old female, who is here from   [place], who has a history of chronic AFib, hypothyroidism, hypertension,   anemia of CKD and dementia, who presented with concerns of having decreased   mentation.  Family notes that she has not been feeling well for the past couple   of weeks.  The patient notes she has some burning when she pees and subsequently   she was found to have here on evaluation with significant bradycardia.  She was   placed in the ICU with dopamine given.  During her hospital course, she was   noted to have worsening renal function.  We were asked to provide assistance in   regards to the renal failure.  Gleaming from her records, she had a BUN and   creatinine of 11 and 1 back in October 2019.  Subsequently, on the date of   consult, her BUN and creatinine was elevated, was 29 and 2.1 with a potassium of   5.5 and a sodium of 127.  She noted that she had also been recently seen by the   nursing home doctor, who has prescribed diuretics.    PAST MEDICAL HISTORY:  Significant for AFib, hypertension, anemia of chronic   disease, history of anticoagulation, cataract, diabetes type 2, history of blood   transfusion, GERD, hypertension, and hypothyroidism.    PAST SURGICAL HISTORY:  Hysterectomy, ____, total hip arthroplasty.    SOCIAL HISTORY:  Pertinent for tobacco abuse in the past.  Alcohol use in the   past.  She is in a nursing home.    FAMILY HISTORY:  Noted for father with stroke, mother with diabetes.    REVIEW OF SYSTEMS:  A 10-point review of system pertinent for malaise, swelling,   dysuria, and diarrhea recently.    CURRENT MEDICATIONS:  ____, Rocephin, Lexapro, Synthroid, Namenda, miconazole,   simvastatin, normal saline, and dopamine.    PHYSICAL  EXAMINATION:  VITAL SIGNS:  Temperature 96, heart rate 72, respirations 19, and blood pressure   138/70.  GENERAL:  Well-developed female, in no acute distress.  HEENT:  Normocephalic.  Extraocular muscles intact.  Moist mucous membranes.  NECK:  Supple.  CARDIOVASCULAR:  S1, S2, regular.  PULMONARY:  No rales.  ABDOMEN:  Positive bowel sounds, soft, nondistended.  EXTREMITIES:  Shows 1+ edema.  NEUROLOGIC:  Awake.    LABORATORY DATA:  White count 15.9, H and H 11.3 and 37.1, platelet count 178,   and INR 1.4.  Sodium 127, potassium 5.5, bicarbonate 23, BUN and creatinine 29   and 2.1, calcium is 9.1, phosphorus 4.9, magnesium is 1.8, and AST is 2372.  UA   shows some leukocytes, bacteria, WBCs.  Sodium 143 and creatinine 61.  Urine   culture is pending.  Chest x-ray showed no pneumonia.    ASSESSMENT AND PLAN:  1.  Acute kidney injury, most likely secondary to acute tubular necrosis.  We   will follow clinically.  No acute indication for dialysis at this time.    Continue conservative therapy.  2.  Hyperkalemia, on Kayexalate.  Repeat potassium.  3.  Hyponatremia secondary to acute kidney injury.  Continue gentle hydration,   but decrease volume delivery.  4.  Bradycardia.  Watching for atrial fibrillation, currently off metoprolol and   Cardizem.  On dopamine.  High-risk for going to [facility].  5.  Hypertension.  No ACE or ARBs currently indicated at this time.  6.  Urinary tract infection.  Start Rocephin.  Check urine culture.  In   addition, we will get a renal ultrasound.  7.  Diabetes type 2, follow sugars.      KATHY  dd: 01/01/2020 11:31:04 (CST)  td: 01/01/2020 14:00:04 (CST)  Doc ID   #[id number]  Job ID #[id number]    CC:

## 2020-01-01 NOTE — SUBJECTIVE & OBJECTIVE
Past Medical History:   Diagnosis Date    Allergy     Anticoagulant long-term use     Coumadin    Anxiety     Arthritis     Atrial fibrillation     Cataract     Closed head injury 09/10/2018    Clotting disorder     Depression     Diabetes mellitus     Encounter for blood transfusion     Fall 09/10/2018    GERD (gastroesophageal reflux disease)     Heart murmur     [place] (hard of hearing)     wears bilateral hearing aids    Hypertension     Thyroid disease     Uses roller walker        Past Surgical History:   Procedure Laterality Date    HYSTERECTOMY      INJECTION OF FACET JOINT Bilateral 4/5/2019    Procedure: INJECTION, FACET JOINT, L4-L5;  Surgeon: Clayton Valle MD;  [facility];  Service: Pain Management;  Laterality: Bilateral;    JOINT REPLACEMENT Right     Hip    TONSILLECTOMY      TOTAL HIP ARTHROPLASTY Left 2004       Review of patient's allergies indicates:   Allergen Reactions    Sulfa (sulfonamide antibiotics) Hives       No current facility-administered medications on file prior to encounter.      Current Outpatient Medications on File Prior to Encounter   Medication Sig    acetaminophen (TYLENOL) 500 MG tablet Take 500 mg by mouth every 6 (six) hours as needed for Pain.    apixaban (ELIQUIS) 5 mg Tab Take 1 tablet (5 mg total) by mouth 2 (two) times daily.    diltiaZEM (CARDIZEM CD) 360 MG 24 hr capsule Take 1 capsule (360 mg total) by mouth once daily.    escitalopram oxalate (LEXAPRO) 10 MG tablet Take 1 tablet (10 mg total) by mouth once daily.    fluticasone (FLONASE) 50 mcg/actuation nasal spray 1 spray by Each Nare route once daily.    gabapentin (NEURONTIN) 300 MG capsule Take 300 mg by mouth 2 (two) times daily.    levothyroxine 137 mcg Cap Take 1 tablet by mouth once daily.    lisinopril (PRINIVIL,ZESTRIL) 40 MG tablet Take 1 tablet (40 mg total) by mouth once daily.    LORazepam (ATIVAN) 0.5 MG tablet Take 0.5 mg by mouth 2 (two) times daily.  Take half in the am and a whole at bedtime    memantine (NAMENDA) 5 MG Tab Take 5 mg by mouth 2 (two) times daily.    menthol/camphor (BIOFREEZE-ILEX TOP) Apply topically.    metoprolol succinate (TOPROL-XL) 200 MG 24 hr tablet Take 2 tablets (400 mg total) by mouth once daily.    simvastatin (ZOCOR) 40 MG tablet Take 40 mg by mouth every evening.    vitamin D (VITAMIN D3) 1000 units Tab Take 2,000 Units by mouth once daily.     Family History     Problem Relation (Age of Onset)    Diabetes Father, Sister, Brother    No Known Problems Mother    Stroke Father        Tobacco Use    Smoking status: Former Smoker     Last attempt to quit: 1988     Years since quittin.5    Smokeless tobacco: Never Used   Substance and Sexual Activity    Alcohol use: Yes     Alcohol/week: 3.0 - 4.0 standard drinks     Types: 1 Glasses of wine, 2 - 3 Standard drinks or equivalent per week    Drug use: No    Sexual activity: Not on file     Review of Systems   Unable to perform ROS: dementia     Objective:     Vital Signs (Most Recent):  Temp: 96.6 °F (35.9 °C)(MD notified) (19)  Pulse: 71 (19)  Resp: (!) 32 (19)  BP: (!) 129/58 (19)  SpO2: 96 % (19) Vital Signs (24h Range):  Temp:  [96.6 °F (35.9 °C)] 96.6 °F (35.9 °C)  Pulse:  [41-78] 71  Resp:  [21-32] 32  SpO2:  [85 %-100 %] 96 %  BP: (105-153)/(58-87) 129/58        There is no height or weight on file to calculate BMI.    SpO2: 96 %  O2 Device (Oxygen Therapy): nasal cannula    No intake or output data in the 24 hours ending 190    Lines/Drains/Airways     Peripheral Intravenous Line                 Peripheral IV - Single Lumen 19 18 G Left Hand less than 1 day         Peripheral IV - Single Lumen 19 20 G Right;Anterior Forearm less than 1 day         Peripheral IV - Single Lumen 12/31/19 2143 18 G Right;Posterior Forearm less than 1 day                Physical Exam    Constitutional: She is oriented to person, place, and time. She appears well-developed and well-nourished.   HENT:   Head: Normocephalic and atraumatic.   Eyes: Pupils are equal, round, and reactive to light. Conjunctivae are normal.   Neck: Normal range of motion. Neck supple.   Cardiovascular: Intact distal pulses. An irregularly irregular rhythm present. Bradycardia present.   Murmur heard.   Harsh midsystolic murmur is present with a grade of 2/6 at the upper right sternal border radiating to the neck.  Pulmonary/Chest: Effort normal and breath sounds normal.   Abdominal: Soft. Bowel sounds are normal.   Musculoskeletal: Normal range of motion.   Neurological: She is alert and oriented to person, place, and time.   Skin: Skin is warm and dry.       Significant Labs: All pertinent lab results from the last 24 hours have been reviewed.    Significant Imaging: Echocardiogram:   2D echo with color flow doppler:   Results for orders placed or performed during the hospital encounter of 07/09/18   2D Echo w/ Color Flow Doppler   Result Value Ref Range    QEF 55 55 - 65    Mitral Valve Regurgitation MODERATE (A)     Aortic Valve Stenosis MILD TO MODERATE (A)     Est. PA Systolic Pressure 44.47 (A)     Tricuspid Valve Regurgitation MILD     Narrative    Date of Procedure: 07/09/2018        TEST DESCRIPTION       Aorta: The aortic root is normal in size, measuring 2.1 cm at sinotubular junction and 2.8 cm at Sinuses of Valsalva. The proximal ascending aorta is normal in size, measuring 3.0 cm across.     Left Atrium: The left atrial volume index is mildly enlarged, measuring 36.36 cc/m2.     Left Ventricle: The left ventricle is normal in size, with an end-diastolic diameter of 4.0 cm, and an end-systolic diameter of 2.7 cm. LV wall thickness is normal, with the septum and the posterior wall each measuring 1.0 cm across. Relative wall   thickness was increased at 0.50, and the LV mass index was 76.4 g/m2 consistent with  concentric remodeling. There are no regional wall motion abnormalities. Left ventricular systolic function appears normal. Visually estimated ejection fraction is   55-60%. The LV Doppler derived stroke volume equals 68.0 ccs.     Diastolic indices: Diastolic function is indeterminate.     Right Atrium: The right atrium is normal in size, measuring 4.7 cm in length and 3.4 cm in width in the apical view.     Right Ventricle: The right ventricle is normal in size measuring 3.7 cm at the base in the apical right ventricle-focused view. Global right ventricular systolic function appears normal. Tricuspid annular plane systolic excursion (TAPSE) is 1.9 cm. The   estimated PA systolic pressure is 44 mmHg.     Aortic Valve:  The aortic valve is moderately sclerotic. The peak velocity obtained across the aortic valve is 2.92 m/s, which translates to a peak gradient of 34 mmHg. The mean gradient is 17 mmHg. Using a left ventricular outflow tract diameter of 2.1   cm, a left ventricular outflow tract velocity time integral of 19 cm, and a peak instantaneous transvalvular velocity time integral of 53 cm, the calculated aortic valve area is 1.3 cm2(AVAi is 0.69 cm2/m2), consistent with mild to moderate aortic   stenosis.     Mitral Valve:  There is moderate mitral regurgitation.     Tricuspid Valve:  There is mild tricuspid regurgitation.     IVC: IVC is normal in size and collapses > 50% with a sniff, suggesting normal right atrial pressure of 3 mmHg.     Intracavitary: There is no evidence of pericardial effusion, intracavity mass, thrombi, or vegetation.         CONCLUSIONS     1 - Normal left ventricular systolic function (EF 55-60%).     2 - Concentric remodeling.     3 - Mild left atrial enlargement.     4 - Pulmonary hypertension. The estimated PA systolic pressure is 44 mmHg.     5 - Mild to moderate aortic stenosis, RAD = 1.3 cm2, AVAi = 0.69 cm2/m2, peak velocity = 2.92 m/s, mean gradient = 17 mmHg.     6 - Moderate  mitral regurgitation.     7 - Mild tricuspid regurgitation.             This document has been electronically    SIGNED BY: Gregg Loza MD On: 07/09/2018 16:41

## 2020-01-01 NOTE — HPI
Pt presents to ED via EMS from [facility] with c/o altered mental status and bradycardia. Per EMS, pt was last seen normal at 4:30 pm today when they were called out for altered mental status. Per EMS, pt's baseline is oriented to name and place, was oriented only to self upon EMS arrival. Upon EMS arrival, pt had no radial or pedal pulses. Pt erasto in the 20s with EMS, given 1 mg of atropine en route. Arrived to ED with HR in the 40s. Pt has DNR but not signed by MD.    Currently externally paced 70s  Poor historian - reports feels bad for several days  Denies CP or SOB  EKG A-fib 32 no acute STT changes    Followed by Dr Kline    # AF, chronic, on eliquis, HR controlled, asymptomatic.   # AS, mod (echo 7/2018)  # HTN, uncontrolled  # DM  # HLP on simva 40mg  # BMI 36, down 1 unit(s) vs last OV    Cont med rx  Cont eliquis 5mg bid  Cont toprol XL 400mg qd  Inc dilt CD 360mg qd (can titrate further if HR will allow)  Next drug(s): HCTZ +/- aldactone  RTC 6 months with echo (June 2020)      Echo: 7/9/18    1 - Normal left ventricular systolic function (EF 55-60%).     2 - Concentric remodeling.     3 - Mild left atrial enlargement.     4 - Pulmonary hypertension. The estimated PA systolic pressure is 44 mmHg.     5 - Mild to moderate aortic stenosis, RAD = 1.3 cm2, AVAi = 0.69 cm2/m2, peak velocity = 2.92 m/s, mean gradient = 17 mmHg.     6 - Moderate mitral regurgitation.     7 - Mild tricuspid regurgitation.

## 2020-01-01 NOTE — CARE UPDATE
Patient is an 87-year-old woman with hypertension, dyslipidemia, chronic atrial fibrillation on apixaban, mild dementia, nursing home resident, who presents with symptomatic bradycardia with pulse of 41 beats per minute with evidence of systemic hypoperfusion with metabolic encephalopathy and acute renal failure with hyperkalemia.  Patient admitted to the intensive care unit for treated with percutaneous pacing along continuous dopamine infusion.  Cardiology consulted and placed a temporary transvenous pacemaker with improvement in her pulse.  Mental status improved with increased pulse.  Patient also challenged with intravenous fluids with improvement  hyperkalemia.  Hyperkalemia improved however still elevated serum potassium this morning.  Patient with poor urine output with 60 mL overnight and serum creatinine unchanged from yesterday.  Suspect patient suffered acute tubular necrosis secondary to systemic hypoperfusion.  Suspect patient's renal failure will recover however may need renal replacement therapy.  Will continue with intravenous fluids (albeit slow rate) and monitor for hypervolemia.  Will treat hyperkalemia with medical therapy. Will give empiric antibiotics for possible urinary tract infection.  Elevated liver function tests likely secondary from shock liver due to systemic hypoperfusion.  Will monitor liver enzymes.  Consulted nephrology (Dr. Naila Thompson) for management of renal failure.  Case discussed with Dr. Gregg Loza (cardiology) who removed temporary transvenous pacemaker this morning.  Will continue to closely monitor in the intensive care unit.  Dicussed goals of care with patient and family.  Patient will remain full code for now.

## 2020-01-01 NOTE — ED TRIAGE NOTES
Pt presents to ED via EMS from [facility] with c/o altered mental status and bradycardia. Per EMS, pt was last seen normal at 4:30 pm today; PTA they were called out for altered mental status. Per EMS, pt's baseline is oriented to name and place, was oriented only to self upon EMS arrival. Upon EMS arrival, pt had no radial or pedal pulses. Pt erasto in the 20s with EMS, given 1 mg of atropine en route. Arrived to ED with HR in the 40s. Pt has DNR but not signed by MD.

## 2020-01-01 NOTE — ED NOTES
Patient placed on continuous cardiac monitor, automatic blood pressure cuff, continuous pulse oximeter and pacer pads.

## 2020-01-01 NOTE — PLAN OF CARE
01/01/20 1020   Discharge Assessment   Assessment Type Discharge Planning Assessment   Confirmed/corrected address and phone number on facesheet? Yes   Assessment information obtained from? Patient;Caregiver  (Sariah-daughter assisted with interview)   Communicated expected length of stay with patient/caregiver no   Prior to hospitilization cognitive status: Alert/Oriented  (Patient was able to answer question; some information was not accurate)   Prior to hospitalization functional status: Assistive Equipment;Needs Assistance  ([facility] resident; Walker, wheelchair, O2-prn; staff assists with care)   Current cognitive status: Alert/Oriented  (Mostly alert)   Current Functional Status: Assistive Equipment;Needs Assistance  ([facility] resident; walker, wheelchair, O2-prn; staff assists)   [facility]ility [facility] From: [facility]   Lives With facility resident   Able to Return to Prior Arrangements yes   Is patient able to care for self after discharge? No   Who are your caregiver(s) and their phone number(s)? [facility] staff; Sariah-daughter: [phone]   Patient's perception of discharge disposition nursing home  ([facility]: return)   Readmission Within the Last 30 Days no previous admission in last 30 days   Patient currently being followed by outpatient case management? No   Patient currently receives any other outside agency services? No   Equipment Currently Used at Home walker, rolling;wheelchair;oxygen   Do you have any problems affording any of your prescribed medications? No   Is the patient taking medications as prescribed? yes   Does the patient have transportation home? Yes   Transportation Anticipated family or friend will provide;health plan transportation  ([facility] transport)   Does the patient receive services at the [facility]? No   Discharge Plan A Return to nursing home  ([facility]: return)   Discharge Plan B Other  (TBD)   DME Needed Upon Discharge  other (see comments)  (TBD)   Patient/Family in  Agreement with Plan yes   SW Role explained to patient; two patient identifiers recognized; SW contact information placed on Communication board. Discussed patient managing health care at home; determined who would be helping patient at home with recovery: [facility] staff helps; daughter-Sariah also helps    PCP: Alesha Baum MD     Extended Emergency Contact Information  Primary Emergency Contact: Sariah Hernandes  Address: [address] [place] [address]  Home Phone: [phone]  Relation: Daughter  Secondary Emergency Contact: Erick Hernandes   [facility]  Home Phone: [phone]  Relation: Relative     Nursing Home provides medications    [id number] [place] - [address] [facility]  [address] [address]  Phone: [phone] Fax: [phone]    [id number] - [place] - [address] [facility]  [address]  [place] [address]  Phone: [phone] Fax: [phone]    Payor: [facility] / Plan: PACE [facility] / Product Type: Medicare Advantage /

## 2020-01-01 NOTE — HOSPITAL COURSE
12/31/19 adm with symptomatic bradycardia in setting of ARF and hyperkalemia, had temp wire placed    Interval Hx: no cp/sob/palps/LH/LOC.  Case d/w RN at bedside.  No events overnight.    Tele: AF 90-110s (personally reviewed and interpreted)     done

## 2020-01-01 NOTE — CONSULTS
[facility]  Cardiology  Consult Note    Patient Name: Vashti Muñoz  MRN: [id number]  Admission Date: 12/31/2019  Hospital Length of Stay: 0 days  Code Status: Prior   Attending Provider: Parth Jin MD   Consulting Provider: Gregg Loza MD  Primary Care Physician: Alesha Baum MD  Principal Problem:<principal problem not specified>    Patient information was obtained from patient and ER records.     Consults  Subjective:     Chief Complaint:  Bradycardia     HPI:   Pt presents to ED via EMS from [facility] with c/o altered mental status and bradycardia. Per EMS, pt was last seen normal at 4:30 pm today when they were called out for altered mental status. Per EMS, pt's baseline is oriented to name and place, was oriented only to self upon EMS arrival. Upon EMS arrival, pt had no radial or pedal pulses. Pt erasot in the 20s with EMS, given 1 mg of atropine en route. Arrived to ED with HR in the 40s. Pt has DNR but not signed by MD.    Currently externally paced 70s  Poor historian - reports feels bad for several days  Denies CP or SOB  EKG A-fib 32 no acute STT changes    Followed by Dr Kline    # AF, chronic, on eliquis, HR controlled, asymptomatic.   # AS, mod (echo 7/2018)  # HTN, uncontrolled  # DM  # HLP on simva 40mg  # BMI 36, down 1 unit(s) vs last OV    Cont med rx  Cont eliquis 5mg bid  Cont toprol XL 400mg qd  Inc dilt CD 360mg qd (can titrate further if HR will allow)  Next drug(s): HCTZ +/- aldactone  RTC 6 months with echo (June 2020)      Echo: 7/9/18    1 - Normal left ventricular systolic function (EF 55-60%).     2 - Concentric remodeling.     3 - Mild left atrial enlargement.     4 - Pulmonary hypertension. The estimated PA systolic pressure is 44 mmHg.     5 - Mild to moderate aortic stenosis, RAD = 1.3 cm2, AVAi = 0.69 cm2/m2, peak velocity = 2.92 m/s, mean gradient = 17 mmHg.     6 - Moderate mitral regurgitation.     7 - Mild tricuspid  regurgitation.     Past Medical History:   Diagnosis Date    Allergy     Anticoagulant long-term use     Coumadin    Anxiety     Arthritis     Atrial fibrillation     Cataract     Closed head injury 09/10/2018    Clotting disorder     Depression     Diabetes mellitus     Encounter for blood transfusion     Fall 09/10/2018    GERD (gastroesophageal reflux disease)     Heart murmur     [place] (hard of hearing)     wears bilateral hearing aids    Hypertension     Thyroid disease     Uses roller walker        Past Surgical History:   Procedure Laterality Date    HYSTERECTOMY      INJECTION OF FACET JOINT Bilateral 4/5/2019    Procedure: INJECTION, FACET JOINT, L4-L5;  Surgeon: Clayton Valle MD;  [facility];  Service: Pain Management;  Laterality: Bilateral;    JOINT REPLACEMENT Right     Hip    TONSILLECTOMY      TOTAL HIP ARTHROPLASTY Left 2004       Review of patient's allergies indicates:   Allergen Reactions    Sulfa (sulfonamide antibiotics) Hives       No current facility-administered medications on file prior to encounter.      Current Outpatient Medications on File Prior to Encounter   Medication Sig    acetaminophen (TYLENOL) 500 MG tablet Take 500 mg by mouth every 6 (six) hours as needed for Pain.    apixaban (ELIQUIS) 5 mg Tab Take 1 tablet (5 mg total) by mouth 2 (two) times daily.    diltiaZEM (CARDIZEM CD) 360 MG 24 hr capsule Take 1 capsule (360 mg total) by mouth once daily.    escitalopram oxalate (LEXAPRO) 10 MG tablet Take 1 tablet (10 mg total) by mouth once daily.    fluticasone (FLONASE) 50 mcg/actuation nasal spray 1 spray by Each Nare route once daily.    gabapentin (NEURONTIN) 300 MG capsule Take 300 mg by mouth 2 (two) times daily.    levothyroxine 137 mcg Cap Take 1 tablet by mouth once daily.    lisinopril (PRINIVIL,ZESTRIL) 40 MG tablet Take 1 tablet (40 mg total) by mouth once daily.    LORazepam (ATIVAN) 0.5 MG tablet Take 0.5 mg by mouth 2  (two) times daily. Take half in the am and a whole at bedtime    memantine (NAMENDA) 5 MG Tab Take 5 mg by mouth 2 (two) times daily.    menthol/camphor (BIOFREEZE-ILEX TOP) Apply topically.    metoprolol succinate (TOPROL-XL) 200 MG 24 hr tablet Take 2 tablets (400 mg total) by mouth once daily.    simvastatin (ZOCOR) 40 MG tablet Take 40 mg by mouth every evening.    vitamin D (VITAMIN D3) 1000 units Tab Take 2,000 Units by mouth once daily.     Family History     Problem Relation (Age of Onset)    Diabetes Father, Sister, Brother    No Known Problems Mother    Stroke Father        Tobacco Use    Smoking status: Former Smoker     Last attempt to quit: 1988     Years since quittin.5    Smokeless tobacco: Never Used   Substance and Sexual Activity    Alcohol use: Yes     Alcohol/week: 3.0 - 4.0 standard drinks     Types: 1 Glasses of wine, 2 - 3 Standard drinks or equivalent per week    Drug use: No    Sexual activity: Not on file     Review of Systems   Unable to perform ROS: dementia     Objective:     Vital Signs (Most Recent):  Temp: 96.6 °F (35.9 °C)(MD notified) (19)  Pulse: 71 (19)  Resp: (!) 32 (19)  BP: (!) 129/58 (19)  SpO2: 96 % (19) Vital Signs (24h Range):  Temp:  [96.6 °F (35.9 °C)] 96.6 °F (35.9 °C)  Pulse:  [41-78] 71  Resp:  [21-32] 32  SpO2:  [85 %-100 %] 96 %  BP: (105-153)/(58-87) 129/58        There is no height or weight on file to calculate BMI.    SpO2: 96 %  O2 Device (Oxygen Therapy): nasal cannula    No intake or output data in the 24 hours ending 19    Lines/Drains/Airways     Peripheral Intravenous Line                 Peripheral IV - Single Lumen 19 18 G Left Hand less than 1 day         Peripheral IV - Single Lumen 19 20 G Right;Anterior Forearm less than 1 day         Peripheral IV - Single Lumen 19 18 G Right;Posterior Forearm less than 1 day                Physical  Exam   Constitutional: She is oriented to person, place, and time. She appears well-developed and well-nourished.   HENT:   Head: Normocephalic and atraumatic.   Eyes: Pupils are equal, round, and reactive to light. Conjunctivae are normal.   Neck: Normal range of motion. Neck supple.   Cardiovascular: Intact distal pulses. An irregularly irregular rhythm present. Bradycardia present.   Murmur heard.   Harsh midsystolic murmur is present with a grade of 2/6 at the upper right sternal border radiating to the neck.  Pulmonary/Chest: Effort normal and breath sounds normal.   Abdominal: Soft. Bowel sounds are normal.   Musculoskeletal: Normal range of motion.   Neurological: She is alert and oriented to person, place, and time.   Skin: Skin is warm and dry.       Significant Labs: All pertinent lab results from the last 24 hours have been reviewed.    Significant Imaging: Echocardiogram:   2D echo with color flow doppler:   Results for orders placed or performed during the hospital encounter of 07/09/18   2D Echo w/ Color Flow Doppler   Result Value Ref Range    QEF 55 55 - 65    Mitral Valve Regurgitation MODERATE (A)     Aortic Valve Stenosis MILD TO MODERATE (A)     Est. PA Systolic Pressure 44.47 (A)     Tricuspid Valve Regurgitation MILD     Narrative    Date of Procedure: 07/09/2018        TEST DESCRIPTION       Aorta: The aortic root is normal in size, measuring 2.1 cm at sinotubular junction and 2.8 cm at Sinuses of Valsalva. The proximal ascending aorta is normal in size, measuring 3.0 cm across.     Left Atrium: The left atrial volume index is mildly enlarged, measuring 36.36 cc/m2.     Left Ventricle: The left ventricle is normal in size, with an end-diastolic diameter of 4.0 cm, and an end-systolic diameter of 2.7 cm. LV wall thickness is normal, with the septum and the posterior wall each measuring 1.0 cm across. Relative wall   thickness was increased at 0.50, and the LV mass index was 76.4 g/m2 consistent  with concentric remodeling. There are no regional wall motion abnormalities. Left ventricular systolic function appears normal. Visually estimated ejection fraction is   55-60%. The LV Doppler derived stroke volume equals 68.0 ccs.     Diastolic indices: Diastolic function is indeterminate.     Right Atrium: The right atrium is normal in size, measuring 4.7 cm in length and 3.4 cm in width in the apical view.     Right Ventricle: The right ventricle is normal in size measuring 3.7 cm at the base in the apical right ventricle-focused view. Global right ventricular systolic function appears normal. Tricuspid annular plane systolic excursion (TAPSE) is 1.9 cm. The   estimated PA systolic pressure is 44 mmHg.     Aortic Valve:  The aortic valve is moderately sclerotic. The peak velocity obtained across the aortic valve is 2.92 m/s, which translates to a peak gradient of 34 mmHg. The mean gradient is 17 mmHg. Using a left ventricular outflow tract diameter of 2.1   cm, a left ventricular outflow tract velocity time integral of 19 cm, and a peak instantaneous transvalvular velocity time integral of 53 cm, the calculated aortic valve area is 1.3 cm2(AVAi is 0.69 cm2/m2), consistent with mild to moderate aortic   stenosis.     Mitral Valve:  There is moderate mitral regurgitation.     Tricuspid Valve:  There is mild tricuspid regurgitation.     IVC: IVC is normal in size and collapses > 50% with a sniff, suggesting normal right atrial pressure of 3 mmHg.     Intracavitary: There is no evidence of pericardial effusion, intracavity mass, thrombi, or vegetation.         CONCLUSIONS     1 - Normal left ventricular systolic function (EF 55-60%).     2 - Concentric remodeling.     3 - Mild left atrial enlargement.     4 - Pulmonary hypertension. The estimated PA systolic pressure is 44 mmHg.     5 - Mild to moderate aortic stenosis, RAD = 1.3 cm2, AVAi = 0.69 cm2/m2, peak velocity = 2.92 m/s, mean gradient = 17 mmHg.     6 -  Moderate mitral regurgitation.     7 - Mild tricuspid regurgitation.             This document has been electronically    SIGNED BY: Gregg Loza MD On: 07/09/2018 16:41     Assessment and Plan:     Mixed hyperlipidemia  On statin    HTN, goal below 140/90  Stable whiled paced    DM type 2, controlled, with complication  Per primary    Memory impairment  Per primary    Chronic atrial fibrillation  Currently bradycardic requiring external pacing to maintain BP. Discussed with family who wish to rescind DNR and have temp transvenous pacing wire placed. Hold eliquis. Bridge with lovenox starting tomorrow. Hold B-blocker and Ca++ blocker. Will discuss PPM if HR fails to improve. Repeat echo        VTE Risk Mitigation (From admission, onward)    None        40 minutes spent with patient in ER    Thank you for your consult. I will follow-up with patient. Please contact us if you have any additional questions.    Gregg Loza MD  Cardiology   [facility]

## 2020-01-01 NOTE — CONSULTS
Dictation #1  MRN:[id number]  CSN:[id number]  [id number]  tobin  Hyperkalemia  Hyponatremia  Bradycardia  htn  uti  dm2

## 2020-01-01 NOTE — ASSESSMENT & PLAN NOTE
Patient was noted to be very bradycardic in the field and was given atropine with some response.  She was then transcutaneously paced in the ED with improvement of her mentation.  She was brought to the Catheterization Lab for transvenous pacer with Cardiology, Dr. Gregg Loza.  I believe the etiology is likely secondary to hyperkalemia as well as being on a couple AV teresita blockers.  Her metoprolol and diltiazem will be held for now and she has been given IV fluids for treatment of renal failure and by extension hyperkalemia.  She had also been given dextrose/insulin, albuterol, and calcium gluconate.  Will await further recommendations from Cardiology in the morning.

## 2020-01-01 NOTE — ASSESSMENT & PLAN NOTE
Currently bradycardic requiring external pacing to maintain BP. Discussed with family who wish to rescind DNR and have temp transvenous pacing wire placed. Hold eliquis. Bridge with lovenox starting tomorrow. Hold B-blocker and Ca++ blocker. Will discuss PPM if HR fails to improve. Repeat echo

## 2020-01-01 NOTE — HPI
"Per Dr. Makenzie Tena:    "Mrs. Vashti Muñoz is a 87 y.o. female [facility] resident with essential hypertension, hyperlipidemia (LDL unknown), chronic atrial fibrillation ([id number]-CYSw score 4) on chronic anticoagulation, hypothyroidism (TSH 0.452 Sept 2018), anemia chronic disease, and dementia who presents to [facility] ED with complaints of altered mental status today.  She was noted to be bradycardic for which EMS was activated.  She was last seen in her normal state of health at around 4:30 PM today.  She was given atropine with positive response in her heart rate and thereafter transported to this facility.  She was then started on transcutaneous pacing with improvement of her mentation.  She was brought to the Catheterization Lab and was placed a transvenous pacer per Cardiology.  Further history is otherwise limited at this time."  "

## 2020-01-01 NOTE — NURSING
Spoke with  on phone after 12 lead EKG was reviewed by him. Turned MA on temporary pacemaker up to 20. Pacer is capturing on cardiac monitor post change.

## 2020-01-01 NOTE — PLAN OF CARE
Patient admitted from ER to Cath Lab to ICU. Patient arrives with temporary transvenous pacemaker. Once patient was connected to cardiac monitor in room , no signs of capturing was noted on 12 lead EKG. Dago aware. Changes were made. An hour later same issue with not capturing. Dago notified once again, and temporary pacing was initiated. Dopamine drip continues to infuse. 3L nasal cannula. Salazar catheter intact. Poor urine output. Q4hr BMP's ordered. Bilateral upper extremity restraints and also to right lower extremity. Moisture associated dermatitis and a puncture wound on abdomen noted. Mepilex placed on sacral area. SCD's intact. IVF of NS at 150mL/hr infusing. Plan of care is for  to come in to fix transvenous pacemaker.

## 2020-01-02 PROBLEM — E87.5 HYPERKALEMIA: Status: RESOLVED | Noted: 2020-01-01 | Resolved: 2020-01-02

## 2020-01-02 LAB
ALBUMIN SERPL BCP-MCNC: 3.3 G/DL (ref 3.5–5.2)
ALP SERPL-CCNC: 135 U/L (ref 55–135)
ALT SERPL W/O P-5'-P-CCNC: 1446 U/L (ref 10–44)
ANION GAP SERPL CALC-SCNC: 10 MMOL/L (ref 8–16)
ANION GAP SERPL CALC-SCNC: 10 MMOL/L (ref 8–16)
ANION GAP SERPL CALC-SCNC: 11 MMOL/L (ref 8–16)
ANION GAP SERPL CALC-SCNC: 8 MMOL/L (ref 8–16)
APTT BLDCRRT: 33.7 SEC (ref 21–32)
AST SERPL-CCNC: 1423 U/L (ref 10–40)
BASOPHILS # BLD AUTO: 0 K/UL (ref 0–0.2)
BASOPHILS NFR BLD: 0 % (ref 0–1.9)
BILIRUB SERPL-MCNC: 0.7 MG/DL (ref 0.1–1)
BUN SERPL-MCNC: 31 MG/DL (ref 8–23)
BUN SERPL-MCNC: 32 MG/DL (ref 8–23)
CALCIUM SERPL-MCNC: 7.8 MG/DL (ref 8.7–10.5)
CALCIUM SERPL-MCNC: 8.1 MG/DL (ref 8.7–10.5)
CALCIUM SERPL-MCNC: 8.3 MG/DL (ref 8.7–10.5)
CALCIUM SERPL-MCNC: 8.5 MG/DL (ref 8.7–10.5)
CHLORIDE SERPL-SCNC: 95 MMOL/L (ref 95–110)
CHLORIDE SERPL-SCNC: 96 MMOL/L (ref 95–110)
CO2 SERPL-SCNC: 22 MMOL/L (ref 23–29)
CO2 SERPL-SCNC: 23 MMOL/L (ref 23–29)
CO2 SERPL-SCNC: 26 MMOL/L (ref 23–29)
CO2 SERPL-SCNC: 27 MMOL/L (ref 23–29)
CREAT SERPL-MCNC: 1.5 MG/DL (ref 0.5–1.4)
CREAT SERPL-MCNC: 1.7 MG/DL (ref 0.5–1.4)
CREAT SERPL-MCNC: 1.7 MG/DL (ref 0.5–1.4)
CREAT SERPL-MCNC: 1.8 MG/DL (ref 0.5–1.4)
DIFFERENTIAL METHOD: ABNORMAL
EOSINOPHIL # BLD AUTO: 0 K/UL (ref 0–0.5)
EOSINOPHIL NFR BLD: 0.1 % (ref 0–8)
ERYTHROCYTE [DISTWIDTH] IN BLOOD BY AUTOMATED COUNT: 15.9 % (ref 11.5–14.5)
EST. GFR  (AFRICAN AMERICAN): 29 ML/MIN/1.73 M^2
EST. GFR  (AFRICAN AMERICAN): 31 ML/MIN/1.73 M^2
EST. GFR  (AFRICAN AMERICAN): 31 ML/MIN/1.73 M^2
EST. GFR  (AFRICAN AMERICAN): 36 ML/MIN/1.73 M^2
EST. GFR  (NON AFRICAN AMERICAN): 25 ML/MIN/1.73 M^2
EST. GFR  (NON AFRICAN AMERICAN): 27 ML/MIN/1.73 M^2
EST. GFR  (NON AFRICAN AMERICAN): 27 ML/MIN/1.73 M^2
EST. GFR  (NON AFRICAN AMERICAN): 31 ML/MIN/1.73 M^2
GLUCOSE SERPL-MCNC: 111 MG/DL (ref 70–110)
GLUCOSE SERPL-MCNC: 113 MG/DL (ref 70–110)
GLUCOSE SERPL-MCNC: 121 MG/DL (ref 70–110)
GLUCOSE SERPL-MCNC: 92 MG/DL (ref 70–110)
HCT VFR BLD AUTO: 31.5 % (ref 37–48.5)
HGB BLD-MCNC: 9.5 G/DL (ref 12–16)
IMM GRANULOCYTES # BLD AUTO: 0.05 K/UL (ref 0–0.04)
IMM GRANULOCYTES NFR BLD AUTO: 0.6 % (ref 0–0.5)
INR PPP: 1.3 (ref 0.8–1.2)
LYMPHOCYTES # BLD AUTO: 0.6 K/UL (ref 1–4.8)
LYMPHOCYTES NFR BLD: 6.7 % (ref 18–48)
MAGNESIUM SERPL-MCNC: 1.6 MG/DL (ref 1.6–2.6)
MCH RBC QN AUTO: 26.4 PG (ref 27–31)
MCHC RBC AUTO-ENTMCNC: 30.2 G/DL (ref 32–36)
MCV RBC AUTO: 88 FL (ref 82–98)
MONOCYTES # BLD AUTO: 0.4 K/UL (ref 0.3–1)
MONOCYTES NFR BLD: 4.8 % (ref 4–15)
NEUTROPHILS # BLD AUTO: 7.2 K/UL (ref 1.8–7.7)
NEUTROPHILS NFR BLD: 87.8 % (ref 38–73)
NRBC BLD-RTO: 0 /100 WBC
PHOSPHATE SERPL-MCNC: 4.3 MG/DL (ref 2.7–4.5)
PLATELET # BLD AUTO: 129 K/UL (ref 150–350)
PMV BLD AUTO: 10.9 FL (ref 9.2–12.9)
POCT GLUCOSE: 124 MG/DL (ref 70–110)
POCT GLUCOSE: 126 MG/DL (ref 70–110)
POCT GLUCOSE: 95 MG/DL (ref 70–110)
POTASSIUM SERPL-SCNC: 4.6 MMOL/L (ref 3.5–5.1)
POTASSIUM SERPL-SCNC: 5.1 MMOL/L (ref 3.5–5.1)
POTASSIUM SERPL-SCNC: 5.2 MMOL/L (ref 3.5–5.1)
POTASSIUM SERPL-SCNC: 5.9 MMOL/L (ref 3.5–5.1)
PROT SERPL-MCNC: 6.2 G/DL (ref 6–8.4)
PROTHROMBIN TIME: 13.4 SEC (ref 9–12.5)
RBC # BLD AUTO: 3.6 M/UL (ref 4–5.4)
SODIUM SERPL-SCNC: 129 MMOL/L (ref 136–145)
SODIUM SERPL-SCNC: 129 MMOL/L (ref 136–145)
SODIUM SERPL-SCNC: 131 MMOL/L (ref 136–145)
SODIUM SERPL-SCNC: 131 MMOL/L (ref 136–145)
WBC # BLD AUTO: 8.25 K/UL (ref 3.9–12.7)

## 2020-01-02 PROCEDURE — 25000003 PHARM REV CODE 250: Performed by: HOSPITALIST

## 2020-01-02 PROCEDURE — 94640 AIRWAY INHALATION TREATMENT: CPT

## 2020-01-02 PROCEDURE — 85025 COMPLETE CBC W/AUTO DIFF WBC: CPT

## 2020-01-02 PROCEDURE — 25000242 PHARM REV CODE 250 ALT 637 W/ HCPCS: Performed by: HOSPITALIST

## 2020-01-02 PROCEDURE — 80048 BASIC METABOLIC PNL TOTAL CA: CPT

## 2020-01-02 PROCEDURE — 84100 ASSAY OF PHOSPHORUS: CPT

## 2020-01-02 PROCEDURE — 27000221 HC OXYGEN, UP TO 24 HOURS

## 2020-01-02 PROCEDURE — 83735 ASSAY OF MAGNESIUM: CPT

## 2020-01-02 PROCEDURE — 83036 HEMOGLOBIN GLYCOSYLATED A1C: CPT

## 2020-01-02 PROCEDURE — 85730 THROMBOPLASTIN TIME PARTIAL: CPT

## 2020-01-02 PROCEDURE — 80053 COMPREHEN METABOLIC PANEL: CPT

## 2020-01-02 PROCEDURE — 63600175 PHARM REV CODE 636 W HCPCS: Performed by: INTERNAL MEDICINE

## 2020-01-02 PROCEDURE — 36415 COLL VENOUS BLD VENIPUNCTURE: CPT

## 2020-01-02 PROCEDURE — 85610 PROTHROMBIN TIME: CPT

## 2020-01-02 PROCEDURE — 25000003 PHARM REV CODE 250: Performed by: INTERNAL MEDICINE

## 2020-01-02 PROCEDURE — 21400001 HC TELEMETRY ROOM

## 2020-01-02 PROCEDURE — 94761 N-INVAS EAR/PLS OXIMETRY MLT: CPT

## 2020-01-02 RX ORDER — IBUPROFEN 200 MG
24 TABLET ORAL
Status: DISCONTINUED | OUTPATIENT
Start: 2020-01-02 | End: 2020-01-09 | Stop reason: HOSPADM

## 2020-01-02 RX ORDER — IBUPROFEN 200 MG
16 TABLET ORAL
Status: DISCONTINUED | OUTPATIENT
Start: 2020-01-02 | End: 2020-01-09 | Stop reason: HOSPADM

## 2020-01-02 RX ORDER — GLUCAGON 1 MG
1 KIT INJECTION
Status: DISCONTINUED | OUTPATIENT
Start: 2020-01-02 | End: 2020-01-09 | Stop reason: HOSPADM

## 2020-01-02 RX ORDER — METOPROLOL TARTRATE 50 MG/1
50 TABLET ORAL 2 TIMES DAILY
Status: DISCONTINUED | OUTPATIENT
Start: 2020-01-02 | End: 2020-01-04

## 2020-01-02 RX ORDER — INSULIN ASPART 100 [IU]/ML
0-5 INJECTION, SOLUTION INTRAVENOUS; SUBCUTANEOUS
Status: DISCONTINUED | OUTPATIENT
Start: 2020-01-02 | End: 2020-01-09 | Stop reason: HOSPADM

## 2020-01-02 RX ADMIN — SIMVASTATIN 40 MG: 40 TABLET, FILM COATED ORAL at 08:01

## 2020-01-02 RX ADMIN — Medication 9 MG: at 08:01

## 2020-01-02 RX ADMIN — LEVOTHYROXINE SODIUM 137 MCG: 25 TABLET ORAL at 06:01

## 2020-01-02 RX ADMIN — APIXABAN 5 MG: 5 TABLET, FILM COATED ORAL at 10:01

## 2020-01-02 RX ADMIN — CEFTRIAXONE 1 G: 1 INJECTION, SOLUTION INTRAVENOUS at 11:01

## 2020-01-02 RX ADMIN — ACETAMINOPHEN 500 MG: 500 TABLET, FILM COATED ORAL at 03:01

## 2020-01-02 RX ADMIN — APIXABAN 5 MG: 5 TABLET, FILM COATED ORAL at 08:01

## 2020-01-02 RX ADMIN — IPRATROPIUM BROMIDE AND ALBUTEROL SULFATE 3 ML: .5; 3 SOLUTION RESPIRATORY (INHALATION) at 12:01

## 2020-01-02 RX ADMIN — METOPROLOL TARTRATE 50 MG: 50 TABLET ORAL at 10:01

## 2020-01-02 RX ADMIN — IPRATROPIUM BROMIDE AND ALBUTEROL SULFATE 3 ML: .5; 3 SOLUTION RESPIRATORY (INHALATION) at 07:01

## 2020-01-02 RX ADMIN — ESCITALOPRAM OXALATE 10 MG: 10 TABLET ORAL at 10:01

## 2020-01-02 RX ADMIN — IPRATROPIUM BROMIDE AND ALBUTEROL SULFATE 3 ML: .5; 3 SOLUTION RESPIRATORY (INHALATION) at 02:01

## 2020-01-02 RX ADMIN — Medication: at 08:01

## 2020-01-02 RX ADMIN — METOPROLOL TARTRATE 50 MG: 50 TABLET ORAL at 08:01

## 2020-01-02 RX ADMIN — Medication: at 10:01

## 2020-01-02 RX ADMIN — IPRATROPIUM BROMIDE AND ALBUTEROL SULFATE 3 ML: .5; 3 SOLUTION RESPIRATORY (INHALATION) at 08:01

## 2020-01-02 NOTE — PLAN OF CARE
Transvenous and Transcutaneous pacemakers discontinued per Cardiology. Dopamine weaning as tolerated, infusing at 8mcg/kg/min. Remains hyperkalemic with K of 5.6 this pm. Nephrology consulted. NS decreased to 75cc/hr. Urine output concentrated and low at 200cc this shift. Family updated on plan of care. No complaints of pain. Intermittent nausea and vomiting x2 this shift. Remained NPO except meds. No falls, injuries or skin breakdown. Excoriated area under folds of abdomen noted, miconazole powder applied.

## 2020-01-02 NOTE — PLAN OF CARE
Pt remains in ICU. Dopamine gtt off since 0530, HR in the 90's. Pt AAO x3-4 at times. Fluids D/C. Breathing treatments ordered due to pt wheezing. US of kidneys completed this shift. UOP increased, le remains in place putting out 650 mL. BM x4, K WNL now. No falls injuries or skin breakdown this shift, precautions maintained for all.

## 2020-01-02 NOTE — PROGRESS NOTES
Date of Admission:12/31/2019    SUBJECTIVE:note feeling the need to urinate, eating jello    Current Facility-Administered Medications   Medication    acetaminophen tablet 500 mg    albuterol-ipratropium 2.5 mg-0.5 mg/3 mL nebulizer solution 3 mL    apixaban tablet 5 mg    cefTRIAXone (ROCEPHIN) 1 g in dextrose 5 % 50 mL IVPB    dextrose 50% injection 12.5 g    dextrose 50% injection 25 g    escitalopram oxalate tablet 10 mg    glucagon (human recombinant) injection 1 mg    glucose chewable tablet 16 g    glucose chewable tablet 24 g    insulin aspart U-100 pen 0-5 Units    levothyroxine tablet 137 mcg    melatonin tablet 9 mg    metoprolol tartrate (LOPRESSOR) tablet 50 mg    miconazole NITRATE 2 % top powder    ondansetron injection 8 mg    promethazine (PHENERGAN) 6.25 mg in dextrose 5 % 50 mL IVPB    senna-docusate 8.6-50 mg per tablet 1 tablet    simvastatin tablet 40 mg       Wt Readings from Last 3 Encounters:   01/01/20 102.5 kg (226 lb)   12/12/19 90.3 kg (199 lb 1.2 oz)   12/11/19 88.7 kg (195 lb 8.8 oz)     Temp Readings from Last 3 Encounters:   01/02/20 97.2 °F (36.2 °C) (Oral)   12/11/19 97.6 °F (36.4 °C)   11/01/19 97.9 °F (36.6 °C) (Oral)     BP Readings from Last 3 Encounters:   01/02/20 132/79   12/12/19 (!) 162/90   12/11/19 (!) 175/101     Pulse Readings from Last 3 Encounters:   01/02/20 109   12/12/19 70   12/11/19 (!) 116       Intake/Output Summary (Last 24 hours) at 1/2/2020 1125  Last data filed at 1/2/2020 1100  Gross per 24 hour   Intake 1956.78 ml   Output 1040 ml   Net 916.78 ml       PE:  GEN:wd female in nad  HEENT:ncat,eomi,mm  CVS:irrregular  PULM:ctab  ABD:+bs,soft,nd  EXT:1+leg edema  NEURO:awake    Recent Labs   Lab 01/02/20  0401  01/02/20  0820   *   < > 92   *   < > 129*   K 5.2*   < > 4.6   CL 96   < > 96   CO2 27   < > 23   BUN 32*   < > 31*   CREATININE 1.7*   < > 1.5*   CALCIUM 8.1*   < > 7.8*   MG 1.6  --   --     < > = values in this  interval not displayed.       Lab Results   Component Value Date    CALCIUM 7.8 (L) 01/02/2020    PHOS 4.3 01/02/2020       Recent Labs   Lab 01/02/20  0402   WBC 8.25   RBC 3.60*   HGB 9.5*   HCT 31.5*   *   MCV 88   MCH 26.4*   MCHC 30.2*         A/P:  1.tobin. Resolving. Decent uop. Ok to dc le.  2.uti. Cont tx.  3.anemia hg ok. No prbc indicated.  4.hyperkalemia. Resolved.  5.htn. Dc ivfs.  6.hyponatremia. Watching.  Should get better.   7.shock liver. lfts going down.  8.afib. Rate going up. Following.  Renal guillen better. Will sign off. Please reconsult as needed. Thanks.

## 2020-01-02 NOTE — CARE UPDATE
[facility]  ICU Multidisciplinary Bedside Rounds     UPDATE     Date: 1/2/2020      Plan of care reviewed with the following, Nurse and Physician.       Needs/ Goals for the day: diet started, restart beta blocker      Level of Care: OK to Transfer

## 2020-01-02 NOTE — NURSING
Patient has arrived on the floor via stretcher bed, on 2L via NC. NAD, without discomfort or pain. States the need to use the bathroom. Patient has been cleaned, pads changed, & repositioned. Bed in lowest position, wheel locked, alarm for safety, call light within reach, tele monitoring continued. Will continue to monitor

## 2020-01-02 NOTE — CARE UPDATE
[facility]  ICU Multidisciplinary Bedside Rounds   SUMMARY     Date: 1/2/2020    Prehospitalization: Nursing Home  Admit Date / LOS : 12/31/2019/ 2 days    Diagnosis: Symptomatic bradycardia    Consults:        Active: Cardio and Nephro       Needed: N/A     Code Status: Full Code   Advanced Directive: Received    LDA: Salazar and PIV       Central Lines/Site/Justification:Patient Does Not Have Central Line       Urinary Cath/Order/Justification:Critically Ill in ICU    Vasopressors/Infusions:    DOPamine Stopped (01/02/20 0535)            CAM ICU: Negative  Pain Management: PO       Pain Controlled: yes     Rhythm: A-Fib    Respiratory Device: Nasal Cannula                 VTE Prophylaxis: Pharm and Mechanical  Mobility: Bedrest  Stress Ulcer Prophylaxis: Yes    Dietary: NPO      Isolation: No active isolations    Restraints: No    Significant Dates:  Post Op Date: N/A  Rescue Date: N/A  Imaging/ Diagnostics: CXR 1/1/20, Kidney US 1/1/20    Noteworthy Labs:  Na 129,     CBC/Anemia Labs: Coags:    Recent Labs   Lab 01/01/20  0051 01/01/20  0431 01/02/20  0402   WBC 11.32 15.95* 8.25   HGB 10.5* 11.3* 9.5*   HCT 34.9* 37.1 31.5*    178 129*   MCV 90 88 88   RDW 16.1* 16.0* 15.9*    Recent Labs   Lab 01/01/20  0051 01/02/20  0402   INR 1.4* 1.3*   APTT 35.6* 33.7*        Chemistries:   Recent Labs   Lab 12/31/19  2142  01/01/20  0431  01/01/20  2330 01/02/20  0401 01/02/20  0402   *   < > 127*   < > 131* 131* 129*   K 6.6*   < > 5.5*   < > 5.9* 5.2* 5.1   CL 91*   < > 93*   < > 95 96 96   CO2 20*   < > 21*   < > 26 27 22*   BUN 28*   < > 29*   < > 31* 32* 31*   CREATININE 2.0*   < > 2.1*   < > 1.8* 1.7* 1.7*   CALCIUM 8.4*   < > 9.1   < > 8.5* 8.1* 8.3*   PROT 6.4  --  7.7  --   --  6.2  --    BILITOT 1.4*  --  1.0  --   --  0.7  --    ALKPHOS 127  --  156*  --   --  135  --    *  --  1,759*  --   --  1,446*  --    *  --  2,372*  --   --  1,423*  --    MG 2.1  --  1.8  --    --  1.6  --    PHOS 7.6*  --  4.9*  --   --  4.3  --     < > = values in this interval not displayed.        Cardiac Enzymes: Ejection Fractions:    Recent Labs     12/31/19 2142 01/01/20  0051   TROPONINI 0.008 0.038*    No results found for: EF     POCT Glucose: HbA1c:    Recent Labs   Lab 12/31/19 2132 12/31/19  2309 01/01/20  1752   POCTGLUCOSE 199* 315* 133*    Hemoglobin A1C   Date Value Ref Range Status   07/05/2018 5.7 (H) 4.8 - 5.6 % Final     Comment:              Pre-diabetes: 5.7 - 6.4           Diabetes: >6.4           Glycemic control for adults with diabetes: <7.0          Needs from Care Team: none     ICU LOS 1d 7h  Level of Care: OK to Transfer

## 2020-01-02 NOTE — EICU
Bedside RN called eICU regarding pt having  mild audible wheezing (without stethoscope), and asking  for an order for resp treatments.  Dr Patel notified via J[employer]ber.

## 2020-01-02 NOTE — NURSING TRANSFER
Nursing Transfer Note      1/2/2020     Transfer To: [address] b    Transfer via bed    Transfer with  to O2, cardiac monitoring    Transported by RN and transport    Medicines sent: yes    Chart send with patient: Yes    Notified: daughter    Patient reassessed at: 1/2/2020 1250     Upon arrival to floor: cardiac monitor applied, patient oriented to room, call bell in reach and bed in lowest position

## 2020-01-03 PROBLEM — I48.91 ATRIAL FIBRILLATION WITH RAPID VENTRICULAR RESPONSE: Status: ACTIVE | Noted: 2020-01-03

## 2020-01-03 PROBLEM — I48.20 CHRONIC ATRIAL FIBRILLATION WITH RAPID VENTRICULAR RESPONSE: Status: ACTIVE | Noted: 2020-01-03

## 2020-01-03 LAB
ALBUMIN SERPL BCP-MCNC: 3.4 G/DL (ref 3.5–5.2)
ALP SERPL-CCNC: 142 U/L (ref 55–135)
ALT SERPL W/O P-5'-P-CCNC: 990 U/L (ref 10–44)
ANION GAP SERPL CALC-SCNC: 12 MMOL/L (ref 8–16)
APTT BLDCRRT: 32.7 SEC (ref 21–32)
AST SERPL-CCNC: 438 U/L (ref 10–40)
BACTERIA UR CULT: ABNORMAL
BACTERIA UR CULT: ABNORMAL
BASOPHILS # BLD AUTO: 0 K/UL (ref 0–0.2)
BASOPHILS NFR BLD: 0 % (ref 0–1.9)
BILIRUB SERPL-MCNC: 0.8 MG/DL (ref 0.1–1)
BUN SERPL-MCNC: 25 MG/DL (ref 8–23)
CALCIUM SERPL-MCNC: 8.3 MG/DL (ref 8.7–10.5)
CHLORIDE SERPL-SCNC: 94 MMOL/L (ref 95–110)
CO2 SERPL-SCNC: 26 MMOL/L (ref 23–29)
CREAT SERPL-MCNC: 1.1 MG/DL (ref 0.5–1.4)
DIFFERENTIAL METHOD: ABNORMAL
EOSINOPHIL # BLD AUTO: 0 K/UL (ref 0–0.5)
EOSINOPHIL NFR BLD: 0 % (ref 0–8)
ERYTHROCYTE [DISTWIDTH] IN BLOOD BY AUTOMATED COUNT: 15.9 % (ref 11.5–14.5)
EST. GFR  (AFRICAN AMERICAN): 52 ML/MIN/1.73 M^2
EST. GFR  (NON AFRICAN AMERICAN): 45 ML/MIN/1.73 M^2
ESTIMATED AVG GLUCOSE: 134 MG/DL (ref 68–131)
GLUCOSE SERPL-MCNC: 170 MG/DL (ref 70–110)
HBA1C MFR BLD HPLC: 6.3 % (ref 4–5.6)
HCT VFR BLD AUTO: 33.7 % (ref 37–48.5)
HGB BLD-MCNC: 10.5 G/DL (ref 12–16)
IMM GRANULOCYTES # BLD AUTO: 0.1 K/UL (ref 0–0.04)
IMM GRANULOCYTES NFR BLD AUTO: 1.1 % (ref 0–0.5)
INR PPP: 1.1 (ref 0.8–1.2)
LYMPHOCYTES # BLD AUTO: 0.3 K/UL (ref 1–4.8)
LYMPHOCYTES NFR BLD: 3.8 % (ref 18–48)
MAGNESIUM SERPL-MCNC: 1.4 MG/DL (ref 1.6–2.6)
MCH RBC QN AUTO: 26.6 PG (ref 27–31)
MCHC RBC AUTO-ENTMCNC: 31.2 G/DL (ref 32–36)
MCV RBC AUTO: 85 FL (ref 82–98)
MONOCYTES # BLD AUTO: 0.6 K/UL (ref 0.3–1)
MONOCYTES NFR BLD: 6.3 % (ref 4–15)
NEUTROPHILS # BLD AUTO: 7.7 K/UL (ref 1.8–7.7)
NEUTROPHILS NFR BLD: 88.8 % (ref 38–73)
NRBC BLD-RTO: 0 /100 WBC
PHOSPHATE SERPL-MCNC: 2.7 MG/DL (ref 2.7–4.5)
PLATELET # BLD AUTO: 138 K/UL (ref 150–350)
PMV BLD AUTO: 11.2 FL (ref 9.2–12.9)
POCT GLUCOSE: 139 MG/DL (ref 70–110)
POCT GLUCOSE: 148 MG/DL (ref 70–110)
POCT GLUCOSE: 175 MG/DL (ref 70–110)
POCT GLUCOSE: 191 MG/DL (ref 70–110)
POTASSIUM SERPL-SCNC: 4.3 MMOL/L (ref 3.5–5.1)
PROT SERPL-MCNC: 6.4 G/DL (ref 6–8.4)
PROTHROMBIN TIME: 11.5 SEC (ref 9–12.5)
RBC # BLD AUTO: 3.95 M/UL (ref 4–5.4)
SODIUM SERPL-SCNC: 132 MMOL/L (ref 136–145)
T4 FREE SERPL-MCNC: 0.9 NG/DL (ref 0.71–1.51)
TSH SERPL DL<=0.005 MIU/L-ACNC: 4.63 UIU/ML (ref 0.4–4)
WBC # BLD AUTO: 8.7 K/UL (ref 3.9–12.7)

## 2020-01-03 PROCEDURE — 25000003 PHARM REV CODE 250: Performed by: HOSPITALIST

## 2020-01-03 PROCEDURE — 63600175 PHARM REV CODE 636 W HCPCS: Performed by: HOSPITALIST

## 2020-01-03 PROCEDURE — 85610 PROTHROMBIN TIME: CPT

## 2020-01-03 PROCEDURE — 84100 ASSAY OF PHOSPHORUS: CPT

## 2020-01-03 PROCEDURE — 93010 EKG 12-LEAD: ICD-10-PCS | Mod: ,,, | Performed by: INTERNAL MEDICINE

## 2020-01-03 PROCEDURE — 85025 COMPLETE CBC W/AUTO DIFF WBC: CPT

## 2020-01-03 PROCEDURE — 94640 AIRWAY INHALATION TREATMENT: CPT

## 2020-01-03 PROCEDURE — 25000242 PHARM REV CODE 250 ALT 637 W/ HCPCS: Performed by: HOSPITALIST

## 2020-01-03 PROCEDURE — 84443 ASSAY THYROID STIM HORMONE: CPT

## 2020-01-03 PROCEDURE — 94761 N-INVAS EAR/PLS OXIMETRY MLT: CPT

## 2020-01-03 PROCEDURE — 83735 ASSAY OF MAGNESIUM: CPT

## 2020-01-03 PROCEDURE — 20000000 HC ICU ROOM

## 2020-01-03 PROCEDURE — 80053 COMPREHEN METABOLIC PANEL: CPT

## 2020-01-03 PROCEDURE — 84481 FREE ASSAY (FT-3): CPT

## 2020-01-03 PROCEDURE — 36415 COLL VENOUS BLD VENIPUNCTURE: CPT

## 2020-01-03 PROCEDURE — 84439 ASSAY OF FREE THYROXINE: CPT

## 2020-01-03 PROCEDURE — 93010 ELECTROCARDIOGRAM REPORT: CPT | Mod: ,,, | Performed by: INTERNAL MEDICINE

## 2020-01-03 PROCEDURE — 85730 THROMBOPLASTIN TIME PARTIAL: CPT

## 2020-01-03 PROCEDURE — 93005 ELECTROCARDIOGRAM TRACING: CPT

## 2020-01-03 RX ORDER — METOPROLOL TARTRATE 50 MG/1
50 TABLET ORAL ONCE
Status: COMPLETED | OUTPATIENT
Start: 2020-01-03 | End: 2020-01-03

## 2020-01-03 RX ORDER — METOPROLOL TARTRATE 1 MG/ML
5 INJECTION, SOLUTION INTRAVENOUS EVERY 5 MIN PRN
Status: DISCONTINUED | OUTPATIENT
Start: 2020-01-03 | End: 2020-01-03

## 2020-01-03 RX ORDER — METOPROLOL TARTRATE 1 MG/ML
10 INJECTION, SOLUTION INTRAVENOUS ONCE
Status: COMPLETED | OUTPATIENT
Start: 2020-01-03 | End: 2020-01-03

## 2020-01-03 RX ORDER — LORAZEPAM 0.5 MG/1
0.5 TABLET ORAL EVERY 12 HOURS PRN
Status: DISCONTINUED | OUTPATIENT
Start: 2020-01-03 | End: 2020-01-07

## 2020-01-03 RX ORDER — DILTIAZEM HYDROCHLORIDE 180 MG/1
360 CAPSULE, COATED, EXTENDED RELEASE ORAL DAILY
Status: DISCONTINUED | OUTPATIENT
Start: 2020-01-03 | End: 2020-01-05

## 2020-01-03 RX ORDER — LEVALBUTEROL 1.25 MG/.5ML
1.25 SOLUTION, CONCENTRATE RESPIRATORY (INHALATION) EVERY 8 HOURS
Status: DISCONTINUED | OUTPATIENT
Start: 2020-01-03 | End: 2020-01-09 | Stop reason: HOSPADM

## 2020-01-03 RX ORDER — HYDRALAZINE HYDROCHLORIDE 20 MG/ML
10 INJECTION INTRAMUSCULAR; INTRAVENOUS ONCE
Status: COMPLETED | OUTPATIENT
Start: 2020-01-03 | End: 2020-01-03

## 2020-01-03 RX ORDER — FUROSEMIDE 10 MG/ML
40 INJECTION INTRAMUSCULAR; INTRAVENOUS ONCE
Status: COMPLETED | OUTPATIENT
Start: 2020-01-03 | End: 2020-01-03

## 2020-01-03 RX ORDER — LABETALOL HYDROCHLORIDE 5 MG/ML
10 INJECTION, SOLUTION INTRAVENOUS EVERY 6 HOURS PRN
Status: DISCONTINUED | OUTPATIENT
Start: 2020-01-03 | End: 2020-01-05

## 2020-01-03 RX ORDER — MAGNESIUM SULFATE 1 G/100ML
1 INJECTION INTRAVENOUS ONCE
Status: COMPLETED | OUTPATIENT
Start: 2020-01-03 | End: 2020-01-03

## 2020-01-03 RX ADMIN — ONDANSETRON HYDROCHLORIDE 8 MG: 2 SOLUTION INTRAMUSCULAR; INTRAVENOUS at 01:01

## 2020-01-03 RX ADMIN — LEVALBUTEROL HYDROCHLORIDE 1.25 MG: 1.25 SOLUTION, CONCENTRATE RESPIRATORY (INHALATION) at 08:01

## 2020-01-03 RX ADMIN — PROMETHAZINE HYDROCHLORIDE 6.25 MG: 25 INJECTION INTRAMUSCULAR; INTRAVENOUS at 03:01

## 2020-01-03 RX ADMIN — MAGNESIUM SULFATE 1 G: 1 INJECTION INTRAVENOUS at 06:01

## 2020-01-03 RX ADMIN — METOPROLOL TARTRATE 50 MG: 50 TABLET, FILM COATED ORAL at 03:01

## 2020-01-03 RX ADMIN — LORAZEPAM 0.5 MG: 0.5 TABLET ORAL at 02:01

## 2020-01-03 RX ADMIN — AMIODARONE HYDROCHLORIDE 0.5 MG/MIN: 1.8 INJECTION, SOLUTION INTRAVENOUS at 12:01

## 2020-01-03 RX ADMIN — METOPROLOL TARTRATE 50 MG: 50 TABLET ORAL at 08:01

## 2020-01-03 RX ADMIN — Medication: at 09:01

## 2020-01-03 RX ADMIN — IPRATROPIUM BROMIDE AND ALBUTEROL SULFATE 3 ML: .5; 3 SOLUTION RESPIRATORY (INHALATION) at 12:01

## 2020-01-03 RX ADMIN — LEVALBUTEROL HYDROCHLORIDE 1.25 MG: 1.25 SOLUTION, CONCENTRATE RESPIRATORY (INHALATION) at 03:01

## 2020-01-03 RX ADMIN — APIXABAN 5 MG: 5 TABLET, FILM COATED ORAL at 08:01

## 2020-01-03 RX ADMIN — LEVALBUTEROL HYDROCHLORIDE 1.25 MG: 1.25 SOLUTION, CONCENTRATE RESPIRATORY (INHALATION) at 11:01

## 2020-01-03 RX ADMIN — Medication: at 08:01

## 2020-01-03 RX ADMIN — HYDRALAZINE HYDROCHLORIDE 10 MG: 20 INJECTION INTRAMUSCULAR; INTRAVENOUS at 06:01

## 2020-01-03 RX ADMIN — CEFTRIAXONE 1 G: 1 INJECTION, SOLUTION INTRAVENOUS at 11:01

## 2020-01-03 RX ADMIN — METOPROLOL TARTRATE 5 MG: 1 INJECTION, SOLUTION INTRAVENOUS at 04:01

## 2020-01-03 RX ADMIN — Medication 9 MG: at 08:01

## 2020-01-03 RX ADMIN — AMIODARONE HYDROCHLORIDE 150 MG: 1.5 INJECTION, SOLUTION INTRAVENOUS at 11:01

## 2020-01-03 RX ADMIN — LEVOTHYROXINE SODIUM 137 MCG: 25 TABLET ORAL at 05:01

## 2020-01-03 RX ADMIN — SIMVASTATIN 40 MG: 40 TABLET, FILM COATED ORAL at 08:01

## 2020-01-03 RX ADMIN — FUROSEMIDE 40 MG: 10 INJECTION, SOLUTION INTRAVENOUS at 06:01

## 2020-01-03 RX ADMIN — METOPROLOL TARTRATE 10 MG: 5 INJECTION, SOLUTION INTRAVENOUS at 03:01

## 2020-01-03 RX ADMIN — APIXABAN 5 MG: 5 TABLET, FILM COATED ORAL at 09:01

## 2020-01-03 RX ADMIN — ACETAMINOPHEN 500 MG: 500 TABLET, FILM COATED ORAL at 11:01

## 2020-01-03 RX ADMIN — ESCITALOPRAM OXALATE 10 MG: 10 TABLET ORAL at 09:01

## 2020-01-03 RX ADMIN — AMIODARONE HYDROCHLORIDE 1 MG/MIN: 1.8 INJECTION, SOLUTION INTRAVENOUS at 06:01

## 2020-01-03 NOTE — PROGRESS NOTES
"[facility] Hospital Medicine  Progress Note    Patient Name: Vashti Muñoz  MRN: [id number]  Patient Class: IP- Inpatient   Admission Date: 12/31/2019  Length of Stay: 3 days  Attending Physician: Anne Marie Kidd MD  Primary Care Provider: Alesha Baum MD        Subjective:     Principal Problem:Chronic atrial fibrillation with rapid ventricular response        HPI:  Per Dr. Makenzie Tena:    "Mrs. Vashti Muñoz is a 87 y.o. female [facility] resident with essential hypertension, hyperlipidemia (LDL unknown), chronic atrial fibrillation ([id number] score 4) on chronic anticoagulation, hypothyroidism (TSH 0.452 Sept 2018), anemia chronic disease, and dementia who presents to [facility] ED with complaints of altered mental status today.  She was noted to be bradycardic for which EMS was activated.  She was last seen in her normal state of health at around 4:30 PM today.  She was given atropine with positive response in her heart rate and thereafter transported to this facility.  She was then started on transcutaneous pacing with improvement of her mentation.  She was brought to the Catheterization Lab and was placed a transvenous pacer per Cardiology.  Further history is otherwise limited at this time."    Overview/Hospital Course:  Patient is an 87-year-old woman with hypertension, dyslipidemia, chronic atrial fibrillation on apixaban, mild dementia, nursing home resident, who  was admitted to the intensive care unit with symptomatic bradycardia with pulse of 41 beats per minute with evidence of systemic hypoperfusion with metabolic encephalopathy and acute renal failure with hyperkalemia.  Patient treated with percutaneous pacing along continuous dopamine infusion.  Cardiology consulted and placed a temporary transvenous pacemaker with improvement in her pulse.  Mental status improved with increased pulse.  Patient also challenged with intravenous fluids with improvement  hyperkalemia  and renal " function.  Suspect patient suffered acute tubular necrosis secondary to systemic hypoperfusion.   Patient also treated with antibiotics for possible urinary tract infection.  Elevated liver function tests likely secondary from shock liver due to systemic hypoperfusion.   Liver enzymes improving.    Pt transferred from floor with afib rvr. Amiodarone bolus dose given with continuous rate. cardizem and lopressor resumed. Pt appears anxious and possibly withdrawal from ativan. Medication resumed.     Interval History: pt anxious and BP rising     Review of Systems   Constitutional: Negative for chills and fever.   Respiratory: Negative for shortness of breath and wheezing.    Cardiovascular: Negative for chest pain.   Gastrointestinal: Negative for abdominal distention, abdominal pain, constipation, diarrhea, nausea and vomiting.   Genitourinary: Negative for dysuria and frequency.   Musculoskeletal: Negative for arthralgias and myalgias.   Neurological: Negative for light-headedness.   Psychiatric/Behavioral: Negative for agitation and confusion.     Objective:     Vital Signs (Most Recent):  Temp: 98.2 °F (36.8 °C) (01/03/20 1527)  Pulse: 110 (01/03/20 1527)  Resp: (!) 25 (01/03/20 1527)  BP: (!) 181/91 (01/03/20 1430)  SpO2: 97 % (01/03/20 1527) Vital Signs (24h Range):  Temp:  [98 °F (36.7 °C)-98.6 °F (37 °C)] 98.2 °F (36.8 °C)  Pulse:  [] 110  Resp:  [18-36] 25  SpO2:  [89 %-98 %] 97 %  BP: (131-190)/() 181/91     Weight: 102.5 kg (226 lb)  Body mass index is 41.34 kg/m².    Intake/Output Summary (Last 24 hours) at 1/3/2020 1627  Last data filed at 1/3/2020 1500  Gross per 24 hour   Intake 506.06 ml   Output 2200 ml   Net -1693.94 ml      Physical Exam   Constitutional: She is oriented to person, place, and time. She appears well-developed and well-nourished. No distress.   HENT:   Head: Atraumatic.   Eyes: Conjunctivae are normal.   Neck: Neck supple.   Cardiovascular: Normal heart sounds.   No murmur  heard.  Irregular, pulse of 110 beats per minute.   Pulmonary/Chest: Effort normal and breath sounds normal. She has no wheezes.   Abdominal: Soft. Bowel sounds are normal. She exhibits no distension. There is no tenderness.   Genitourinary:   Genitourinary Comments: Salazar catheter with good urine output.   Musculoskeletal: Normal range of motion. She exhibits no edema or deformity.   Neurological: She is alert and oriented to person, place, and time.       Significant Labs:   BMP:   Recent Labs   Lab 01/03/20  0503   *   *   K 4.3   CL 94*   CO2 26   BUN 25*   CREATININE 1.1   CALCIUM 8.3*   MG 1.4*     CBC:   Recent Labs   Lab 01/02/20  0402 01/03/20  0503   WBC 8.25 8.70   HGB 9.5* 10.5*   HCT 31.5* 33.7*   * 138*       Significant Imaging: I have reviewed and interpreted all pertinent imaging results/findings within the past 24 hours.      Assessment/Plan:      * Chronic atrial fibrillation with rapid ventricular response  Amiodarone bolus and infusion  Lopressor and cardizem given        Atrial fibrillation with rapid ventricular response    Not rate controlled see above    Dementia without behavioral disturbance  Stable; will continue her home regimen of memantine.    Anemia of chronic disease  The patient's H/H is stable and consistent with previous laboratory measurements, and the patient exhibits no signs or symptoms of acute bleeding; there is no indication for transfusion.  Will continue to monitor.    Acute renal failure  Likely ATN from systemic hypoperfusion from bradycardia.  Improving.    Symptomatic bradycardia  Resolved.  Dopamine discontinued.  Now with atrial fibration with rate of 110 beats per minute.  Slowly restart beta-blocker therapy.    Mixed hyperlipidemia  Will continue her home regimen of simvastatin.    Essential hypertension  Reasonably controlled with current regimen.  Will continue with current regimen and continue to monitor.    Chronic anticoagulation  As  addressed above; will continue her home regimen of apixaban.    Acquired hypothyroidism  Well controlled; will continue her home regimen of levothyroxine.    Chronic atrial fibrillation  Restart beta-blocker therapy today and will slowly titrate.      VTE Risk Mitigation (From admission, onward)         Ordered     apixaban tablet 5 mg  2 times daily      01/01/20 0922     IP VTE HIGH RISK PATIENT  Once      01/01/20 0141                Critical care time spent on the evaluation and treatment of severe organ dysfunction, review of pertinent labs and imaging studies, discussions with consulting providers and discussions with patient/family: 35 minutes.      Anne Marie Kidd MD  Department of Hospital Medicine   [facility]

## 2020-01-03 NOTE — EICU
eICU Note : New Admit :notified by the [facility]:Readmit to  from Tele with Afib with RvR5:51 AM      Brief HPI:87-year-old female from nursing home with history of hypertension, hyperlipidemia, chronic atrial fibrillation on chronic anticoagulation, hypothyroidism, anemia, dementia presented to [facility] ED with complaints of altered mental status.  She was noted to be bradycardic for which EMS was activated.  She was started on transcutaneous pacing with improvement in her mentation.  Patient was brought to the cardiac Cath Lab and she was placed on transvenous pacer per cardiology.    Vital Signs :  Vitals:    01/03/20 0545 01/03/20 0600 01/03/20 0615 01/03/20 0630   BP: (!) 169/81 (!) 159/76 (!) 181/87 (!) 188/92   Pulse: (!) 117 (!) 116 (!) 124 (!) 120   Resp: (!) 23 (!) 23 (!) 23 (!) 24   Temp: 98.4 °F (36.9 °C)      TempSrc: Oral      SpO2: 96% 98% 97% 98%       Camera Assessment :Patient lying in bed in no apparent    Data:  WBC 6.43, hemoglobin 10.1, hematocrit 32.8, platelets 133  Sodium 134, potassium 4.1, chloride 91, CO2 36, anion gap 7, BUN 13, creatinine 0.8, EGFR greater than 60, glucose 111, calcium 8.3, phosphorus 2.9, magnesium 1.4, alkaline phosphatase 91, total protein 5.6, albumin 2.6, total bilirubin 0.5, AST 26,     Impression and recommendations:  1.Symptomatic bradycardia.  Patient received atropine in the field.  Status post Transvenous pacemaker insertion.  2.Acute renal failure.BUN is 13/creatinine 0.8, continue to monitor input and output  3.Chronic atrial fibrillation:Currently bradycardic, cardiology to consult  4.history of hypothyroidism:Replace Synthroid  5.anemia of chronic diseaseMonitor H&H  6.PUD DVT prophylaxis SCDs and PPI        Jo Ann Gómez M.D  eICU Physician

## 2020-01-03 NOTE — SUBJECTIVE & OBJECTIVE
Interval History: pt anxious and BP rising     Review of Systems   Constitutional: Negative for chills and fever.   Respiratory: Negative for shortness of breath and wheezing.    Cardiovascular: Negative for chest pain.   Gastrointestinal: Negative for abdominal distention, abdominal pain, constipation, diarrhea, nausea and vomiting.   Genitourinary: Negative for dysuria and frequency.   Musculoskeletal: Negative for arthralgias and myalgias.   Neurological: Negative for light-headedness.   Psychiatric/Behavioral: Negative for agitation and confusion.     Objective:     Vital Signs (Most Recent):  Temp: 98.2 °F (36.8 °C) (01/03/20 1527)  Pulse: 110 (01/03/20 1527)  Resp: (!) 25 (01/03/20 1527)  BP: (!) 181/91 (01/03/20 1430)  SpO2: 97 % (01/03/20 1527) Vital Signs (24h Range):  Temp:  [98 °F (36.7 °C)-98.6 °F (37 °C)] 98.2 °F (36.8 °C)  Pulse:  [] 110  Resp:  [18-36] 25  SpO2:  [89 %-98 %] 97 %  BP: (131-190)/() 181/91     Weight: 102.5 kg (226 lb)  Body mass index is 41.34 kg/m².    Intake/Output Summary (Last 24 hours) at 1/3/2020 1627  Last data filed at 1/3/2020 1500  Gross per 24 hour   Intake 506.06 ml   Output 2200 ml   Net -1693.94 ml      Physical Exam   Constitutional: She is oriented to person, place, and time. She appears well-developed and well-nourished. No distress.   HENT:   Head: Atraumatic.   Eyes: Conjunctivae are normal.   Neck: Neck supple.   Cardiovascular: Normal heart sounds.   No murmur heard.  Irregular, pulse of 110 beats per minute.   Pulmonary/Chest: Effort normal and breath sounds normal. She has no wheezes.   Abdominal: Soft. Bowel sounds are normal. She exhibits no distension. There is no tenderness.   Genitourinary:   Genitourinary Comments: Salazar catheter with good urine output.   Musculoskeletal: Normal range of motion. She exhibits no edema or deformity.   Neurological: She is alert and oriented to person, place, and time.       Significant Labs:   BMP:   Recent Labs    Lab 01/03/20  0503   *   *   K 4.3   CL 94*   CO2 26   BUN 25*   CREATININE 1.1   CALCIUM 8.3*   MG 1.4*     CBC:   Recent Labs   Lab 01/02/20  0402 01/03/20  0503   WBC 8.25 8.70   HGB 9.5* 10.5*   HCT 31.5* 33.7*   * 138*       Significant Imaging: I have reviewed and interpreted all pertinent imaging results/findings within the past 24 hours.

## 2020-01-03 NOTE — NURSING
Pt sustaining HR in the 120-140s and is complaining of nausea and SOB. Dr. Sim notified and orders given for IV metoprolol 10mg once, 50mg oral metoprolol once now, and change scheduled albuterol to xoponex 1.25mg Q8H. Will continue to monitor.

## 2020-01-03 NOTE — SIGNIFICANT EVENT
Hospital Medicine  Cross-Cover Note        Diagnosis leading to hospitalization: Symptomatic bradycardia    Length of Stay since admission: 3     HPI - Interval History:       Called by nursing staff for sustained heart rate greater than 120 to 130s.    Oral metoprolol as well as several rounds of IV metoprolol given with minimal improvement in rate control.  Patient has history of chronic atrial fibrillation.  May need amiodarone infusion and step up to ICU.  Additional laboratory studies ordered in the interim including thyroid function testing and electrolytes.    Vitals:  Vitals:    01/02/20 2330 01/03/20 0022 01/03/20 0228 01/03/20 0434   BP: (!) 145/77   (!) 169/80   BP Location: Left arm   Left arm   Patient Position: Lying   Sitting   Pulse: (!) 117 (!) 129 109 (!) 121   Resp: 18 18  18   Temp: 98 °F (36.7 °C)   98.2 °F (36.8 °C)   TempSrc: Oral   Oral   SpO2: 96% 95%  (!) 94%   Weight:       Height:         General:  Restless.  Moderately uncomfortable appearing.  Chest/lungs:  Increased work of breathing.  Shortness of breath at rest.  CV:  Rapid irregularly irregular heart rate and rhythm. S1-S2 present  Extremities:  Warm to touch.  Capillary refill less than 2 sec.    Current Medications:  Scheduled Meds:   apixaban  5 mg Oral BID    cefTRIAXone (ROCEPHIN) IVPB  1 g Intravenous Q24H    escitalopram oxalate  10 mg Oral Daily    levalbuterol  1.25 mg Nebulization Q8H    levothyroxine  137 mcg Oral Before breakfast    metoprolol tartrate  50 mg Oral BID    miconazole NITRATE 2 %   Topical (Top) BID    simvastatin  40 mg Oral QHS     Continuous Infusions:  PRN Meds:.acetaminophen, dextrose 50%, dextrose 50%, glucagon (human recombinant), glucose, glucose, insulin aspart U-100, melatonin, metoprolol, ondansetron, promethazine (PHENERGAN) IVPB, senna-docusate 8.6-50 mg    Lab Results:     CBC:  Recent Labs   Lab 01/01/20  0051 01/01/20  0431 01/02/20  0402   WBC 11.32 15.95* 8.25   HGB 10.5* 11.3*  9.5*    178 129*   MCV 90 88 88   MCH 27.1 26.7* 26.4*   MCHC 30.1* 30.5* 30.2*   RBC 3.87* 4.24 3.60*       CMP:  Recent Labs   Lab 12/31/19 2142 01/01/20  0431  01/01/20  1745 01/01/20  2330 01/02/20  0401 01/02/20  0402 01/02/20  0820   *   < > 127*   < > 128* 131* 131* 129* 129*   K 6.6*   < > 5.5*   < > 5.6* 5.9* 5.2* 5.1 4.6   CL 91*   < > 93*   < > 94* 95 96 96 96   CO2 20*   < > 21*   < > 24 26 27 22* 23   BUN 28*   < > 29*   < > 30* 31* 32* 31* 31*   CREATININE 2.0*   < > 2.1*   < > 2.0* 1.8* 1.7* 1.7* 1.5*   CALCIUM 8.4*   < > 9.1   < > 8.1* 8.5* 8.1* 8.3* 7.8*   MG 2.1  --  1.8  --   --   --  1.6  --   --    PHOS 7.6*  --  4.9*  --   --   --  4.3  --   --    PROT 6.4  --  7.7  --   --   --  6.2  --   --    BILITOT 1.4*  --  1.0  --   --   --  0.7  --   --    ALKPHOS 127  --  156*  --   --   --  135  --   --    *  --  1,759*  --   --   --  1,446*  --   --    *  --  2,372*  --   --   --  1,423*  --   --     < > = values in this interval not displayed.     Coagulantion studies  Recent Labs   Lab 01/01/20 0051 01/02/20  0402   INR 1.4* 1.3*     Cardiac Enzymes  Recent Labs   Lab 12/31/19 2142 01/01/20 0051   TROPONINI 0.008 0.038*     No results for input(s): LACTATE in the last 168 hours.      Consults:  IP CONSULT TO NEPHROLOGY     Assessment and Plan:    Active Hospital Problems    Diagnosis  POA    *Symptomatic bradycardia [R00.1]  Yes    Acute renal failure [N17.9]  Yes    Anemia of chronic disease [D63.8]  Yes     Chronic    Dementia without behavioral disturbance [F03.90]  Yes     Chronic    Mixed hyperlipidemia [E78.2]  Yes     Chronic    Chronic atrial fibrillation [I48.20]  Yes     Chronic    Essential hypertension [I10]  Yes     Chronic    Chronic anticoagulation [Z79.01]  Not Applicable     Chronic    Acquired hypothyroidism [E03.9]  Yes     Chronic      Resolved Hospital Problems    Diagnosis Date Resolved POA    Hyperkalemia [E87.5] 01/02/2020 Yes        Labs were reviewed.  Imaging was reviewed.  Problem listed reviewed and updated where needed.    Additional orders placed:    1. Lopressor IV  2. Magnesium sulfate 1 g IV  3. TSH, free T3, free T4  4. May need transfer to ICU and amiodarone infusion  5. 2D echocardiogram  6. Continue metoprolol tartrate  7. Continue apixaban    Ede Sim MD, MPH  [facility]  Pager: [phone]    Critical care was given for Vashti Muñoz who has a condition that poses threat to life and bodily function, including:  Atrial fibrillation rapid ventricular response     Critical care was time spent personally by me on the following activities: development of treatment plan with patient or surrogate and bedside caregivers, discussions with consultants, evaluation of patient's response to treatment, examination of patient, ordering and performing treatments and interventions, ordering and review of laboratory studies, ordering and review of radiographic studies, pulse oximetry, re-evaluation of patient's condition. This critical care time did not overlap with that of any other provider or involve time for any procedures.    Reviewed: previous chart and vitals  Reviewed previous: labs, x-ray, EKG  Interpretation: labs, EKG    Total Critical Care time: 35 minutes. This excludes time spent performing separately reportable procedures and services.  Counseling/Conference Time: 15 minutes

## 2020-01-03 NOTE — NURSING
Report given to oncoming nurse Klaudia RN, Patient is awake and alert. Bed is in lowest position, wheels locked, call light is in reach. Family at the bedside. 12 hour chart check completed

## 2020-01-03 NOTE — PROGRESS NOTES
"[facility] Hospital Medicine  Progress Note    Patient Name: Vashti Muñoz  MRN: [id number]  Patient Class: IP- Inpatient   Admission Date: 12/31/2019  Length of Stay: 2 days  Attending Physician: Harmeet Hollingsworth MD  Primary Care Provider: Alesha Baum MD        Subjective:     Principal Problem:Symptomatic bradycardia        HPI:  Per Dr. Makenzie Tena:    "Mrs. Vashti Muñoz is a 87 y.o. female [facility] resident with essential hypertension, hyperlipidemia (LDL unknown), chronic atrial fibrillation ([id number] score 4) on chronic anticoagulation, hypothyroidism (TSH 0.452 Sept 2018), anemia chronic disease, and dementia who presents to [facility] ED with complaints of altered mental status today.  She was noted to be bradycardic for which EMS was activated.  She was last seen in her normal state of health at around 4:30 PM today.  She was given atropine with positive response in her heart rate and thereafter transported to this facility.  She was then started on transcutaneous pacing with improvement of her mentation.  She was brought to the Catheterization Lab and was placed a transvenous pacer per Cardiology.  Further history is otherwise limited at this time."    Overview/Hospital Course:  Patient is an 87-year-old woman with hypertension, dyslipidemia, chronic atrial fibrillation on apixaban, mild dementia, nursing home resident, who  was admitted to the intensive care unit with symptomatic bradycardia with pulse of 41 beats per minute with evidence of systemic hypoperfusion with metabolic encephalopathy and acute renal failure with hyperkalemia.  Patient treated with percutaneous pacing along continuous dopamine infusion.  Cardiology consulted and placed a temporary transvenous pacemaker with improvement in her pulse.  Mental status improved with increased pulse.  Patient also challenged with intravenous fluids with improvement  hyperkalemia  and renal function.  Suspect patient suffered " acute tubular necrosis secondary to systemic hypoperfusion.   Patient also treated with antibiotics for possible urinary tract infection.  Elevated liver function tests likely secondary from shock liver due to systemic hypoperfusion.   Liver enzymes improving.    Interval History:  Renal function improving.  No further bradycardia.    Review of Systems   Constitutional: Negative for chills and fever.   Respiratory: Negative for shortness of breath and wheezing.    Cardiovascular: Negative for chest pain.   Gastrointestinal: Negative for abdominal distention, abdominal pain, constipation, diarrhea, nausea and vomiting.   Genitourinary: Negative for dysuria and frequency.   Musculoskeletal: Negative for arthralgias and myalgias.   Neurological: Negative for light-headedness.   Psychiatric/Behavioral: Negative for agitation and confusion.     Objective:     Vital Signs (Most Recent):  Temp: 98.4 °F (36.9 °C) (01/02/20 2015)  Pulse: (!) 113 (01/02/20 2024)  Resp: 18 (01/02/20 2024)  BP: 131/88 (01/02/20 2015)  SpO2: 96 % (01/02/20 2024) Vital Signs (24h Range):  Temp:  [97.2 °F (36.2 °C)-98.4 °F (36.9 °C)] 98.4 °F (36.9 °C)  Pulse:  [] 113  Resp:  [11-27] 18  SpO2:  [90 %-100 %] 96 %  BP: ()/(51-88) 131/88     Weight: 102.5 kg (226 lb)  Body mass index is 41.34 kg/m².    Intake/Output Summary (Last 24 hours) at 1/2/2020 2058  Last data filed at 1/2/2020 1744  Gross per 24 hour   Intake 1059.63 ml   Output 920 ml   Net 139.63 ml      Physical Exam   Constitutional: She is oriented to person, place, and time. She appears well-developed and well-nourished. No distress.   HENT:   Head: Atraumatic.   Eyes: Conjunctivae are normal.   Neck: Neck supple.   Cardiovascular: Normal heart sounds.   No murmur heard.  Irregular, pulse of 110 beats per minute.   Pulmonary/Chest: Effort normal and breath sounds normal. She has no wheezes.   Abdominal: Soft. Bowel sounds are normal. She exhibits no distension. There is no  tenderness.   Genitourinary:   Genitourinary Comments: Salazar catheter with good urine output.   Musculoskeletal: Normal range of motion. She exhibits no edema or deformity.   Neurological: She is alert and oriented to person, place, and time.       Significant Labs: All pertinent labs within the past 24 hours have been reviewed.    Significant Imaging: I have reviewed all pertinent imaging results/findings within the past 24 hours.      Assessment/Plan:      * Symptomatic bradycardia  Resolved.  Dopamine discontinued.  Now with atrial fibration with rate of 110 beats per minute.  Slowly restart beta-blocker therapy.    Acute renal failure  Likely ATN from systemic hypoperfusion from bradycardia.  Improving.    Chronic atrial fibrillation  Restart beta-blocker therapy today and will slowly titrate.    Dementia without behavioral disturbance  Stable; will continue her home regimen of memantine.    Anemia of chronic disease  The patient's H/H is stable and consistent with previous laboratory measurements, and the patient exhibits no signs or symptoms of acute bleeding; there is no indication for transfusion.  Will continue to monitor.    Mixed hyperlipidemia  Will continue her home regimen of simvastatin.    Essential hypertension  Reasonably controlled with current regimen.  Will continue with current regimen and continue to monitor.    Chronic anticoagulation  As addressed above; will continue her home regimen of apixaban.    Acquired hypothyroidism  Well controlled; will continue her home regimen of levothyroxine.    VTE Risk Mitigation (From admission, onward)         Ordered     apixaban tablet 5 mg  2 times daily      01/01/20 0922     IP VTE HIGH RISK PATIENT  Once      01/01/20 0141                      Harmeet Hollingsworth MD  Department of Hospital Medicine   [facility]

## 2020-01-03 NOTE — SUBJECTIVE & OBJECTIVE
Interval History:  Renal function improving.  No further bradycardia.    Review of Systems   Constitutional: Negative for chills and fever.   Respiratory: Negative for shortness of breath and wheezing.    Cardiovascular: Negative for chest pain.   Gastrointestinal: Negative for abdominal distention, abdominal pain, constipation, diarrhea, nausea and vomiting.   Genitourinary: Negative for dysuria and frequency.   Musculoskeletal: Negative for arthralgias and myalgias.   Neurological: Negative for light-headedness.   Psychiatric/Behavioral: Negative for agitation and confusion.     Objective:     Vital Signs (Most Recent):  Temp: 98.4 °F (36.9 °C) (01/02/20 2015)  Pulse: (!) 113 (01/02/20 2024)  Resp: 18 (01/02/20 2024)  BP: 131/88 (01/02/20 2015)  SpO2: 96 % (01/02/20 2024) Vital Signs (24h Range):  Temp:  [97.2 °F (36.2 °C)-98.4 °F (36.9 °C)] 98.4 °F (36.9 °C)  Pulse:  [] 113  Resp:  [11-27] 18  SpO2:  [90 %-100 %] 96 %  BP: ()/(51-88) 131/88     Weight: 102.5 kg (226 lb)  Body mass index is 41.34 kg/m².    Intake/Output Summary (Last 24 hours) at 1/2/2020 2058  Last data filed at 1/2/2020 1744  Gross per 24 hour   Intake 1059.63 ml   Output 920 ml   Net 139.63 ml      Physical Exam   Constitutional: She is oriented to person, place, and time. She appears well-developed and well-nourished. No distress.   HENT:   Head: Atraumatic.   Eyes: Conjunctivae are normal.   Neck: Neck supple.   Cardiovascular: Normal heart sounds.   No murmur heard.  Irregular, pulse of 110 beats per minute.   Pulmonary/Chest: Effort normal and breath sounds normal. She has no wheezes.   Abdominal: Soft. Bowel sounds are normal. She exhibits no distension. There is no tenderness.   Genitourinary:   Genitourinary Comments: Salazar catheter with good urine output.   Musculoskeletal: Normal range of motion. She exhibits no edema or deformity.   Neurological: She is alert and oriented to person, place, and time.       Significant Labs:  All pertinent labs within the past 24 hours have been reviewed.    Significant Imaging: I have reviewed all pertinent imaging results/findings within the past 24 hours.

## 2020-01-03 NOTE — EICU
Readmit to EICU and WB.  Camera rounding completed.  Pt lying quietly.  No acute distress noted.  Dr. Stephenson notified of admission.

## 2020-01-03 NOTE — CARE UPDATE
[facility]  ICU Multidisciplinary Bedside Rounds     UPDATE     Date: 1/3/2020      Plan of care reviewed with the following, Nurse, Charge Nurse, Physician, Pulm CC, , Resp. Therapist, Infection Prevention, Dietician and .       Needs/ Goals for the day: Pt currently on amiodarone gtt. Bolus not given today. Cardiology signed off 1/1, re-consult prn. Pt with [facility] prior to admit. Goals of care discussion today. Pt wheezing with exertion, lasix given, xray resulted, breathing treatments ordered.       Level of Care: Critical Care

## 2020-01-03 NOTE — PROGRESS NOTES
1000 Informed MD Prejeant of new urine culture results. Md will place orders as needed.  1330 Notified MD Prejeant of pt's /122. New orders received. Family and pt updated.

## 2020-01-03 NOTE — HOSPITAL COURSE
Ms. Muñoz was admitted to the ICU with symptomatic bradycardia with pulse of 41 beats per minute with evidence of systemic hypoperfusion with metabolic encephalopathy and acute renal failure with hyperkalemia.  Patient treated with percutaneous pacing along with continuous dopamine infusion.  Cardiology consulted and placed a temporary transvenous pacemaker with improvement in her pulse.  Mental status improved with increased pulse.  Patient also challenged with intravenous fluids with improvement of hyperkalemia and renal function.  Suspect patient suffered acute tubular necrosis secondary to systemic hypoperfusion.  Patient also treated with antibiotics for possible urinary tract infection.  Elevated liver function tests likely secondary from shock liver due to systemic hypoperfusion.  Liver enzymes improved. She was moved out the ICU and was on the floor when she developed Afib with RVR.  Transferred back to ICU and amiodarone bolus dose given with continuous rate. Cardizem and lopressor resumed. Pt appears anxious and possibly withdrawal from ativan. Medication resumed PRN for anxiety. On 1/5/20, amiodarone infusion stopped and patient was stepped down to floor.  BP improved and HR better controlled with increased metoprolol to 100 mg BID. Cardizem stopped. PT/OT consulted. Completed 7 days of ceftriaxone for urinary tract infection (UCx with pansensitive E coli (> 100,000 cfu/mL) and Klebsiella pneumoniae (10,000-49,999 cfu/mL). On 1/7 patient again with AFib with RVR despite increased dose of metoprolol. Metoprolol increased to 200 mg BID (home dose) and diltiazem added back slowly as blood pressure not elevated. Rate controlled with mentioned dose of metoprolol and diltiazem 180 mg daily. Lisinopril stopped as blood pressure would not tolerate. Patient discharged to [facility] SNF in stable condition. Cardiac diet. Activity as tolerated. Plan discussed with patient and daughter at bedside.

## 2020-01-03 NOTE — PROGRESS NOTES
Pt transferred to ICU from telemetry floor per MD Sim to be placed on continuous amiodarone infusion for A-fib with RVR.  Pt is alert and oriented to self and place, follows commands.  Denies SOB, chest pain, or nausea.  Afib per monitor with -120s, BP stable.      0645 - Pt complaining of SOB.  O2 sat 96% on 3 L nasal cannula.  Audible wheezing.  Respiratory therapist notified, will bring 0800 xoponex treatment.  MD Sim also at bedside to assess.  New orders received.

## 2020-01-03 NOTE — ASSESSMENT & PLAN NOTE
Resolved.  Dopamine discontinued.  Now with atrial fibration with rate of 110 beats per minute.  Slowly restart beta-blocker therapy.

## 2020-01-03 NOTE — PLAN OF CARE
Problem: Fall Injury Risk  Goal: Absence of Fall and Fall-Related Injury  Outcome: Ongoing, Progressing     Problem: Adult Inpatient Plan of Care  Goal: Patient-Specific Goal (Individualization)  Outcome: Ongoing, Progressing     Problem: Diabetes Comorbidity  Goal: Blood Glucose Level Within Desired Range  Outcome: Ongoing, Progressing     Problem: Infection  Goal: Infection Symptom Resolution  Outcome: Ongoing, Progressing     Problem: Skin Injury Risk Increased  Goal: Skin Health and Integrity  Outcome: Ongoing, Progressing     Pt with no injuries this shift. Pt with sustained HR in 120-140s afib with RVR. Medications given per MD order for HR and nausea. Bed in locked and low position with bed alarm set and call bell within reach, will continue with current plan of care.

## 2020-01-03 NOTE — PLAN OF CARE
Problem: Fall Injury Risk  Goal: Absence of Fall and Fall-Related Injury  Intervention: Identify and Manage Contributors to Fall Injury Risk  Flowsheets (Taken 1/2/2020 1833)  Self-Care Promotion: independence encouraged; BADL personal objects within reach; meal setup provided; BADL personal routines maintained  Medication Review/Management: medications reviewed  Intervention: Promote Injury-Free Environment  Flowsheets (Taken 1/2/2020 1833)  Safety Promotion/Fall Prevention: assistive device/personal item within reach; bed alarm set; nonskid shoes/socks when out of bed; instructed to call staff for mobility; side rails raised x 3  Environmental Safety Modification: assistive device/personal items within reach; clutter free environment maintained; room near unit station; room organization consistent

## 2020-01-04 LAB
ALBUMIN SERPL BCP-MCNC: 3.2 G/DL (ref 3.5–5.2)
ALP SERPL-CCNC: 139 U/L (ref 55–135)
ALT SERPL W/O P-5'-P-CCNC: 650 U/L (ref 10–44)
ANION GAP SERPL CALC-SCNC: 10 MMOL/L (ref 8–16)
APTT BLDCRRT: 31.3 SEC (ref 21–32)
AST SERPL-CCNC: 146 U/L (ref 10–40)
BASOPHILS # BLD AUTO: 0.01 K/UL (ref 0–0.2)
BASOPHILS NFR BLD: 0.1 % (ref 0–1.9)
BILIRUB SERPL-MCNC: 0.8 MG/DL (ref 0.1–1)
BUN SERPL-MCNC: 19 MG/DL (ref 8–23)
CALCIUM SERPL-MCNC: 8.8 MG/DL (ref 8.7–10.5)
CHLORIDE SERPL-SCNC: 88 MMOL/L (ref 95–110)
CK SERPL-CCNC: 60 U/L (ref 20–180)
CO2 SERPL-SCNC: 34 MMOL/L (ref 23–29)
CREAT SERPL-MCNC: 0.9 MG/DL (ref 0.5–1.4)
DIFFERENTIAL METHOD: ABNORMAL
EOSINOPHIL # BLD AUTO: 0 K/UL (ref 0–0.5)
EOSINOPHIL NFR BLD: 0 % (ref 0–8)
ERYTHROCYTE [DISTWIDTH] IN BLOOD BY AUTOMATED COUNT: 16 % (ref 11.5–14.5)
EST. GFR  (AFRICAN AMERICAN): >60 ML/MIN/1.73 M^2
EST. GFR  (NON AFRICAN AMERICAN): 58 ML/MIN/1.73 M^2
GLUCOSE SERPL-MCNC: 172 MG/DL (ref 70–110)
HCT VFR BLD AUTO: 33.1 % (ref 37–48.5)
HGB BLD-MCNC: 10.4 G/DL (ref 12–16)
IMM GRANULOCYTES # BLD AUTO: 0.07 K/UL (ref 0–0.04)
IMM GRANULOCYTES NFR BLD AUTO: 0.8 % (ref 0–0.5)
INR PPP: 1.1 (ref 0.8–1.2)
LYMPHOCYTES # BLD AUTO: 0.4 K/UL (ref 1–4.8)
LYMPHOCYTES NFR BLD: 4.7 % (ref 18–48)
MAGNESIUM SERPL-MCNC: 1.3 MG/DL (ref 1.6–2.6)
MCH RBC QN AUTO: 26.7 PG (ref 27–31)
MCHC RBC AUTO-ENTMCNC: 31.4 G/DL (ref 32–36)
MCV RBC AUTO: 85 FL (ref 82–98)
MONOCYTES # BLD AUTO: 0.6 K/UL (ref 0.3–1)
MONOCYTES NFR BLD: 6.6 % (ref 4–15)
NEUTROPHILS # BLD AUTO: 7.9 K/UL (ref 1.8–7.7)
NEUTROPHILS NFR BLD: 87.8 % (ref 38–73)
NRBC BLD-RTO: 0 /100 WBC
PHOSPHATE SERPL-MCNC: 2 MG/DL (ref 2.7–4.5)
PLATELET # BLD AUTO: 126 K/UL (ref 150–350)
PMV BLD AUTO: 10.5 FL (ref 9.2–12.9)
POCT GLUCOSE: 143 MG/DL (ref 70–110)
POCT GLUCOSE: 153 MG/DL (ref 70–110)
POCT GLUCOSE: 160 MG/DL (ref 70–110)
POCT GLUCOSE: 174 MG/DL (ref 70–110)
POTASSIUM SERPL-SCNC: 4.4 MMOL/L (ref 3.5–5.1)
PROT SERPL-MCNC: 6.2 G/DL (ref 6–8.4)
PROTHROMBIN TIME: 11.3 SEC (ref 9–12.5)
RBC # BLD AUTO: 3.89 M/UL (ref 4–5.4)
SODIUM SERPL-SCNC: 132 MMOL/L (ref 136–145)
WBC # BLD AUTO: 8.99 K/UL (ref 3.9–12.7)

## 2020-01-04 PROCEDURE — 94640 AIRWAY INHALATION TREATMENT: CPT

## 2020-01-04 PROCEDURE — 25000242 PHARM REV CODE 250 ALT 637 W/ HCPCS: Performed by: HOSPITALIST

## 2020-01-04 PROCEDURE — S0166 INJ OLANZAPINE 2.5MG: HCPCS | Performed by: HOSPITALIST

## 2020-01-04 PROCEDURE — 85610 PROTHROMBIN TIME: CPT

## 2020-01-04 PROCEDURE — 27000221 HC OXYGEN, UP TO 24 HOURS

## 2020-01-04 PROCEDURE — 25000003 PHARM REV CODE 250: Performed by: HOSPITALIST

## 2020-01-04 PROCEDURE — 36415 COLL VENOUS BLD VENIPUNCTURE: CPT

## 2020-01-04 PROCEDURE — 63600175 PHARM REV CODE 636 W HCPCS: Performed by: HOSPITALIST

## 2020-01-04 PROCEDURE — 94761 N-INVAS EAR/PLS OXIMETRY MLT: CPT

## 2020-01-04 PROCEDURE — 20000000 HC ICU ROOM

## 2020-01-04 PROCEDURE — 84100 ASSAY OF PHOSPHORUS: CPT

## 2020-01-04 PROCEDURE — 80053 COMPREHEN METABOLIC PANEL: CPT

## 2020-01-04 PROCEDURE — 82550 ASSAY OF CK (CPK): CPT

## 2020-01-04 PROCEDURE — 85025 COMPLETE CBC W/AUTO DIFF WBC: CPT

## 2020-01-04 PROCEDURE — 83735 ASSAY OF MAGNESIUM: CPT

## 2020-01-04 PROCEDURE — 85730 THROMBOPLASTIN TIME PARTIAL: CPT

## 2020-01-04 RX ORDER — FUROSEMIDE 10 MG/ML
20 INJECTION INTRAMUSCULAR; INTRAVENOUS 2 TIMES DAILY
Status: DISCONTINUED | OUTPATIENT
Start: 2020-01-04 | End: 2020-01-05

## 2020-01-04 RX ORDER — LISINOPRIL 20 MG/1
40 TABLET ORAL DAILY
Status: DISCONTINUED | OUTPATIENT
Start: 2020-01-04 | End: 2020-01-07

## 2020-01-04 RX ORDER — OLANZAPINE 10 MG/2ML
5 INJECTION, POWDER, FOR SOLUTION INTRAMUSCULAR ONCE AS NEEDED
Status: COMPLETED | OUTPATIENT
Start: 2020-01-04 | End: 2020-01-04

## 2020-01-04 RX ORDER — ACETAMINOPHEN 325 MG/1
650 TABLET ORAL EVERY 6 HOURS PRN
Status: DISCONTINUED | OUTPATIENT
Start: 2020-01-04 | End: 2020-01-09 | Stop reason: HOSPADM

## 2020-01-04 RX ORDER — METOPROLOL TARTRATE 50 MG/1
100 TABLET ORAL 2 TIMES DAILY
Status: DISCONTINUED | OUTPATIENT
Start: 2020-01-04 | End: 2020-01-07

## 2020-01-04 RX ADMIN — AMIODARONE HYDROCHLORIDE 0.5 MG/MIN: 1.8 INJECTION, SOLUTION INTRAVENOUS at 12:01

## 2020-01-04 RX ADMIN — Medication: at 08:01

## 2020-01-04 RX ADMIN — FUROSEMIDE 20 MG: 10 INJECTION, SOLUTION INTRAVENOUS at 04:01

## 2020-01-04 RX ADMIN — ACETAMINOPHEN 650 MG: 325 TABLET ORAL at 04:01

## 2020-01-04 RX ADMIN — METOPROLOL TARTRATE 50 MG: 50 TABLET ORAL at 08:01

## 2020-01-04 RX ADMIN — LISINOPRIL 40 MG: 20 TABLET ORAL at 04:01

## 2020-01-04 RX ADMIN — LEVALBUTEROL HYDROCHLORIDE 1.25 MG: 1.25 SOLUTION, CONCENTRATE RESPIRATORY (INHALATION) at 11:01

## 2020-01-04 RX ADMIN — ESCITALOPRAM OXALATE 10 MG: 10 TABLET ORAL at 08:01

## 2020-01-04 RX ADMIN — LEVALBUTEROL HYDROCHLORIDE 1.25 MG: 1.25 SOLUTION, CONCENTRATE RESPIRATORY (INHALATION) at 07:01

## 2020-01-04 RX ADMIN — DILTIAZEM HYDROCHLORIDE 360 MG: 180 CAPSULE, COATED, EXTENDED RELEASE ORAL at 08:01

## 2020-01-04 RX ADMIN — AMIODARONE HYDROCHLORIDE 0.5 MG/MIN: 1.8 INJECTION, SOLUTION INTRAVENOUS at 10:01

## 2020-01-04 RX ADMIN — Medication 9 MG: at 09:01

## 2020-01-04 RX ADMIN — APIXABAN 5 MG: 5 TABLET, FILM COATED ORAL at 08:01

## 2020-01-04 RX ADMIN — SIMVASTATIN 40 MG: 40 TABLET, FILM COATED ORAL at 08:01

## 2020-01-04 RX ADMIN — LEVALBUTEROL HYDROCHLORIDE 1.25 MG: 1.25 SOLUTION, CONCENTRATE RESPIRATORY (INHALATION) at 04:01

## 2020-01-04 RX ADMIN — LABETALOL HYDROCHLORIDE 100 MG: 5 INJECTION INTRAVENOUS at 12:01

## 2020-01-04 RX ADMIN — AMIODARONE HYDROCHLORIDE 0.5 MG/MIN: 1.8 INJECTION, SOLUTION INTRAVENOUS at 11:01

## 2020-01-04 RX ADMIN — METOPROLOL TARTRATE 100 MG: 50 TABLET, FILM COATED ORAL at 08:01

## 2020-01-04 RX ADMIN — LEVOTHYROXINE SODIUM 137 MCG: 25 TABLET ORAL at 06:01

## 2020-01-04 RX ADMIN — OLANZAPINE 5 MG: 10 INJECTION, POWDER, FOR SOLUTION INTRAMUSCULAR at 12:01

## 2020-01-04 RX ADMIN — CEFTRIAXONE 1 G: 1 INJECTION, SOLUTION INTRAVENOUS at 12:01

## 2020-01-04 NOTE — PLAN OF CARE
Intermittent confusion continues side rails up bed in low position for pt safety even though has made no attempt to get OOB.shift went okay if pt has her way.

## 2020-01-04 NOTE — ASSESSMENT & PLAN NOTE
Likely ATN from systemic hypoperfusion from bradycardia.  Improving.    Ok to resume acei  Add diuretic

## 2020-01-04 NOTE — PROGRESS NOTES
"[facility] Hospital Medicine  Progress Note    Patient Name: Vashti Muñoz  MRN: [id number]  Patient Class: IP- Inpatient   Admission Date: 12/31/2019  Length of Stay: 4 days  Attending Physician: Anne Marie Kidd MD  Primary Care Provider: Alesha Baum MD        Subjective:     Principal Problem:Chronic atrial fibrillation with rapid ventricular response        HPI:  Per Dr. Makenzie Tena:    "Mrs. Vashti Muñoz is a 87 y.o. female [facility] resident with essential hypertension, hyperlipidemia (LDL unknown), chronic atrial fibrillation (PAR1LW0-MBBd score 4) on chronic anticoagulation, hypothyroidism (TSH 0.452 Sept 2018), anemia chronic disease, and dementia who presents to [facility] ED with complaints of altered mental status today.  She was noted to be bradycardic for which EMS was activated.  She was last seen in her normal state of health at around 4:30 PM today.  She was given atropine with positive response in her heart rate and thereafter transported to this facility.  She was then started on transcutaneous pacing with improvement of her mentation.  She was brought to the Catheterization Lab and was placed a transvenous pacer per Cardiology.  Further history is otherwise limited at this time."    Overview/Hospital Course:  Patient is an 87-year-old woman with hypertension, dyslipidemia, chronic atrial fibrillation on apixaban, mild dementia, nursing home resident, who  was admitted to the intensive care unit with symptomatic bradycardia with pulse of 41 beats per minute with evidence of systemic hypoperfusion with metabolic encephalopathy and acute renal failure with hyperkalemia.  Patient treated with percutaneous pacing along continuous dopamine infusion.  Cardiology consulted and placed a temporary transvenous pacemaker with improvement in her pulse.  Mental status improved with increased pulse.  Patient also challenged with intravenous fluids with improvement  hyperkalemia  and renal " function.  Suspect patient suffered acute tubular necrosis secondary to systemic hypoperfusion.   Patient also treated with antibiotics for possible urinary tract infection.  Elevated liver function tests likely secondary from shock liver due to systemic hypoperfusion.   Liver enzymes improving.    Pt transferred from floor with afib rvr. Amiodarone bolus dose given with continuous rate. cardizem and lopressor resumed. Pt appears anxious and possibly withdrawal from ativan. Medication resumed.     HR still 100-110s.  BP elevated. Increase lopressor 100mg BID. Resume lisinopril. Add lasix IV BID      Interval History: pt has no new complaints     Review of Systems   Constitutional: Negative for chills and fever.   Respiratory: Negative for shortness of breath and wheezing.    Cardiovascular: Negative for chest pain.   Gastrointestinal: Negative for abdominal distention, abdominal pain, constipation, diarrhea, nausea and vomiting.   Genitourinary: Negative for dysuria and frequency.   Musculoskeletal: Negative for arthralgias and myalgias.   Neurological: Negative for light-headedness.   Psychiatric/Behavioral: Negative for agitation and confusion.     Objective:     Vital Signs (Most Recent):  Temp: 98.6 °F (37 °C) (01/04/20 1200)  Pulse: (!) 121 (01/04/20 1605)  Resp: 16 (01/04/20 1605)  BP: (!) 165/93 (01/04/20 1500)  SpO2: 99 % (01/04/20 1605) Vital Signs (24h Range):  Temp:  [98.3 °F (36.8 °C)-98.6 °F (37 °C)] 98.6 °F (37 °C)  Pulse:  [] 121  Resp:  [16-28] 16  SpO2:  [96 %-100 %] 99 %  BP: (133-213)/() 165/93     Weight: 97.2 kg (214 lb 4.6 oz)  Body mass index is 39.19 kg/m².    Intake/Output Summary (Last 24 hours) at 1/4/2020 1615  Last data filed at 1/4/2020 1300  Gross per 24 hour   Intake 920.7 ml   Output 2200 ml   Net -1279.3 ml      Physical Exam   Constitutional: She is oriented to person, place, and time. She appears well-developed and well-nourished. No distress.   HENT:   Head:  Atraumatic.   Eyes: Conjunctivae are normal.   Neck: Neck supple.   Cardiovascular: Normal heart sounds.   No murmur heard.  Irregular, pulse of 110 beats per minute.   Pulmonary/Chest: Effort normal and breath sounds normal. She has no wheezes.   Abdominal: Soft. Bowel sounds are normal. She exhibits no distension. There is no tenderness.   Genitourinary:   Genitourinary Comments: Salazar catheter with good urine output.   Musculoskeletal: Normal range of motion. She exhibits no edema or deformity.   Neurological: She is alert and oriented to person, place, and time.       Significant Labs:   BMP:   Recent Labs   Lab 01/04/20  0417   *   *   K 4.4   CL 88*   CO2 34*   BUN 19   CREATININE 0.9   CALCIUM 8.8   MG 1.3*     CBC:   Recent Labs   Lab 01/03/20  0503 01/04/20  0418   WBC 8.70 8.99   HGB 10.5* 10.4*   HCT 33.7* 33.1*   * 126*       Significant Imaging: I have reviewed and interpreted all pertinent imaging results/findings within the past 24 hours.      Assessment/Plan:      * Chronic atrial fibrillation with rapid ventricular response  Amiodarone bolus and infusion  Lopressor and cardizem given    Lopressor dose increased         Atrial fibrillation with rapid ventricular response    Not rate controlled see above    Dementia without behavioral disturbance  Stable; will continue her home regimen of memantine.    Anemia of chronic disease  The patient's H/H is stable and consistent with previous laboratory measurements, and the patient exhibits no signs or symptoms of acute bleeding; there is no indication for transfusion.  Will continue to monitor.    Acute renal failure  Likely ATN from systemic hypoperfusion from bradycardia.  Improving.    Ok to resume acei  Add diuretic     Symptomatic bradycardia  Resolved.  Dopamine discontinued.  Now with atrial fibration with rate of 110 beats per minute.  Slowly restart beta-blocker therapy.    Mixed hyperlipidemia  Will continue her home regimen of  simvastatin.    Essential hypertension  Reasonably controlled with current regimen.  Will continue with current regimen and continue to monitor.    Chronic anticoagulation  As addressed above; will continue her home regimen of apixaban.    Acquired hypothyroidism  Well controlled; will continue her home regimen of levothyroxine.    Chronic atrial fibrillation  Restart beta-blocker therapy today and will slowly titrate.      VTE Risk Mitigation (From admission, onward)         Ordered     apixaban tablet 5 mg  2 times daily      01/01/20 0922     IP VTE HIGH RISK PATIENT  Once      01/01/20 0141                Critical care time spent on the evaluation and treatment of severe organ dysfunction, review of pertinent labs and imaging studies, discussions with consulting providers and discussions with patient/family: 30 minutes.      Anne Marie Kidd MD  Department of Hospital Medicine   [facility]

## 2020-01-04 NOTE — NURSING
[facility]  ICU Multidisciplinary Bedside Rounds   SUMMARY     Date: 1/4/2020    Prehospitalization: Nursing Home  Admit Date / LOS : 12/31/2019/ 4 days    Diagnosis: Chronic atrial fibrillation with rapid ventricular response    Consults:        Active: Cardio and Pulm CC       Needed: N/A     Code Status: DNR   Advanced Directive: Received    LDA: PIV and Purewick       Central Lines/Site/Justification:Patient Does Not Have Central Line       Urinary Cath/Order/Justification:Patient Does Not Have Urinary Catheter    Vasopressors/Infusions:    amiodarone in dextrose 5% 0.5 mg/min (01/04/20 0600)          GOALS: Volume/ Hemodynamic: N/A                     RASS: -1  awakes to voice (eye opening/contact) > 10 seconds    CAM ICU: Positive  Pain Management: none       Pain Controlled: not applicable     Rhythm: A-Fib    Respiratory Device: Nasal Cannula                  Most Recent SBT/ SAT: N/A       VTE Prophylaxis: Mechanical  Mobility: Bedrest  Stress Ulcer Prophylaxis: Yes    Dietary: PO  Tolerance: not applicable  /  Advancement: no    Isolation: No active isolations    Restraints: No    Significant Dates:  Post Op Date: N/A  Rescue Date: N/A  Imaging/ Diagnostics: N/A    Noteworthy Labs:  none    CBC/Anemia Labs: Coags:    Recent Labs   Lab 01/02/20  0402 01/03/20  0503 01/04/20  0418   WBC 8.25 8.70 8.99   HGB 9.5* 10.5* 10.4*   HCT 31.5* 33.7* 33.1*   * 138* 126*   MCV 88 85 85   RDW 15.9* 15.9* 16.0*    Recent Labs   Lab 01/02/20  0402 01/03/20  0503 01/04/20  0418   INR 1.3* 1.1 1.1   APTT 33.7* 32.7* 31.3        Chemistries:   Recent Labs   Lab 01/02/20  0401  01/02/20  0820 01/03/20  0503 01/04/20  0417   *   < > 129* 132* 132*   K 5.2*   < > 4.6 4.3 4.4   CL 96   < > 96 94* 88*   CO2 27   < > 23 26 34*   BUN 32*   < > 31* 25* 19   CREATININE 1.7*   < > 1.5* 1.1 0.9   CALCIUM 8.1*   < > 7.8* 8.3* 8.8   PROT 6.2  --   --  6.4 6.2   BILITOT 0.7  --   --  0.8 0.8   ALKPHOS 135   --   --  142* 139*   ALT 1,446*  --   --  990* 650*   AST 1,423*  --   --  438* 146*   MG 1.6  --   --  1.4* 1.3*   PHOS 4.3  --   --  2.7 2.0*    < > = values in this interval not displayed.        Cardiac Enzymes: Ejection Fractions:    Recent Labs     01/04/20  0417   CPK 60    No results found for: EF     POCT Glucose: HbA1c:    Recent Labs   Lab 01/02/20 2020 01/03/20  0628 01/03/20  1205 01/03/20  1739 01/03/20 2052 01/04/20  0635   POCTGLUCOSE 126* 175* 191* 148* 139* 153*    Hemoglobin A1C   Date Value Ref Range Status   01/02/2020 6.3 (H) 4.0 - 5.6 % Final     Comment:     ADA Screening Guidelines:  5.7-6.4%  Consistent with prediabetes  >or=6.5%  Consistent with diabetes  High levels of fetal hemoglobin interfere with the HbA1C  assay. Heterozygous hemoglobin variants (HbS, HgC, etc)do  not significantly interfere with this assay.   However, presence of multiple variants may affect accuracy.     07/05/2018 5.7 (H) 4.8 - 5.6 % Final     Comment:              Pre-diabetes: 5.7 - 6.4           Diabetes: >6.4           Glycemic control for adults with diabetes: <7.0          Needs from Care Team: none     ICU LOS 1d 1h  Level of Care: OK to Transfer

## 2020-01-04 NOTE — PLAN OF CARE
Plan of care reviewed. No falls/injuries this shift. Skin intact. Diet advanced.poor appetite. Goals of care discussed. Blood sugar monitored. Amiodarone infusing. Daughters at bedside and updated. Remains in afib, rate improving.

## 2020-01-04 NOTE — SUBJECTIVE & OBJECTIVE
Interval History: pt has no new complaints     Review of Systems   Constitutional: Negative for chills and fever.   Respiratory: Negative for shortness of breath and wheezing.    Cardiovascular: Negative for chest pain.   Gastrointestinal: Negative for abdominal distention, abdominal pain, constipation, diarrhea, nausea and vomiting.   Genitourinary: Negative for dysuria and frequency.   Musculoskeletal: Negative for arthralgias and myalgias.   Neurological: Negative for light-headedness.   Psychiatric/Behavioral: Negative for agitation and confusion.     Objective:     Vital Signs (Most Recent):  Temp: 98.6 °F (37 °C) (01/04/20 1200)  Pulse: (!) 121 (01/04/20 1605)  Resp: 16 (01/04/20 1605)  BP: (!) 165/93 (01/04/20 1500)  SpO2: 99 % (01/04/20 1605) Vital Signs (24h Range):  Temp:  [98.3 °F (36.8 °C)-98.6 °F (37 °C)] 98.6 °F (37 °C)  Pulse:  [] 121  Resp:  [16-28] 16  SpO2:  [96 %-100 %] 99 %  BP: (133-213)/() 165/93     Weight: 97.2 kg (214 lb 4.6 oz)  Body mass index is 39.19 kg/m².    Intake/Output Summary (Last 24 hours) at 1/4/2020 1615  Last data filed at 1/4/2020 1300  Gross per 24 hour   Intake 920.7 ml   Output 2200 ml   Net -1279.3 ml      Physical Exam   Constitutional: She is oriented to person, place, and time. She appears well-developed and well-nourished. No distress.   HENT:   Head: Atraumatic.   Eyes: Conjunctivae are normal.   Neck: Neck supple.   Cardiovascular: Normal heart sounds.   No murmur heard.  Irregular, pulse of 110 beats per minute.   Pulmonary/Chest: Effort normal and breath sounds normal. She has no wheezes.   Abdominal: Soft. Bowel sounds are normal. She exhibits no distension. There is no tenderness.   Genitourinary:   Genitourinary Comments: Salazar catheter with good urine output.   Musculoskeletal: Normal range of motion. She exhibits no edema or deformity.   Neurological: She is alert and oriented to person, place, and time.       Significant Labs:   BMP:   Recent  Labs   Lab 01/04/20  0417   *   *   K 4.4   CL 88*   CO2 34*   BUN 19   CREATININE 0.9   CALCIUM 8.8   MG 1.3*     CBC:   Recent Labs   Lab 01/03/20  0503 01/04/20  0418   WBC 8.70 8.99   HGB 10.5* 10.4*   HCT 33.7* 33.1*   * 126*       Significant Imaging: I have reviewed and interpreted all pertinent imaging results/findings within the past 24 hours.

## 2020-01-04 NOTE — PLAN OF CARE
Patient sleeping well this morning with vital signs stable except heart rate remains elevated. Patient remains on Cordarone drip at 0.5 mg. Patient less confused and very pleasant and cooperative this am. Ate well for each meal at least half of tray and blood sugars all have been under 150.  Urine output has been much improved and tolerating the pure wick system without difficulty. No falls or injuries this shift. No further skin breakdown this shift. Medications have been reviewed by Dr. Kidd and readjusted. Plan of care reviewed with family and daughter at bedside.

## 2020-01-05 PROBLEM — R00.1 SYMPTOMATIC BRADYCARDIA: Status: ACTIVE | Noted: 2020-01-05

## 2020-01-05 PROBLEM — N17.9 ACUTE RENAL FAILURE: Status: RESOLVED | Noted: 2020-01-01 | Resolved: 2020-01-05

## 2020-01-05 PROBLEM — R00.1 SYMPTOMATIC BRADYCARDIA: Status: RESOLVED | Noted: 2019-12-31 | Resolved: 2020-01-05

## 2020-01-05 PROBLEM — I48.91 ATRIAL FIBRILLATION WITH RAPID VENTRICULAR RESPONSE: Status: RESOLVED | Noted: 2020-01-03 | Resolved: 2020-01-05

## 2020-01-05 LAB
ALBUMIN SERPL BCP-MCNC: 2.9 G/DL (ref 3.5–5.2)
ALP SERPL-CCNC: 110 U/L (ref 55–135)
ALT SERPL W/O P-5'-P-CCNC: 418 U/L (ref 10–44)
ANION GAP SERPL CALC-SCNC: 8 MMOL/L (ref 8–16)
AST SERPL-CCNC: 59 U/L (ref 10–40)
BASOPHILS # BLD AUTO: 0 K/UL (ref 0–0.2)
BASOPHILS NFR BLD: 0 % (ref 0–1.9)
BILIRUB SERPL-MCNC: 0.5 MG/DL (ref 0.1–1)
BUN SERPL-MCNC: 17 MG/DL (ref 8–23)
CALCIUM SERPL-MCNC: 8.4 MG/DL (ref 8.7–10.5)
CHLORIDE SERPL-SCNC: 93 MMOL/L (ref 95–110)
CO2 SERPL-SCNC: 33 MMOL/L (ref 23–29)
CREAT SERPL-MCNC: 0.9 MG/DL (ref 0.5–1.4)
DIFFERENTIAL METHOD: ABNORMAL
EOSINOPHIL # BLD AUTO: 0 K/UL (ref 0–0.5)
EOSINOPHIL NFR BLD: 0.1 % (ref 0–8)
ERYTHROCYTE [DISTWIDTH] IN BLOOD BY AUTOMATED COUNT: 16.1 % (ref 11.5–14.5)
EST. GFR  (AFRICAN AMERICAN): >60 ML/MIN/1.73 M^2
EST. GFR  (NON AFRICAN AMERICAN): 58 ML/MIN/1.73 M^2
GLUCOSE SERPL-MCNC: 127 MG/DL (ref 70–110)
HCT VFR BLD AUTO: 34.7 % (ref 37–48.5)
HGB BLD-MCNC: 10.6 G/DL (ref 12–16)
IMM GRANULOCYTES # BLD AUTO: 0.05 K/UL (ref 0–0.04)
IMM GRANULOCYTES NFR BLD AUTO: 0.7 % (ref 0–0.5)
LYMPHOCYTES # BLD AUTO: 1.3 K/UL (ref 1–4.8)
LYMPHOCYTES NFR BLD: 19.1 % (ref 18–48)
MAGNESIUM SERPL-MCNC: 1.2 MG/DL (ref 1.6–2.6)
MCH RBC QN AUTO: 26.4 PG (ref 27–31)
MCHC RBC AUTO-ENTMCNC: 30.5 G/DL (ref 32–36)
MCV RBC AUTO: 87 FL (ref 82–98)
MONOCYTES # BLD AUTO: 0.9 K/UL (ref 0.3–1)
MONOCYTES NFR BLD: 12.6 % (ref 4–15)
NEUTROPHILS # BLD AUTO: 4.6 K/UL (ref 1.8–7.7)
NEUTROPHILS NFR BLD: 67.5 % (ref 38–73)
NRBC BLD-RTO: 0 /100 WBC
PHOSPHATE SERPL-MCNC: 2.7 MG/DL (ref 2.7–4.5)
PLATELET # BLD AUTO: 148 K/UL (ref 150–350)
PMV BLD AUTO: 11.2 FL (ref 9.2–12.9)
POCT GLUCOSE: 138 MG/DL (ref 70–110)
POCT GLUCOSE: 141 MG/DL (ref 70–110)
POCT GLUCOSE: 151 MG/DL (ref 70–110)
POCT GLUCOSE: 160 MG/DL (ref 70–110)
POTASSIUM SERPL-SCNC: 3.6 MMOL/L (ref 3.5–5.1)
PROT SERPL-MCNC: 5.7 G/DL (ref 6–8.4)
RBC # BLD AUTO: 4.01 M/UL (ref 4–5.4)
SODIUM SERPL-SCNC: 134 MMOL/L (ref 136–145)
T3FREE SERPL-MCNC: <1 PG/ML (ref 2.3–4.2)
WBC # BLD AUTO: 6.82 K/UL (ref 3.9–12.7)

## 2020-01-05 PROCEDURE — 25000003 PHARM REV CODE 250: Performed by: HOSPITALIST

## 2020-01-05 PROCEDURE — 27000221 HC OXYGEN, UP TO 24 HOURS

## 2020-01-05 PROCEDURE — 84100 ASSAY OF PHOSPHORUS: CPT

## 2020-01-05 PROCEDURE — 36415 COLL VENOUS BLD VENIPUNCTURE: CPT

## 2020-01-05 PROCEDURE — 80053 COMPREHEN METABOLIC PANEL: CPT

## 2020-01-05 PROCEDURE — 63600175 PHARM REV CODE 636 W HCPCS: Performed by: HOSPITALIST

## 2020-01-05 PROCEDURE — 83735 ASSAY OF MAGNESIUM: CPT

## 2020-01-05 PROCEDURE — 85025 COMPLETE CBC W/AUTO DIFF WBC: CPT

## 2020-01-05 PROCEDURE — 21400001 HC TELEMETRY ROOM

## 2020-01-05 PROCEDURE — 25000242 PHARM REV CODE 250 ALT 637 W/ HCPCS: Performed by: HOSPITALIST

## 2020-01-05 PROCEDURE — 94640 AIRWAY INHALATION TREATMENT: CPT

## 2020-01-05 RX ORDER — MAGNESIUM SULFATE HEPTAHYDRATE 40 MG/ML
2 INJECTION, SOLUTION INTRAVENOUS ONCE
Status: COMPLETED | OUTPATIENT
Start: 2020-01-05 | End: 2020-01-05

## 2020-01-05 RX ADMIN — ESCITALOPRAM OXALATE 10 MG: 10 TABLET ORAL at 08:01

## 2020-01-05 RX ADMIN — FUROSEMIDE 20 MG: 10 INJECTION, SOLUTION INTRAVENOUS at 08:01

## 2020-01-05 RX ADMIN — Medication: at 09:01

## 2020-01-05 RX ADMIN — METOPROLOL TARTRATE 100 MG: 50 TABLET, FILM COATED ORAL at 09:01

## 2020-01-05 RX ADMIN — APIXABAN 5 MG: 5 TABLET, FILM COATED ORAL at 08:01

## 2020-01-05 RX ADMIN — Medication: at 08:01

## 2020-01-05 RX ADMIN — FUROSEMIDE 20 MG: 10 INJECTION, SOLUTION INTRAVENOUS at 05:01

## 2020-01-05 RX ADMIN — SIMVASTATIN 40 MG: 40 TABLET, FILM COATED ORAL at 09:01

## 2020-01-05 RX ADMIN — LEVOTHYROXINE SODIUM 137 MCG: 25 TABLET ORAL at 05:01

## 2020-01-05 RX ADMIN — Medication 9 MG: at 09:01

## 2020-01-05 RX ADMIN — MAGNESIUM SULFATE 2 G: 2 INJECTION INTRAVENOUS at 08:01

## 2020-01-05 RX ADMIN — LEVALBUTEROL HYDROCHLORIDE 1.25 MG: 1.25 SOLUTION, CONCENTRATE RESPIRATORY (INHALATION) at 11:01

## 2020-01-05 RX ADMIN — CEFTRIAXONE 1 G: 1 INJECTION, SOLUTION INTRAVENOUS at 12:01

## 2020-01-05 RX ADMIN — METOPROLOL TARTRATE 100 MG: 50 TABLET, FILM COATED ORAL at 08:01

## 2020-01-05 RX ADMIN — APIXABAN 5 MG: 5 TABLET, FILM COATED ORAL at 09:01

## 2020-01-05 RX ADMIN — LEVALBUTEROL HYDROCHLORIDE 1.25 MG: 1.25 SOLUTION, CONCENTRATE RESPIRATORY (INHALATION) at 03:01

## 2020-01-05 RX ADMIN — LEVALBUTEROL HYDROCHLORIDE 1.25 MG: 1.25 SOLUTION, CONCENTRATE RESPIRATORY (INHALATION) at 08:01

## 2020-01-05 NOTE — PLAN OF CARE
Patient awake and alert Amio drip off since 630 this am. Patient has chronic afib but rate is now controlled. Vital signs have been stable and patient afebrile. No falls or injuries this shift. No skin breakdown this shift. Patient has orders to transfer to tele floor after 6pm. Blood sugars have been in good control. Plan of care reviewed with patient and daughter at bedside without difficulty.

## 2020-01-05 NOTE — NURSING TRANSFER
Nursing Transfer Note      1/5/2020     Transfer To: Room 311A    Transfer via bed    Transfer with  O2, cardiac monitoring    Transported by Nurse and Transport    Medicines sent: yes  Chart send with patient: Yes  Notified: Daughter reassessed at: 01/05/2020 1600  Upon arrival to floor: cardiac monitor applied, patient oriented to room, call bell in reach and bed in lowest position

## 2020-01-05 NOTE — PLAN OF CARE
Patient calm all night, no anxiety noted. Melatonin given to help patient sleep and it was effective. Patient HR has been in the 60-70 range since taking lopressor 100mg last evening but remains in Atrial fib. Excoriation under abdominal folds improving with medicated powder. Patient was awake for bath but more drowsy than earlier in the shift. Urine output greater than 850. Pure wick in place and also wets blue pad sometimes. Continues on amiodarone 0.5.

## 2020-01-05 NOTE — CARE UPDATE
[facility]  ICU Multidisciplinary Bedside Rounds   SUMMARY     Date: 1/5/2020    Prehospitalization: Nursing Home  Admit Date / LOS : 12/31/2019/ 5 days    Diagnosis: Chronic atrial fibrillation with rapid ventricular response    Consults:        Active: Cardio       Needed: N/A     Code Status: DNR   Advanced Directive: Received    LDA: PIV and Purewick       Central Lines/Site/Justification:Patient Does Not Have Central Line       Urinary Cath/Order/Justification:Patient Does Not Have Urinary Catheter    Vasopressors/Infusions:    amiodarone in dextrose 5% 0.5 mg/min (01/05/20 0400)          GOALS: Volume/ Hemodynamic: N/A                     RASS: 0  alert and calm    CAM ICU: N/A  Pain Management: none       Pain Controlled: not applicable     Rhythm: A-Fib    Respiratory Device: Nasal Cannula                  Most Recent SBT/ SAT: N/A       MOVE Screen: PT Consult    VTE Prophylaxis: Mechanical  Mobility: Bedrest  Stress Ulcer Prophylaxis: Yes    Dietary: PO  Tolerance: yes  /  Advancement: yes    Isolation: No active isolations    Restraints: No    Significant Dates:  Post Op Date: N/A  Rescue Date: N/A  Imaging/ Diagnostics: N/A    Noteworthy Labs:  none    CBC/Anemia Labs: Coags:    Recent Labs   Lab 01/03/20  0503 01/04/20  0418 01/05/20  0404   WBC 8.70 8.99 6.82   HGB 10.5* 10.4* 10.6*   HCT 33.7* 33.1* 34.7*   * 126* 148*   MCV 85 85 87   RDW 15.9* 16.0* 16.1*    Recent Labs   Lab 01/02/20  0402 01/03/20  0503 01/04/20  0418   INR 1.3* 1.1 1.1   APTT 33.7* 32.7* 31.3        Chemistries:   Recent Labs   Lab 01/02/20  0401  01/02/20  0820 01/03/20  0503 01/04/20  0417   *   < > 129* 132* 132*   K 5.2*   < > 4.6 4.3 4.4   CL 96   < > 96 94* 88*   CO2 27   < > 23 26 34*   BUN 32*   < > 31* 25* 19   CREATININE 1.7*   < > 1.5* 1.1 0.9   CALCIUM 8.1*   < > 7.8* 8.3* 8.8   PROT 6.2  --   --  6.4 6.2   BILITOT 0.7  --   --  0.8 0.8   ALKPHOS 135  --   --  142* 139*   ALT 1,446*   --   --  990* 650*   AST 1,423*  --   --  438* 146*   MG 1.6  --   --  1.4* 1.3*   PHOS 4.3  --   --  2.7 2.0*    < > = values in this interval not displayed.        Cardiac Enzymes: Ejection Fractions:    Recent Labs     01/04/20  0417   CPK 60    No results found for: EF     POCT Glucose: HbA1c:    Recent Labs   Lab 01/03/20  1739 01/03/20  2052 01/04/20  0635 01/04/20  1149 01/04/20  1633 01/04/20  2148   POCTGLUCOSE 148* 139* 153* 174* 143* 160*    Hemoglobin A1C   Date Value Ref Range Status   01/02/2020 6.3 (H) 4.0 - 5.6 % Final     Comment:     ADA Screening Guidelines:  5.7-6.4%  Consistent with prediabetes  >or=6.5%  Consistent with diabetes  High levels of fetal hemoglobin interfere with the HbA1C  assay. Heterozygous hemoglobin variants (HbS, HgC, etc)do  not significantly interfere with this assay.   However, presence of multiple variants may affect accuracy.     07/05/2018 5.7 (H) 4.8 - 5.6 % Final     Comment:              Pre-diabetes: 5.7 - 6.4           Diabetes: >6.4           Glycemic control for adults with diabetes: <7.0          Needs from Care Team: Wean amiodarone and should transfer.     ICU LOS 1d 23h  Level of Care: OK to Transfer when amiodarone discontinued.

## 2020-01-06 LAB
ALBUMIN SERPL BCP-MCNC: 2.6 G/DL (ref 3.5–5.2)
ALP SERPL-CCNC: 99 U/L (ref 55–135)
ALT SERPL W/O P-5'-P-CCNC: 272 U/L (ref 10–44)
ANION GAP SERPL CALC-SCNC: 10 MMOL/L (ref 8–16)
AST SERPL-CCNC: 37 U/L (ref 10–40)
BASOPHILS # BLD AUTO: 0 K/UL (ref 0–0.2)
BASOPHILS NFR BLD: 0 % (ref 0–1.9)
BILIRUB SERPL-MCNC: 0.5 MG/DL (ref 0.1–1)
BUN SERPL-MCNC: 17 MG/DL (ref 8–23)
CALCIUM SERPL-MCNC: 8.2 MG/DL (ref 8.7–10.5)
CHLORIDE SERPL-SCNC: 93 MMOL/L (ref 95–110)
CO2 SERPL-SCNC: 33 MMOL/L (ref 23–29)
CREAT SERPL-MCNC: 0.8 MG/DL (ref 0.5–1.4)
DIFFERENTIAL METHOD: ABNORMAL
EOSINOPHIL # BLD AUTO: 0 K/UL (ref 0–0.5)
EOSINOPHIL NFR BLD: 0.2 % (ref 0–8)
ERYTHROCYTE [DISTWIDTH] IN BLOOD BY AUTOMATED COUNT: 16.3 % (ref 11.5–14.5)
EST. GFR  (AFRICAN AMERICAN): >60 ML/MIN/1.73 M^2
EST. GFR  (NON AFRICAN AMERICAN): >60 ML/MIN/1.73 M^2
GLUCOSE SERPL-MCNC: 123 MG/DL (ref 70–110)
HCT VFR BLD AUTO: 40.5 % (ref 37–48.5)
HGB BLD-MCNC: 12.7 G/DL (ref 12–16)
IMM GRANULOCYTES # BLD AUTO: 0.03 K/UL (ref 0–0.04)
IMM GRANULOCYTES NFR BLD AUTO: 0.5 % (ref 0–0.5)
LYMPHOCYTES # BLD AUTO: 0.9 K/UL (ref 1–4.8)
LYMPHOCYTES NFR BLD: 16.7 % (ref 18–48)
MAGNESIUM SERPL-MCNC: 1.5 MG/DL (ref 1.6–2.6)
MCH RBC QN AUTO: 26.9 PG (ref 27–31)
MCHC RBC AUTO-ENTMCNC: 31.4 G/DL (ref 32–36)
MCV RBC AUTO: 86 FL (ref 82–98)
MONOCYTES # BLD AUTO: 0.9 K/UL (ref 0.3–1)
MONOCYTES NFR BLD: 16.5 % (ref 4–15)
NEUTROPHILS # BLD AUTO: 3.6 K/UL (ref 1.8–7.7)
NEUTROPHILS NFR BLD: 66.1 % (ref 38–73)
NRBC BLD-RTO: 0 /100 WBC
PHOSPHATE SERPL-MCNC: 3.6 MG/DL (ref 2.7–4.5)
PLATELET # BLD AUTO: 90 K/UL (ref 150–350)
PMV BLD AUTO: 10.7 FL (ref 9.2–12.9)
POCT GLUCOSE: 122 MG/DL (ref 70–110)
POCT GLUCOSE: 126 MG/DL (ref 70–110)
POCT GLUCOSE: 158 MG/DL (ref 70–110)
POTASSIUM SERPL-SCNC: 3.9 MMOL/L (ref 3.5–5.1)
PROT SERPL-MCNC: 4.8 G/DL (ref 6–8.4)
RBC # BLD AUTO: 4.72 M/UL (ref 4–5.4)
SODIUM SERPL-SCNC: 136 MMOL/L (ref 136–145)
WBC # BLD AUTO: 5.5 K/UL (ref 3.9–12.7)

## 2020-01-06 PROCEDURE — 25000003 PHARM REV CODE 250: Performed by: HOSPITALIST

## 2020-01-06 PROCEDURE — 25000242 PHARM REV CODE 250 ALT 637 W/ HCPCS: Performed by: HOSPITALIST

## 2020-01-06 PROCEDURE — 80053 COMPREHEN METABOLIC PANEL: CPT

## 2020-01-06 PROCEDURE — 21400001 HC TELEMETRY ROOM

## 2020-01-06 PROCEDURE — 94640 AIRWAY INHALATION TREATMENT: CPT

## 2020-01-06 PROCEDURE — 63600175 PHARM REV CODE 636 W HCPCS: Performed by: HOSPITALIST

## 2020-01-06 PROCEDURE — 94761 N-INVAS EAR/PLS OXIMETRY MLT: CPT

## 2020-01-06 PROCEDURE — 97161 PT EVAL LOW COMPLEX 20 MIN: CPT

## 2020-01-06 PROCEDURE — 97166 OT EVAL MOD COMPLEX 45 MIN: CPT

## 2020-01-06 PROCEDURE — 85025 COMPLETE CBC W/AUTO DIFF WBC: CPT

## 2020-01-06 PROCEDURE — 83735 ASSAY OF MAGNESIUM: CPT

## 2020-01-06 PROCEDURE — 97535 SELF CARE MNGMENT TRAINING: CPT

## 2020-01-06 PROCEDURE — 84100 ASSAY OF PHOSPHORUS: CPT

## 2020-01-06 RX ADMIN — LEVALBUTEROL HYDROCHLORIDE 1.25 MG: 1.25 SOLUTION, CONCENTRATE RESPIRATORY (INHALATION) at 07:01

## 2020-01-06 RX ADMIN — APIXABAN 5 MG: 5 TABLET, FILM COATED ORAL at 09:01

## 2020-01-06 RX ADMIN — ESCITALOPRAM OXALATE 10 MG: 10 TABLET ORAL at 09:01

## 2020-01-06 RX ADMIN — LISINOPRIL 40 MG: 20 TABLET ORAL at 09:01

## 2020-01-06 RX ADMIN — ACETAMINOPHEN 650 MG: 325 TABLET ORAL at 04:01

## 2020-01-06 RX ADMIN — Medication: at 09:01

## 2020-01-06 RX ADMIN — LEVOTHYROXINE SODIUM 137 MCG: 25 TABLET ORAL at 05:01

## 2020-01-06 RX ADMIN — LEVALBUTEROL HYDROCHLORIDE 1.25 MG: 1.25 SOLUTION, CONCENTRATE RESPIRATORY (INHALATION) at 03:01

## 2020-01-06 RX ADMIN — ACETAMINOPHEN 650 MG: 325 TABLET ORAL at 05:01

## 2020-01-06 RX ADMIN — METOPROLOL TARTRATE 100 MG: 50 TABLET, FILM COATED ORAL at 09:01

## 2020-01-06 RX ADMIN — LORAZEPAM 0.5 MG: 0.5 TABLET ORAL at 12:01

## 2020-01-06 RX ADMIN — Medication 9 MG: at 09:01

## 2020-01-06 RX ADMIN — Medication: at 04:01

## 2020-01-06 RX ADMIN — CEFTRIAXONE 1 G: 1 INJECTION, SOLUTION INTRAVENOUS at 12:01

## 2020-01-06 RX ADMIN — SIMVASTATIN 40 MG: 40 TABLET, FILM COATED ORAL at 09:01

## 2020-01-06 NOTE — PLAN OF CARE
01/06/20 1601   Discharge Reassessment   Assessment Type Discharge Planning Reassessment   Provided patient/caregiver education on the expected discharge date and the discharge plan Yes   Do you have any problems affording any of your prescribed medications? No   Discharge Plan A Return to nursing home;Skilled Nursing Facility   Discharge Plan B Return to Nursing Home   DME Needed Upon Discharge  other (see comments)  (tbd)   Anticipated Discharge Disposition SNF   Can the patient answer the patient profile reliably? No, cognitively impaired   How does the patient rate their overall health at the present time? Fair   Describe the patient's ability to walk at the present time. Walks with the help of equipment   How often would a person be available to care for the patient? Often   Number of comorbid conditions (as recorded on the chart) Two   During the past month, has the patient often been bothered by feeling down, depressed or hopeless? No   During the past month, has the patient often been bothered by little interest or pleasure in doing things? No   Post-Acute Status   Post-Acute Authorization Placement  (return to [facility])   Patient choice form signed by patient/caregiver   (returning to [facility] snf when returned)   Discharge Delays None known at this time   Selina Cano RN, BSN, STN CCM  1/6/2020

## 2020-01-06 NOTE — PROGRESS NOTES
"[facility] Hospital Medicine  Progress Note    Patient Name: Vashti Muñoz  MRN: [id number]  Patient Class: IP- Inpatient   Admission Date: 12/31/2019  Length of Stay: 5 days  Attending Physician: Anne Marie Kidd MD  Primary Care Provider: Alesha Baum MD        Subjective:     Principal Problem:Chronic atrial fibrillation with rapid ventricular response        HPI:  Per Dr. Makenzie Tena:    "Mrs. Vashti Muñoz is a 87 y.o. female [facility] resident with essential hypertension, hyperlipidemia (LDL unknown), chronic atrial fibrillation ([id number] score 4) on chronic anticoagulation, hypothyroidism (TSH 0.452 Sept 2018), anemia chronic disease, and dementia who presents to [facility] ED with complaints of altered mental status today.  She was noted to be bradycardic for which EMS was activated.  She was last seen in her normal state of health at around 4:30 PM today.  She was given atropine with positive response in her heart rate and thereafter transported to this facility.  She was then started on transcutaneous pacing with improvement of her mentation.  She was brought to the Catheterization Lab and was placed a transvenous pacer per Cardiology.  Further history is otherwise limited at this time."    Overview/Hospital Course:  Patient is an 87-year-old woman with hypertension, dyslipidemia, chronic atrial fibrillation on apixaban, mild dementia, nursing home resident, who  was admitted to the intensive care unit with symptomatic bradycardia with pulse of 41 beats per minute with evidence of systemic hypoperfusion with metabolic encephalopathy and acute renal failure with hyperkalemia.  Patient treated with percutaneous pacing along continuous dopamine infusion.  Cardiology consulted and placed a temporary transvenous pacemaker with improvement in her pulse.  Mental status improved with increased pulse.  Patient also challenged with intravenous fluids with improvement  hyperkalemia  and renal " function.  Suspect patient suffered acute tubular necrosis secondary to systemic hypoperfusion.   Patient also treated with antibiotics for possible urinary tract infection.  Elevated liver function tests likely secondary from shock liver due to systemic hypoperfusion.   Liver enzymes improving.    Pt transferred from floor with afib rvr. Amiodarone bolus dose given with continuous rate. cardizem and lopressor resumed. Pt appears anxious and possibly withdrawal from ativan. Medication resumed.     HR still 100-110s.  BP elevated. Increase lopressor 100mg BID. Resume lisinopril. Add lasix IV BID    1/5- amiodarone infusion dc'd.  BP improved. Ok to transfer to floor. HR better controlled with increased lopressor. DC cardizem. PT/OT consulted for dc planning. Rocephin #5/7 - UTI      Interval History: HR improved     Review of Systems   Constitutional: Negative for chills and fever.   Respiratory: Negative for shortness of breath and wheezing.    Cardiovascular: Negative for chest pain.   Gastrointestinal: Negative for abdominal distention, abdominal pain, constipation, diarrhea, nausea and vomiting.   Genitourinary: Negative for dysuria and frequency.   Musculoskeletal: Negative for arthralgias and myalgias.   Neurological: Negative for light-headedness.   Psychiatric/Behavioral: Negative for agitation and confusion.     Objective:     Vital Signs (Most Recent):  Temp: 98.4 °F (36.9 °C) (01/05/20 1600)  Pulse: (!) 125 (01/05/20 1800)  Resp: 19 (01/05/20 1800)  BP: (!) 157/71 (01/05/20 1800)  SpO2: 100 % (01/05/20 1800) Vital Signs (24h Range):  Temp:  [98 °F (36.7 °C)-98.7 °F (37.1 °C)] 98.4 °F (36.9 °C)  Pulse:  [] 125  Resp:  [15-22] 19  SpO2:  [90 %-100 %] 100 %  BP: ()/() 157/71     Weight: 97.2 kg (214 lb 4.6 oz)  Body mass index is 39.19 kg/m².    Intake/Output Summary (Last 24 hours) at 1/5/2020 1807  Last data filed at 1/5/2020 1800  Gross per 24 hour   Intake 847.1 ml   Output 2150 ml   Net  -1302.9 ml      Physical Exam   Constitutional: She is oriented to person, place, and time. She appears well-developed and well-nourished. No distress.   HENT:   Head: Atraumatic.   Eyes: Conjunctivae are normal.   Neck: Neck supple.   Cardiovascular: Normal heart sounds.   No murmur heard.  Irregular, pulse of 110 beats per minute.   Pulmonary/Chest: Effort normal and breath sounds normal. She has no wheezes.   Abdominal: Soft. Bowel sounds are normal. She exhibits no distension. There is no tenderness.   Genitourinary:   Genitourinary Comments: Salazar catheter with good urine output.   Musculoskeletal: Normal range of motion. She exhibits no edema or deformity.   Neurological: She is alert and oriented to person, place, and time.       Significant Labs: All pertinent labs within the past 24 hours have been reviewed.    Significant Imaging: I have reviewed and interpreted all pertinent imaging results/findings within the past 24 hours.      Assessment/Plan:      * Chronic atrial fibrillation with rapid ventricular response  Amiodarone bolus and infusion  Lopressor and cardizem given    Lopressor dose increased         Dementia without behavioral disturbance  Stable; will continue her home regimen of memantine.    Anemia of chronic disease  The patient's H/H is stable and consistent with previous laboratory measurements, and the patient exhibits no signs or symptoms of acute bleeding; there is no indication for transfusion.  Will continue to monitor.    Mixed hyperlipidemia  Will continue her home regimen of simvastatin.    Essential hypertension  Reasonably controlled with current regimen.  Will continue with current regimen and continue to monitor.    Chronic anticoagulation  As addressed above; will continue her home regimen of apixaban.    Acquired hypothyroidism  Well controlled; will continue her home regimen of levothyroxine.    Chronic atrial fibrillation  Restart beta-blocker therapy today and will slowly  titrate.      VTE Risk Mitigation (From admission, onward)         Ordered     apixaban tablet 5 mg  2 times daily      01/01/20 0922     IP VTE HIGH RISK PATIENT  Once      01/01/20 0141                Critical care time spent on the evaluation and treatment of severe organ dysfunction, review of pertinent labs and imaging studies, discussions with consulting providers and discussions with patient/family: 30 minutes.      Anne Marie Kidd MD  Department of Hospital Medicine   [facility]

## 2020-01-06 NOTE — NURSING
Report called to Simran on Third floor. Patient going to room 340A. Room was changed at the last minute.

## 2020-01-06 NOTE — NURSING
1/5/2020 19:30 Received bedside report from RADHA Khalil .Patient alert  and oriented to self and palce. Family at bedside. On 2L of oxygen. Cardiac monitor in palce. Purewick in place - attached to suction at 70mmhg pressure. Call bell given on her reach, instructed to call for assistance all the time. Bed alarm on. Bed on lowest position. Safety maintained.

## 2020-01-06 NOTE — PLAN OF CARE
Problem: Occupational Therapy Goal  Goal: Occupational Therapy Goal  Description  Goals to be met by: 01/20/20     Patient will increase functional independence with ADLs by performing:    Feeding with Set-up Assistance.  UE Dressing with Set-up Assistance.  LE Dressing with Minimal Assistance.  Grooming while standing with Contact Guard Assistance.  Toileting from bedside commode with Minimal Assistance for hygiene and clothing management.   Sitting at edge of bed x15 minutes with Supervision.  Rolling to Bilateral with Contact Guard Assistance.   Supine to sit with Contact Guard Assistance.  Step transfer with Stand-by Assistance  Toilet transfer to bedside commode with Stand-by Assistance.  Upper extremity exercise program x15 reps per handout, with assistance as needed.     Outcome: Ongoing, Progressing  Pt will continue to benefit from acute OT in order to increase safety awareness and independence with ADLs and all aspects of functional mobility. OT rec. SNF at d/c to regain PLOF and reduce risk of falls, unplanned readmission, and morbidity.     TOTAL A for bed mobility. Pt took a few sidesteps at EOB using RW with MIN A.

## 2020-01-06 NOTE — SUBJECTIVE & OBJECTIVE
Interval History: HR improved     Review of Systems   Constitutional: Negative for chills and fever.   Respiratory: Negative for shortness of breath and wheezing.    Cardiovascular: Negative for chest pain.   Gastrointestinal: Negative for abdominal distention, abdominal pain, constipation, diarrhea, nausea and vomiting.   Genitourinary: Negative for dysuria and frequency.   Musculoskeletal: Negative for arthralgias and myalgias.   Neurological: Negative for light-headedness.   Psychiatric/Behavioral: Negative for agitation and confusion.     Objective:     Vital Signs (Most Recent):  Temp: 98.4 °F (36.9 °C) (01/05/20 1600)  Pulse: (!) 125 (01/05/20 1800)  Resp: 19 (01/05/20 1800)  BP: (!) 157/71 (01/05/20 1800)  SpO2: 100 % (01/05/20 1800) Vital Signs (24h Range):  Temp:  [98 °F (36.7 °C)-98.7 °F (37.1 °C)] 98.4 °F (36.9 °C)  Pulse:  [] 125  Resp:  [15-22] 19  SpO2:  [90 %-100 %] 100 %  BP: ()/() 157/71     Weight: 97.2 kg (214 lb 4.6 oz)  Body mass index is 39.19 kg/m².    Intake/Output Summary (Last 24 hours) at 1/5/2020 1807  Last data filed at 1/5/2020 1800  Gross per 24 hour   Intake 847.1 ml   Output 2150 ml   Net -1302.9 ml      Physical Exam   Constitutional: She is oriented to person, place, and time. She appears well-developed and well-nourished. No distress.   HENT:   Head: Atraumatic.   Eyes: Conjunctivae are normal.   Neck: Neck supple.   Cardiovascular: Normal heart sounds.   No murmur heard.  Irregular, pulse of 110 beats per minute.   Pulmonary/Chest: Effort normal and breath sounds normal. She has no wheezes.   Abdominal: Soft. Bowel sounds are normal. She exhibits no distension. There is no tenderness.   Genitourinary:   Genitourinary Comments: Salazar catheter with good urine output.   Musculoskeletal: Normal range of motion. She exhibits no edema or deformity.   Neurological: She is alert and oriented to person, place, and time.       Significant Labs: All pertinent labs within  the past 24 hours have been reviewed.    Significant Imaging: I have reviewed and interpreted all pertinent imaging results/findings within the past 24 hours.

## 2020-01-06 NOTE — PLAN OF CARE
Problem: Skin Injury Risk Increased  Goal: Skin Health and Integrity  Intervention: Optimize Skin Protection  Flowsheets (Taken 1/6/2020 0625)  Pressure Reduction Techniques: frequent weight shift encouraged; rest period provided between sit times  Pressure Reduction Devices: pressure-redistributing mattress utilized  Skin Protection: incontinence pads utilized  Head of Bed (HOB): HOB elevated     Problem: Skin Injury Risk Increased  Goal: Skin Health and Integrity  Intervention: Promote and Optimize Oral Intake  Flowsheets (Taken 1/6/2020 0625)  Oral Nutrition Promotion: rest periods promoted

## 2020-01-06 NOTE — NURSING
Patient stated that she feels shaky and anxious - HR 98 bpm  on cardiac monitor. PRN anxiety meds give as per patient request.       Patient refused to lie on her side. Pads checked regularly, turn to sides very well with minimal assistance.

## 2020-01-07 PROBLEM — R53.81 DEBILITY: Status: ACTIVE | Noted: 2020-01-07

## 2020-01-07 PROBLEM — N39.0 URINARY TRACT INFECTION WITHOUT HEMATURIA: Status: ACTIVE | Noted: 2020-01-07

## 2020-01-07 LAB
ALBUMIN SERPL BCP-MCNC: 2.6 G/DL (ref 3.5–5.2)
ALP SERPL-CCNC: 94 U/L (ref 55–135)
ALT SERPL W/O P-5'-P-CCNC: 193 U/L (ref 10–44)
ANION GAP SERPL CALC-SCNC: 7 MMOL/L (ref 8–16)
AST SERPL-CCNC: 25 U/L (ref 10–40)
BASOPHILS # BLD AUTO: 0.01 K/UL (ref 0–0.2)
BASOPHILS NFR BLD: 0.2 % (ref 0–1.9)
BILIRUB SERPL-MCNC: 0.5 MG/DL (ref 0.1–1)
BUN SERPL-MCNC: 15 MG/DL (ref 8–23)
CALCIUM SERPL-MCNC: 8.4 MG/DL (ref 8.7–10.5)
CHLORIDE SERPL-SCNC: 90 MMOL/L (ref 95–110)
CO2 SERPL-SCNC: 37 MMOL/L (ref 23–29)
CREAT SERPL-MCNC: 0.8 MG/DL (ref 0.5–1.4)
DIFFERENTIAL METHOD: ABNORMAL
EOSINOPHIL # BLD AUTO: 0.1 K/UL (ref 0–0.5)
EOSINOPHIL NFR BLD: 2 % (ref 0–8)
ERYTHROCYTE [DISTWIDTH] IN BLOOD BY AUTOMATED COUNT: 16.1 % (ref 11.5–14.5)
EST. GFR  (AFRICAN AMERICAN): >60 ML/MIN/1.73 M^2
EST. GFR  (NON AFRICAN AMERICAN): >60 ML/MIN/1.73 M^2
GLUCOSE SERPL-MCNC: 108 MG/DL (ref 70–110)
HCT VFR BLD AUTO: 32.8 % (ref 37–48.5)
HGB BLD-MCNC: 10.1 G/DL (ref 12–16)
IMM GRANULOCYTES # BLD AUTO: 0.04 K/UL (ref 0–0.04)
IMM GRANULOCYTES NFR BLD AUTO: 0.6 % (ref 0–0.5)
LYMPHOCYTES # BLD AUTO: 1.4 K/UL (ref 1–4.8)
LYMPHOCYTES NFR BLD: 21.5 % (ref 18–48)
MAGNESIUM SERPL-MCNC: 1.3 MG/DL (ref 1.6–2.6)
MCH RBC QN AUTO: 26.8 PG (ref 27–31)
MCHC RBC AUTO-ENTMCNC: 30.8 G/DL (ref 32–36)
MCV RBC AUTO: 87 FL (ref 82–98)
MONOCYTES # BLD AUTO: 1.2 K/UL (ref 0.3–1)
MONOCYTES NFR BLD: 18.2 % (ref 4–15)
NEUTROPHILS # BLD AUTO: 3.7 K/UL (ref 1.8–7.7)
NEUTROPHILS NFR BLD: 57.5 % (ref 38–73)
NRBC BLD-RTO: 0 /100 WBC
PHOSPHATE SERPL-MCNC: 3.3 MG/DL (ref 2.7–4.5)
PLATELET # BLD AUTO: 133 K/UL (ref 150–350)
PMV BLD AUTO: 11.3 FL (ref 9.2–12.9)
POCT GLUCOSE: 110 MG/DL (ref 70–110)
POCT GLUCOSE: 116 MG/DL (ref 70–110)
POCT GLUCOSE: 154 MG/DL (ref 70–110)
POCT GLUCOSE: 205 MG/DL (ref 70–110)
POTASSIUM SERPL-SCNC: 3.6 MMOL/L (ref 3.5–5.1)
PROT SERPL-MCNC: 5.4 G/DL (ref 6–8.4)
RBC # BLD AUTO: 3.77 M/UL (ref 4–5.4)
SODIUM SERPL-SCNC: 134 MMOL/L (ref 136–145)
WBC # BLD AUTO: 6.43 K/UL (ref 3.9–12.7)

## 2020-01-07 PROCEDURE — 36415 COLL VENOUS BLD VENIPUNCTURE: CPT

## 2020-01-07 PROCEDURE — 25000003 PHARM REV CODE 250: Performed by: HOSPITALIST

## 2020-01-07 PROCEDURE — 84100 ASSAY OF PHOSPHORUS: CPT

## 2020-01-07 PROCEDURE — 25000003 PHARM REV CODE 250: Performed by: INTERNAL MEDICINE

## 2020-01-07 PROCEDURE — 97110 THERAPEUTIC EXERCISES: CPT | Mod: CQ

## 2020-01-07 PROCEDURE — 99232 PR SUBSEQUENT HOSPITAL CARE,LEVL II: ICD-10-PCS | Mod: ,,, | Performed by: INTERNAL MEDICINE

## 2020-01-07 PROCEDURE — 63600175 PHARM REV CODE 636 W HCPCS: Performed by: HOSPITALIST

## 2020-01-07 PROCEDURE — 27000221 HC OXYGEN, UP TO 24 HOURS

## 2020-01-07 PROCEDURE — 94761 N-INVAS EAR/PLS OXIMETRY MLT: CPT

## 2020-01-07 PROCEDURE — 80053 COMPREHEN METABOLIC PANEL: CPT

## 2020-01-07 PROCEDURE — 63600175 PHARM REV CODE 636 W HCPCS: Performed by: INTERNAL MEDICINE

## 2020-01-07 PROCEDURE — 21400001 HC TELEMETRY ROOM

## 2020-01-07 PROCEDURE — 97530 THERAPEUTIC ACTIVITIES: CPT | Mod: CQ

## 2020-01-07 PROCEDURE — 94640 AIRWAY INHALATION TREATMENT: CPT

## 2020-01-07 PROCEDURE — 25000242 PHARM REV CODE 250 ALT 637 W/ HCPCS: Performed by: HOSPITALIST

## 2020-01-07 PROCEDURE — 99232 SBSQ HOSP IP/OBS MODERATE 35: CPT | Mod: ,,, | Performed by: INTERNAL MEDICINE

## 2020-01-07 PROCEDURE — 85025 COMPLETE CBC W/AUTO DIFF WBC: CPT

## 2020-01-07 PROCEDURE — 83735 ASSAY OF MAGNESIUM: CPT

## 2020-01-07 RX ORDER — METOPROLOL TARTRATE 50 MG/1
150 TABLET ORAL 2 TIMES DAILY
Status: DISCONTINUED | OUTPATIENT
Start: 2020-01-07 | End: 2020-01-08

## 2020-01-07 RX ORDER — METOPROLOL TARTRATE 50 MG/1
50 TABLET ORAL ONCE
Status: COMPLETED | OUTPATIENT
Start: 2020-01-07 | End: 2020-01-07

## 2020-01-07 RX ORDER — METOPROLOL TARTRATE 1 MG/ML
5 INJECTION, SOLUTION INTRAVENOUS ONCE
Status: COMPLETED | OUTPATIENT
Start: 2020-01-07 | End: 2020-01-07

## 2020-01-07 RX ORDER — LORAZEPAM 0.5 MG/1
0.5 TABLET ORAL EVERY 6 HOURS PRN
Status: DISCONTINUED | OUTPATIENT
Start: 2020-01-07 | End: 2020-01-09 | Stop reason: HOSPADM

## 2020-01-07 RX ADMIN — Medication: at 08:01

## 2020-01-07 RX ADMIN — LISINOPRIL 40 MG: 20 TABLET ORAL at 08:01

## 2020-01-07 RX ADMIN — APIXABAN 5 MG: 5 TABLET, FILM COATED ORAL at 08:01

## 2020-01-07 RX ADMIN — Medication: at 09:01

## 2020-01-07 RX ADMIN — LEVALBUTEROL HYDROCHLORIDE 1.25 MG: 1.25 SOLUTION, CONCENTRATE RESPIRATORY (INHALATION) at 08:01

## 2020-01-07 RX ADMIN — ACETAMINOPHEN 650 MG: 325 TABLET ORAL at 11:01

## 2020-01-07 RX ADMIN — LEVALBUTEROL HYDROCHLORIDE 1.25 MG: 1.25 SOLUTION, CONCENTRATE RESPIRATORY (INHALATION) at 12:01

## 2020-01-07 RX ADMIN — METOPROLOL TARTRATE 5 MG: 1 INJECTION, SOLUTION INTRAVENOUS at 10:01

## 2020-01-07 RX ADMIN — ESCITALOPRAM OXALATE 10 MG: 10 TABLET ORAL at 08:01

## 2020-01-07 RX ADMIN — LEVOTHYROXINE SODIUM 137 MCG: 25 TABLET ORAL at 06:01

## 2020-01-07 RX ADMIN — METOPROLOL TARTRATE 150 MG: 50 TABLET, FILM COATED ORAL at 09:01

## 2020-01-07 RX ADMIN — INSULIN ASPART 2 UNITS: 100 INJECTION, SOLUTION INTRAVENOUS; SUBCUTANEOUS at 03:01

## 2020-01-07 RX ADMIN — LEVALBUTEROL HYDROCHLORIDE 1.25 MG: 1.25 SOLUTION, CONCENTRATE RESPIRATORY (INHALATION) at 03:01

## 2020-01-07 RX ADMIN — APIXABAN 5 MG: 5 TABLET, FILM COATED ORAL at 09:01

## 2020-01-07 RX ADMIN — Medication 9 MG: at 09:01

## 2020-01-07 RX ADMIN — METOPROLOL TARTRATE 50 MG: 50 TABLET, FILM COATED ORAL at 01:01

## 2020-01-07 RX ADMIN — CEFTRIAXONE 1 G: 1 INJECTION, SOLUTION INTRAVENOUS at 11:01

## 2020-01-07 RX ADMIN — LEVALBUTEROL HYDROCHLORIDE 1.25 MG: 1.25 SOLUTION, CONCENTRATE RESPIRATORY (INHALATION) at 11:01

## 2020-01-07 RX ADMIN — SIMVASTATIN 40 MG: 40 TABLET, FILM COATED ORAL at 09:01

## 2020-01-07 RX ADMIN — METOPROLOL TARTRATE 100 MG: 50 TABLET, FILM COATED ORAL at 08:01

## 2020-01-07 NOTE — ASSESSMENT & PLAN NOTE
AF is chronic, on eliquis  Adm with symptomatic bradycardia in setting of ARF and hyperkalemia, now resolved.  AF rates uncontrolled, was prev on high dose BBL/CCB, held with bradycardia.  Will attempt to uptitrate BBL  Hope to avoid PPM

## 2020-01-07 NOTE — NURSING
19:05 Received bedside report from RADHA Kunz. Patient alert and oriented. No pain. No sign of distress.  IV line intact over Rt hand and Lt FA - saline locked. On 2L of oxygen. Cardiac monitor in place. Call bell given on her reach, instructed to call for assistance all the time. Bed on lowest position. Bed alarm on. Light adjusted. Safety maintained.    Patient taken kat crackers before settling down as per request.

## 2020-01-07 NOTE — ASSESSMENT & PLAN NOTE
S/p Amiodarone bolus and infusion  Lopressor and cardizem given  Lopressor dose increased and cardizem stopped  Continue apixaban

## 2020-01-07 NOTE — PLAN OF CARE
Problem: Physical Therapy Goal  Goal: Physical Therapy Goal  Description  Goals to be met by: 2020     Patient will increase functional independence with mobility by performin. Supine to sit with Stand-by Assistance  2. Rolling to Left and Right with Supervision.  3. Sit to stand transfer with Contact Guard Assistance  4. Gait  x  feet with Stand-by Assistance using Rolling Walker.   5. Lower extremity exercise program x10 reps per handout, with supervision     Outcome: Ongoing, Progressing   Pt will benefit from further skilled therapy in order to return to PLOF.

## 2020-01-07 NOTE — PROGRESS NOTES
"[facility] Hospital Medicine  Progress Note    Patient Name: Vashti Muñoz  MRN: [id number]  Patient Class: IP- Inpatient   Admission Date: 12/31/2019  Length of Stay: 7 days  Attending Physician: Collette Bautista MD  Primary Care Provider: Alesha Baum MD        Subjective:     Principal Problem:Chronic atrial fibrillation with rapid ventricular response        HPI:  Per Dr. Makenzie Tena:    "Mrs. Vashti Muñoz is a 87 y.o. female [facility] resident with essential hypertension, hyperlipidemia (LDL unknown), chronic atrial fibrillation ([id number] score 4) on chronic anticoagulation, hypothyroidism (TSH 0.452 Sept 2018), anemia chronic disease, and dementia who presents to [facility] ED with complaints of altered mental status today.  She was noted to be bradycardic for which EMS was activated.  She was last seen in her normal state of health at around 4:30 PM today.  She was given atropine with positive response in her heart rate and thereafter transported to this facility.  She was then started on transcutaneous pacing with improvement of her mentation.  She was brought to the Catheterization Lab and was placed a transvenous pacer per Cardiology.  Further history is otherwise limited at this time."    Overview/Hospital Course:  Ms. Muñoz was admitted to the ICU with symptomatic bradycardia with pulse of 41 beats per minute with evidence of systemic hypoperfusion with metabolic encephalopathy and acute renal failure with hyperkalemia.  Patient treated with percutaneous pacing along with continuous dopamine infusion.  Cardiology consulted and placed a temporary transvenous pacemaker with improvement in her pulse.  Mental status improved with increased pulse.  Patient also challenged with intravenous fluids with improvement of hyperkalemia and renal function.  Suspect patient suffered acute tubular necrosis secondary to systemic hypoperfusion.   Patient also treated with antibiotics for possible " urinary tract infection.  Elevated liver function tests likely secondary from shock liver due to systemic hypoperfusion.   Liver enzymes improved. She was moved out the ICU and was on the floor when she developed Afib with RVR.  Transferred back to ICU and amiodarone bolus dose given with continuous rate. Cardizem and lopressor resumed. Pt appears anxious and possibly withdrawal from ativan. Medication resumed PRN for anxiety. On 1/5/20, amiodarone infusion stopped and patient was stepped down to floor.  BP improved and HR better controlled with increased metoprolol to 100 mg BID. Cardizem stopped. PT/OT consulted. Completed 7 days of ceftriaxone for urinary tract infection (UCx with pansensitive E coli (> 100,000 cfu/mL) and Klebsiella pneumoniae (10,000-49,999 cfu/mL). On 1/7 patient again with AFib with RVR despite increased dose of metoprolol.     Interval History: feels ok. Has no complaints. Daughter concerned about left PIV site where she has linear violaceous streak along IV site that is non tender, indurated nor warm. Back in AFib with RVR in 130s-140s.     Review of Systems   Respiratory: Negative.    Cardiovascular: Positive for leg swelling (better ).   Gastrointestinal: Negative.    Neurological: Positive for weakness.     Objective:     Vital Signs (Most Recent):  Temp: 99.1 °F (37.3 °C) (01/07/20 0739)  Pulse: (!) 115 (01/07/20 0800)  Resp: 18 (01/07/20 0800)  BP: 119/80 (01/07/20 0739)  SpO2: 95 % (01/07/20 0800) Vital Signs (24h Range):  Temp:  [97.8 °F (36.6 °C)-99.4 °F (37.4 °C)] 99.1 °F (37.3 °C)  Pulse:  [] 115  Resp:  [18-20] 18  SpO2:  [95 %-99 %] 95 %  BP: (117-158)/(70-92) 119/80     Weight: 94 kg (207 lb 3.7 oz)  Body mass index is 37.9 kg/m².    Intake/Output Summary (Last 24 hours) at 1/7/2020 1009  Last data filed at 1/7/2020 0600  Gross per 24 hour   Intake 320 ml   Output 300 ml   Net 20 ml      Physical Exam   Constitutional: She is oriented to person, place, and time. She  appears well-developed. No distress.   Cardiovascular: An irregularly irregular rhythm present. Tachycardia present.   Pulmonary/Chest: Effort normal and breath sounds normal.   Abdominal: Soft. Bowel sounds are normal.   Musculoskeletal: She exhibits edema (pitting to arms, hand and legs (improved)).   Neurological: She is alert and oriented to person, place, and time.   Skin: She is not diaphoretic.   Psychiatric: She has a normal mood and affect. Her behavior is normal. Judgment and thought content normal.   Nursing note and vitals reviewed.      Significant Labs: All pertinent labs within the past 24 hours have been reviewed.    Significant Imaging: I have reviewed all pertinent imaging results/findings within the past 24 hours.  I have reviewed and interpreted all pertinent imaging results/findings within the past 24 hours.      Assessment/Plan:      * Chronic atrial fibrillation with rapid ventricular response  S/p Amiodarone bolus and infusion  Lopressor and cardizem given  Lopressor dose increased and cardizem stopped  Continue apixaban  Patient back in AFib with RVR today despite increased dose of metoprolol  Patient denies chest pain and SOB  Will re-consult Cardiology. Will give one dose of metoprolol IV pending consultation  Hold off on diltiazem as SBP in the 110s    Atrial fibrillation with rapid ventricular response  S/p amiodarone gtt  Initially rate control with metoprolol and anticoagulated with apixaban  RVR again today. Cardiology re-consulted    Debility  PT/OT  Plan for SNF at [facility] when ready for discharge    Urinary tract infection without hematuria  Due to E. Coli and Klebsiella  Last dose ceftriaxone due today to complete total of 7 days of abx treatment    Symptomatic bradycardia  2/2 hyperkalemia  Resolved       Dementia without behavioral disturbance  Stable; will continue her home regimen of memantine.    Anemia of chronic disease  H/H is stable and consistent with previous  laboratory measurements  No signs of bleeding  Monitor    Hyperkalemia  Patient's potassium was found to be 6.6 mmol/L in the setting of acute renal failure.    This is also likely the cause of her bradycardia.    She has been given insulin/dextrose, albuterol, and calcium gluconate.  Repeat potassium was much better at 5.6 mmlol/L.  Resolved    Acute renal failure  Likely ATN from systemic hypoperfusion from bradycardia.    Resolved      Mixed hyperlipidemia  Will continue her home regimen of simvastatin.    Essential hypertension  BP low side likely due to AFib with RVR. Hold lisinopril for now    Chronic anticoagulation  Continue her home regimen of apixaban.    Acquired hypothyroidism  Well controlled; will continue her home regimen of levothyroxine.    Chronic atrial fibrillation  As above    VTE Risk Mitigation (From admission, onward)         Ordered     apixaban tablet 5 mg  2 times daily      01/01/20 0922     IP VTE HIGH RISK PATIENT  Once      01/01/20 0141                  Dispo: to [facility] for SNF then resume long-term when ready for discharge    Collette Trejo MD  Department of Hospital Medicine   [facility]

## 2020-01-07 NOTE — SUBJECTIVE & OBJECTIVE
Interval History: SOB better, but reports feeling very weak.    Review of Systems   Constitutional: Negative for chills and fever.   Respiratory: Positive for shortness of breath.    Cardiovascular: Negative for chest pain.     Objective:     Vital Signs (Most Recent):  Temp: 98.3 °F (36.8 °C) (01/07/20 0329)  Pulse: 109 (01/07/20 0329)  Resp: 18 (01/07/20 0329)  BP: 117/70 (01/07/20 0329)  SpO2: 96 % (01/07/20 0329) Vital Signs (24h Range):  Temp:  [97.8 °F (36.6 °C)-99.4 °F (37.4 °C)] 98.3 °F (36.8 °C)  Pulse:  [] 109  Resp:  [16-20] 18  SpO2:  [96 %-99 %] 96 %  BP: (117-158)/(60-92) 117/70     Weight: 94 kg (207 lb 3.7 oz)  Body mass index is 37.9 kg/m².    Intake/Output Summary (Last 24 hours) at 1/7/2020 0514  Last data filed at 1/7/2020 0340  Gross per 24 hour   Intake 220 ml   Output 300 ml   Net -80 ml      Physical Exam   Constitutional: She is oriented to person, place, and time. She appears well-developed and well-nourished. No distress.   Chronically ill appearing   HENT:   Head: Atraumatic.   Eyes: Conjunctivae are normal.   Neck: Neck supple.   Cardiovascular: Normal heart sounds.   No murmur heard.  Irregular, slightly tachycardic   Pulmonary/Chest: Effort normal and breath sounds normal. She has no wheezes.   Abdominal: Soft. Bowel sounds are normal. She exhibits no distension. There is no tenderness.   Genitourinary:   Genitourinary Comments: Salaazr catheter with good urine output.   Musculoskeletal: Normal range of motion. She exhibits no edema or deformity.   Neurological: She is alert and oriented to person, place, and time.       Significant Labs: All pertinent labs within the past 24 hours have been reviewed.    Significant Imaging: I have reviewed and interpreted all pertinent imaging results/findings within the past 24 hours.

## 2020-01-07 NOTE — ASSESSMENT & PLAN NOTE
Likely ATN from systemic hypoperfusion from bradycardia.    Resolved  Resumed ACEI   Continue to monitor

## 2020-01-07 NOTE — PLAN OF CARE
Patient is currently on a 2 lpm nasal cannula with oxygen saturation of 98%.  No apparent distress noted at present time.  Will administer Respiratory treatments as ordered.  Will continue to monitor.

## 2020-01-07 NOTE — ASSESSMENT & PLAN NOTE
S/p amiodarone gtt  Initially rate control with metoprolol and anticoagulated with apixaban  RVR again today. Cardiology re-consulted

## 2020-01-07 NOTE — SUBJECTIVE & OBJECTIVE
Review of Systems   Gastrointestinal: Negative for melena.   Genitourinary: Negative for hematuria.     Objective:     Vital Signs (Most Recent):  Temp: 99.1 °F (37.3 °C) (01/07/20 0739)  Pulse: (!) 115 (01/07/20 0800)  Resp: 18 (01/07/20 0800)  BP: 119/80 (01/07/20 0739)  SpO2: 95 % (01/07/20 0800) Vital Signs (24h Range):  Temp:  [97.8 °F (36.6 °C)-99.4 °F (37.4 °C)] 99.1 °F (37.3 °C)  Pulse:  [] 115  Resp:  [18-20] 18  SpO2:  [95 %-99 %] 95 %  BP: (117-158)/(70-92) 119/80     Weight: 94 kg (207 lb 3.7 oz)  Body mass index is 37.9 kg/m².     SpO2: 95 %  O2 Device (Oxygen Therapy): nasal cannula      Intake/Output Summary (Last 24 hours) at 1/7/2020 1053  Last data filed at 1/7/2020 0600  Gross per 24 hour   Intake 320 ml   Output 300 ml   Net 20 ml       Lines/Drains/Airways     Drain            Female External Urinary Catheter 01/03/20 0000 4 days          Peripheral Intravenous Line                 Peripheral IV - Single Lumen 01/03/20 20 G Right Hand 4 days                Physical Exam   Constitutional: She is oriented to person, place, and time. She appears well-developed and well-nourished. No distress.   HENT:   Head: Normocephalic and atraumatic.   Mouth/Throat: No oropharyngeal exudate.   Eyes: Pupils are equal, round, and reactive to light. Conjunctivae and EOM are normal. No scleral icterus.   Neck: Normal range of motion. Neck supple. No JVD present. No tracheal deviation present. No thyromegaly present.   Cardiovascular: S1 normal and S2 normal. An irregularly irregular rhythm present. Tachycardia present. Exam reveals distant heart sounds. Exam reveals no gallop and no friction rub.   Murmur heard.   Systolic murmur is present with a grade of 2/6.  Pulmonary/Chest: Effort normal and breath sounds normal. No respiratory distress. She has no wheezes. She has no rales. She exhibits no tenderness.   Abdominal: Soft. She exhibits no distension.   obese   Musculoskeletal: Normal range of motion. She  exhibits no edema.   Neurological: She is alert and oriented to person, place, and time. No cranial nerve deficit.   Skin: Skin is warm and dry. She is not diaphoretic.   Psychiatric: She has a normal mood and affect. Her behavior is normal. Judgment normal.       Current Medications:   apixaban  5 mg Oral BID    cefTRIAXone (ROCEPHIN) IVPB  1 g Intravenous Q24H    escitalopram oxalate  10 mg Oral Daily    levalbuterol  1.25 mg Nebulization Q8H    levothyroxine  137 mcg Oral Before breakfast    metoprolol tartrate  100 mg Oral BID    miconazole NITRATE 2 %   Topical (Top) BID    simvastatin  40 mg Oral QHS       acetaminophen, dextrose 50%, dextrose 50%, glucagon (human recombinant), glucose, glucose, insulin aspart U-100, LORazepam, melatonin, ondansetron, promethazine (PHENERGAN) IVPB, senna-docusate 8.6-50 mg    Laboratory (all labs reviewed):  CBC:  Recent Labs   Lab 01/03/20  0503 01/04/20  0418 01/05/20  0404 01/06/20  0313 01/07/20  0306   WBC 8.70 8.99 6.82 5.50 6.43   Hemoglobin 10.5 L 10.4 L 10.6 L 12.7 10.1 L   Hematocrit 33.7 L 33.1 L 34.7 L 40.5 32.8 L   Platelets 138 L 126 L 148 L 90 L 133 L       CHEMISTRIES:  Recent Labs   Lab 01/03/20  0503 01/04/20  0417 01/05/20  0403 01/06/20  0313 01/07/20  0306   Glucose 170 H 172 H 127 H 123 H 108   Sodium 132 L 132 L 134 L 136 134 L   Potassium 4.3 4.4 3.6 3.9 3.6   BUN, Bld 25 H 19 17 17 15   Creatinine 1.1 0.9 0.9 0.8 0.8   eGFR if  52 A >60 >60 >60 >60   eGFR if non  45 A 58 A 58 A >60 >60   Calcium 8.3 L 8.8 8.4 L 8.2 L 8.4 L   Magnesium 1.4 L 1.3 L 1.2 L 1.5 L 1.3 L       CARDIAC BIOMARKERS:  Recent Labs   Lab 09/11/18  1104 09/11/18  1658 10/11/19  0927 12/31/19  2142 01/01/20  0051 01/04/20  0417   CPK  --   --   --   --   --  60   Troponin I <0.006 0.018 0.012 0.008 0.038 H  --        COAGS:  Recent Labs   Lab 10/11/19  0927 01/01/20  0051 01/02/20  0402 01/03/20  0503 01/04/20  0418   INR 1.0 1.4 H 1.3 H 1.1  1.1       LIPIDS/LFTS:  Recent Labs   Lab 01/03/20  0503 01/04/20  0417 01/05/20  0403 01/06/20  0313 01/07/20  0306    H 146 H 59 H 37 25    H 650 H 418 H 272 H 193 H       BNP:  Recent Labs   Lab 09/11/18  0932 10/11/19  0927 12/31/19  2142    H 377 H 439 H       TSH:  Recent Labs   Lab 07/05/18  1429 09/11/18  0932 01/03/20  0503   TSH 4.690 H 0.452 4.633 H       Free T4:  Recent Labs   Lab 01/03/20  0503   Free T4 0.90       Diagnostic Results:  ECG (personally reviewed and interpreted tracing(s)):  1/3/20 0552     Echo: 1/1/20  · Normal left ventricular systolic function. The estimated ejection fraction is 70%  · Concentric left ventricular hypertrophy.  · Grade II (moderate) left ventricular diastolic dysfunction consistent with pseudonormalization.  · Normal right ventricular systolic function.  · Moderate-to-severe aortic valve stenosis.  · Aortic valve area is 0.64 cm2; peak velocity is 3.86 m/s; mean gradient is 35 mmHg.  · Mild mitral regurgitation.  · Mild tricuspid regurgitation.  · The estimated PA systolic pressure is 70 mm Hg  · Pulmonary hypertension present.

## 2020-01-07 NOTE — PT/OT/SLP PROGRESS
Occupational Therapy      Patient Name:  Vashti Muñoz   MRN:  [id number]    Patient not seen today secondary to Nursing care(Pt with PCT. Offered assistance for ADLs, but they reported no need at this current time.). Will follow-up later as able.    Tonie Mandujano, OT  1/7/2020

## 2020-01-07 NOTE — PLAN OF CARE
Problem: Diabetes Comorbidity  Goal: Blood Glucose Level Within Desired Range  Intervention: Maintain Glycemic Control  Flowsheets (Taken 1/7/2020 0550)  Glycemic Management: blood glucose monitoring     Problem: Skin Injury Risk Increased  Goal: Skin Health and Integrity  Outcome: Ongoing, Progressing  Intervention: Optimize Skin Protection  Flowsheets (Taken 1/7/2020 0550)  Pressure Reduction Techniques: frequent weight shift encouraged; heels elevated off bed; weight shift assistance provided; rest period provided between sit times  Pressure Reduction Devices: pressure-redistributing mattress utilized  Skin Protection: incontinence pads utilized  Head of Bed (HOB): HOB elevated     Problem: Skin Injury Risk Increased  Goal: Skin Health and Integrity  Intervention: Optimize Skin Protection  Flowsheets (Taken 1/7/2020 0550)  Pressure Reduction Techniques: frequent weight shift encouraged; heels elevated off bed; weight shift assistance provided; rest period provided between sit times  Pressure Reduction Devices: pressure-redistributing mattress utilized  Skin Protection: incontinence pads utilized  Head of Bed (HOB): HOB elevated

## 2020-01-07 NOTE — ASSESSMENT & PLAN NOTE
Patient's potassium was found to be 6.6 mmol/L in the setting of acute renal failure.    This is also likely the cause of her bradycardia.    She has been given insulin/dextrose, albuterol, and calcium gluconate.  Repeat potassium was much better at 5.6 mmlol/L.  Resolved

## 2020-01-07 NOTE — PROGRESS NOTES
[facility]  Cardiology  Progress Note    Patient Name: Vashti Muñoz  MRN: [id number]  Admission Date: 12/31/2019  Hospital Length of Stay: 7 days  Code Status: DNR   Attending Physician: Collette Bautista MD   Primary Care Physician: Alesha Baum MD  Expected Discharge Date:   Principal Problem:Chronic atrial fibrillation with rapid ventricular response    Subjective:     Hospital Course:   12/31/19 adm with symptomatic bradycardia in setting of ARF and hyperkalemia, had temp wire placed    Interval Hx: reconsulted for AF/RVR.  Denies cp/sob.  Daughter at bedside.  Pt was prev on high dose BBL/CCB,     Tele: AF 90s, 120-130s overnight (personally reviewed and interpreted)        Review of Systems   Gastrointestinal: Negative for melena.   Genitourinary: Negative for hematuria.     Objective:     Vital Signs (Most Recent):  Temp: 99.1 °F (37.3 °C) (01/07/20 0739)  Pulse: (!) 115 (01/07/20 0800)  Resp: 18 (01/07/20 0800)  BP: 119/80 (01/07/20 0739)  SpO2: 95 % (01/07/20 0800) Vital Signs (24h Range):  Temp:  [97.8 °F (36.6 °C)-99.4 °F (37.4 °C)] 99.1 °F (37.3 °C)  Pulse:  [] 115  Resp:  [18-20] 18  SpO2:  [95 %-99 %] 95 %  BP: (117-158)/(70-92) 119/80     Weight: 94 kg (207 lb 3.7 oz)  Body mass index is 37.9 kg/m².     SpO2: 95 %  O2 Device (Oxygen Therapy): nasal cannula      Intake/Output Summary (Last 24 hours) at 1/7/2020 1053  Last data filed at 1/7/2020 0600  Gross per 24 hour   Intake 320 ml   Output 300 ml   Net 20 ml       Lines/Drains/Airways     Drain            Female External Urinary Catheter 01/03/20 0000 4 days          Peripheral Intravenous Line                 Peripheral IV - Single Lumen 01/03/20 20 G Right Hand 4 days                Physical Exam   Constitutional: She is oriented to person, place, and time. She appears well-developed and well-nourished. No distress.   HENT:   Head: Normocephalic and atraumatic.   Mouth/Throat: No oropharyngeal exudate.   Eyes: Pupils are  equal, round, and reactive to light. Conjunctivae and EOM are normal. No scleral icterus.   Neck: Normal range of motion. Neck supple. No JVD present. No tracheal deviation present. No thyromegaly present.   Cardiovascular: S1 normal and S2 normal. An irregularly irregular rhythm present. Tachycardia present. Exam reveals distant heart sounds. Exam reveals no gallop and no friction rub.   Murmur heard.   Systolic murmur is present with a grade of 2/6.  Pulmonary/Chest: Effort normal and breath sounds normal. No respiratory distress. She has no wheezes. She has no rales. She exhibits no tenderness.   Abdominal: Soft. She exhibits no distension.   obese   Musculoskeletal: Normal range of motion. She exhibits no edema.   Neurological: She is alert and oriented to person, place, and time. No cranial nerve deficit.   Skin: Skin is warm and dry. She is not diaphoretic.   Psychiatric: She has a normal mood and affect. Her behavior is normal. Judgment normal.       Current Medications:   apixaban  5 mg Oral BID    cefTRIAXone (ROCEPHIN) IVPB  1 g Intravenous Q24H    escitalopram oxalate  10 mg Oral Daily    levalbuterol  1.25 mg Nebulization Q8H    levothyroxine  137 mcg Oral Before breakfast    metoprolol tartrate  100 mg Oral BID    miconazole NITRATE 2 %   Topical (Top) BID    simvastatin  40 mg Oral QHS       acetaminophen, dextrose 50%, dextrose 50%, glucagon (human recombinant), glucose, glucose, insulin aspart U-100, LORazepam, melatonin, ondansetron, promethazine (PHENERGAN) IVPB, senna-docusate 8.6-50 mg    Laboratory (all labs reviewed):  CBC:  Recent Labs   Lab 01/03/20  0503 01/04/20  0418 01/05/20  0404 01/06/20  0313 01/07/20  0306   WBC 8.70 8.99 6.82 5.50 6.43   Hemoglobin 10.5 L 10.4 L 10.6 L 12.7 10.1 L   Hematocrit 33.7 L 33.1 L 34.7 L 40.5 32.8 L   Platelets 138 L 126 L 148 L 90 L 133 L       CHEMISTRIES:  Recent Labs   Lab 01/03/20  0503 01/04/20  0417 01/05/20  0403 01/06/20  0313  01/07/20  0306   Glucose 170 H 172 H 127 H 123 H 108   Sodium 132 L 132 L 134 L 136 134 L   Potassium 4.3 4.4 3.6 3.9 3.6   BUN, Bld 25 H 19 17 17 15   Creatinine 1.1 0.9 0.9 0.8 0.8   eGFR if  52 A >60 >60 >60 >60   eGFR if non  45 A 58 A 58 A >60 >60   Calcium 8.3 L 8.8 8.4 L 8.2 L 8.4 L   Magnesium 1.4 L 1.3 L 1.2 L 1.5 L 1.3 L       CARDIAC BIOMARKERS:  Recent Labs   Lab 09/11/18  1104 09/11/18  1658 10/11/19  0927 12/31/19  2142 01/01/20  0051 01/04/20  0417   CPK  --   --   --   --   --  60   Troponin I <0.006 0.018 0.012 0.008 0.038 H  --        COAGS:  Recent Labs   Lab 10/11/19  0927 01/01/20  0051 01/02/20  0402 01/03/20  0503 01/04/20  0418   INR 1.0 1.4 H 1.3 H 1.1 1.1       LIPIDS/LFTS:  Recent Labs   Lab 01/03/20  0503 01/04/20  0417 01/05/20  0403 01/06/20  0313 01/07/20  0306    H 146 H 59 H 37 25    H 650 H 418 H 272 H 193 H       BNP:  Recent Labs   Lab 09/11/18  0932 10/11/19  0927 12/31/19  2142    H 377 H 439 H       TSH:  Recent Labs   Lab 07/05/18  1429 09/11/18  0932 01/03/20  0503   TSH 4.690 H 0.452 4.633 H       Free T4:  Recent Labs   Lab 01/03/20  0503   Free T4 0.90       Diagnostic Results:  ECG (personally reviewed and interpreted tracing(s)):  1/3/20 0552     Echo: 1/1/20  · Normal left ventricular systolic function. The estimated ejection fraction is 70%  · Concentric left ventricular hypertrophy.  · Grade II (moderate) left ventricular diastolic dysfunction consistent with pseudonormalization.  · Normal right ventricular systolic function.  · Moderate-to-severe aortic valve stenosis.  · Aortic valve area is 0.64 cm2; peak velocity is 3.86 m/s; mean gradient is 35 mmHg.  · Mild mitral regurgitation.  · Mild tricuspid regurgitation.  · The estimated PA systolic pressure is 70 mm Hg  · Pulmonary hypertension present.        Assessment and Plan:     * Chronic atrial fibrillation with rapid ventricular response  AF is chronic,  on eliquis  Adm with symptomatic bradycardia in setting of ARF and hyperkalemia, now resolved.  AF rates uncontrolled, was prev on high dose BBL/CCB, held with bradycardia.  Will attempt to uptitrate BBL  Hope to avoid PPM    DM type 2, controlled, with complication  Per primary    Chronic anticoagulation  For AF    Essential hypertension  Stable     Acute renal failure  K 6.6 initially - likely contributing to bradycardia - improved now    Mixed hyperlipidemia  On statin    Memory impairment  Per primary    Chronic atrial fibrillation  As above        VTE Risk Mitigation (From admission, onward)         Ordered     apixaban tablet 5 mg  2 times daily      01/01/20 0922     IP VTE HIGH RISK PATIENT  Once      01/01/20 0141                Ede Kline MD  Cardiology  [facility]

## 2020-01-07 NOTE — SUBJECTIVE & OBJECTIVE
Interval History: feels ok. Has no complaints. Daughter concerned about left PIV site where she has linear violaceous streak along IV site that is non tender, indurated nor warm. Back in AFib with RVR in 130s-140s.     Review of Systems   Respiratory: Negative.    Cardiovascular: Positive for leg swelling (better ).   Gastrointestinal: Negative.    Neurological: Positive for weakness.     Objective:     Vital Signs (Most Recent):  Temp: 99.1 °F (37.3 °C) (01/07/20 0739)  Pulse: (!) 115 (01/07/20 0800)  Resp: 18 (01/07/20 0800)  BP: 119/80 (01/07/20 0739)  SpO2: 95 % (01/07/20 0800) Vital Signs (24h Range):  Temp:  [97.8 °F (36.6 °C)-99.4 °F (37.4 °C)] 99.1 °F (37.3 °C)  Pulse:  [] 115  Resp:  [18-20] 18  SpO2:  [95 %-99 %] 95 %  BP: (117-158)/(70-92) 119/80     Weight: 94 kg (207 lb 3.7 oz)  Body mass index is 37.9 kg/m².    Intake/Output Summary (Last 24 hours) at 1/7/2020 1009  Last data filed at 1/7/2020 0600  Gross per 24 hour   Intake 320 ml   Output 300 ml   Net 20 ml      Physical Exam   Constitutional: She is oriented to person, place, and time. She appears well-developed. No distress.   Cardiovascular: An irregularly irregular rhythm present. Tachycardia present.   Pulmonary/Chest: Effort normal and breath sounds normal.   Abdominal: Soft. Bowel sounds are normal.   Musculoskeletal: She exhibits edema (pitting to arms, hand and legs (improved)).   Neurological: She is alert and oriented to person, place, and time.   Skin: She is not diaphoretic.   Psychiatric: She has a normal mood and affect. Her behavior is normal. Judgment and thought content normal.   Nursing note and vitals reviewed.      Significant Labs: All pertinent labs within the past 24 hours have been reviewed.    Significant Imaging: I have reviewed all pertinent imaging results/findings within the past 24 hours.  I have reviewed and interpreted all pertinent imaging results/findings within the past 24 hours.

## 2020-01-07 NOTE — PROGRESS NOTES
"[facility] Hospital Medicine  Progress Note    Patient Name: Vashti Muñoz  MRN: [id number]  Patient Class: IP- Inpatient   Admission Date: 12/31/2019  Length of Stay: 7 days  Attending Physician: Zonia Cross MD  Primary Care Provider: Alesha Baum MD        Subjective:     Principal Problem:Chronic atrial fibrillation with rapid ventricular response        HPI:  Per Dr. Makenzie Tena:    "Mrs. Vashti Muñoz is a 87 y.o. female [facility] resident with essential hypertension, hyperlipidemia (LDL unknown), chronic atrial fibrillation ([id number] score 4) on chronic anticoagulation, hypothyroidism (TSH 0.452 Sept 2018), anemia chronic disease, and dementia who presents to [facility] ED with complaints of altered mental status today.  She was noted to be bradycardic for which EMS was activated.  She was last seen in her normal state of health at around 4:30 PM today.  She was given atropine with positive response in her heart rate and thereafter transported to this facility.  She was then started on transcutaneous pacing with improvement of her mentation.  She was brought to the Catheterization Lab and was placed a transvenous pacer per Cardiology.  Further history is otherwise limited at this time."    Overview/Hospital Course:  Ms. Muñoz was admitted to the ICU with symptomatic bradycardia with pulse of 41 beats per minute with evidence of systemic hypoperfusion with metabolic encephalopathy and acute renal failure with hyperkalemia.  Patient treated with percutaneous pacing along with continuous dopamine infusion.  Cardiology consulted and placed a temporary transvenous pacemaker with improvement in her pulse.  Mental status improved with increased pulse.  Patient also challenged with intravenous fluids with improvement  hyperkalemia  and renal function.  Suspect patient suffered acute tubular necrosis secondary to systemic hypoperfusion.   Patient also treated with antibiotics for possible " urinary tract infection.  Elevated liver function tests likely secondary from shock liver due to systemic hypoperfusion.   Liver enzymes improving. She was moved out the ICU and was on the floor when she developed Afib with RVR.  Transferred back to ICU and maiodarone bolus dose given with continuous rate. Cardizem and lopressor resumed. Pt appears anxious and possibly withdrawal from ativan. Medication resumed. On 1/5/20, amiodarone infusion stopped and patient was stepped down to floor.  BP improved and HR better controlled with increased lopressor. Cardizem stopped. PT/OT consulted. Rocephin continued for UTI.    Interval History: SOB better, but reports feeling very weak.    Review of Systems   Constitutional: Negative for chills and fever.   Respiratory: Positive for shortness of breath.    Cardiovascular: Negative for chest pain.     Objective:     Vital Signs (Most Recent):  Temp: 98.3 °F (36.8 °C) (01/07/20 0329)  Pulse: 109 (01/07/20 0329)  Resp: 18 (01/07/20 0329)  BP: 117/70 (01/07/20 0329)  SpO2: 96 % (01/07/20 0329) Vital Signs (24h Range):  Temp:  [97.8 °F (36.6 °C)-99.4 °F (37.4 °C)] 98.3 °F (36.8 °C)  Pulse:  [] 109  Resp:  [16-20] 18  SpO2:  [96 %-99 %] 96 %  BP: (117-158)/(60-92) 117/70     Weight: 94 kg (207 lb 3.7 oz)  Body mass index is 37.9 kg/m².    Intake/Output Summary (Last 24 hours) at 1/7/2020 0512  Last data filed at 1/7/2020 0340  Gross per 24 hour   Intake 220 ml   Output 300 ml   Net -80 ml      Physical Exam   Constitutional: She is oriented to person, place, and time. She appears well-developed and well-nourished. No distress.   Chronically ill appearing   HENT:   Head: Atraumatic.   Eyes: Conjunctivae are normal.   Neck: Neck supple.   Cardiovascular: Normal heart sounds.   No murmur heard.  Irregular, slightly tachycardic   Pulmonary/Chest: Effort normal and breath sounds normal. She has no wheezes.   Abdominal: Soft. Bowel sounds are normal. She exhibits no distension.  There is no tenderness.   Genitourinary:   Genitourinary Comments: Salazar catheter with good urine output.   Musculoskeletal: Normal range of motion. She exhibits no edema or deformity.   Neurological: She is alert and oriented to person, place, and time.       Significant Labs: All pertinent labs within the past 24 hours have been reviewed.    Significant Imaging: I have reviewed and interpreted all pertinent imaging results/findings within the past 24 hours.      Assessment/Plan:      * Chronic atrial fibrillation with rapid ventricular response  S/p Amiodarone bolus and infusion  Lopressor and cardizem given  Lopressor dose increased and cardizem stopped  Continue apixaban    Debility  PT/OT  Plan for SNF tomorrow    Urinary tract infection without hematuria  Due to E. Coli and Klebsiella  Treating with Rocephin, last dose tomorrow    Atrial fibrillation with rapid ventricular response  S/p amiodarone gtt  Rate control with metoprolol and anticoagulated with apixaban    Dementia without behavioral disturbance  Stable; will continue her home regimen of memantine.    Anemia of chronic disease  H/H is stable and consistent with previous laboratory measurements  No signs of bleeding  Monitor    Hyperkalemia  Patient's potassium was found to be 6.6 mmol/L in the setting of acute renal failure.    This is also likely the cause of her bradycardia.    She has been given insulin/dextrose, albuterol, and calcium gluconate.  Repeat potassium was much better at 5.6 mmlol/L.  Resolved    Acute renal failure  Likely ATN from systemic hypoperfusion from bradycardia.    Resolved  Resumed ACEI   Continue to monitor    Mixed hyperlipidemia  Will continue her home regimen of simvastatin.    Essential hypertension  Reasonably controlled with current regimen.    Continue to monitor.    Chronic anticoagulation  Continue her home regimen of apixaban.    Acquired hypothyroidism  Well controlled; will continue her home regimen of  levothyroxine.    Chronic atrial fibrillation  Restarted beta-blocker therapy and will slowly titrate.      VTE Risk Mitigation (From admission, onward)         Ordered     apixaban tablet 5 mg  2 times daily      01/01/20 0922     IP VTE HIGH RISK PATIENT  Once      01/01/20 0141                      Zonia Cross MD  Department of Hospital Medicine   [facility]

## 2020-01-07 NOTE — ASSESSMENT & PLAN NOTE
S/p Amiodarone bolus and infusion  Lopressor and cardizem given  Lopressor dose increased and cardizem stopped  Continue apixaban  Patient back in AFib with RVR today despite increased dose of metoprolol  Patient denies chest pain and SOB  Will re-consult Cardiology. Will give one dose of metoprolol IV pending consultation  Hold off on diltiazem as SBP in the 110s

## 2020-01-07 NOTE — ASSESSMENT & PLAN NOTE
Due to E. Coli and Klebsiella  Last dose ceftriaxone due today to complete total of 7 days of abx treatment

## 2020-01-08 LAB
ALBUMIN SERPL BCP-MCNC: 2.6 G/DL (ref 3.5–5.2)
ALP SERPL-CCNC: 91 U/L (ref 55–135)
ALT SERPL W/O P-5'-P-CCNC: 142 U/L (ref 10–44)
ANION GAP SERPL CALC-SCNC: 7 MMOL/L (ref 8–16)
AST SERPL-CCNC: 26 U/L (ref 10–40)
BILIRUB SERPL-MCNC: 0.5 MG/DL (ref 0.1–1)
BUN SERPL-MCNC: 13 MG/DL (ref 8–23)
CALCIUM SERPL-MCNC: 8.3 MG/DL (ref 8.7–10.5)
CHLORIDE SERPL-SCNC: 91 MMOL/L (ref 95–110)
CO2 SERPL-SCNC: 36 MMOL/L (ref 23–29)
CREAT SERPL-MCNC: 0.8 MG/DL (ref 0.5–1.4)
EST. GFR  (AFRICAN AMERICAN): >60 ML/MIN/1.73 M^2
EST. GFR  (NON AFRICAN AMERICAN): >60 ML/MIN/1.73 M^2
GLUCOSE SERPL-MCNC: 111 MG/DL (ref 70–110)
MAGNESIUM SERPL-MCNC: 1.4 MG/DL (ref 1.6–2.6)
PHOSPHATE SERPL-MCNC: 2.9 MG/DL (ref 2.7–4.5)
POCT GLUCOSE: 111 MG/DL (ref 70–110)
POCT GLUCOSE: 115 MG/DL (ref 70–110)
POCT GLUCOSE: 171 MG/DL (ref 70–110)
POCT GLUCOSE: 176 MG/DL (ref 70–110)
POTASSIUM SERPL-SCNC: 4.1 MMOL/L (ref 3.5–5.1)
PROT SERPL-MCNC: 5.6 G/DL (ref 6–8.4)
SODIUM SERPL-SCNC: 134 MMOL/L (ref 136–145)

## 2020-01-08 PROCEDURE — 97530 THERAPEUTIC ACTIVITIES: CPT | Mod: CQ

## 2020-01-08 PROCEDURE — 21400001 HC TELEMETRY ROOM

## 2020-01-08 PROCEDURE — 27000221 HC OXYGEN, UP TO 24 HOURS

## 2020-01-08 PROCEDURE — 94761 N-INVAS EAR/PLS OXIMETRY MLT: CPT

## 2020-01-08 PROCEDURE — 84100 ASSAY OF PHOSPHORUS: CPT

## 2020-01-08 PROCEDURE — 25000003 PHARM REV CODE 250: Performed by: HOSPITALIST

## 2020-01-08 PROCEDURE — 99232 SBSQ HOSP IP/OBS MODERATE 35: CPT | Mod: ,,, | Performed by: INTERNAL MEDICINE

## 2020-01-08 PROCEDURE — 36415 COLL VENOUS BLD VENIPUNCTURE: CPT

## 2020-01-08 PROCEDURE — 83735 ASSAY OF MAGNESIUM: CPT

## 2020-01-08 PROCEDURE — 80053 COMPREHEN METABOLIC PANEL: CPT

## 2020-01-08 PROCEDURE — 25000003 PHARM REV CODE 250: Performed by: INTERNAL MEDICINE

## 2020-01-08 PROCEDURE — 97530 THERAPEUTIC ACTIVITIES: CPT

## 2020-01-08 PROCEDURE — 97116 GAIT TRAINING THERAPY: CPT | Mod: CQ

## 2020-01-08 PROCEDURE — 97110 THERAPEUTIC EXERCISES: CPT | Mod: CQ

## 2020-01-08 PROCEDURE — 25000242 PHARM REV CODE 250 ALT 637 W/ HCPCS: Performed by: HOSPITALIST

## 2020-01-08 PROCEDURE — 99232 PR SUBSEQUENT HOSPITAL CARE,LEVL II: ICD-10-PCS | Mod: ,,, | Performed by: INTERNAL MEDICINE

## 2020-01-08 PROCEDURE — 94640 AIRWAY INHALATION TREATMENT: CPT

## 2020-01-08 RX ORDER — METOPROLOL TARTRATE 50 MG/1
200 TABLET ORAL 2 TIMES DAILY
Status: DISCONTINUED | OUTPATIENT
Start: 2020-01-08 | End: 2020-01-09 | Stop reason: HOSPADM

## 2020-01-08 RX ORDER — DILTIAZEM HYDROCHLORIDE 120 MG/1
120 CAPSULE, COATED, EXTENDED RELEASE ORAL DAILY
Status: DISCONTINUED | OUTPATIENT
Start: 2020-01-08 | End: 2020-01-09

## 2020-01-08 RX ADMIN — LORAZEPAM 0.5 MG: 0.5 TABLET ORAL at 06:01

## 2020-01-08 RX ADMIN — LEVALBUTEROL HYDROCHLORIDE 1.25 MG: 1.25 SOLUTION, CONCENTRATE RESPIRATORY (INHALATION) at 07:01

## 2020-01-08 RX ADMIN — APIXABAN 5 MG: 5 TABLET, FILM COATED ORAL at 08:01

## 2020-01-08 RX ADMIN — LEVALBUTEROL HYDROCHLORIDE 1.25 MG: 1.25 SOLUTION, CONCENTRATE RESPIRATORY (INHALATION) at 04:01

## 2020-01-08 RX ADMIN — Medication 9 MG: at 08:01

## 2020-01-08 RX ADMIN — Medication: at 08:01

## 2020-01-08 RX ADMIN — ACETAMINOPHEN 650 MG: 325 TABLET ORAL at 09:01

## 2020-01-08 RX ADMIN — DILTIAZEM HYDROCHLORIDE 120 MG: 120 CAPSULE, COATED, EXTENDED RELEASE ORAL at 09:01

## 2020-01-08 RX ADMIN — LEVOTHYROXINE SODIUM 137 MCG: 25 TABLET ORAL at 05:01

## 2020-01-08 RX ADMIN — ESCITALOPRAM OXALATE 10 MG: 10 TABLET ORAL at 09:01

## 2020-01-08 RX ADMIN — LEVALBUTEROL HYDROCHLORIDE 1.25 MG: 1.25 SOLUTION, CONCENTRATE RESPIRATORY (INHALATION) at 11:01

## 2020-01-08 RX ADMIN — APIXABAN 5 MG: 5 TABLET, FILM COATED ORAL at 09:01

## 2020-01-08 RX ADMIN — METOPROLOL TARTRATE 150 MG: 50 TABLET, FILM COATED ORAL at 09:01

## 2020-01-08 RX ADMIN — METOPROLOL TARTRATE 200 MG: 50 TABLET, FILM COATED ORAL at 08:01

## 2020-01-08 RX ADMIN — Medication: at 09:01

## 2020-01-08 NOTE — PLAN OF CARE
Problem: Fall Injury Risk  Goal: Absence of Fall and Fall-Related Injury  Outcome: Ongoing, Progressing     Problem: Adult Inpatient Plan of Care  Goal: Plan of Care Review  Outcome: Ongoing, Progressing  Goal: Patient-Specific Goal (Individualization)  Outcome: Ongoing, Progressing  Goal: Absence of Hospital-Acquired Illness or Injury  Outcome: Ongoing, Progressing  Goal: Optimal Comfort and Wellbeing  Outcome: Ongoing, Progressing  Goal: Readiness for Transition of Care  Outcome: Ongoing, Progressing  Goal: Rounds/Family Conference  Outcome: Ongoing, Progressing     Problem: Diabetes Comorbidity  Goal: Blood Glucose Level Within Desired Range  Outcome: Ongoing, Progressing     Problem: Infection  Goal: Infection Symptom Resolution  Outcome: Ongoing, Progressing     Problem: Skin Injury Risk Increased  Goal: Skin Health and Integrity  Outcome: Ongoing, Progressing     Problem: Wound  Goal: Optimal Wound Healing  Outcome: Ongoing, Progressing     Problem: Arrhythmia/Dysrhythmia  Goal: Normalized Cardiac Rhythm  Outcome: Ongoing, Progressing

## 2020-01-08 NOTE — NURSING
Report given to Ene GARCIA. Pt resting comfortably. No acute distress noted. Denies pain at this time. Safety precautions maintained.     Chart check completed.

## 2020-01-08 NOTE — PT/OT/SLP PROGRESS
Physical Therapy Treatment    Patient Name:  Vashti Muñoz   MRN:  [id number]    Recommendations:     Discharge Recommendations:  nursing facility, skilled   Discharge Equipment Recommendations: none   Barriers to discharge: pt with decreased mobility     Assessment:     Vashti Muñoz is a 87 y.o. female admitted with a medical diagnosis of Chronic atrial fibrillation with rapid ventricular response.  She presents with the following impairments/functional limitations:  weakness, impaired endurance, impaired sensation, impaired functional mobilty, gait instability, decreased lower extremity function, decreased upper extremity function, decreased safety awareness, decreased coordination, impaired balance, pain, decreased ROM, edema, impaired cardiopulmonary response to activity .    Rehab Prognosis: Good; patient would benefit from acute skilled PT services to address these deficits and reach maximum level of function.    Recent Surgery: Procedure(s) (LRB):  Insertion, Pacemaker, Temporary Transvenous (N/A) 7 Days Post-Op    Plan:     During this hospitalization, patient to be seen 5 x/week to address the identified rehab impairments via gait training, therapeutic exercises, therapeutic activities and progress toward the following goals:    · Plan of Care Expires:  01/20/20    Subjective     Chief Complaint: weakness   Patient/Family Comments/goals: to get stronger   Pain/Comfort: pt unable to rate in number   · Location - Side 1: Right  · Location 1: arm  · Pain Addressed 1: Pre-medicate for activity, Cessation of Activity, Reposition, Nurse notified      Objective:     Communicated with nurse Oquendo prior to session.  Patient found HOB elevated with bed alarm, telemetry, oxygen, PureWick upon PT entry to room.     General Precautions: Standard, fall, respiratory   Orthopedic Precautions:N/A   Braces: N/A     Functional Mobility:  · Bed Mobility:     · Rolling Right: minimum assistance  · Scooting: minimum  assistance  · Supine to Sit: minimum assistance and moderate assistance, HOB elevated , bedside rail   · Sit to Supine: moderate assistance with BLE   · Transfers:     · Sit to Stand:  maximal assistance with rolling walker. V/T cues for safety technique and walker management.    · Gait: unable 2* c/o pain in B feet with WB .   · Balance:  Fair in sitting, poor in standing.       AM-PAC 6 CLICK MOBILITY  Turning over in bed (including adjusting bedclothes, sheets and blankets)?: 3  Sitting down on and standing up from a chair with arms (e.g., wheelchair, bedside commode, etc.): 2  Moving from lying on back to sitting on the side of the bed?: 2  Moving to and from a bed to a chair (including a wheelchair)?: 2  Need to walk in hospital room?: 2  Climbing 3-5 steps with a railing?: 1  Basic Mobility Total Score: 12       Therapeutic Activities and Exercises:   Pt performed seated BLE 10 reps x 2 trials:   AP, LAQ, HS,  Hip abd/add with pillow , Hip flexion. V/T's cues for technique and sequence. Pt required frequent rest breaks throughout therapy 2* fatigue and weakness.    Educated pt on safety awareness with all OOB mobility .     Patient left with bed in chair position with B heels offloading  all lines intact, call button in reach, bed alarm on and family and nurse present..    GOALS:   Multidisciplinary Problems     Physical Therapy Goals        Problem: Physical Therapy Goal    Goal Priority Disciplines Outcome Goal Variances Interventions   Physical Therapy Goal     PT, PT/OT Ongoing, Progressing     Description:  Goals to be met by: 2020     Patient will increase functional independence with mobility by performin. Supine to sit with Stand-by Assistance  2. Rolling to Left and Right with Supervision.  3. Sit to stand transfer with Contact Guard Assistance  4. Gait  x  feet with Stand-by Assistance using Rolling Walker.   5. Lower extremity exercise program x10 reps per handout, with  supervision                      Time Tracking:     PT Received On: 01/07/20  PT Start Time: 1515     PT Stop Time: 1543  PT Total Time (min): 28 min     Billable Minutes: Therapeutic Activity 14 and Therapeutic Exercise 14    Treatment Type: Treatment  PT/PTA: PTA     PTA Visit Number: 1     Lea Cheney, PTA  01/07/2020

## 2020-01-08 NOTE — PT/OT/SLP PROGRESS
Physical Therapy Treatment    Patient Name:  Vashti Muñoz   MRN:  [id number]    Recommendations:     Discharge Recommendations:  nursing facility, skilled   Discharge Equipment Recommendations: none   Barriers to discharge: Pt decreased mobility     Assessment:     Vashti Muñoz is a 87 y.o. female admitted with a medical diagnosis of Chronic atrial fibrillation with rapid ventricular response.  She presents with the following impairments/functional limitations:  weakness, impaired endurance, gait instability, impaired balance, decreased upper extremity function, decreased lower extremity function, decreased ROM, edema, pain, decreased safety awareness, decreased coordination, impaired cardiopulmonary response to activity, impaired functional mobilty .    Rehab Prognosis: Good; patient would benefit from acute skilled PT services to address these deficits and reach maximum level of function.    Recent Surgery: Procedure(s) (LRB):  Insertion, Pacemaker, Temporary Transvenous (N/A) 8 Days Post-Op    Plan:     During this hospitalization, patient to be seen 5 x/week to address the identified rehab impairments via gait training, therapeutic exercises, therapeutic activities and progress toward the following goals:    · Plan of Care Expires:  01/20/20    Subjective     Chief Complaint: weakness and sleepy   Patient/Family Comments/goals: to return to PLOF  Pain/Comfort:  · Location - Orientation 1: lower  · Location 1: back  · Pain Addressed 1: Pre-medicate for activity, Nurse notified, Reposition      Objective:     Communicated with nurse prior to session.  Patient found HOB elevated with bed alarm, telemetry, oxygen, PureWick upon PT entry to room.     General Precautions: Standard, fall, respiratory   Orthopedic Precautions:N/A   Braces: N/A     Functional Mobility:  · Bed Mobility:     · Rolling Right: minimum assistance  · Scooting: contact guard assistance  · Supine to Sit: minimum assistance , HOB elevated ,  bedside rail   · Transfers:     · Sit to Stand: x 2 trials from bed moderate assistance with rolling walker. V/T cues for safety technique and walker management.   · Bed to Chair: minimum assistance and moderate assistance with  rolling walker  using  Step Transfer   · Gait trained ~  4-5 side steps and ~ 6-7 steps from bed to chair  on level tile with Rolling Walker with Minimal Assistance/MOD A .  Pt with demonstarting a  3-point gait with decreased mynor, increased time in double stance, decreased velocity of limb motion, decreased step length, decreased swing-to-stance ratio, decreased toe-to-floor clearance and decreased weight-shifting ability.Impairments contributing to gait deviations include impaired balance, decreased flexibility, pain, impaired postural control, decreased ROM and decreased strength. V/T cues for safety technique and walker management.   · Balance:  Fair -      AM-PAC 6 CLICK MOBILITY  Turning over in bed (including adjusting bedclothes, sheets and blankets)?: 3  Sitting down on and standing up from a chair with arms (e.g., wheelchair, bedside commode, etc.): 2  Moving from lying on back to sitting on the side of the bed?: 3  Moving to and from a bed to a chair (including a wheelchair)?: 3  Need to walk in hospital room?: 2  Climbing 3-5 steps with a railing?: 2  Basic Mobility Total Score: 15       Therapeutic Activities and Exercises:   Pt performed seated BLE 10 reps x 2 trials:   AP, LAQ, HS,  Hip abd/add with pillow , Hip flexion. V/T's cues for technique and sequence.  HR maintain from 79-92 bpm per telemetry monitor  tech   Educated pt on safety awareness with all OOB mobility , transfer and gait training.     Patient left up in chair on green air cushion with all lines intact, call button in reach, nurse notified and family  present..    GOALS:   Multidisciplinary Problems     Physical Therapy Goals        Problem: Physical Therapy Goal    Goal Priority Disciplines Outcome Goal  Variances Interventions   Physical Therapy Goal     PT, PT/OT Ongoing, Progressing     Description:  Goals to be met by: 2020     Patient will increase functional independence with mobility by performin. Supine to sit with Stand-by Assistance  2. Rolling to Left and Right with Supervision.  3. Sit to stand transfer with Contact Guard Assistance  4. Gait  x  feet with Stand-by Assistance using Rolling Walker.   5. Lower extremity exercise program x10 reps per handout, with supervision                      Time Tracking:     PT Received On: 20  PT Start Time: 1135     PT Stop Time: 1215  PT Total Time (min): 40 min     Billable Minutes: Gait Training 10, Therapeutic Activity 15 and Therapeutic Exercise 15    Treatment Type: Treatment  PT/PTA: PTA     PTA Visit Number: 2     Lea Cheney, PTA  2020

## 2020-01-08 NOTE — NURSING
"Pt had uneventful night. No c/o pain during this shift. Refused to be turned every 2 hours. Pt stated, "My back hurts, I can't turn."  Educated on risks factors of pressure ulcers. Will continue to monitor.  "

## 2020-01-08 NOTE — PT/OT/SLP PROGRESS
"Occupational Therapy   Treatment    Name: Vashti Muñoz  MRN: [id number]  Admitting Diagnosis:  Chronic atrial fibrillation with rapid ventricular response  8 Days Post-Op    Recommendations:     Discharge Recommendations: nursing facility, skilled  Discharge Equipment Recommendations:  none  Barriers to discharge:  (increased amount of care for functional transfers and basic self-care needs)    Assessment:     Vashti Muñoz is a 87 y.o. female with a medical diagnosis of Chronic atrial fibrillation with rapid ventricular response.  She presents as motivated to participate in order to regain PLOF. Performance deficits affecting function are impaired functional mobilty, weakness, decreased safety awareness, impaired cardiopulmonary response to activity, impaired endurance, gait instability, pain, impaired balance, decreased upper extremity function, impaired self care skills, decreased lower extremity function, decreased ROM.     Rehab Prognosis:  Good; patient would benefit from acute skilled OT services to address these deficits and reach maximum level of function.       Plan:     Patient to be seen 5 x/week to address the above listed problems via self-care/home management, therapeutic activities, therapeutic exercises  · Plan of Care Expires: 01/20/20  · Plan of Care Reviewed with: patient, sibling, daughter    Subjective     Chief complaint: "I'm very weak"   Patient's comments/goals: wanting to get back in bed, B/L LE are "very tired" and she was afraid she wouldn't be able to stand     Pain/Comfort:  · Pain Rating 1: (no pain at rest; some pain to RUE with movement, but no more throbbing)    Objective:     Communicated with: nurseEne, prior to session.  Patient found up in chair with oxygen, peripheral IV upon OT entry to room.    General Precautions: Standard, fall, respiratory   Orthopedic Precautions:N/A   Braces: N/A     Occupational Performance:     Bed Mobility:    · Patient completed Scooting with " contact guard assistance and min verbal/tactile cueing for body mechanics  · Patient completed Bridging with contact guard assistance with min verbal/tactile cueing for body mechanics   · Patient completed Sit to Supine with maximal assistance and with leg lift     Functional Mobility/Transfers:  · Patient completed Bed <> Chair Transfer using Stand Pivot technique with maximal assist with hand-held assist  · Functional Mobility: Pt attempted to stand to take 1-2 steps from bed>chair, but pt was only able to clear her bottom from the chair. Pt was then positioned to complete a stand pivot transfer, requiring MAX A with verbal cueing.     Activities of Daily Living:  · NT      [facility] 6 Click ADL: 14    Treatment & Education:  · Pt re-educated on OT role/POC.   · Importance of OOB activity with staff assistance.  · Safety during functional t/f and mobility   · White board updated   · Pt completed 1 x 5 BUE AROM in supine/HOB elevated position:  · Digits I-V flexion/extension   · Wrist flexion/extension  · Forearm pronation/supination  · Elbow flexion/extension  · Shoulder flexion/extension  · Shoulder elevation/depression   · Forward punches  · Shoulder ab/dduction   · Handout placed on lap tray for pt to continue to read for increased carryover   · Pt encouraged to complete 2x10-15 reps a day  · Functional mobility completed- assistance level noted above   · All questions/concerns answered within OT scope of practice       Patient left HOB elevated with all lines intact, call button in reach, bed alarm on and nurseEne, notifiedEducation:   on her ability to transfer, family notified in the waiting room, and oxygen NC in place.     GOALS:   Multidisciplinary Problems     Occupational Therapy Goals        Problem: Occupational Therapy Goal    Goal Priority Disciplines Outcome Interventions   Occupational Therapy Goal     OT, PT/OT Ongoing, Progressing    Description:  Goals to be met by: 01/20/20     Patient will  increase functional independence with ADLs by performing:    Feeding with Set-up Assistance.  UE Dressing with Set-up Assistance.  LE Dressing with Minimal Assistance.  Grooming while standing with Contact Guard Assistance.  Toileting from bedside commode with Minimal Assistance for hygiene and clothing management.   Sitting at edge of bed x15 minutes with Supervision.  Rolling to Bilateral with Supervision. (goal updated from CGA on 01/08/20)   Supine to sit with Contact Guard Assistance.  Step transfer with Stand-by Assistance  Toilet transfer to bedside commode with Stand-by Assistance.  Upper extremity exercise program x15 reps per handout, with assistance as needed.                       Time Tracking:     OT Date of Treatment: 01/08/20  OT Start Time: 1309  OT Stop Time: 1333  OT Total Time (min): 24 min    Billable Minutes:Therapeutic Activity 24 min    Tonie Mandujano OT  1/8/2020

## 2020-01-08 NOTE — ASSESSMENT & PLAN NOTE
S/p Amiodarone bolus and infusion  Lopressor and cardizem given  Lopressor dose increased and cardizem stopped  Continue apixaban  Patient back in AFib with RVR despite increased dose of metoprolol  Patient denies chest pain and SOB  Discussed with Cardiology on 1/7. Recommend adding back diltiazem and increasing the dose gradually until rate better controlled and making sure BP tolerates.

## 2020-01-08 NOTE — PT/OT/SLP PROGRESS
Occupational Therapy      Patient Name:  Vashti Muñoz   MRN:  [id number]    Patient not seen today secondary to Other (Comment)(Pt's -140 bpm at rest. Nurse notified. ). Will follow-up later today once medically appropriate for therapy.    Tonie Mandujano, OT  1/8/2020

## 2020-01-08 NOTE — PROGRESS NOTES
"[facility] Hospital Medicine  Progress Note    Patient Name: Vashti Muñoz  MRN: [id number]  Patient Class: IP- Inpatient   Admission Date: 12/31/2019  Length of Stay: 8 days  Attending Physician: Collette Bautista MD  Primary Care Provider: Alesha Baum MD        Subjective:     Principal Problem:Chronic atrial fibrillation with rapid ventricular response        HPI:  Per Dr. Makenzie Tena:    "Mrs. Vashti Muñoz is a 87 y.o. female [facility] resident with essential hypertension, hyperlipidemia (LDL unknown), chronic atrial fibrillation ([id number] score 4) on chronic anticoagulation, hypothyroidism (TSH 0.452 Sept 2018), anemia chronic disease, and dementia who presents to [facility] ED with complaints of altered mental status today.  She was noted to be bradycardic for which EMS was activated.  She was last seen in her normal state of health at around 4:30 PM today.  She was given atropine with positive response in her heart rate and thereafter transported to this facility.  She was then started on transcutaneous pacing with improvement of her mentation.  She was brought to the Catheterization Lab and was placed a transvenous pacer per Cardiology.  Further history is otherwise limited at this time."    Overview/Hospital Course:  Ms. Muñoz was admitted to the ICU with symptomatic bradycardia with pulse of 41 beats per minute with evidence of systemic hypoperfusion with metabolic encephalopathy and acute renal failure with hyperkalemia.  Patient treated with percutaneous pacing along with continuous dopamine infusion.  Cardiology consulted and placed a temporary transvenous pacemaker with improvement in her pulse.  Mental status improved with increased pulse.  Patient also challenged with intravenous fluids with improvement of hyperkalemia and renal function.  Suspect patient suffered acute tubular necrosis secondary to systemic hypoperfusion.   Patient also treated with antibiotics for possible " urinary tract infection.  Elevated liver function tests likely secondary from shock liver due to systemic hypoperfusion.   Liver enzymes improved. She was moved out the ICU and was on the floor when she developed Afib with RVR.  Transferred back to ICU and amiodarone bolus dose given with continuous rate. Cardizem and lopressor resumed. Pt appears anxious and possibly withdrawal from ativan. Medication resumed PRN for anxiety. On 1/5/20, amiodarone infusion stopped and patient was stepped down to floor.  BP improved and HR better controlled with increased metoprolol to 100 mg BID. Cardizem stopped. PT/OT consulted. Completed 7 days of ceftriaxone for urinary tract infection (UCx with pansensitive E coli (> 100,000 cfu/mL) and Klebsiella pneumoniae (10,000-49,999 cfu/mL). On 1/7 patient again with AFib with RVR despite increased dose of metoprolol.     Interval History: feels ok. Has no complaints. RVR improved but not at goal.     Review of Systems   Respiratory: Negative.    Cardiovascular: Positive for leg swelling (better ).   Gastrointestinal: Negative.    Neurological: Positive for weakness.     Objective:     Vital Signs (Most Recent):  Temp: 97.9 °F (36.6 °C) (01/08/20 1202)  Pulse: 80 (01/08/20 1202)  Resp: 18 (01/08/20 1202)  BP: 135/73 (01/08/20 1202)  SpO2: 100 % (01/08/20 1202) Vital Signs (24h Range):  Temp:  [97.9 °F (36.6 °C)-99.3 °F (37.4 °C)] 97.9 °F (36.6 °C)  Pulse:  [] 80  Resp:  [16-20] 18  SpO2:  [93 %-100 %] 100 %  BP: (116-165)/(59-85) 135/73     Weight: 94 kg (207 lb 3.7 oz)  Body mass index is 37.9 kg/m².    Intake/Output Summary (Last 24 hours) at 1/8/2020 1249  Last data filed at 1/8/2020 0500  Gross per 24 hour   Intake --   Output 750 ml   Net -750 ml      Physical Exam   Constitutional: She is oriented to person, place, and time. She appears well-developed. No distress.   Cardiovascular: An irregularly irregular rhythm present. Tachycardia present.   Pulmonary/Chest: Effort  normal and breath sounds normal.   Abdominal: Soft. Bowel sounds are normal.   Musculoskeletal: She exhibits edema (pitting to arms, hand and legs (improved)).   Neurological: She is alert and oriented to person, place, and time.   Skin: She is not diaphoretic.   Psychiatric: She has a normal mood and affect. Her behavior is normal. Judgment and thought content normal.   Nursing note and vitals reviewed.      Significant Labs: All pertinent labs within the past 24 hours have been reviewed.    Significant Imaging: I have reviewed all pertinent imaging results/findings within the past 24 hours.  I have reviewed and interpreted all pertinent imaging results/findings within the past 24 hours.      Assessment/Plan:      * Chronic atrial fibrillation with rapid ventricular response  S/p Amiodarone bolus and infusion  Lopressor and cardizem given  Lopressor dose increased and cardizem stopped  Continue apixaban  Patient back in AFib with RVR despite increased dose of metoprolol  Patient denies chest pain and SOB  Discussed with Cardiology on 1/7. Recommend adding back diltiazem and increasing the dose gradually until rate better controlled and making sure BP tolerates.       Atrial fibrillation with rapid ventricular response  Management as above  Cardiology re-consulted    Debility  PT/OT  Plan for SNF at [facility] when ready for discharge    Urinary tract infection without hematuria  Due to E. Coli and Klebsiella  Completed total of 7 days of abx treatment    Symptomatic bradycardia  2/2 hyperkalemia  Resolved       Dementia without behavioral disturbance  Stable; will continue her home regimen of memantine.    Anemia of chronic disease  H/H is stable and consistent with previous laboratory measurements  No signs of bleeding  Monitor    Hyperkalemia  Patient's potassium was found to be 6.6 mmol/L in the setting of acute renal failure.    This is also likely the cause of her bradycardia.    She has been given  insulin/dextrose, albuterol, and calcium gluconate.  Repeat potassium was much better at 5.6 mmlol/L.  Resolved    Acute renal failure  Likely ATN from systemic hypoperfusion from bradycardia.    Resolved      Mixed hyperlipidemia  Will continue her home regimen of simvastatin.    Essential hypertension  Restart lisinopril when appropriately     Chronic anticoagulation  Continue her home regimen of apixaban.    Acquired hypothyroidism  Well controlled; will continue her home regimen of levothyroxine.    Chronic atrial fibrillation  As above    VTE Risk Mitigation (From admission, onward)         Ordered     apixaban tablet 5 mg  2 times daily      01/01/20 0922     IP VTE HIGH RISK PATIENT  Once      01/01/20 0141              Dispo: back to [facility] with SNF when ready    Collette Trejo MD  Department of Hospital Medicine   [facility]

## 2020-01-08 NOTE — PLAN OF CARE
Problem: Occupational Therapy Goal  Goal: Occupational Therapy Goal  Description  Goals to be met by: 01/20/20     Patient will increase functional independence with ADLs by performing:    Feeding with Set-up Assistance.  UE Dressing with Set-up Assistance.  LE Dressing with Minimal Assistance.  Grooming while standing with Contact Guard Assistance.  Toileting from bedside commode with Minimal Assistance for hygiene and clothing management.   Sitting at edge of bed x15 minutes with Supervision.  Rolling to Bilateral with Supervision. (goal updated from CGA on 01/08/20)   Supine to sit with Contact Guard Assistance.  Step transfer with Stand-by Assistance  Toilet transfer to bedside commode with Stand-by Assistance.  Upper extremity exercise program x15 reps per handout, with assistance as needed.      Outcome: Ongoing, Progressing     Stand pivot transfer chair>bed MAX A. Bridge/Rolling with CGA

## 2020-01-09 VITALS
WEIGHT: 209.44 LBS | HEART RATE: 80 BPM | RESPIRATION RATE: 18 BRPM | HEIGHT: 62 IN | TEMPERATURE: 98 F | DIASTOLIC BLOOD PRESSURE: 58 MMHG | OXYGEN SATURATION: 97 % | BODY MASS INDEX: 38.54 KG/M2 | SYSTOLIC BLOOD PRESSURE: 119 MMHG

## 2020-01-09 PROBLEM — E87.5 HYPERKALEMIA: Status: RESOLVED | Noted: 2020-01-01 | Resolved: 2020-01-09

## 2020-01-09 PROBLEM — N17.9 ACUTE RENAL FAILURE: Status: RESOLVED | Noted: 2020-01-01 | Resolved: 2020-01-09

## 2020-01-09 PROBLEM — N39.0 URINARY TRACT INFECTION WITHOUT HEMATURIA: Status: RESOLVED | Noted: 2020-01-07 | Resolved: 2020-01-09

## 2020-01-09 PROBLEM — R00.1 SYMPTOMATIC BRADYCARDIA: Status: RESOLVED | Noted: 2020-01-05 | Resolved: 2020-01-09

## 2020-01-09 LAB
POCT GLUCOSE: 106 MG/DL (ref 70–110)
POCT GLUCOSE: 143 MG/DL (ref 70–110)

## 2020-01-09 PROCEDURE — 25000242 PHARM REV CODE 250 ALT 637 W/ HCPCS: Performed by: HOSPITALIST

## 2020-01-09 PROCEDURE — 25000003 PHARM REV CODE 250: Performed by: HOSPITALIST

## 2020-01-09 PROCEDURE — 99232 PR SUBSEQUENT HOSPITAL CARE,LEVL II: ICD-10-PCS | Mod: ,,, | Performed by: INTERNAL MEDICINE

## 2020-01-09 PROCEDURE — 25000003 PHARM REV CODE 250: Performed by: INTERNAL MEDICINE

## 2020-01-09 PROCEDURE — 94640 AIRWAY INHALATION TREATMENT: CPT

## 2020-01-09 PROCEDURE — 27000221 HC OXYGEN, UP TO 24 HOURS

## 2020-01-09 PROCEDURE — 94761 N-INVAS EAR/PLS OXIMETRY MLT: CPT

## 2020-01-09 PROCEDURE — 99232 SBSQ HOSP IP/OBS MODERATE 35: CPT | Mod: ,,, | Performed by: INTERNAL MEDICINE

## 2020-01-09 RX ORDER — DILTIAZEM HYDROCHLORIDE 180 MG/1
180 CAPSULE, COATED, EXTENDED RELEASE ORAL DAILY
Status: DISCONTINUED | OUTPATIENT
Start: 2020-01-09 | End: 2020-01-09 | Stop reason: HOSPADM

## 2020-01-09 RX ORDER — METOPROLOL TARTRATE 100 MG/1
200 TABLET ORAL 2 TIMES DAILY
Qty: 120 TABLET | Refills: 11 | Status: SHIPPED | OUTPATIENT
Start: 2020-01-09 | End: 2021-01-08

## 2020-01-09 RX ORDER — DILTIAZEM HYDROCHLORIDE 180 MG/1
180 CAPSULE, COATED, EXTENDED RELEASE ORAL DAILY
Status: DISCONTINUED | OUTPATIENT
Start: 2020-01-10 | End: 2020-01-09

## 2020-01-09 RX ORDER — ACETAMINOPHEN 325 MG/1
650 TABLET ORAL EVERY 6 HOURS PRN
Status: DISCONTINUED | OUTPATIENT
Start: 2020-01-09 | End: 2020-01-09 | Stop reason: HOSPADM

## 2020-01-09 RX ORDER — DILTIAZEM HYDROCHLORIDE 180 MG/1
180 CAPSULE, COATED, EXTENDED RELEASE ORAL DAILY
Qty: 30 CAPSULE | Refills: 11 | Status: SHIPPED | OUTPATIENT
Start: 2020-01-09 | End: 2021-01-08

## 2020-01-09 RX ADMIN — METOPROLOL TARTRATE 200 MG: 50 TABLET, FILM COATED ORAL at 09:01

## 2020-01-09 RX ADMIN — LEVALBUTEROL HYDROCHLORIDE 1.25 MG: 1.25 SOLUTION, CONCENTRATE RESPIRATORY (INHALATION) at 07:01

## 2020-01-09 RX ADMIN — LEVOTHYROXINE SODIUM 137 MCG: 25 TABLET ORAL at 05:01

## 2020-01-09 RX ADMIN — Medication: at 09:01

## 2020-01-09 RX ADMIN — APIXABAN 5 MG: 5 TABLET, FILM COATED ORAL at 09:01

## 2020-01-09 RX ADMIN — LEVALBUTEROL HYDROCHLORIDE 1.25 MG: 1.25 SOLUTION, CONCENTRATE RESPIRATORY (INHALATION) at 03:01

## 2020-01-09 RX ADMIN — DILTIAZEM HYDROCHLORIDE 180 MG: 180 CAPSULE, COATED, EXTENDED RELEASE ORAL at 09:01

## 2020-01-09 RX ADMIN — ACETAMINOPHEN 650 MG: 325 TABLET ORAL at 12:01

## 2020-01-09 RX ADMIN — ESCITALOPRAM OXALATE 10 MG: 10 TABLET ORAL at 09:01

## 2020-01-09 NOTE — PLAN OF CARE
[facility]     Department of Hospital Medicine     [address] [place] [address]     [phone] after hours  [phone] fax       NURSING HOME ORDERS    Patient Name: Vashti Muñoz  YOB: 1932 01/09/2020    Admit to Nursing Home:   Skilled Bed                                Diagnoses:  Active Hospital Problems    Diagnosis  POA    *Chronic atrial fibrillation with rapid ventricular response [I48.20]  No    Atrial fibrillation with rapid ventricular response [I48.91]  Yes     Priority: 2     Urinary tract infection without hematuria [N39.0]  Yes    Debility [R53.81]  Yes    Symptomatic bradycardia [R00.1]  Yes    Acute renal failure [N17.9]  Yes    Hyperkalemia [E87.5]  Yes    Anemia of chronic disease [D63.8]  Yes     Chronic    Dementia without behavioral disturbance [F03.90]  Yes     Chronic    Mixed hyperlipidemia [E78.2]  Yes     Chronic    Chronic atrial fibrillation [I48.20]  Yes     Chronic    Essential hypertension [I10]  Yes     Chronic    Chronic anticoagulation [Z79.01]  Not Applicable     Chronic    Acquired hypothyroidism [E03.9]  Yes     Chronic      Resolved Hospital Problems    Diagnosis Date Resolved POA    Symptomatic bradycardia [R00.1] 01/05/2020 Yes       Patient is homebound due to:  Chronic atrial fibrillation with rapid ventricular response    Allergies:  Review of patient's allergies indicates:   Allergen Reactions    Sulfa (sulfonamide antibiotics) Hives       Vitals:      Every shift (Skilled Nursing patients)    Diet: cardiac diet     Acitivities:   - Up in a chair each morning as tolerated  - Ambulate with assistance to bathroom    LABS:  Per facility protocol    Nursing Precautions:   - Aspiration precautions:             -  Upright 90 degrees befor during and after meals    - Fall precautions per nursing home protocol      CONSULTS:     Physical Therapy to evaluate and treat     Occupational  Therapy to evaluate and treat    Misc: oxygen 2 L via low flow NC as needed for shortness of breath and/or O2 sats < 90%    Medications: Review discharge medications with patient and family and provide education.      Current Discharge Medication List      START taking these medications    Details   metoprolol tartrate (LOPRESSOR) 100 MG tablet Take 2 tablets (200 mg total) by mouth 2 (two) times daily.  Qty: 120 tablet, Refills: 11      diltiaZEM (CARDIZEM CD) 180 MG 24 hr capsule Take 1 capsule (180 mg total) by mouth once daily.  Qty: 30 capsule, Refills: 11         CONTINUE these medications which have NOT CHANGED    Details   acetaminophen (TYLENOL) 500 MG tablet Take 500 mg by mouth every 6 (six) hours as needed for Pain.      apixaban (ELIQUIS) 5 mg Tab Take 1 tablet (5 mg total) by mouth 2 (two) times daily.  Qty: 180 tablet, Refills: 3      escitalopram oxalate (LEXAPRO) 10 MG tablet Take 1 tablet (10 mg total) by mouth once daily.  Qty: 90 tablet, Refills: 3    Associated Diagnoses: Anxiety      levothyroxine 137 mcg Cap Take 1 tablet by mouth once daily.      memantine (NAMENDA) 5 MG Tab Take 5 mg by mouth 2 (two) times daily.      simvastatin (ZOCOR) 40 MG tablet Take 40 mg by mouth every evening.      vitamin D (VITAMIN D3) 1000 units Tab Take 2,000 Units by mouth once daily.             I certify that this patient is confined to her home and needs physical therapy and occupational therapy.

## 2020-01-09 NOTE — PROGRESS NOTES
Orders are good and I am getting the auth. Call report in 30 minutes to [phone] to   Nurse Lori for room 10B . Call Report information above for telemetry nurse..Selina Cano RN, BSN, STN [facility]  1/9/2020  '

## 2020-01-09 NOTE — PT/OT/SLP DISCHARGE
Occupational Therapy Discharge Summary    Vashti Muñoz  MRN: [id number]   Principal Problem: Chronic atrial fibrillation with rapid ventricular response      Patient Discharged from acute Occupational Therapy on 01/09/20.  Please refer to prior OT notes for functional status.    Assessment:      Patient appropriate for care in another setting.    Objective:     GOALS:   Multidisciplinary Problems     Occupational Therapy Goals     Not on file          Multidisciplinary Problems (Resolved)        Problem: Occupational Therapy Goal    Goal Priority Disciplines Outcome Interventions   Occupational Therapy Goal   (Resolved)     OT, PT/OT Met    Description:  Goals to be met by: 01/20/20     Patient will increase functional independence with ADLs by performing:    Feeding with Set-up Assistance.  UE Dressing with Set-up Assistance.  LE Dressing with Minimal Assistance.  Grooming while standing with Contact Guard Assistance.  Toileting from bedside commode with Minimal Assistance for hygiene and clothing management.   Sitting at edge of bed x15 minutes with Supervision.  Rolling to Bilateral with Supervision. (goal updated from CGA on 01/08/20)   Supine to sit with Contact Guard Assistance.  Step transfer with Stand-by Assistance  Toilet transfer to bedside commode with Stand-by Assistance.  Upper extremity exercise program x15 reps per handout, with assistance as needed.                       Reasons for Discontinuation of Therapy Services  Transfer to alternate level of care.      Plan:     Patient Discharged to: Skilled Nursing Facility    Tonie Mandujano OT  1/9/2020

## 2020-01-09 NOTE — ASSESSMENT & PLAN NOTE
AF is chronic, on eliquis  Adm with symptomatic bradycardia in setting of ARF and hyperkalemia, now resolved.  AF rates better controlled, was prev on high dose BBL/CCB, previously held with bradycardia.  Can titrate dilt further as needed  Hope to avoid PPM

## 2020-01-09 NOTE — SUBJECTIVE & OBJECTIVE
Review of Systems   Gastrointestinal: Negative for melena.   Genitourinary: Negative for hematuria.     Objective:     Vital Signs (Most Recent):  Temp: 98 °F (36.7 °C) (01/09/20 0417)  Pulse: 100 (01/09/20 0417)  Resp: 18 (01/09/20 0417)  BP: 132/70 (01/09/20 0417)  SpO2: 98 % (01/09/20 0417) Vital Signs (24h Range):  Temp:  [97.5 °F (36.4 °C)-98 °F (36.7 °C)] 98 °F (36.7 °C)  Pulse:  [] 100  Resp:  [17-20] 18  SpO2:  [92 %-100 %] 98 %  BP: (115-143)/(60-77) 132/70     Weight: 95 kg (209 lb 7 oz)  Body mass index is 38.31 kg/m².     SpO2: 98 %  O2 Device (Oxygen Therapy): nasal cannula    No intake or output data in the 24 hours ending 01/09/20 0614    Lines/Drains/Airways     Drain            Female External Urinary Catheter 01/03/20 0000 6 days          Peripheral Intravenous Line                 Peripheral IV - Single Lumen 01/03/20 20 G Right Hand 6 days         Peripheral IV - Single Lumen 01/08/20 0420 22 G Left;Posterior Hand 1 day                Physical Exam   Constitutional: She is oriented to person, place, and time. She appears well-developed and well-nourished. No distress.   HENT:   Head: Normocephalic and atraumatic.   Mouth/Throat: No oropharyngeal exudate.   Eyes: Pupils are equal, round, and reactive to light. Conjunctivae and EOM are normal. No scleral icterus.   Neck: Normal range of motion. Neck supple. No JVD present. No tracheal deviation present. No thyromegaly present.   Cardiovascular: Normal rate, S1 normal and S2 normal. An irregularly irregular rhythm present. Exam reveals distant heart sounds. Exam reveals no gallop and no friction rub.   Murmur heard.   Systolic murmur is present with a grade of 2/6.  Pulmonary/Chest: Effort normal and breath sounds normal. No respiratory distress. She has no wheezes. She has no rales. She exhibits no tenderness.   Abdominal: Soft. She exhibits no distension.   obese   Musculoskeletal: Normal range of motion. She exhibits no edema.    Neurological: She is alert and oriented to person, place, and time. No cranial nerve deficit.   Skin: Skin is warm and dry. She is not diaphoretic.   Psychiatric: She has a normal mood and affect. Her behavior is normal. Judgment normal.       Current Medications:   apixaban  5 mg Oral BID    diltiaZEM  120 mg Oral Daily    escitalopram oxalate  10 mg Oral Daily    levalbuterol  1.25 mg Nebulization Q8H    levothyroxine  137 mcg Oral Before breakfast    metoprolol tartrate  200 mg Oral BID    miconazole NITRATE 2 %   Topical (Top) BID       acetaminophen, dextrose 50%, dextrose 50%, glucagon (human recombinant), glucose, glucose, insulin aspart U-100, LORazepam, melatonin, ondansetron, promethazine (PHENERGAN) IVPB, senna-docusate 8.6-50 mg    Laboratory (all labs reviewed):  CBC:  Recent Labs   Lab 01/03/20  0503 01/04/20  0418 01/05/20  0404 01/06/20  0313 01/07/20  0306   WBC 8.70 8.99 6.82 5.50 6.43   Hemoglobin 10.5 L 10.4 L 10.6 L 12.7 10.1 L   Hematocrit 33.7 L 33.1 L 34.7 L 40.5 32.8 L   Platelets 138 L 126 L 148 L 90 L 133 L       CHEMISTRIES:  Recent Labs   Lab 01/04/20  0417 01/05/20  0403 01/06/20  0313 01/07/20  0306 01/08/20  0542   Glucose 172 H 127 H 123 H 108 111 H   Sodium 132 L 134 L 136 134 L 134 L   Potassium 4.4 3.6 3.9 3.6 4.1   BUN, Bld 19 17 17 15 13   Creatinine 0.9 0.9 0.8 0.8 0.8   eGFR if African American >60 >60 >60 >60 >60   eGFR if non  58 A 58 A >60 >60 >60   Calcium 8.8 8.4 L 8.2 L 8.4 L 8.3 L   Magnesium 1.3 L 1.2 L 1.5 L 1.3 L 1.4 L       CARDIAC BIOMARKERS:  Recent Labs   Lab 09/11/18  1104 09/11/18  1658 10/11/19  0927 12/31/19  2142 01/01/20 0051 01/04/20  0417   CPK  --   --   --   --   --  60   Troponin I <0.006 0.018 0.012 0.008 0.038 H  --        COAGS:  Recent Labs   Lab 10/11/19  0927 01/01/20 0051 01/02/20  0402 01/03/20  0503 01/04/20  0418   INR 1.0 1.4 H 1.3 H 1.1 1.1       LIPIDS/LFTS:  Recent Labs   Lab 01/04/20 0417 01/05/20  0403  01/06/20  0313 01/07/20  0306 01/08/20  0542    H 59 H 37 25 26    H 418 H 272 H 193 H 142 H       BNP:  Recent Labs   Lab 09/11/18  0932 10/11/19  0927 12/31/19  2142    H 377 H 439 H       TSH:  Recent Labs   Lab 07/05/18  1429 09/11/18  0932 01/03/20  0503   TSH 4.690 H 0.452 4.633 H       Free T4:  Recent Labs   Lab 01/03/20  0503   Free T4 0.90       Diagnostic Results:  ECG (personally reviewed and interpreted tracing(s)):  1/3/20 0552     Echo: 1/1/20  · Normal left ventricular systolic function. The estimated ejection fraction is 70%  · Concentric left ventricular hypertrophy.  · Grade II (moderate) left ventricular diastolic dysfunction consistent with pseudonormalization.  · Normal right ventricular systolic function.  · Moderate-to-severe aortic valve stenosis.  · Aortic valve area is 0.64 cm2; peak velocity is 3.86 m/s; mean gradient is 35 mmHg.  · Mild mitral regurgitation.  · Mild tricuspid regurgitation.  · The estimated PA systolic pressure is 70 mm Hg  · Pulmonary hypertension present.

## 2020-01-09 NOTE — PLAN OF CARE
11:14AM TN informed telemetry nurseAnnamaria that pt is cleared for discharge from [facility]. TN provided nurse with travel packet..     01/09/20 1130   Final Note   Assessment Type Final Discharge Note   Anticipated Discharge Disposition SNF   What phone number can be called within the next 1-3 days to see how you are doing after discharge?   (see chart)   Hospital Follow Up  Appt(s) scheduled? Yes   Discharge plans and expectations educations in teach back method with documentation complete? Yes   Right Care Referral Info   Referral Type SNF   Facility Name [facility]   Selina Cano RN, BSN, STN CCM  1/9/2020

## 2020-01-09 NOTE — ASSESSMENT & PLAN NOTE
AF is chronic, on eliquis  Adm with symptomatic bradycardia in setting of ARF and hyperkalemia, now resolved.  AF rates uncontrolled, was prev on high dose BBL/CCB, held with bradycardia.  Will attempt to uptitrate BBL, inc 200mg bid tonight  Hope to avoid PPM

## 2020-01-09 NOTE — NURSING
Report received from Ene GARCIA. Pt awake, sitting up in lounge chair. 2L O2 nasal cannula in use. NAD noted. Daughter at the bedside. Call light within reach. Will continue to monitor.

## 2020-01-09 NOTE — NURSING
Report given to Annamaria GARCIA. Pt resting comfortably. No acute distress noted. Denies pain at this time. Safety precautions maintained.     Chart check completed.

## 2020-01-09 NOTE — NURSING
[facility]  transport ([employer])  at bedside for transport to [facility]. Patient awake, alert with daughter Chuyita at bedside. Pt stood and transferred to  using walker, min assist with two RNs. O2 used for transport. All pt belongings returned. No apparent distress noted.

## 2020-01-09 NOTE — PROGRESS NOTES
ADT 30 order placed for Van Transportation.  Requested  time:12NOON , WHEEL CHAIR , SAFETY BELT AND OXYGEN  If transportation does not arrive at ETA time nurse will be instructed to follow protocol for transportation below:   How can I get in touch directly with dispatch, if needed?                 Non-emergent dispatch: [phone]      +++NURSING:  If Van does not arrive at requested time please call the above Non Emergent Dispatcher.  If issue not resolved please escalate to your charge nurse for further instructions.  .Selina Cano RN, BSN, STN CCM  1/9/2020

## 2020-01-09 NOTE — UM SECONDARY REVIEW
Physician Advisor Internal    IP Extended Stay > 10     Reviewed due to hospital day 8 or greater    LOS: approved an agreement with D/C plan      Med adjustment due to elevated HR. Plan for DC back to [facility] once HR controlled

## 2020-01-09 NOTE — NURSING
Call placed to [phone] (transfer center- spoke to Kenan) about pt  today. Kenan contacted [facility] and states it will be in approx 20 minutes from now.

## 2020-01-09 NOTE — NURSING
Report called to Lori CARTER at [facility] [phone]. Pt to go to room 10B via  van. Waiting on transport van.

## 2020-01-09 NOTE — PROGRESS NOTES
[facility]  Cardiology  Progress Note    Patient Name: Vashti Muñoz  MRN: [id number]  Admission Date: 12/31/2019  Hospital Length of Stay: 9 days  Code Status: DNR   Attending Physician: Collette Bautista MD   Primary Care Physician: Alesha Baum MD  Expected Discharge Date:   Principal Problem:Chronic atrial fibrillation with rapid ventricular response    Subjective:     Hospital Course:   12/31/19 adm with symptomatic bradycardia in setting of ARF and hyperkalemia, had temp wire placed    Interval Hx: no cp/sob/palps/LH/LOC.  Case d/w RN at bedside.  No events overnight.    Tele: AF 90-110s (personally reviewed and interpreted)        Review of Systems   Gastrointestinal: Negative for melena.   Genitourinary: Negative for hematuria.     Objective:     Vital Signs (Most Recent):  Temp: 98 °F (36.7 °C) (01/09/20 0417)  Pulse: 100 (01/09/20 0417)  Resp: 18 (01/09/20 0417)  BP: 132/70 (01/09/20 0417)  SpO2: 98 % (01/09/20 0417) Vital Signs (24h Range):  Temp:  [97.5 °F (36.4 °C)-98 °F (36.7 °C)] 98 °F (36.7 °C)  Pulse:  [] 100  Resp:  [17-20] 18  SpO2:  [92 %-100 %] 98 %  BP: (115-143)/(60-77) 132/70     Weight: 95 kg (209 lb 7 oz)  Body mass index is 38.31 kg/m².     SpO2: 98 %  O2 Device (Oxygen Therapy): nasal cannula    No intake or output data in the 24 hours ending 01/09/20 0614    Lines/Drains/Airways     Drain            Female External Urinary Catheter 01/03/20 0000 6 days          Peripheral Intravenous Line                 Peripheral IV - Single Lumen 01/03/20 20 G Right Hand 6 days         Peripheral IV - Single Lumen 01/08/20 0420 22 G Left;Posterior Hand 1 day                Physical Exam   Constitutional: She is oriented to person, place, and time. She appears well-developed and well-nourished. No distress.   HENT:   Head: Normocephalic and atraumatic.   Mouth/Throat: No oropharyngeal exudate.   Eyes: Pupils are equal, round, and reactive to light. Conjunctivae and EOM are  normal. No scleral icterus.   Neck: Normal range of motion. Neck supple. No JVD present. No tracheal deviation present. No thyromegaly present.   Cardiovascular: Normal rate, S1 normal and S2 normal. An irregularly irregular rhythm present. Exam reveals distant heart sounds. Exam reveals no gallop and no friction rub.   Murmur heard.   Systolic murmur is present with a grade of 2/6.  Pulmonary/Chest: Effort normal and breath sounds normal. No respiratory distress. She has no wheezes. She has no rales. She exhibits no tenderness.   Abdominal: Soft. She exhibits no distension.   obese   Musculoskeletal: Normal range of motion. She exhibits no edema.   Neurological: She is alert and oriented to person, place, and time. No cranial nerve deficit.   Skin: Skin is warm and dry. She is not diaphoretic.   Psychiatric: She has a normal mood and affect. Her behavior is normal. Judgment normal.       Current Medications:   apixaban  5 mg Oral BID    diltiaZEM  120 mg Oral Daily    escitalopram oxalate  10 mg Oral Daily    levalbuterol  1.25 mg Nebulization Q8H    levothyroxine  137 mcg Oral Before breakfast    metoprolol tartrate  200 mg Oral BID    miconazole NITRATE 2 %   Topical (Top) BID       acetaminophen, dextrose 50%, dextrose 50%, glucagon (human recombinant), glucose, glucose, insulin aspart U-100, LORazepam, melatonin, ondansetron, promethazine (PHENERGAN) IVPB, senna-docusate 8.6-50 mg    Laboratory (all labs reviewed):  CBC:  Recent Labs   Lab 01/03/20  0503 01/04/20  0418 01/05/20  0404 01/06/20  0313 01/07/20  0306   WBC 8.70 8.99 6.82 5.50 6.43   Hemoglobin 10.5 L 10.4 L 10.6 L 12.7 10.1 L   Hematocrit 33.7 L 33.1 L 34.7 L 40.5 32.8 L   Platelets 138 L 126 L 148 L 90 L 133 L       CHEMISTRIES:  Recent Labs   Lab 01/04/20  0417 01/05/20  0403 01/06/20  0313 01/07/20  0306 01/08/20  0542   Glucose 172 H 127 H 123 H 108 111 H   Sodium 132 L 134 L 136 134 L 134 L   Potassium 4.4 3.6 3.9 3.6 4.1   BUN, Bld 19  17 17 15 13   Creatinine 0.9 0.9 0.8 0.8 0.8   eGFR if African American >60 >60 >60 >60 >60   eGFR if non  58 A 58 A >60 >60 >60   Calcium 8.8 8.4 L 8.2 L 8.4 L 8.3 L   Magnesium 1.3 L 1.2 L 1.5 L 1.3 L 1.4 L       CARDIAC BIOMARKERS:  Recent Labs   Lab 09/11/18  1104 09/11/18  1658 10/11/19  0927 12/31/19  2142 01/01/20  0051 01/04/20  0417   CPK  --   --   --   --   --  60   Troponin I <0.006 0.018 0.012 0.008 0.038 H  --        COAGS:  Recent Labs   Lab 10/11/19  0927 01/01/20  0051 01/02/20  0402 01/03/20  0503 01/04/20  0418   INR 1.0 1.4 H 1.3 H 1.1 1.1       LIPIDS/LFTS:  Recent Labs   Lab 01/04/20  0417 01/05/20  0403 01/06/20  0313 01/07/20  0306 01/08/20  0542    H 59 H 37 25 26    H 418 H 272 H 193 H 142 H       BNP:  Recent Labs   Lab 09/11/18  0932 10/11/19  0927 12/31/19  2142    H 377 H 439 H       TSH:  Recent Labs   Lab 07/05/18  1429 09/11/18  0932 01/03/20  0503   TSH 4.690 H 0.452 4.633 H       Free T4:  Recent Labs   Lab 01/03/20  0503   Free T4 0.90       Diagnostic Results:  ECG (personally reviewed and interpreted tracing(s)):  1/3/20 0552     Echo: 1/1/20  · Normal left ventricular systolic function. The estimated ejection fraction is 70%  · Concentric left ventricular hypertrophy.  · Grade II (moderate) left ventricular diastolic dysfunction consistent with pseudonormalization.  · Normal right ventricular systolic function.  · Moderate-to-severe aortic valve stenosis.  · Aortic valve area is 0.64 cm2; peak velocity is 3.86 m/s; mean gradient is 35 mmHg.  · Mild mitral regurgitation.  · Mild tricuspid regurgitation.  · The estimated PA systolic pressure is 70 mm Hg  · Pulmonary hypertension present.        Assessment and Plan:     * Chronic atrial fibrillation with rapid ventricular response  AF is chronic, on eliquis  Adm with symptomatic bradycardia in setting of ARF and hyperkalemia, now resolved.  AF rates better controlled, was prev on high  dose BBL/CCB, previously held with bradycardia.  Can titrate dilt further as needed  Hope to avoid PPM    DM type 2, controlled, with complication  Per primary    Chronic anticoagulation  For AF    Essential hypertension  Stable     Acute renal failure  K 6.6 initially - likely contributing to bradycardia - improved now    Mixed hyperlipidemia  On statin    Memory impairment  Per primary          VTE Risk Mitigation (From admission, onward)         Ordered     apixaban tablet 5 mg  2 times daily      01/01/20 0922     IP VTE HIGH RISK PATIENT  Once      01/01/20 0141              Cardiology will sign off, pls call with questions.    Ede Kline MD  Cardiology  [facility]

## 2020-01-09 NOTE — SUBJECTIVE & OBJECTIVE
Review of Systems   Gastrointestinal: Negative for melena.   Genitourinary: Negative for hematuria.     Objective:     Vital Signs (Most Recent):  Temp: 98 °F (36.7 °C) (01/08/20 1538)  Pulse: 73 (01/08/20 1602)  Resp: 18 (01/08/20 1602)  BP: 115/74 (01/08/20 1538)  SpO2: (!) 92 % (01/08/20 1602) Vital Signs (24h Range):  Temp:  [97.9 °F (36.6 °C)-99.3 °F (37.4 °C)] 98 °F (36.7 °C)  Pulse:  [] 73  Resp:  [16-20] 18  SpO2:  [92 %-100 %] 92 %  BP: (115-165)/(64-85) 115/74     Weight: 94 kg (207 lb 3.7 oz)  Body mass index is 37.9 kg/m².     SpO2: (!) 92 %  O2 Device (Oxygen Therapy): nasal cannula      Intake/Output Summary (Last 24 hours) at 1/8/2020 1856  Last data filed at 1/8/2020 0500  Gross per 24 hour   Intake --   Output 750 ml   Net -750 ml       Lines/Drains/Airways     Drain            Female External Urinary Catheter 01/03/20 0000 5 days          Peripheral Intravenous Line                 Peripheral IV - Single Lumen 01/03/20 20 G Right Hand 5 days         Peripheral IV - Single Lumen 01/08/20 0420 22 G Left;Posterior Hand less than 1 day              Exam unchanged vs 1/7/20  Physical Exam   Constitutional: She is oriented to person, place, and time. She appears well-developed and well-nourished. No distress.   HENT:   Head: Normocephalic and atraumatic.   Mouth/Throat: No oropharyngeal exudate.   Eyes: Pupils are equal, round, and reactive to light. Conjunctivae and EOM are normal. No scleral icterus.   Neck: Normal range of motion. Neck supple. No JVD present. No tracheal deviation present. No thyromegaly present.   Cardiovascular: S1 normal and S2 normal. An irregularly irregular rhythm present. Tachycardia present. Exam reveals distant heart sounds. Exam reveals no gallop and no friction rub.   Murmur heard.   Systolic murmur is present with a grade of 2/6.  Pulmonary/Chest: Effort normal and breath sounds normal. No respiratory distress. She has no wheezes. She has no rales. She exhibits no  tenderness.   Abdominal: Soft. She exhibits no distension.   obese   Musculoskeletal: Normal range of motion. She exhibits no edema.   Neurological: She is alert and oriented to person, place, and time. No cranial nerve deficit.   Skin: Skin is warm and dry. She is not diaphoretic.   Psychiatric: She has a normal mood and affect. Her behavior is normal. Judgment normal.       Current Medications:   apixaban  5 mg Oral BID    diltiaZEM  120 mg Oral Daily    escitalopram oxalate  10 mg Oral Daily    levalbuterol  1.25 mg Nebulization Q8H    levothyroxine  137 mcg Oral Before breakfast    metoprolol tartrate  150 mg Oral BID    miconazole NITRATE 2 %   Topical (Top) BID       acetaminophen, dextrose 50%, dextrose 50%, glucagon (human recombinant), glucose, glucose, insulin aspart U-100, LORazepam, melatonin, ondansetron, promethazine (PHENERGAN) IVPB, senna-docusate 8.6-50 mg    Laboratory (all labs reviewed):  CBC:  Recent Labs   Lab 01/03/20  0503 01/04/20  0418 01/05/20  0404 01/06/20  0313 01/07/20  0306   WBC 8.70 8.99 6.82 5.50 6.43   Hemoglobin 10.5 L 10.4 L 10.6 L 12.7 10.1 L   Hematocrit 33.7 L 33.1 L 34.7 L 40.5 32.8 L   Platelets 138 L 126 L 148 L 90 L 133 L       CHEMISTRIES:  Recent Labs   Lab 01/04/20  0417 01/05/20  0403 01/06/20  0313 01/07/20  0306 01/08/20  0542   Glucose 172 H 127 H 123 H 108 111 H   Sodium 132 L 134 L 136 134 L 134 L   Potassium 4.4 3.6 3.9 3.6 4.1   BUN, Bld 19 17 17 15 13   Creatinine 0.9 0.9 0.8 0.8 0.8   eGFR if African American >60 >60 >60 >60 >60   eGFR if non  58 A 58 A >60 >60 >60   Calcium 8.8 8.4 L 8.2 L 8.4 L 8.3 L   Magnesium 1.3 L 1.2 L 1.5 L 1.3 L 1.4 L       CARDIAC BIOMARKERS:  Recent Labs   Lab 09/11/18  1104 09/11/18  1658 10/11/19  0927 12/31/19  2142 01/01/20  0051 01/04/20  0417   CPK  --   --   --   --   --  60   Troponin I <0.006 0.018 0.012 0.008 0.038 H  --        COAGS:  Recent Labs   Lab 10/11/19  0927 01/01/20  0051 01/02/20  0402  01/03/20  0503 01/04/20  0418   INR 1.0 1.4 H 1.3 H 1.1 1.1       LIPIDS/LFTS:  Recent Labs   Lab 01/04/20  0417 01/05/20  0403 01/06/20  0313 01/07/20  0306 01/08/20  0542    H 59 H 37 25 26    H 418 H 272 H 193 H 142 H       BNP:  Recent Labs   Lab 09/11/18  0932 10/11/19  0927 12/31/19  2142    H 377 H 439 H       TSH:  Recent Labs   Lab 07/05/18  1429 09/11/18  0932 01/03/20  0503   TSH 4.690 H 0.452 4.633 H       Free T4:  Recent Labs   Lab 01/03/20  0503   Free T4 0.90       Diagnostic Results:  ECG (personally reviewed and interpreted tracing(s)):  1/3/20 0552     Echo: 1/1/20  · Normal left ventricular systolic function. The estimated ejection fraction is 70%  · Concentric left ventricular hypertrophy.  · Grade II (moderate) left ventricular diastolic dysfunction consistent with pseudonormalization.  · Normal right ventricular systolic function.  · Moderate-to-severe aortic valve stenosis.  · Aortic valve area is 0.64 cm2; peak velocity is 3.86 m/s; mean gradient is 35 mmHg.  · Mild mitral regurgitation.  · Mild tricuspid regurgitation.  · The estimated PA systolic pressure is 70 mm Hg  · Pulmonary hypertension present.

## 2020-01-09 NOTE — PROGRESS NOTES
[facility]  Cardiology  Progress Note    Patient Name: Vashti Muñoz  MRN: [id number]  Admission Date: 12/31/2019  Hospital Length of Stay: 8 days  Code Status: DNR   Attending Physician: Collette Bautista MD   Primary Care Physician: Alesha Baum MD  Expected Discharge Date:   Principal Problem:Chronic atrial fibrillation with rapid ventricular response    Subjective:     Hospital Course:   12/31/19 adm with symptomatic bradycardia in setting of ARF and hyperkalemia, had temp wire placed    Interval Hx: no cp/sob/palps/LH/LOC    Tele: AF 80-130s (personally reviewed and interpreted)        Review of Systems   Gastrointestinal: Negative for melena.   Genitourinary: Negative for hematuria.     Objective:     Vital Signs (Most Recent):  Temp: 98 °F (36.7 °C) (01/08/20 1538)  Pulse: 73 (01/08/20 1602)  Resp: 18 (01/08/20 1602)  BP: 115/74 (01/08/20 1538)  SpO2: (!) 92 % (01/08/20 1602) Vital Signs (24h Range):  Temp:  [97.9 °F (36.6 °C)-99.3 °F (37.4 °C)] 98 °F (36.7 °C)  Pulse:  [] 73  Resp:  [16-20] 18  SpO2:  [92 %-100 %] 92 %  BP: (115-165)/(64-85) 115/74     Weight: 94 kg (207 lb 3.7 oz)  Body mass index is 37.9 kg/m².     SpO2: (!) 92 %  O2 Device (Oxygen Therapy): nasal cannula      Intake/Output Summary (Last 24 hours) at 1/8/2020 1856  Last data filed at 1/8/2020 0500  Gross per 24 hour   Intake --   Output 750 ml   Net -750 ml       Lines/Drains/Airways     Drain            Female External Urinary Catheter 01/03/20 0000 5 days          Peripheral Intravenous Line                 Peripheral IV - Single Lumen 01/03/20 20 G Right Hand 5 days         Peripheral IV - Single Lumen 01/08/20 0420 22 G Left;Posterior Hand less than 1 day              Exam unchanged vs 1/7/20  Physical Exam   Constitutional: She is oriented to person, place, and time. She appears well-developed and well-nourished. No distress.   HENT:   Head: Normocephalic and atraumatic.   Mouth/Throat: No oropharyngeal  exudate.   Eyes: Pupils are equal, round, and reactive to light. Conjunctivae and EOM are normal. No scleral icterus.   Neck: Normal range of motion. Neck supple. No JVD present. No tracheal deviation present. No thyromegaly present.   Cardiovascular: S1 normal and S2 normal. An irregularly irregular rhythm present. Tachycardia present. Exam reveals distant heart sounds. Exam reveals no gallop and no friction rub.   Murmur heard.   Systolic murmur is present with a grade of 2/6.  Pulmonary/Chest: Effort normal and breath sounds normal. No respiratory distress. She has no wheezes. She has no rales. She exhibits no tenderness.   Abdominal: Soft. She exhibits no distension.   obese   Musculoskeletal: Normal range of motion. She exhibits no edema.   Neurological: She is alert and oriented to person, place, and time. No cranial nerve deficit.   Skin: Skin is warm and dry. She is not diaphoretic.   Psychiatric: She has a normal mood and affect. Her behavior is normal. Judgment normal.       Current Medications:   apixaban  5 mg Oral BID    diltiaZEM  120 mg Oral Daily    escitalopram oxalate  10 mg Oral Daily    levalbuterol  1.25 mg Nebulization Q8H    levothyroxine  137 mcg Oral Before breakfast    metoprolol tartrate  150 mg Oral BID    miconazole NITRATE 2 %   Topical (Top) BID       acetaminophen, dextrose 50%, dextrose 50%, glucagon (human recombinant), glucose, glucose, insulin aspart U-100, LORazepam, melatonin, ondansetron, promethazine (PHENERGAN) IVPB, senna-docusate 8.6-50 mg    Laboratory (all labs reviewed):  CBC:  Recent Labs   Lab 01/03/20  0503 01/04/20  0418 01/05/20  0404 01/06/20  0313 01/07/20  0306   WBC 8.70 8.99 6.82 5.50 6.43   Hemoglobin 10.5 L 10.4 L 10.6 L 12.7 10.1 L   Hematocrit 33.7 L 33.1 L 34.7 L 40.5 32.8 L   Platelets 138 L 126 L 148 L 90 L 133 L       CHEMISTRIES:  Recent Labs   Lab 01/04/20  0417 01/05/20  0403 01/06/20  0313 01/07/20  0306 01/08/20  0542   Glucose 172 H 127 H  123 H 108 111 H   Sodium 132 L 134 L 136 134 L 134 L   Potassium 4.4 3.6 3.9 3.6 4.1   BUN, Bld 19 17 17 15 13   Creatinine 0.9 0.9 0.8 0.8 0.8   eGFR if African American >60 >60 >60 >60 >60   eGFR if non  58 A 58 A >60 >60 >60   Calcium 8.8 8.4 L 8.2 L 8.4 L 8.3 L   Magnesium 1.3 L 1.2 L 1.5 L 1.3 L 1.4 L       CARDIAC BIOMARKERS:  Recent Labs   Lab 09/11/18  1104 09/11/18  1658 10/11/19  0927 12/31/19  2142 01/01/20  0051 01/04/20  0417   CPK  --   --   --   --   --  60   Troponin I <0.006 0.018 0.012 0.008 0.038 H  --        COAGS:  Recent Labs   Lab 10/11/19  0927 01/01/20  0051 01/02/20  0402 01/03/20  0503 01/04/20  0418   INR 1.0 1.4 H 1.3 H 1.1 1.1       LIPIDS/LFTS:  Recent Labs   Lab 01/04/20  0417 01/05/20  0403 01/06/20  0313 01/07/20  0306 01/08/20  0542    H 59 H 37 25 26    H 418 H 272 H 193 H 142 H       BNP:  Recent Labs   Lab 09/11/18  0932 10/11/19  0927 12/31/19  2142    H 377 H 439 H       TSH:  Recent Labs   Lab 07/05/18  1429 09/11/18  0932 01/03/20  0503   TSH 4.690 H 0.452 4.633 H       Free T4:  Recent Labs   Lab 01/03/20  0503   Free T4 0.90       Diagnostic Results:  ECG (personally reviewed and interpreted tracing(s)):  1/3/20 0552     Echo: 1/1/20  · Normal left ventricular systolic function. The estimated ejection fraction is 70%  · Concentric left ventricular hypertrophy.  · Grade II (moderate) left ventricular diastolic dysfunction consistent with pseudonormalization.  · Normal right ventricular systolic function.  · Moderate-to-severe aortic valve stenosis.  · Aortic valve area is 0.64 cm2; peak velocity is 3.86 m/s; mean gradient is 35 mmHg.  · Mild mitral regurgitation.  · Mild tricuspid regurgitation.  · The estimated PA systolic pressure is 70 mm Hg  · Pulmonary hypertension present.        Assessment and Plan:     * Chronic atrial fibrillation with rapid ventricular response  AF is chronic, on eliquis  Adm with symptomatic bradycardia  in setting of ARF and hyperkalemia, now resolved.  AF rates uncontrolled, was prev on high dose BBL/CCB, held with bradycardia.  Will attempt to uptitrate BBL, inc 200mg bid tonight  Hope to avoid PPM    DM type 2, controlled, with complication  Per primary    Chronic anticoagulation  For AF    Essential hypertension  Stable     Acute renal failure  K 6.6 initially - likely contributing to bradycardia - improved now    Mixed hyperlipidemia  On statin    Memory impairment  Per primary            VTE Risk Mitigation (From admission, onward)         Ordered     apixaban tablet 5 mg  2 times daily      01/01/20 0922     IP VTE HIGH RISK PATIENT  Once      01/01/20 0141                Ede Kline MD  Cardiology  [facility]

## 2020-01-09 NOTE — PLAN OF CARE
9:18AM TN left message for Mili in admissions at [facility] that pt is discharging back to SNF today. Pt lives there as [facility] patient.     01/09/20 0918   Post-Acute Status   Post-Acute Authorization Placement  (Return to [facility])   Discharge Delays (!) Patient Transportation   .Selina Cano RN, BSN, STN CCM  1/9/2020

## 2020-01-10 NOTE — DISCHARGE SUMMARY
"[facility] Hospital Medicine  Discharge Summary      Patient Name: Vashti Muñoz  MRN: [id number]  Admission Date: 12/31/2019  Hospital Length of Stay: 9 days  Discharge Date and Time:  01/09/2020 6:26 PM  Attending Physician: Jacquie att. providers found   Discharging Provider: Collette Trejo MD  Primary Care Provider: Alesha Baum MD      HPI:   Per Dr. Makenzie Tena:    "Mrs. Vashti Muñoz is a 87 y.o. female [facility] resident with essential hypertension, hyperlipidemia (LDL unknown), chronic atrial fibrillation ([id number] score 4) on chronic anticoagulation, hypothyroidism (TSH 0.452 Sept 2018), anemia chronic disease, and dementia who presents to [facility] ED with complaints of altered mental status today.  She was noted to be bradycardic for which EMS was activated.  She was last seen in her normal state of health at around 4:30 PM today.  She was given atropine with positive response in her heart rate and thereafter transported to this facility.  She was then started on transcutaneous pacing with improvement of her mentation.  She was brought to the Catheterization Lab and was placed a transvenous pacer per Cardiology.  Further history is otherwise limited at this time."    Procedure(s) (LRB):  Insertion, Pacemaker, Temporary Transvenous (N/A)      Hospital Course:   Ms. Muñoz was admitted to the ICU with symptomatic bradycardia with pulse of 41 beats per minute with evidence of systemic hypoperfusion with metabolic encephalopathy and acute renal failure with hyperkalemia.  Patient treated with percutaneous pacing along with continuous dopamine infusion.  Cardiology consulted and placed a temporary transvenous pacemaker with improvement in her pulse.  Mental status improved with increased pulse.  Patient also challenged with intravenous fluids with improvement of hyperkalemia and renal function.  Suspect patient suffered acute tubular necrosis secondary to systemic hypoperfusion.   Patient also " treated with antibiotics for possible urinary tract infection.  Elevated liver function tests likely secondary from shock liver due to systemic hypoperfusion.   Liver enzymes improved. She was moved out the ICU and was on the floor when she developed Afib with RVR.  Transferred back to ICU and amiodarone bolus dose given with continuous rate. Cardizem and lopressor resumed. Pt appears anxious and possibly withdrawal from ativan. Medication resumed PRN for anxiety. On 1/5/20, amiodarone infusion stopped and patient was stepped down to floor.  BP improved and HR better controlled with increased metoprolol to 100 mg BID. Cardizem stopped. PT/OT consulted. Completed 7 days of ceftriaxone for urinary tract infection (UCx with pansensitive E coli (> 100,000 cfu/mL) and Klebsiella pneumoniae (10,000-49,999 cfu/mL). On 1/7 patient again with AFib with RVR despite increased dose of metoprolol. Metoprolol increased to 200 mg BID (home dose) and diltiazem added back slowly as blood pressure not elevated. Rate controlled with mentioned dose of metoprolol and diltiazem 180 mg daily. Lisinopril stopped as blood pressure would not tolerate. Patient discharged to [facility] with SNF in stable condition. Cardiac diet. Activity as tolerated. Plan discussed with patient and daughter at bedside.      Consults:   Consults (From admission, onward)        Status Ordering Provider     Inpatient consult to Cardiology  Once     Provider:  Jose Alfredo Kramer MD    Completed WING VILLEDA     Inpatient consult to Nephrology  Once     Provider:  Naila Thompson MD    Completed CHERRI SOLANO        Final Active Diagnoses:    Diagnosis Date Noted POA    PRINCIPAL PROBLEM:  Chronic atrial fibrillation with rapid ventricular response [I48.20] 01/03/2020 No    Atrial fibrillation with rapid ventricular response [I48.91] 01/03/2020 Yes    Debility [R53.81] 01/07/2020 Yes    Anemia of chronic disease [D63.8] 01/01/2020 Yes     Chronic     Dementia without behavioral disturbance [F03.90] 01/01/2020 Yes     Chronic    Mixed hyperlipidemia [E78.2] 10/01/2018 Yes     Chronic    Chronic atrial fibrillation [I48.20] 07/05/2018 Yes     Chronic    Essential hypertension [I10] 07/05/2018 Yes     Chronic    Chronic anticoagulation [Z79.01] 07/05/2018 Not Applicable     Chronic    Acquired hypothyroidism [E03.9] 07/05/2018 Yes     Chronic      Problems Resolved During this Admission:    Diagnosis Date Noted Date Resolved POA    Urinary tract infection without hematuria [N39.0] 01/07/2020 01/09/2020 Yes    Symptomatic bradycardia [R00.1] 01/05/2020 01/09/2020 Yes    Acute renal failure [N17.9] 01/01/2020 01/09/2020 Yes    Hyperkalemia [E87.5] 01/01/2020 01/09/2020 Yes    Symptomatic bradycardia [R00.1] 12/31/2019 01/05/2020 Yes       Discharged Condition: stable    Disposition: Skilled Nursing Facility    Follow Up:  Follow-up Information     Alesha Baum MD.    Specialties:  Internal Medicine, Geriatric Medicine  Contact information:  [address]  [facility] [address]  [phone]             [facility].    Specialties:  Nursing Home Agency, SNF Agency  Why:  Nursing Home.   Contact information:  [place] [address] [phone]                   Medications:  Reconciled Home Medications:      Medication List      START taking these medications    metoprolol tartrate 100 MG tablet  Commonly known as:  LOPRESSOR  Take 2 tablets (200 mg total) by mouth 2 (two) times daily.        CHANGE how you take these medications    diltiaZEM 180 MG 24 hr capsule  Commonly known as:  CARDIZEM CD  Take 1 capsule (180 mg total) by mouth once daily.  What changed:    · medication strength  · how much to take        CONTINUE taking these medications    acetaminophen 500 MG tablet  Commonly known as:  TYLENOL  Take 500 mg by mouth every 6 (six) hours as needed for Pain.     apixaban 5 mg Tab  Commonly known as:  ELIQUIS  Take 1 tablet (5 mg  total) by mouth 2 (two) times daily.     BIOFREEZE-ILEX TOP  Apply topically.     escitalopram oxalate 10 MG tablet  Commonly known as:  Lexapro  Take 1 tablet (10 mg total) by mouth once daily.     levothyroxine 137 mcg Cap  Commonly known as:  TIROSINT  Take 1 tablet by mouth once daily.     LORazepam 0.5 MG tablet  Commonly known as:  ATIVAN  Take 0.5 mg by mouth 2 (two) times daily. Take half in the am and a whole at bedtime     memantine 5 MG Tab  Commonly known as:  NAMENDA  Take 5 mg by mouth 2 (two) times daily.     simvastatin 40 MG tablet  Commonly known as:  ZOCOR  Take 40 mg by mouth every evening.     vitamin D 1000 units Tab  Commonly known as:  VITAMIN D3  Take 2,000 Units by mouth once daily.            Indwelling Lines/Drains at time of discharge:   Lines/Drains/Airways     Drain            none 6 days                Time spent on the discharge of patient: > 35 minutes  Patient was seen and examined on the date of discharge and determined to be suitable for discharge.         Collette Trejo MD  Department of Hospital Medicine  [facility]

## 2020-01-10 NOTE — PLAN OF CARE
01/07/20 1132   Medicare Message   Important Message from Medicare regarding Discharge Appeal Rights Given to patient/caregiver;Explained to patient/caregiver;Signed/date by patient/caregiver   Date IMM was signed 01/07/20   Time IMM was signed 1120   MARISOL Diaz completed IMM with patient.  Selina Cano, RN, BSN, STN CCM  1/7/2020

## 2020-01-10 NOTE — PT/OT/SLP DISCHARGE
Physical Therapy Discharge Summary    Name: Vashti Muñoz  MRN: [id number]   Principal Problem: Chronic atrial fibrillation with rapid ventricular response     Patient Discharged from acute Physical Therapy on 2020.  Please refer to prior PT noted date on 2020 for functional status.     Assessment:     Patient appropriate for care in another setting.    Objective:     GOALS:   Multidisciplinary Problems     Physical Therapy Goals     Not on file          Multidisciplinary Problems (Resolved)        Problem: Physical Therapy Goal    Goal Priority Disciplines Outcome Goal Variances Interventions   Physical Therapy Goal   (Resolved)     PT, PT/OT Met     Description:  Goals to be met by: 2020     Patient will increase functional independence with mobility by performin. Supine to sit with Stand-by Assistance  2. Rolling to Left and Right with Supervision.  3. Sit to stand transfer with Contact Guard Assistance  4. Gait  x  feet with Stand-by Assistance using Rolling Walker.   5. Lower extremity exercise program x10 reps per handout, with supervision                      Reasons for Discontinuation of Therapy Services  Transfer to alternate level of care.      Plan:     Patient Discharged to: Skilled Nursing Facility.    Karolina Duarte, PT  1/10/2020

## 2020-01-28 ENCOUNTER — OFFICE VISIT (OUTPATIENT)
Dept: CARDIOLOGY | Facility: CLINIC | Age: 85
End: 2020-01-28
Payer: COMMERCIAL

## 2020-01-28 VITALS
HEART RATE: 86 BPM | BODY MASS INDEX: 38.46 KG/M2 | HEIGHT: 62 IN | SYSTOLIC BLOOD PRESSURE: 174 MMHG | OXYGEN SATURATION: 85 % | WEIGHT: 209 LBS | DIASTOLIC BLOOD PRESSURE: 101 MMHG | RESPIRATION RATE: 15 BRPM

## 2020-01-28 DIAGNOSIS — I10 ESSENTIAL HYPERTENSION: Chronic | ICD-10-CM

## 2020-01-28 DIAGNOSIS — E11.8 DM TYPE 2, CONTROLLED, WITH COMPLICATION: ICD-10-CM

## 2020-01-28 DIAGNOSIS — I35.0 NONRHEUMATIC AORTIC VALVE STENOSIS: ICD-10-CM

## 2020-01-28 DIAGNOSIS — I48.20 CHRONIC ATRIAL FIBRILLATION: Chronic | ICD-10-CM

## 2020-01-28 DIAGNOSIS — E78.2 MIXED HYPERLIPIDEMIA: Chronic | ICD-10-CM

## 2020-01-28 DIAGNOSIS — I50.32 CHRONIC HEART FAILURE WITH PRESERVED EJECTION FRACTION: Primary | ICD-10-CM

## 2020-01-28 DIAGNOSIS — Z79.01 LONG TERM (CURRENT) USE OF ANTICOAGULANTS: ICD-10-CM

## 2020-01-28 PROCEDURE — 1125F PR PAIN SEVERITY QUANTIFIED, PAIN PRESENT: ICD-10-PCS | Mod: S$GLB,,, | Performed by: INTERNAL MEDICINE

## 2020-01-28 PROCEDURE — 1159F PR MEDICATION LIST DOCUMENTED IN MEDICAL RECORD: ICD-10-PCS | Mod: S$GLB,,, | Performed by: INTERNAL MEDICINE

## 2020-01-28 PROCEDURE — 99214 OFFICE O/P EST MOD 30 MIN: CPT | Mod: S$GLB,,, | Performed by: INTERNAL MEDICINE

## 2020-01-28 PROCEDURE — 99999 PR PBB SHADOW E&M-EST. PATIENT-LVL III: ICD-10-PCS | Mod: PBBFAC,,, | Performed by: INTERNAL MEDICINE

## 2020-01-28 PROCEDURE — 99214 PR OFFICE/OUTPT VISIT, EST, LEVL IV, 30-39 MIN: ICD-10-PCS | Mod: S$GLB,,, | Performed by: INTERNAL MEDICINE

## 2020-01-28 PROCEDURE — 1159F MED LIST DOCD IN RCRD: CPT | Mod: S$GLB,,, | Performed by: INTERNAL MEDICINE

## 2020-01-28 PROCEDURE — 99999 PR PBB SHADOW E&M-EST. PATIENT-LVL III: CPT | Mod: PBBFAC,,, | Performed by: INTERNAL MEDICINE

## 2020-01-28 PROCEDURE — 1125F AMNT PAIN NOTED PAIN PRSNT: CPT | Mod: S$GLB,,, | Performed by: INTERNAL MEDICINE

## 2020-01-28 RX ORDER — FUROSEMIDE 20 MG/1
20 TABLET ORAL 2 TIMES DAILY
COMMUNITY

## 2020-01-28 NOTE — PROGRESS NOTES
CARDIOVASCULAR PROGRESS NOTE    REASON FOR CONSULT:   Vashti Muñoz is a 87 y.o. female who presents for follow up of Chr AF, AS.    PCP: Sarika ([facility])  HISTORY OF PRESENT ILLNESS:   PATIENT IN [facility]    The patient returns for follow-up accompanied by her daughter.  She was seen at [facility] earlier in January 2020 with bradycardia in relation to ARF/hyperkalemia which required temporary wire.  The bradycardia resolved with normalization of her potassium.  At present, the patient's main complaint appears to be related to swelling, as both of her arms and legs appear to be swollen.  The patient's daughter seems to be somewhat frustrated with the PACE program in that it took several weeks before the patient was placed on Lasix.  I suggested that they discuss this with the nursing home as well as with the PACE program providers.  The patient otherwise denies angina, dyspnea, palpitations, or syncope.  There has been no PND, melena, or hematuria.  She is currently in a wheelchair and denies any claudicant symptoms.  The patient's daughter is asking if the patient can travel.  While there is no cardiac contraindication to travel, I would imagine this would be difficult given the patient's wheelchair-bound status.  We also discussed the patient's DNR, and is not entirely clear that the family wants a full DNR in that they would be willing to have mechanical ventilation if this was going to be short-lived.  I suggested that they discuss this to come to a decision as to the patient DNR status.    Medication list per [facility] dated 01/28/2020  Tylenol p.r.n.  Apixaban 5 mg b.i.d.  Biofreeze gel b.i.d. to lower back  Cholecalciferol 2000 IU daily  Cipro 5 mg b.i.d. for 10 days  Diltiazem 180 mg q.a.m.  Colace  Celexa  Fluticasone  Lasix 20 mg daily  Neurontin 300 mg daily  Synthroid 137 mcg daily  Lidocaine cream to lower back  Lisinopril 40 mg  daily  Lorazepam  Melatonin  Namenda  Metoprolol tartrate 200 mg twice daily  Multi vitamin  Preparation H  Simvastatin 40 mg q.h.s.      CARDIOVASCULAR HISTORY:   AF, chronic on eliquis  AS, mod-sev (echo 1/2020)    PAST MEDICAL HISTORY:     Past Medical History:   Diagnosis Date    Acute renal failure 1/1/2020    Allergy     Anemia of chronic disease 1/1/2020    Anticoagulant long-term use     Coumadin    Anxiety     Arthritis     Atrial fibrillation     Cataract     Closed head injury 09/10/2018    Clotting disorder     Depression     Diabetes mellitus     Encounter for blood transfusion     Fall 09/10/2018    GERD (gastroesophageal reflux disease)     Heart murmur     [employer] (hard of hearing)     wears bilateral hearing aids    Hypertension     Thyroid disease     Uses roller walker        PAST SURGICAL HISTORY:     Past Surgical History:   Procedure Laterality Date    HYSTERECTOMY      INJECTION OF FACET JOINT Bilateral 4/5/2019    Procedure: INJECTION, FACET JOINT, L4-L5;  Surgeon: Clayton Valle MD;  Location: [facility] PAIN T;  Service: Pain Management;  Laterality: Bilateral;    JOINT REPLACEMENT Right     Hip    TONSILLECTOMY      TOTAL HIP ARTHROPLASTY Left 2004       ALLERGIES AND MEDICATION:     Review of patient's allergies indicates:   Allergen Reactions    Sulfa (sulfonamide antibiotics) Hives        Medication List           Accurate as of January 28, 2020  8:56 AM. If you have any questions, ask your nurse or doctor.               CONTINUE taking these medications    acetaminophen 500 MG tablet  Commonly known as:  TYLENOL     apixaban 5 mg Tab  Commonly known as:  ELIQUIS  Take 1 tablet (5 mg total) by mouth 2 (two) times daily.     BIOFREEZE-ILEX TOP     diltiaZEM 180 MG 24 hr capsule  Commonly known as:  CARDIZEM CD  Take 1 capsule (180 mg total) by mouth once daily.     escitalopram oxalate 10 MG tablet  Commonly known as:  Lexapro  Take 1 tablet (10 mg total) by mouth  once daily.     fluticasone propionate 50 mcg/actuation nasal spray  Commonly known as:  FLONASE     furosemide 20 MG tablet  Commonly known as:  LASIX     gabapentin 300 MG capsule  Commonly known as:  NEURONTIN     levothyroxine 137 mcg Cap  Commonly known as:  TIROSINT     LORazepam 0.5 MG tablet  Commonly known as:  ATIVAN     memantine 5 MG Tab  Commonly known as:  NAMENDA     metoprolol tartrate 100 MG tablet  Commonly known as:  LOPRESSOR  Take 2 tablets (200 mg total) by mouth 2 (two) times daily.     simvastatin 40 MG tablet  Commonly known as:  ZOCOR     vitamin D 1000 units Tab  Commonly known as:  VITAMIN D3              SOCIAL HISTORY:     Social History     Socioeconomic History    Marital status:      Spouse name: Not on file    Number of children: 2    Years of education: 12    Highest education level: Not on file   Occupational History    Occupation: retired     Comment: [employer]   Social Needs    Financial resource strain: Not on file    Food insecurity:     Worry: Not on file     Inability: Not on file    Transportation needs:     Medical: Not on file     Non-medical: Not on file   Tobacco Use    Smoking status: Former Smoker     Last attempt to quit: 1988     Years since quittin.5    Smokeless tobacco: Never Used   Substance and Sexual Activity    Alcohol use: Yes     Alcohol/week: 3.0 - 4.0 standard drinks     Types: 1 Glasses of wine, 2 - 3 Standard drinks or equivalent per week    Drug use: No    Sexual activity: Not on file   Lifestyle    Physical activity:     Days per week: Not on file     Minutes per session: Not on file    Stress: Not on file   Relationships    Social connections:     Talks on phone: Not on file     Gets together: Not on file     Attends Confucianist service: Not on file     Active member of club or organization: Not on file     Attends meetings of clubs or organizations: Not on file     Relationship status: Not on file   Other Topics Concern     "Not on file   Social History Narrative    Not on file       FAMILY HISTORY:     Family History   Problem Relation Age of Onset    No Known Problems Mother     Diabetes Father     Stroke Father     Diabetes Sister     Diabetes Brother        REVIEW OF SYSTEMS:   Review of Systems   Constitutional: Negative for chills, diaphoresis and fever.   HENT: Negative for nosebleeds.    Eyes: Negative for blurred vision, double vision and photophobia.   Respiratory: Negative for hemoptysis, shortness of breath and wheezing.    Cardiovascular: Positive for leg swelling. Negative for chest pain, palpitations, orthopnea, claudication and PND.   Gastrointestinal: Negative for abdominal pain, blood in stool, heartburn, melena, nausea and vomiting.   Genitourinary: Negative for flank pain and hematuria.   Musculoskeletal: Negative for falls, myalgias and neck pain.   Skin: Negative for rash.   Neurological: Negative for dizziness, seizures, loss of consciousness, weakness and headaches.   Endo/Heme/Allergies: Negative for polydipsia. Does not bruise/bleed easily.   Psychiatric/Behavioral: Negative for depression and memory loss. The patient is not nervous/anxious.        PHYSICAL EXAM:     Vitals:    01/28/20 0829   BP: (!) 174/101   Pulse: 86   Resp: 15    Body mass index is 38.23 kg/m².  Weight: 94.8 kg (209 lb)   Height: 5' 2" (157.5 cm)     Physical Exam   Constitutional: She is oriented to person, place, and time. She appears well-developed and well-nourished. She is cooperative.  Non-toxic appearance. No distress.   HENT:   Head: Normocephalic and atraumatic.   Eyes: Pupils are equal, round, and reactive to light. Conjunctivae and EOM are normal. No scleral icterus.   Neck: Trachea normal and normal range of motion. Neck supple. Normal carotid pulses and no JVD present. Carotid bruit is not present. No neck rigidity. No tracheal deviation and no edema present. No thyromegaly present.   Cardiovascular: Normal rate, S1 " normal and S2 normal. An irregularly irregular rhythm present. PMI is not displaced. Exam reveals no gallop and no friction rub.   Murmur heard.   Systolic murmur is present with a grade of 2/6.  Pulses:       Carotid pulses are 2+ on the right side, and 2+ on the left side.  Pulmonary/Chest: Effort normal and breath sounds normal. No stridor. No respiratory distress. She has no wheezes. She has no rales. She exhibits no tenderness.   Abdominal: Soft. She exhibits no distension. There is no hepatosplenomegaly.   obese   Musculoskeletal: She exhibits edema. She exhibits no tenderness.   Feet:   Right Foot:   Skin Integrity: Negative for ulcer.   Left Foot:   Skin Integrity: Negative for ulcer.   Neurological: She is alert and oriented to person, place, and time. No cranial nerve deficit.   Skin: Skin is warm and dry. No rash noted. No erythema.   Psychiatric: She has a normal mood and affect. Her speech is normal and behavior is normal.   Vitals reviewed.      DATA:   EKG: (personally reviewed tracing)  1/3/20     Laboratory:  CBC:  Recent Labs   Lab 01/05/20  0404 01/06/20  0313 01/07/20  0306   WBC 6.82 5.50 6.43   Hemoglobin 10.6 L 12.7 10.1 L   Hematocrit 34.7 L 40.5 32.8 L   Platelets 148 L 90 L 133 L       CHEMISTRIES:  Recent Labs   Lab 01/06/20  0313 01/07/20  0306 01/08/20  0542   Glucose 123 H 108 111 H   Sodium 136 134 L 134 L   Potassium 3.9 3.6 4.1   BUN, Bld 17 15 13   Creatinine 0.8 0.8 0.8   eGFR if African American >60 >60 >60   eGFR if non African American >60 >60 >60   Calcium 8.2 L 8.4 L 8.3 L   Magnesium 1.5 L 1.3 L 1.4 L       CARDIAC BIOMARKERS:  Recent Labs   Lab 10/11/19  0927 12/31/19  2142 01/01/20  0051 01/04/20  0417   CPK  --   --   --  60   Troponin I 0.012 0.008 0.038 H  --        COAGS:  Recent Labs   Lab 01/02/20  0402 01/03/20  0503 01/04/20  0418   INR 1.3 H 1.1 1.1       LIPIDS/LFTS:  Recent Labs   Lab 01/06/20  0313 01/07/20  0306 01/08/20  0542   AST 37 25 26    H  193 H 142 H     Lab Results   Component Value Date    TSH 4.633 (H) 01/03/2020         Cardiovascular Testing:  Echo: 1/1/20  · Normal left ventricular systolic function. The estimated ejection fraction is 70%  · Concentric left ventricular hypertrophy.  · Grade II (moderate) left ventricular diastolic dysfunction consistent with pseudonormalization.  · Normal right ventricular systolic function.  · Moderate-to-severe aortic valve stenosis.  · Aortic valve area is 0.64 cm2; peak velocity is 3.86 m/s; mean gradient is 35 mmHg.  · Mild mitral regurgitation.  · Mild tricuspid regurgitation.  · The estimated PA systolic pressure is 70 mm Hg.  Pulmonary hypertension present.    ASSESSMENT:   # AF, chronic, on eliquis, HR controlled, asymptomatic.  Hx of bradycardia (requiring temp wire) d/t elev Creat/K+ in 1/2020.  # AS, mod-sev (echo 1/2020)  # HTN, uncontrolled  # vol overload/edema on exam  # DM  # HLP on simva 40mg  # BMI 38, up 2 unit(s) vs last OV  # DNR.  Not clear what family's wishes are in regards to mech ventilation.  I have encouraged the family to discuss amongst themselves about what their wishes are.  I have also suggested the daughter present at today's visit be PoA (as opposed to daughter who lives in [place]).    PLAN:   Cont med rx  Cont eliquis 5mg bid  Increase lasix from 20mg qd to 40mg bid for 1-2 weeks.   Check BMP 2 weeks  PCP/PACE provider to reassess swelling and determine if lasix dose can be decreased.  Can titrate dilt as next intervention for HTN  RTC 3 months, sooner if swelling/HTN persists    Ede Kline MD, FACC

## 2020-01-30 ENCOUNTER — HOSPITAL ENCOUNTER (INPATIENT)
Facility: HOSPITAL | Age: 85
LOS: 8 days | Discharge: HOME | DRG: 291 | End: 2020-02-07
Attending: EMERGENCY MEDICINE | Admitting: HOSPITALIST
Payer: COMMERCIAL

## 2020-01-30 DIAGNOSIS — Z51.5 PALLIATIVE CARE ENCOUNTER: ICD-10-CM

## 2020-01-30 DIAGNOSIS — R00.0 TACHYCARDIA: ICD-10-CM

## 2020-01-30 DIAGNOSIS — R60.1 ANASARCA: Primary | ICD-10-CM

## 2020-01-30 DIAGNOSIS — I48.11 LONGSTANDING PERSISTENT ATRIAL FIBRILLATION: ICD-10-CM

## 2020-01-30 DIAGNOSIS — E88.09 HYPOALBUMINEMIA: ICD-10-CM

## 2020-01-30 DIAGNOSIS — I50.9 HEART FAILURE: ICD-10-CM

## 2020-01-30 LAB
ALBUMIN SERPL BCP-MCNC: 2.6 G/DL (ref 3.5–5.2)
ALP SERPL-CCNC: 114 U/L (ref 55–135)
ALT SERPL W/O P-5'-P-CCNC: 59 U/L (ref 10–44)
ANION GAP SERPL CALC-SCNC: 9 MMOL/L (ref 8–16)
AST SERPL-CCNC: 86 U/L (ref 10–40)
BASOPHILS # BLD AUTO: 0 K/UL (ref 0–0.2)
BASOPHILS NFR BLD: 0 % (ref 0–1.9)
BILIRUB SERPL-MCNC: 0.3 MG/DL (ref 0.1–1)
BILIRUB UR QL STRIP: NEGATIVE
BNP SERPL-MCNC: 270 PG/ML (ref 0–99)
BUN SERPL-MCNC: 22 MG/DL (ref 8–23)
CALCIUM SERPL-MCNC: 8.6 MG/DL (ref 8.7–10.5)
CHLORIDE SERPL-SCNC: 92 MMOL/L (ref 95–110)
CLARITY UR: CLEAR
CO2 SERPL-SCNC: 33 MMOL/L (ref 23–29)
COLOR UR: YELLOW
CREAT SERPL-MCNC: 1 MG/DL (ref 0.5–1.4)
DIFFERENTIAL METHOD: ABNORMAL
EOSINOPHIL # BLD AUTO: 0 K/UL (ref 0–0.5)
EOSINOPHIL NFR BLD: 0.2 % (ref 0–8)
ERYTHROCYTE [DISTWIDTH] IN BLOOD BY AUTOMATED COUNT: 15.6 % (ref 11.5–14.5)
EST. GFR  (AFRICAN AMERICAN): 59 ML/MIN/1.73 M^2
EST. GFR  (NON AFRICAN AMERICAN): 51 ML/MIN/1.73 M^2
GLUCOSE SERPL-MCNC: 117 MG/DL (ref 70–110)
GLUCOSE UR QL STRIP: NEGATIVE
HCT VFR BLD AUTO: 31.2 % (ref 37–48.5)
HGB BLD-MCNC: 9.3 G/DL (ref 12–16)
HGB UR QL STRIP: NEGATIVE
IMM GRANULOCYTES # BLD AUTO: 0.04 K/UL (ref 0–0.04)
IMM GRANULOCYTES NFR BLD AUTO: 0.7 % (ref 0–0.5)
KETONES UR QL STRIP: NEGATIVE
LEUKOCYTE ESTERASE UR QL STRIP: ABNORMAL
LYMPHOCYTES # BLD AUTO: 1.4 K/UL (ref 1–4.8)
LYMPHOCYTES NFR BLD: 25.4 % (ref 18–48)
MCH RBC QN AUTO: 26.2 PG (ref 27–31)
MCHC RBC AUTO-ENTMCNC: 29.8 G/DL (ref 32–36)
MCV RBC AUTO: 88 FL (ref 82–98)
MICROSCOPIC COMMENT: ABNORMAL
MONOCYTES # BLD AUTO: 1.4 K/UL (ref 0.3–1)
MONOCYTES NFR BLD: 25 % (ref 4–15)
NEUTROPHILS # BLD AUTO: 2.7 K/UL (ref 1.8–7.7)
NEUTROPHILS NFR BLD: 48.7 % (ref 38–73)
NITRITE UR QL STRIP: NEGATIVE
NRBC BLD-RTO: 0 /100 WBC
PH UR STRIP: 5 [PH] (ref 5–8)
PLATELET # BLD AUTO: 186 K/UL (ref 150–350)
PMV BLD AUTO: 11.2 FL (ref 9.2–12.9)
POTASSIUM SERPL-SCNC: 4.3 MMOL/L (ref 3.5–5.1)
PROT SERPL-MCNC: 6.3 G/DL (ref 6–8.4)
PROT UR QL STRIP: NEGATIVE
RBC # BLD AUTO: 3.55 M/UL (ref 4–5.4)
SODIUM SERPL-SCNC: 134 MMOL/L (ref 136–145)
SP GR UR STRIP: 1.01 (ref 1–1.03)
SQUAMOUS #/AREA URNS HPF: 3 /HPF
TROPONIN I SERPL DL<=0.01 NG/ML-MCNC: 0.01 NG/ML (ref 0–0.03)
URN SPEC COLLECT METH UR: ABNORMAL
UROBILINOGEN UR STRIP-ACNC: ABNORMAL EU/DL
WBC # BLD AUTO: 5.55 K/UL (ref 3.9–12.7)
WBC #/AREA URNS HPF: 2 /HPF (ref 0–5)
YEAST URNS QL MICRO: ABNORMAL

## 2020-01-30 PROCEDURE — 11000001 HC ACUTE MED/SURG PRIVATE ROOM

## 2020-01-30 PROCEDURE — 80053 COMPREHEN METABOLIC PANEL: CPT

## 2020-01-30 PROCEDURE — 84481 FREE ASSAY (FT-3): CPT

## 2020-01-30 PROCEDURE — 84484 ASSAY OF TROPONIN QUANT: CPT

## 2020-01-30 PROCEDURE — 63600175 PHARM REV CODE 636 W HCPCS: Performed by: EMERGENCY MEDICINE

## 2020-01-30 PROCEDURE — 83880 ASSAY OF NATRIURETIC PEPTIDE: CPT

## 2020-01-30 PROCEDURE — 93010 ELECTROCARDIOGRAM REPORT: CPT | Mod: ,,, | Performed by: INTERNAL MEDICINE

## 2020-01-30 PROCEDURE — 84439 ASSAY OF FREE THYROXINE: CPT

## 2020-01-30 PROCEDURE — 99285 EMERGENCY DEPT VISIT HI MDM: CPT | Mod: 25

## 2020-01-30 PROCEDURE — 93010 EKG 12-LEAD: ICD-10-PCS | Mod: ,,, | Performed by: INTERNAL MEDICINE

## 2020-01-30 PROCEDURE — 84443 ASSAY THYROID STIM HORMONE: CPT

## 2020-01-30 PROCEDURE — 93005 ELECTROCARDIOGRAM TRACING: CPT

## 2020-01-30 PROCEDURE — 96374 THER/PROPH/DIAG INJ IV PUSH: CPT

## 2020-01-30 PROCEDURE — 85025 COMPLETE CBC W/AUTO DIFF WBC: CPT

## 2020-01-30 PROCEDURE — 81000 URINALYSIS NONAUTO W/SCOPE: CPT

## 2020-01-30 RX ORDER — POLYETHYLENE GLYCOL 3350 17 G/17G
17 POWDER, FOR SOLUTION ORAL DAILY
Status: DISCONTINUED | OUTPATIENT
Start: 2020-01-31 | End: 2020-02-07 | Stop reason: HOSPADM

## 2020-01-30 RX ORDER — ONDANSETRON 2 MG/ML
8 INJECTION INTRAMUSCULAR; INTRAVENOUS EVERY 8 HOURS PRN
Status: DISCONTINUED | OUTPATIENT
Start: 2020-01-30 | End: 2020-02-07 | Stop reason: HOSPADM

## 2020-01-30 RX ORDER — METOLAZONE 2.5 MG/1
5 TABLET ORAL DAILY
Status: COMPLETED | OUTPATIENT
Start: 2020-01-31 | End: 2020-02-01

## 2020-01-30 RX ORDER — ACETAMINOPHEN 325 MG/1
650 TABLET ORAL EVERY 6 HOURS PRN
Status: DISCONTINUED | OUTPATIENT
Start: 2020-01-30 | End: 2020-02-07 | Stop reason: HOSPADM

## 2020-01-30 RX ORDER — GABAPENTIN 300 MG/1
300 CAPSULE ORAL 2 TIMES DAILY
Status: DISCONTINUED | OUTPATIENT
Start: 2020-01-31 | End: 2020-02-07 | Stop reason: HOSPADM

## 2020-01-30 RX ORDER — FUROSEMIDE 10 MG/ML
40 INJECTION INTRAMUSCULAR; INTRAVENOUS 2 TIMES DAILY
Status: DISCONTINUED | OUTPATIENT
Start: 2020-01-31 | End: 2020-02-02

## 2020-01-30 RX ORDER — TALC
9 POWDER (GRAM) TOPICAL NIGHTLY PRN
Status: DISCONTINUED | OUTPATIENT
Start: 2020-01-30 | End: 2020-02-07 | Stop reason: HOSPADM

## 2020-01-30 RX ORDER — LORAZEPAM 0.5 MG/1
0.5 TABLET ORAL EVERY 6 HOURS PRN
Status: DISCONTINUED | OUTPATIENT
Start: 2020-01-30 | End: 2020-02-07 | Stop reason: HOSPADM

## 2020-01-30 RX ORDER — IBUPROFEN 200 MG
16 TABLET ORAL
Status: DISCONTINUED | OUTPATIENT
Start: 2020-01-30 | End: 2020-02-07 | Stop reason: HOSPADM

## 2020-01-30 RX ORDER — ACETAMINOPHEN 325 MG/1
650 TABLET ORAL EVERY 6 HOURS PRN
Status: DISCONTINUED | OUTPATIENT
Start: 2020-01-30 | End: 2020-01-30

## 2020-01-30 RX ORDER — INSULIN ASPART 100 [IU]/ML
0-5 INJECTION, SOLUTION INTRAVENOUS; SUBCUTANEOUS
Status: DISCONTINUED | OUTPATIENT
Start: 2020-01-30 | End: 2020-02-07 | Stop reason: HOSPADM

## 2020-01-30 RX ORDER — ALBUMIN HUMAN 250 G/1000ML
25 SOLUTION INTRAVENOUS ONCE
Status: COMPLETED | OUTPATIENT
Start: 2020-01-31 | End: 2020-01-31

## 2020-01-30 RX ORDER — FUROSEMIDE 10 MG/ML
80 INJECTION INTRAMUSCULAR; INTRAVENOUS
Status: COMPLETED | OUTPATIENT
Start: 2020-01-30 | End: 2020-01-30

## 2020-01-30 RX ORDER — IBUPROFEN 200 MG
24 TABLET ORAL
Status: DISCONTINUED | OUTPATIENT
Start: 2020-01-30 | End: 2020-02-07 | Stop reason: HOSPADM

## 2020-01-30 RX ORDER — LORAZEPAM 0.5 MG/1
0.5 TABLET ORAL 2 TIMES DAILY
Status: DISCONTINUED | OUTPATIENT
Start: 2020-01-31 | End: 2020-01-30

## 2020-01-30 RX ORDER — METOPROLOL TARTRATE 50 MG/1
200 TABLET ORAL 2 TIMES DAILY
Status: DISCONTINUED | OUTPATIENT
Start: 2020-01-31 | End: 2020-02-07 | Stop reason: HOSPADM

## 2020-01-30 RX ORDER — MEMANTINE HYDROCHLORIDE 5 MG/1
5 TABLET ORAL 2 TIMES DAILY
Status: DISCONTINUED | OUTPATIENT
Start: 2020-01-31 | End: 2020-02-07 | Stop reason: HOSPADM

## 2020-01-30 RX ORDER — ACETAMINOPHEN 500 MG
500 TABLET ORAL EVERY 6 HOURS PRN
Status: DISCONTINUED | OUTPATIENT
Start: 2020-01-30 | End: 2020-01-30

## 2020-01-30 RX ORDER — AMOXICILLIN 250 MG
1 CAPSULE ORAL 2 TIMES DAILY PRN
Status: DISCONTINUED | OUTPATIENT
Start: 2020-01-30 | End: 2020-02-07 | Stop reason: HOSPADM

## 2020-01-30 RX ORDER — SODIUM CHLORIDE 0.9 % (FLUSH) 0.9 %
10 SYRINGE (ML) INJECTION
Status: DISCONTINUED | OUTPATIENT
Start: 2020-01-30 | End: 2020-02-07 | Stop reason: HOSPADM

## 2020-01-30 RX ORDER — DILTIAZEM HYDROCHLORIDE 180 MG/1
180 CAPSULE, COATED, EXTENDED RELEASE ORAL DAILY
Status: DISCONTINUED | OUTPATIENT
Start: 2020-01-31 | End: 2020-02-07 | Stop reason: HOSPADM

## 2020-01-30 RX ORDER — ESCITALOPRAM OXALATE 10 MG/1
10 TABLET ORAL DAILY
Status: DISCONTINUED | OUTPATIENT
Start: 2020-01-31 | End: 2020-02-07 | Stop reason: HOSPADM

## 2020-01-30 RX ORDER — FLUTICASONE PROPIONATE 50 MCG
1 SPRAY, SUSPENSION (ML) NASAL DAILY
Status: DISCONTINUED | OUTPATIENT
Start: 2020-01-31 | End: 2020-02-07 | Stop reason: HOSPADM

## 2020-01-30 RX ORDER — SIMVASTATIN 40 MG/1
40 TABLET, FILM COATED ORAL NIGHTLY
Status: DISCONTINUED | OUTPATIENT
Start: 2020-01-31 | End: 2020-02-07 | Stop reason: HOSPADM

## 2020-01-30 RX ORDER — LEVOTHYROXINE SODIUM 137 UG/1
1 CAPSULE ORAL DAILY
Status: DISCONTINUED | OUTPATIENT
Start: 2020-01-31 | End: 2020-01-31 | Stop reason: CLARIF

## 2020-01-30 RX ORDER — CHOLECALCIFEROL (VITAMIN D3) 25 MCG
2000 TABLET ORAL DAILY
Status: DISCONTINUED | OUTPATIENT
Start: 2020-01-31 | End: 2020-02-07 | Stop reason: HOSPADM

## 2020-01-30 RX ORDER — GLUCAGON 1 MG
1 KIT INJECTION
Status: DISCONTINUED | OUTPATIENT
Start: 2020-01-30 | End: 2020-02-07 | Stop reason: HOSPADM

## 2020-01-30 RX ADMIN — FUROSEMIDE 80 MG: 10 INJECTION, SOLUTION INTRAVENOUS at 06:01

## 2020-01-30 NOTE — ED PROVIDER NOTES
Encounter Date: 1/30/2020    SCRIBE #1 NOTE: I, Sherif Choi, am scribing for, and in the presence of,  Ino Wright MD. I have scribed the following portions of the note - Other sections scribed: HPI, ROS.       History     Chief Complaint   Patient presents with    Edema     EMS reports pt from [facility] today with bilateral upper extremity swelling and pain with palpation since yesterday      CC: Edema    HPI: This 87 y.o. Female with a past medical history of anemia, atrial fibrillation, diabetes mellitus, GERD, hypertension and thyroid disease presents to the ED for an evaluation of generalized swelling and L arm pain since yesterday. Pt reports swelling to her arms, legs and hands. [facility] Pt denies fever, chills, headaches, chest pain, cough, shortness of breath, abdominal pain, nausea, vomiting or dysuria. Pt was admitted to the hospital 12/31/19 for chronic a fib and stayed in the hospital for 8 days. During her stay she was hyperkalemic with generalized swelling. She started O2 at home and Lasix when she was discharged. Pt has no relief after complying with her medications.    The history is provided by the patient. No  was used.     Review of patient's allergies indicates:   Allergen Reactions    Sulfa (sulfonamide antibiotics) Hives     Past Medical History:   Diagnosis Date    Acute renal failure 1/1/2020    Allergy     Anemia of chronic disease 1/1/2020    Anticoagulant long-term use     Coumadin    Anxiety     Arthritis     Atrial fibrillation     Cataract     Closed head injury 09/10/2018    Clotting disorder     Depression     Diabetes mellitus     Encounter for blood transfusion     Fall 09/10/2018    GERD (gastroesophageal reflux disease)     Heart murmur     [place]     wears bilateral hearing aids    Hypertension     Thyroid disease     Uses roller walker      Past Surgical History:   Procedure  Laterality Date    HYSTERECTOMY      INJECTION OF FACET JOINT Bilateral 2019    Procedure: INJECTION, FACET JOINT, L4-L5;  Surgeon: Clayton Valle MD;  Location: [facility];  Service: Pain Management;  Laterality: Bilateral;    JOINT REPLACEMENT Right     Hip    TONSILLECTOMY      TOTAL HIP ARTHROPLASTY Left      Family History   Problem Relation Age of Onset    No Known Problems Mother     Diabetes Father     Stroke Father     Diabetes Sister     Diabetes Brother      Social History     Tobacco Use    Smoking status: Former Smoker     Last attempt to quit: 1988     Years since quittin.5    Smokeless tobacco: Never Used   Substance Use Topics    Alcohol use: Not Currently     Alcohol/week: 3.0 - 4.0 standard drinks     Types: 1 Glasses of wine, 2 - 3 Standard drinks or equivalent per week    Drug use: No     Review of Systems   Constitutional: Negative for chills and fever.   HENT: Negative for sore throat.    Respiratory: Negative for cough and shortness of breath.    Cardiovascular: Positive for leg swelling. Negative for chest pain.   Gastrointestinal: Negative for abdominal pain, nausea and vomiting.   Genitourinary: Negative for dysuria.   Musculoskeletal: Positive for arthralgias (L arm) and joint swelling (arms, hands). Negative for back pain.   Skin: Negative for rash.   Neurological: Negative for weakness and headaches.   Hematological: Does not bruise/bleed easily.       Physical Exam     Initial Vitals [20 1509]   BP Pulse Resp Temp SpO2   121/67 (!) 114 16 98 °F (36.7 °C) 98 %      MAP       --         Physical Exam  The patient was examined specifically for the following:   General:No significant distress, Good color, Warm and dry. Head and neck:Scalp atraumatic, Neck supple. Neurological:Appropriate conversation, Gross motor deficits. Eyes:Conjugate gaze, Clear corneas. ENT: No epistaxis. Cardiac: Regular rate and rhythm, Grossly normal heart tones. Pulmonary:  Wheezing, Rales. Gastrointestinal: Abdominal tenderness, Abdominal distention. Musculoskeletal: Extremity deformity, Apparent pain with range of motion of the joints. Skin: Rash.   The findings on examination were normal except for the following:  The patient has pitting edema all the way up to her low lumbar back.  She has edema of both hands.  Lungs are clear.  The heart tones are normal.  The abdomen is soft.  There is no obvious respiratory distress. The patient's oxygen saturations are 98% on room air.  The respiratory rate is 16 the patient is afebrile the heart rate is little high at 114.  ED Course   Procedures  Labs Reviewed   CBC W/ AUTO DIFFERENTIAL - Abnormal; Notable for the following components:       Result Value    RBC 3.55 (*)     Hemoglobin 9.3 (*)     Hematocrit 31.2 (*)     Mean Corpuscular Hemoglobin 26.2 (*)     Mean Corpuscular Hemoglobin Conc 29.8 (*)     RDW 15.6 (*)     Immature Granulocytes 0.7 (*)     Mono # 1.4 (*)     Mono% 25.0 (*)     All other components within normal limits   COMPREHENSIVE METABOLIC PANEL - Abnormal; Notable for the following components:    Sodium 134 (*)     Chloride 92 (*)     CO2 33 (*)     Glucose 117 (*)     Calcium 8.6 (*)     Albumin 2.6 (*)     AST 86 (*)     ALT 59 (*)     eGFR if  59 (*)     eGFR if non  51 (*)     All other components within normal limits   B-TYPE NATRIURETIC PEPTIDE - Abnormal; Notable for the following components:     (*)     All other components within normal limits   URINALYSIS, REFLEX TO URINE CULTURE - Abnormal; Notable for the following components:    Urobilinogen, UA 2.0-3.0 (*)     Leukocytes, UA 1+ (*)     All other components within normal limits    Narrative:     Preferred Collection Type->Urine, Clean Catch   URINALYSIS MICROSCOPIC - Abnormal; Notable for the following components:    Yeast, UA Rare (*)     All other components within normal limits    Narrative:     Preferred Collection  Type->Urine, Clean Catch   TROPONIN I     EKG Readings: (Independently Interpreted)   This patient is in atrial fibrillation with a heart rate of 85.  There is poor R-wave progression across the precordium.  There are no significant ST segment or T-wave changes.  There is no evidence of acute myocardial infarction or malignant arrhythmia.  The atrial fibrillation is chronic.       Imaging Results          X-Ray Chest AP Portable (Final result)  Result time 01/30/20 18:18:13    Final result by Cruzito Pelletier MD (01/30/20 18:18:13)                 Impression:      Slight obscuration of the left hemidiaphragm may be associated with mild left basilar effusion or retrocardiac airspace disease.      Electronically signed by: Cruzito Pelletier  Date:    01/30/2020  Time:    18:18             Narrative:    EXAMINATION:  XR CHEST AP PORTABLE    CLINICAL HISTORY:  chest pain;    TECHNIQUE:  Single frontal view of the chest was performed.    COMPARISON:  01/03/2020    FINDINGS:  Heart is minimally enlarged.  No edema.    Slight obscuration of the left hemidiaphragm may be associated with mild left basilar effusion or retrocardiac airspace disease.    Upper lung fields are clear.    Aortic atherosclerosis.    No evidence of pneumothorax.    No acute osseous abnormality.                              Medical decision making:  Given the above this patient presents to the emergency with anasarca.  She has edema of both hands.  Ed as well as both lower extremities all the way to the hips and low lumbar back.  Chest x-ray reveals a possible left effusion.  EKG reveals atrial fibrillation which is chronic.  The patient's heart rate is controlled at 88 at 7:21 p.m..  The patient was treated with 80 mg of Lasix in the emergency room I will admit this patient for continued diuresis.  Salazar catheter is in place at this time.                Scribe Attestation:   Scribe #1: I performed the above scribed service and the documentation  accurately describes the services I performed. I attest to the accuracy of the note.                          Clinical Impression:       ICD-10-CM ICD-9-CM   1. Anasarca R60.1 782.3   2. Tachycardia R00.0 785.0   3. Longstanding persistent atrial fibrillation I48.11 427.31   4. Hypoalbuminemia E88.09 273.8            I personally performed the services described in this documentation.  All medical record  entries made by the scribe are at my direction and in my presence.  Signed, Dr. Adriana Wright MD  01/30/20 1931

## 2020-01-30 NOTE — ED TRIAGE NOTES
87 y.o. Female presents to the ED with chief complaint of extremity swelling. Pt's daughter reports increased swelling to bilateral upper and lower extremities. Pt reports hx of CHF and reports recently admitted for edema. Pt resting in bed in NAD. Side rails up x2. Pt aaox4. Side rails up x2. [facility]

## 2020-01-31 LAB
ANION GAP SERPL CALC-SCNC: 9 MMOL/L (ref 8–16)
BASOPHILS # BLD AUTO: 0 K/UL (ref 0–0.2)
BASOPHILS NFR BLD: 0 % (ref 0–1.9)
BUN SERPL-MCNC: 20 MG/DL (ref 8–23)
CALCIUM SERPL-MCNC: 8.2 MG/DL (ref 8.7–10.5)
CHLORIDE SERPL-SCNC: 92 MMOL/L (ref 95–110)
CO2 SERPL-SCNC: 34 MMOL/L (ref 23–29)
CREAT SERPL-MCNC: 0.8 MG/DL (ref 0.5–1.4)
DIFFERENTIAL METHOD: ABNORMAL
EOSINOPHIL # BLD AUTO: 0 K/UL (ref 0–0.5)
EOSINOPHIL NFR BLD: 0.3 % (ref 0–8)
ERYTHROCYTE [DISTWIDTH] IN BLOOD BY AUTOMATED COUNT: 15.4 % (ref 11.5–14.5)
EST. GFR  (AFRICAN AMERICAN): >60 ML/MIN/1.73 M^2
EST. GFR  (NON AFRICAN AMERICAN): >60 ML/MIN/1.73 M^2
ESTIMATED AVG GLUCOSE: 137 MG/DL (ref 68–131)
GLUCOSE SERPL-MCNC: 101 MG/DL (ref 70–110)
HBA1C MFR BLD HPLC: 6.4 % (ref 4–5.6)
HCT VFR BLD AUTO: 29.6 % (ref 37–48.5)
HGB BLD-MCNC: 8.8 G/DL (ref 12–16)
IMM GRANULOCYTES # BLD AUTO: 0.05 K/UL (ref 0–0.04)
IMM GRANULOCYTES NFR BLD AUTO: 1.3 % (ref 0–0.5)
LYMPHOCYTES # BLD AUTO: 1.4 K/UL (ref 1–4.8)
LYMPHOCYTES NFR BLD: 38.1 % (ref 18–48)
MAGNESIUM SERPL-MCNC: 1.4 MG/DL (ref 1.6–2.6)
MCH RBC QN AUTO: 25.7 PG (ref 27–31)
MCHC RBC AUTO-ENTMCNC: 29.7 G/DL (ref 32–36)
MCV RBC AUTO: 86 FL (ref 82–98)
MONOCYTES # BLD AUTO: 1 K/UL (ref 0.3–1)
MONOCYTES NFR BLD: 25.6 % (ref 4–15)
NEUTROPHILS # BLD AUTO: 1.3 K/UL (ref 1.8–7.7)
NEUTROPHILS NFR BLD: 34.7 % (ref 38–73)
NRBC BLD-RTO: 0 /100 WBC
PHOSPHATE SERPL-MCNC: 3.5 MG/DL (ref 2.7–4.5)
PLATELET # BLD AUTO: 146 K/UL (ref 150–350)
PMV BLD AUTO: 11 FL (ref 9.2–12.9)
POCT GLUCOSE: 104 MG/DL (ref 70–110)
POCT GLUCOSE: 124 MG/DL (ref 70–110)
POCT GLUCOSE: 135 MG/DL (ref 70–110)
POCT GLUCOSE: 140 MG/DL (ref 70–110)
POTASSIUM SERPL-SCNC: 4 MMOL/L (ref 3.5–5.1)
RBC # BLD AUTO: 3.43 M/UL (ref 4–5.4)
SODIUM SERPL-SCNC: 135 MMOL/L (ref 136–145)
T3FREE SERPL-MCNC: <1 PG/ML (ref 2.3–4.2)
T4 FREE SERPL-MCNC: 1.26 NG/DL (ref 0.71–1.51)
TSH SERPL DL<=0.005 MIU/L-ACNC: 5.23 UIU/ML (ref 0.4–4)
WBC # BLD AUTO: 3.75 K/UL (ref 3.9–12.7)

## 2020-01-31 PROCEDURE — 25000003 PHARM REV CODE 250: Performed by: HOSPITALIST

## 2020-01-31 PROCEDURE — 83735 ASSAY OF MAGNESIUM: CPT

## 2020-01-31 PROCEDURE — 85025 COMPLETE CBC W/AUTO DIFF WBC: CPT

## 2020-01-31 PROCEDURE — 63600175 PHARM REV CODE 636 W HCPCS: Performed by: HOSPITALIST

## 2020-01-31 PROCEDURE — 36415 COLL VENOUS BLD VENIPUNCTURE: CPT

## 2020-01-31 PROCEDURE — 83036 HEMOGLOBIN GLYCOSYLATED A1C: CPT

## 2020-01-31 PROCEDURE — 63600175 PHARM REV CODE 636 W HCPCS: Performed by: EMERGENCY MEDICINE

## 2020-01-31 PROCEDURE — 97110 THERAPEUTIC EXERCISES: CPT

## 2020-01-31 PROCEDURE — 97161 PT EVAL LOW COMPLEX 20 MIN: CPT

## 2020-01-31 PROCEDURE — 25000003 PHARM REV CODE 250: Performed by: EMERGENCY MEDICINE

## 2020-01-31 PROCEDURE — 84100 ASSAY OF PHOSPHORUS: CPT

## 2020-01-31 PROCEDURE — 11000001 HC ACUTE MED/SURG PRIVATE ROOM

## 2020-01-31 PROCEDURE — 27000221 HC OXYGEN, UP TO 24 HOURS

## 2020-01-31 PROCEDURE — 25000242 PHARM REV CODE 250 ALT 637 W/ HCPCS: Performed by: EMERGENCY MEDICINE

## 2020-01-31 PROCEDURE — 97165 OT EVAL LOW COMPLEX 30 MIN: CPT

## 2020-01-31 PROCEDURE — P9047 ALBUMIN (HUMAN), 25%, 50ML: HCPCS | Performed by: HOSPITALIST

## 2020-01-31 PROCEDURE — 94761 N-INVAS EAR/PLS OXIMETRY MLT: CPT

## 2020-01-31 PROCEDURE — 94799 UNLISTED PULMONARY SVC/PX: CPT

## 2020-01-31 PROCEDURE — 80048 BASIC METABOLIC PNL TOTAL CA: CPT

## 2020-01-31 PROCEDURE — 99900035 HC TECH TIME PER 15 MIN (STAT)

## 2020-01-31 RX ORDER — MAGNESIUM SULFATE HEPTAHYDRATE 40 MG/ML
2 INJECTION, SOLUTION INTRAVENOUS ONCE
Status: COMPLETED | OUTPATIENT
Start: 2020-01-31 | End: 2020-01-31

## 2020-01-31 RX ADMIN — FUROSEMIDE 40 MG: 10 INJECTION, SOLUTION INTRAMUSCULAR; INTRAVENOUS at 09:01

## 2020-01-31 RX ADMIN — POLYETHYLENE GLYCOL 3350 17 G: 17 POWDER, FOR SOLUTION ORAL at 09:01

## 2020-01-31 RX ADMIN — MEMANTINE HYDROCHLORIDE 5 MG: 5 TABLET ORAL at 08:01

## 2020-01-31 RX ADMIN — FLUTICASONE PROPIONATE 50 MCG: 50 SPRAY, METERED NASAL at 09:01

## 2020-01-31 RX ADMIN — APIXABAN 5 MG: 5 TABLET, FILM COATED ORAL at 09:01

## 2020-01-31 RX ADMIN — SIMVASTATIN 40 MG: 40 TABLET, FILM COATED ORAL at 08:01

## 2020-01-31 RX ADMIN — SIMVASTATIN 40 MG: 40 TABLET, FILM COATED ORAL at 12:01

## 2020-01-31 RX ADMIN — ACETAMINOPHEN 650 MG: 325 TABLET ORAL at 05:01

## 2020-01-31 RX ADMIN — MELATONIN 2000 UNITS: at 09:01

## 2020-01-31 RX ADMIN — GABAPENTIN 300 MG: 300 CAPSULE ORAL at 08:01

## 2020-01-31 RX ADMIN — METOLAZONE 5 MG: 2.5 TABLET ORAL at 09:01

## 2020-01-31 RX ADMIN — MEMANTINE HYDROCHLORIDE 5 MG: 5 TABLET ORAL at 12:01

## 2020-01-31 RX ADMIN — ALBUMIN (HUMAN) 25 G: 12.5 SOLUTION INTRAVENOUS at 05:01

## 2020-01-31 RX ADMIN — GABAPENTIN 300 MG: 300 CAPSULE ORAL at 12:01

## 2020-01-31 RX ADMIN — FUROSEMIDE 40 MG: 10 INJECTION, SOLUTION INTRAMUSCULAR; INTRAVENOUS at 05:01

## 2020-01-31 RX ADMIN — MAGNESIUM SULFATE 2 G: 2 INJECTION INTRAVENOUS at 05:01

## 2020-01-31 RX ADMIN — APIXABAN 5 MG: 5 TABLET, FILM COATED ORAL at 12:01

## 2020-01-31 RX ADMIN — DILTIAZEM HYDROCHLORIDE 180 MG: 180 CAPSULE, COATED, EXTENDED RELEASE ORAL at 09:01

## 2020-01-31 RX ADMIN — METOPROLOL TARTRATE 200 MG: 50 TABLET, FILM COATED ORAL at 12:01

## 2020-01-31 RX ADMIN — ESCITALOPRAM OXALATE 10 MG: 10 TABLET ORAL at 09:01

## 2020-01-31 RX ADMIN — GABAPENTIN 300 MG: 300 CAPSULE ORAL at 09:01

## 2020-01-31 RX ADMIN — MEMANTINE HYDROCHLORIDE 5 MG: 5 TABLET ORAL at 09:01

## 2020-01-31 RX ADMIN — LEVOTHYROXINE SODIUM 137 MCG: 25 TABLET ORAL at 05:01

## 2020-01-31 RX ADMIN — APIXABAN 5 MG: 5 TABLET, FILM COATED ORAL at 08:01

## 2020-01-31 RX ADMIN — METOPROLOL TARTRATE 200 MG: 50 TABLET, FILM COATED ORAL at 08:01

## 2020-01-31 NOTE — PLAN OF CARE
Problem: Occupational Therapy Goal  Goal: Occupational Therapy Goal  Description  Goals to be met by: 02/14/20    Patient will increase functional independence with ADLs by performing:    Feeding with Set-up Assistance.  UE Dressing with Set-up Assistance.  Grooming while seated with Set-up Assistance.  Toileting from bedside commode with Contact Guard Assistance for hygiene and clothing management.   Rolling to Bilateral with Stand-by Assistance.   Supine to sit with Stand-by Assistance.  Step transfer with Stand-by Assistance  Toilet transfer to bedside commode with Stand-by Assistance.  Upper extremity exercise program x15 reps per handout, with assistance as needed.     Outcome: Ongoing, Progressing   Pt pleasantly confused and motivated to participate; pt tolerated up from bed>chair with a few steps and CGA using RW. OT rec. Return to SNF at d/c.

## 2020-01-31 NOTE — PLAN OF CARE
Problem: Physical Therapy Goal  Goal: Physical Therapy Goal  Description  Goals to be met by: 2020     Patient will increase functional independence with mobility by performin. Sit to stand transfer with Stand-by Assistance  2. Gait  x 25-50 feet with Stand-by Assistance using Rolling Walker.   3. Perform B LE ther ex per handout x 10 reps with assistance as needed   2020 1633 by Karolina Duarte, PT  Outcome: Ongoing, Progressing  Initial PT evaluation completed.  Pt could benefit from skilled PT services 3x/wk In order to maximize function prior to D/C.  Return to SNF vs return to NH with PT recommended

## 2020-01-31 NOTE — NURSING
Pt aaox2, sat in recliner for lunch and dinner. She c/o pain to R arm and HA, tylenol given. Salazar catheter draining clear yellow urine.Total output during shift 1600ml, fluid restriction. No SSI given. Call light w/i reach.

## 2020-01-31 NOTE — PLAN OF CARE
Pt has arrived to unit  Pt is awake alert and stable. No complaints at this time  Report received from Selina ED RN. Will admit and cont with current plan of care.

## 2020-01-31 NOTE — H&P
[facility] Medicine  History & Physical    Patient Name: Vashti Muñoz  MRN: [id number]  Admission Date: 01/31/2020  Attending Physician: Ede Sim MD, MPH      PCP:     Alesha Baum MD    CC:     Chief Complaint   Patient presents with    Edema     EMS reports pt from [facility] today with bilateral upper extremity swelling and pain with palpation since yesterday        HISTORY OF PRESENT ILLNESS:     Vashti Muñoz is a 87 y.o. female that (in part)  has a past medical history of Acute renal failure, Allergy, Anemia of chronic disease, Anticoagulant long-term use, Anxiety, Arthritis, Atrial fibrillation, Cataract, Closed head injury, Clotting disorder, Depression, Diabetes mellitus, Encounter for blood transfusion, Fall, GERD (gastroesophageal reflux disease), Heart murmur, [place] (hard of hearing), Hypertension, Thyroid disease, and Uses roller walker.  has a past surgical history that includes Hysterectomy; Tonsillectomy; Total hip arthroplasty (Left, 2004); Joint replacement (Right); and Injection of facet joint (Bilateral, 4/5/2019). Presents to [facility] Emergency Department from her nursing home with report of acute on chronic edema with progressive worsening.  Involves bilateral lower extremities as well as upper extremities to the level of her hands and wrist.  Marked unintentional weight gain.  Has pain in all extremities due to the edema. Also complaining of palpitations.  Reports compliance with home medication regimen.  She has dementia.  Therefore the history is somewhat limited.  Four weeks ago she was admitted to the hospital and stayed for 8 days for treatment of heart failure and atrial fibrillation.  She was started on supplemental oxygen and Lasix at that time prior to discharge.  Patient saw her cardiologist 2 days ago in clinic for follow-up for CHF, atrial fibrillation, and aortic stenosis.  Dr. Kline increased Lasix  from 20-40 mg twice a day for 1-2 weeks.    In the emergency department routine laboratory studies EKG, cardiac enzymes, and chest x-ray was performed.  On physical exam patient had evidence of anasarca.  Hypoalbuminemia was noted as well as a history of heart failure.  She was given Lasix intravenously and albumin in the ED.  She is being admitted for acute on chronic heart failure, anasarca, and need for diuresis.    Hospital medicine has been asked to admit to inpatient for further evaluation and treatment.       REVIEW OF SYSTEMS:     Limited review of systems due to history of dementia other than what is available above in the HPI.      PAST MEDICAL / SURGICAL HISTORY:     Past Medical History:   Diagnosis Date    Acute renal failure 1/1/2020    Allergy     Anemia of chronic disease 1/1/2020    Anticoagulant long-term use     Coumadin    Anxiety     Arthritis     Atrial fibrillation     Cataract     Closed head injury 09/10/2018    Clotting disorder     Depression     Diabetes mellitus     Encounter for blood transfusion     Fall 09/10/2018    GERD (gastroesophageal reflux disease)     Heart murmur     [place] (hard of hearing)     wears bilateral hearing aids    Hypertension     Thyroid disease     Uses roller walker      Past Surgical History:   Procedure Laterality Date    HYSTERECTOMY      INJECTION OF FACET JOINT Bilateral 4/5/2019    Procedure: INJECTION, FACET JOINT, L4-L5;  Surgeon: Clayton Valle MD;  Location: [facility];  Service: Pain Management;  Laterality: Bilateral;    JOINT REPLACEMENT Right     Hip    TONSILLECTOMY      TOTAL HIP ARTHROPLASTY Left 2004         FAMILY HISTORY:     Family History   Problem Relation Age of Onset    No Known Problems Mother     Diabetes Father     Stroke Father     Diabetes Sister     Diabetes Brother          SOCIAL HISTORY:     Social History     Socioeconomic History    Marital status:      Spouse name: Not on file     Number of children: 2    Years of education: 12    Highest education level: Not on file   Occupational History    Occupation: retired     Comment: [employer]   Social Needs    Financial resource strain: Not on file    Food insecurity:     Worry: Not on file     Inability: Not on file    Transportation needs:     Medical: Not on file     Non-medical: Not on file   Tobacco Use    Smoking status: Former Smoker     Last attempt to quit: 1988     Years since quittin.5    Smokeless tobacco: Never Used   Substance and Sexual Activity    Alcohol use: Not Currently     Alcohol/week: 3.0 - 4.0 standard drinks     Types: 1 Glasses of wine, 2 - 3 Standard drinks or equivalent per week    Drug use: No    Sexual activity: Not on file   Lifestyle    Physical activity:     Days per week: Not on file     Minutes per session: Not on file    Stress: Not on file   Relationships    Social connections:     Talks on phone: Not on file     Gets together: Not on file     Attends Zoroastrianism service: Not on file     Active member of club or organization: Not on file     Attends meetings of clubs or organizations: Not on file     Relationship status: Not on file   Other Topics Concern    Not on file   Social History Narrative    Not on file         ALLERGIES:       Review of patient's allergies indicates:   Allergen Reactions    Sulfa (sulfonamide antibiotics) Hives         HEALTH SCREENING:     Influenza vaccine not up-to-date for this season.  Prevnar 13 pneumonia vaccine = no evidence of previous vaccination found in the medical record      HOME MEDICATIONS:     Prior to Admission medications    Medication Sig Start Date End Date Taking? Authorizing Provider   acetaminophen (TYLENOL) 500 MG tablet Take 500 mg by mouth every 6 (six) hours as needed for Pain.    Historical Provider, MD   apixaban (ELIQUIS) 5 mg Tab Take 1 tablet (5 mg total) by mouth 2 (two) times daily. 10/18/19   Ede Kline MD   diltiaDELIAM  (CARDIZEM CD) 180 MG 24 hr capsule Take 1 capsule (180 mg total) by mouth once daily. 1/9/20 1/8/21  Collette Bautista MD   escitalopram oxalate (LEXAPRO) 10 MG tablet Take 1 tablet (10 mg total) by mouth once daily. 7/19/18 10/11/19  Harish Garcia MD   fluticasone (FLONASE) 50 mcg/actuation nasal spray 1 spray by Each Nare route once daily.    Historical Provider, MD   furosemide (LASIX) 20 MG tablet Take 20 mg by mouth 2 (two) times daily.    Historical Provider, MD   gabapentin (NEURONTIN) 300 MG capsule Take 300 mg by mouth 2 (two) times daily.    Historical Provider, MD   levothyroxine 137 mcg Cap Take 1 tablet by mouth once daily.    Historical Provider, MD   LORazepam (ATIVAN) 0.5 MG tablet Take 0.5 mg by mouth 2 (two) times daily. Take half in the am and a whole at bedtime    Historical Provider, MD   memantine (NAMENDA) 5 MG Tab Take 5 mg by mouth 2 (two) times daily.    Historical Provider, MD   menthol/camphor (BIOFREEZE-ILEX TOP) Apply topically.    Historical Provider, MD   metoprolol tartrate (LOPRESSOR) 100 MG tablet Take 2 tablets (200 mg total) by mouth 2 (two) times daily. 1/9/20 1/8/21  Collette Bautista MD   simvastatin (ZOCOR) 40 MG tablet Take 40 mg by mouth every evening.    Historical Provider, MD   vitamin D (VITAMIN D3) 1000 units Tab Take 2,000 Units by mouth once daily.    Historical Provider, MD          HOSPITAL MEDICATIONS:     Scheduled Meds:    albumin human 25%  25 g Intravenous Once    apixaban  5 mg Oral BID    diltiaZEM  180 mg Oral Daily    escitalopram oxalate  10 mg Oral Daily    fluticasone propionate  1 spray Each Nostril Daily    furosemide  40 mg Intravenous BID    gabapentin  300 mg Oral BID    levothyroxine  1 tablet Oral Daily    memantine  5 mg Oral BID    metOLazone  5 mg Oral Daily    metoprolol tartrate  200 mg Oral BID    polyethylene glycol  17 g Oral Daily    simvastatin  40 mg Oral QHS    vitamin D  2,000 Units Oral Daily     Continuous  Infusions:   PRN Meds: acetaminophen, dextrose 50%, dextrose 50%, glucagon (human recombinant), glucose, glucose, influenza, insulin aspart U-100, LORazepam, melatonin, ondansetron, pneumoc 13-ravinder conj-dip cr(PF), promethazine (PHENERGAN) IVPB, senna-docusate 8.6-50 mg, sodium chloride 0.9%      PHYSICAL EXAM:     Wt Readings from Last 1 Encounters:   01/30/20 1509 113.4 kg (250 lb)     Body mass index is 42.91 kg/m².  Vitals:    01/30/20 1847 01/30/20 2021 01/30/20 2058 01/30/20 2345   BP: (!) 139/59  134/88 129/80   BP Location:   Left leg Left leg   Patient Position:   Lying Lying   Pulse: 99  82 87   Resp: 20 16 18   Temp:  98.4 °F (36.9 °C) 97.8 °F (36.6 °C) 97.1 °F (36.2 °C)   TempSrc:  Oral Oral Axillary   SpO2: 100%  100% 100%   Weight:       Height:              -- General appearance:  Marked peripheral edema. Obesity.  well developed. appears stated age   -- Head: normocephalic, atraumatic   -- Eyes: conjunctivae clear. Extraocular muscles intact  -- Nose: Nares normal. Septum midline.   -- Mouth/Throat: lips, mucosa, and tongue normal. no throat erythema.   -- Neck: supple, symmetrical, trachea midline, elevated JVD.  thyroid not grossly enlarged, appears symmetric  -- Lungs: clear to auscultation bilaterally. normal respiratory effort. No use of accessory muscles.   -- Chest wall: no tenderness. equal bilateral chest rise   -- Heart:  Rapid irregularly irregular heart rate and rhythm.  Systolic murmur present.  S1, S2 normal.  no click, rub or gallop   -- Abdomen: soft, non-tender, non-distended, non-tympanic; bowel sounds normal; no masses  -- Extremities:  3+ bilateral lower extremity pitting edema. 2+ bilateral upper extremity edema.  no cyanosis, clubbing.  -- Pulses: 2+ and symmetric   -- Skin:  Turgor normal. Color normal. Texture normal. No rashes or lesions.   -- Neurologic:  Pleasantly demented.  Globally decreased muscle strength and tone. No focal numbness or weakness. CNII-XII intact.  Delia coma scale:  14      LABORATORY STUDIES:     Recent Results (from the past 36 hour(s))   CBC auto differential    Collection Time: 01/30/20  5:27 PM   Result Value Ref Range    WBC 5.55 3.90 - 12.70 K/uL    RBC 3.55 (L) 4.00 - 5.40 M/uL    Hemoglobin 9.3 (L) 12.0 - 16.0 g/dL    Hematocrit 31.2 (L) 37.0 - 48.5 %    Mean Corpuscular Volume 88 82 - 98 fL    Mean Corpuscular Hemoglobin 26.2 (L) 27.0 - 31.0 pg    Mean Corpuscular Hemoglobin Conc 29.8 (L) 32.0 - 36.0 g/dL    RDW 15.6 (H) 11.5 - 14.5 %    Platelets 186 150 - 350 K/uL    MPV 11.2 9.2 - 12.9 fL    Immature Granulocytes 0.7 (H) 0.0 - 0.5 %    Gran # (ANC) 2.7 1.8 - 7.7 K/uL    Immature Grans (Abs) 0.04 0.00 - 0.04 K/uL    Lymph # 1.4 1.0 - 4.8 K/uL    Mono # 1.4 (H) 0.3 - 1.0 K/uL    Eos # 0.0 0.0 - 0.5 K/uL    Baso # 0.00 0.00 - 0.20 K/uL    nRBC 0 0 /100 WBC    Gran% 48.7 38.0 - 73.0 %    Lymph% 25.4 18.0 - 48.0 %    Mono% 25.0 (H) 4.0 - 15.0 %    Eosinophil% 0.2 0.0 - 8.0 %    Basophil% 0.0 0.0 - 1.9 %    Differential Method Automated    Comprehensive metabolic panel    Collection Time: 01/30/20  5:27 PM   Result Value Ref Range    Sodium 134 (L) 136 - 145 mmol/L    Potassium 4.3 3.5 - 5.1 mmol/L    Chloride 92 (L) 95 - 110 mmol/L    CO2 33 (H) 23 - 29 mmol/L    Glucose 117 (H) 70 - 110 mg/dL    BUN, Bld 22 8 - 23 mg/dL    Creatinine 1.0 0.5 - 1.4 mg/dL    Calcium 8.6 (L) 8.7 - 10.5 mg/dL    Total Protein 6.3 6.0 - 8.4 g/dL    Albumin 2.6 (L) 3.5 - 5.2 g/dL    Total Bilirubin 0.3 0.1 - 1.0 mg/dL    Alkaline Phosphatase 114 55 - 135 U/L    AST 86 (H) 10 - 40 U/L    ALT 59 (H) 10 - 44 U/L    Anion Gap 9 8 - 16 mmol/L    eGFR if African American 59 (A) >60 mL/min/1.73 m^2    eGFR if non African American 51 (A) >60 mL/min/1.73 m^2   Troponin I    Collection Time: 01/30/20  5:27 PM   Result Value Ref Range    Troponin I 0.009 0.000 - 0.026 ng/mL   Brain natriuretic peptide    Collection Time: 01/30/20  5:27 PM   Result Value Ref Range     (H) 0 -  99 pg/mL   Urinalysis, Reflex to Urine Culture Urine, Clean Catch    Collection Time: 01/30/20  6:42 PM   Result Value Ref Range    Specimen UA Urine, Catheterized     Color, UA Yellow Yellow, Straw, [place]    Appearance, UA Clear Clear    pH, UA 5.0 5.0 - 8.0    Specific Gravity, UA 1.010 1.005 - 1.030    Protein, UA Negative Negative    Glucose, UA Negative Negative    Ketones, UA Negative Negative    Bilirubin (UA) Negative Negative    Occult Blood UA Negative Negative    Nitrite, UA Negative Negative    Urobilinogen, UA 2.0-3.0 (A) <2.0 EU/dL    Leukocytes, UA 1+ (A) Negative   Urinalysis Microscopic    Collection Time: 01/30/20  6:42 PM   Result Value Ref Range    WBC, UA 2 0 - 5 /hpf    Yeast, UA Rare (A) None    Squam Epithel, UA 3 /hpf    Microscopic Comment SEE COMMENT        Lab Results   Component Value Date    INR 1.1 01/04/2020    INR 1.1 01/03/2020    INR 1.3 (H) 01/02/2020     Lab Results   Component Value Date    HGBA1C 6.3 (H) 01/02/2020     No results for input(s): POCTGLUCOSE in the last 72 hours.        MICROBIOLOGY DATA:     Urine Culture, Routine   Date Value Ref Range Status   01/01/2020 ESCHERICHIA COLI  >100,000 cfu/ml   (A)  Final   01/01/2020 KLEBSIELLA PNEUMONIAE  10,000 - 49,999 cfu/ml   (A)  Final       Microbiology x 7d:   Microbiology Results (last 7 days)     ** No results found for the last 168 hours. **            IMAGING:     Imaging Results          X-Ray Chest AP Portable (Final result)  Result time 01/30/20 18:18:13    Final result by Cruzito Pelletier MD (01/30/20 18:18:13)                 Impression:      Slight obscuration of the left hemidiaphragm may be associated with mild left basilar effusion or retrocardiac airspace disease.      Electronically signed by: Cruzito Pelletier  Date:    01/30/2020  Time:    18:18             Narrative:    EXAMINATION:  XR CHEST AP PORTABLE    CLINICAL HISTORY:  chest pain;    TECHNIQUE:  Single frontal view of the chest was  performed.    COMPARISON:  01/03/2020    FINDINGS:  Heart is minimally enlarged.  No edema.    Slight obscuration of the left hemidiaphragm may be associated with mild left basilar effusion or retrocardiac airspace disease.    Upper lung fields are clear.    Aortic atherosclerosis.    No evidence of pneumothorax.    No acute osseous abnormality.                                    ASSESSMENT & PLAN:     Primary Diagnosis:  Anasarca    Active Hospital Problems    Diagnosis  POA    *Anasarca [R60.1]  Yes     Priority: 1 - High    Chronic heart failure with preserved ejection fraction [I50.32]  Yes     Priority: 2     Anemia of chronic disease [D63.8]  Yes     Chronic    Dementia without behavioral disturbance [F03.90]  Yes     Chronic    Mixed hyperlipidemia [E78.2]  Yes     Chronic    Chronic atrial fibrillation [I48.20]  Yes     Chronic    Acquired hypothyroidism [E03.9]  Yes     Chronic    Chronic anticoagulation [Z79.01]  Not Applicable     Chronic    Essential hypertension [I10]  Yes     Chronic      Resolved Hospital Problems   No resolved problems to display.       Acute on chronic CHF exacerbation with anasarca  · Evidenced by history, elevated BNP, pulmonary edema, peripheral edema  · Patient saw her cardiologist 2 days ago in clinic for follow-up for CHF, atrial fibrillation, and aortic stenosis.  Dr. Kline increased Lasix from 20-40 mg twice a day for 1-2 weeks.  · Provide diuresis w/ IV medication  · Maintain w/ beta-blocker  · ACE inhibitor or ARB if GFR allows and remains stable  · If 1) Patient cannot tolerate ACEi or 2) NYHA class III or above, add spironolactone (or eplerenone) and hydralazine-isosorbide dinitrate   · Daily Weights  · Strict I/O  · Fluid restriction to 1,500cc daily  · Low-sodium cardiac diet  · Obtain 2D echo if <6 months   No results found for: EF  · Chest X-ray  · Check TSH, albumin, UA, and renal function  · EKG and cardiac enzymes PRN  · DVT prophylaxis w/  pharmacological and/or mechanical measures  · Oxygen supplementation support PRN  · Consider cardiology consult pending clinical course and effectiveness of diuresis    Instructions given to patient/family:  Monitor daily weight.  Regular activity within patient's limitations.  Low salt, low fat and low choleterol diet and restrict fluid < 2L per day.  Call MD if SOB, chest pain, weight gain > 2-3 lbs per day and/or 5-6 lbs per week.   No smoking. Annual influenza vaccine required.    Chronic atrial fibrillation  · Currently rate controlled  · Maintain with beta-blocker with hold parameters  · Monitor on telemetry  · Maintain magnesium around 2.0  · Maintain potassium around 4.0  · Apixaban    Chronic anticoagulation  · For treatment of chronic atrial fibrillation  · No evidence of acute blood loss   · Continue apixaban.    Diabetes mellitus type 2  · BG in acceptable range at this time  · Maintain w/ subcutaneous insulin management order set  · Hold oral diabetic meds  · ADA 1800 kcal diet  · BG goal while in patient is <180mg/dL  · HgA1c = Pending    Essential Hypertension  · Goal while inpatient is a systolic blood pressure less than 160mmHg  · BP in acceptable range at this time  · Continue current home regimen with hold parameters  · PRN antihypertensives available     Thyroid dysfunction   · Clinically, patient is euthyroid   · Chemically, undetermined  · Obtain TSH, free T3, and free T4  · Continue current regimen    Hyperlipidemia   · Lipid panel - as an outpatient  · Cardiac diet  · Continue statin      Chronic kidney disease  · Renal dose medications  · Avoid nephrotoxic agents  · Maintain euvolemic state    Dementia without behavioral disturbance  · No acute issues  · Supportive care          VTE Risk Mitigation (From admission, onward)         Ordered     apixaban tablet 5 mg  2 times daily      01/30/20 2352     IP VTE HIGH RISK PATIENT  Once      01/30/20 2355     Reason for No Pharmacological VTE  Prophylaxis  Once     Question:  Reasons:  Answer:  Already adequately anticoagulated on oral Anticoagulants    01/30/20 1344                  Adult PRN medications available   DVT prophylaxis given       DISPOSITION:     Will admit to the Hospital Medicine service for further evaluation and treatment.    Chart reviewed and updated where applicable.    High Risk Conditions:  Patient has a condition that poses threat to life and bodily function:  Anasarca secondary to acute CHF exacerbation      ===============================================================    Ede Sim MD, MPH  Department of Hospital Medicine   [facility]  [phone] pg  (7pm - 6am)          This note is dictated using [employer] voice recognition software.  There are word recognition mistakes that are occasionally missed on review.

## 2020-01-31 NOTE — PT/OT/SLP EVAL
Physical Therapy Evaluation    Patient Name:  Vashti Muñoz   MRN:  [id number]    Recommendations:     Discharge Recommendations:  (Return to SNf vs return to NH with PT)   Discharge Equipment Recommendations: none   Barriers to discharge: None    Assessment:     Vashti Muñoz is a 87 y.o. female admitted with a medical diagnosis of Anasarca.  She presents with the following impairments/functional limitations:  weakness, impaired self care skills, impaired balance, decreased coordination, decreased safety awareness, impaired endurance, impaired functional mobilty, impaired coordination, pain, decreased upper extremity function, decreased ROM, edema, gait instability, impaired cognition, decreased lower extremity function, impaired cardiopulmonary response to activity .    Rehab Prognosis: Good; patient would benefit from acute skilled PT services to address these deficits and reach maximum level of function.    Recent Surgery: * No surgery found *      Plan:     During this hospitalization, patient to be seen 3 x/week to address the identified rehab impairments via gait training, therapeutic activities, therapeutic exercises and progress toward the following goals:    · Plan of Care Expires:  02/07/20    Subjective     Chief Complaint: L UE pain  Patient/Family Comments/goals: none stated, Poor historian  Pain/Comfort:  · Pain Rating 1: (not rated)  · Location 1: (L arm)  · Pain Addressed 1: Reposition, Cessation of Activity, Distraction, Nurse notified    Patients cultural, spiritual, Moravian conflicts given the current situation: no    Living Environment:  Pt is a NH resident.  Was admitted from SNF after being D/C'd from this hospital approx 3wks ago.    Prior to admission, patients level of function was Mod I with Rolator for ambulation.  Equipment used at home: walker, rolling, wheelchair, oxygen.  DME owned (not currently used): none.  Upon discharge, patient will have assistance from NH  staff.    Objective:     Communicated with nsg prior to session.  Patient found HOB elevated with bed alarm, peripheral IV, le catheter  upon PT entry to room.    General Precautions: Standard, fall, hearing impaired, diabetic   Orthopedic Precautions:N/A   Braces: N/A     Exams:  · Cognitive Exam:  Patient is oriented to Person and Place  · Gross Motor Coordination:  mildly impaired 2/2 gen weakness and deconditioning, obesity  · Postural Exam:  Patient presented with the following abnormalities:    · -       Rounded shoulders  · -       Forward head  · -       pendulous abdomen  · Sensation:    · -       Intact  light/touch B LE's  · Skin Integrity/Edema:      · -       Skin integrity: Visible skin intact  · -       Edema: Moderate B LE's, L UE   · RLE ROM: WFL  · RLE Strength: WFL  · LLE ROM: WFL  · LLE Strength: WFL    Functional Mobility:  · Bed Mobility:     · Scooting: stand by assistance  · Supine to Sit: stand by assistance  · Transfers:     · Sit to Stand:  minimum assistance with rolling walker  · Gait: 8 small steps to chair with RW and CGA, decreased mynor  · Balance: Fair+ sit, Fair stand      Therapeutic Activities and Exercises:   Educated patient to perform B LE there ex while sitting up in chair.  Pt verbalized/demonstrated understanding of:  B AP's, FAQ's, Marching, Hip ABd/Add, AP's, and TKE's x 10 reps    AM-PAC 6 CLICK MOBILITY  Total Score:17     Patient left up in chair with all lines intact, call button in reach and nsg notified.    GOALS:   Multidisciplinary Problems     Physical Therapy Goals        Problem: Physical Therapy Goal    Goal Priority Disciplines Outcome Goal Variances Interventions   Physical Therapy Goal     PT, PT/OT Ongoing, Progressing     Description:  Goals to be met by: 2020     Patient will increase functional independence with mobility by performin. Sit to stand transfer with Stand-by Assistance  2. Gait  x 25-50 feet with Stand-by Assistance using  Rolling Walker.   3. Perform B LE ther ex per handout x 10 reps with assistance as needed                    History:     Past Medical History:   Diagnosis Date    Acute renal failure 1/1/2020    Allergy     Anemia of chronic disease 1/1/2020    Anticoagulant long-term use     Coumadin    Anxiety     Arthritis     Atrial fibrillation     Cataract     Closed head injury 09/10/2018    Clotting disorder     Depression     Diabetes mellitus     Encounter for blood transfusion     Fall 09/10/2018    GERD (gastroesophageal reflux disease)     Heart murmur     [place] (hard of hearing)     wears bilateral hearing aids    Hypertension     Thyroid disease     Uses roller walker        Past Surgical History:   Procedure Laterality Date    HYSTERECTOMY      INJECTION OF FACET JOINT Bilateral 4/5/2019    Procedure: INJECTION, FACET JOINT, L4-L5;  Surgeon: Clayton Valle MD;  [facility]: [facility];  Service: Pain Management;  Laterality: Bilateral;    JOINT REPLACEMENT Right     Hip    TONSILLECTOMY      TOTAL HIP ARTHROPLASTY Left 2004       Time Tracking:     PT Received On: 01/31/20  PT Start Time: 1130     PT Stop Time: 1200  PT Total Time (min): 30 min     Billable Minutes: Evaluation 15 and Therapeutic Exercise 15      Karolina Duarte, PT  01/31/2020

## 2020-01-31 NOTE — HPI
Vashti Muñoz is a 87 y.o. female that (in part)  has a past medical history of Acute renal failure, Allergy, Anemia of chronic disease, Anticoagulant long-term use, Anxiety, Arthritis, Atrial fibrillation, Cataract, Closed head injury, Clotting disorder, Depression, Diabetes mellitus, Encounter for blood transfusion, Fall, GERD (gastroesophageal reflux disease), Heart murmur, [place] (hard of hearing), Hypertension, Thyroid disease, and Uses roller walker.  has a past surgical history that includes Hysterectomy; Tonsillectomy; Total hip arthroplasty (Left, 2004); Joint replacement (Right); and Injection of facet joint (Bilateral, 4/5/2019). Presents to [facility] Emergency Department from her nursing home with report of acute on chronic edema with progressive worsening.  Involves bilateral lower extremities as well as upper extremities to the level of her hands and wrist.  Marked unintentional weight gain.  Has pain in all extremities due to the edema. Also complaining of palpitations.  Reports compliance with home medication regimen.  She has dementia.  Therefore the history is somewhat limited.  Four weeks ago she was admitted to the hospital and stayed for 8 days for treatment of heart failure and atrial fibrillation.  She was started on supplemental oxygen and Lasix at that time prior to discharge.  Patient saw her cardiologist 2 days ago in clinic for follow-up for CHF, atrial fibrillation, and aortic stenosis.  Dr. Kline increased Lasix from 20-40 mg twice a day for 1-2 weeks.    In the emergency department routine laboratory studies EKG, cardiac enzymes, and chest x-ray was performed.  On physical exam patient had evidence of anasarca.  Hypoalbuminemia was noted as well as a history of heart failure.  She was given Lasix intravenously and albumin in the ED.  She is being admitted for acute on chronic heart failure, anasarca, and need for diuresis.    Hospital medicine has been asked to  admit to inpatient for further evaluation and treatment.

## 2020-01-31 NOTE — PLAN OF CARE
Problem: Fall Injury Risk  Goal: Absence of Fall and Fall-Related Injury  Outcome: Ongoing, Progressing       Problem: Skin Injury Risk Increased  Goal: Skin Health and Integrity  Outcome: Ongoing, Progressing     Problem: Mobility Impairment  Goal: Optimal Mobility  Outcome: Ongoing, Progressing     Problem: Fluid Volume Excess  Goal: Fluid Balance  Outcome: Ongoing, Progressing     Problem: Pain Acute  Goal: Optimal Pain Control  Outcome: Ongoing, Progressing    PT is AAOX4, no acute changes noted during shift, pt is on bedrest and is repositioned q2, no skin breakdown noted, hourly rounds made during shift,  VSS. No falls noted. Fall precautions remain. Pain assessed. No pain noted. Pt resting comfortably in bed. Call light in reach. Will continue to monitor.

## 2020-01-31 NOTE — PT/OT/SLP EVAL
Occupational Therapy   Evaluation    Name: Vashti Muñoz  MRN: [id number]  Admitting Diagnosis:  Anasarca      Recommendations:     Discharge Recommendations: (return to SNF)  Discharge Equipment Recommendations:  none  Barriers to discharge:  None    Assessment:     Vashti Muñoz is a 87 y.o. female with a medical diagnosis of Anasarca. Performance deficits affecting function: weakness, impaired functional mobilty, impaired cognition, decreased safety awareness, impaired cardiopulmonary response to activity, impaired endurance, gait instability, pain, impaired fine motor, impaired balance, decreased upper extremity function, impaired skin, impaired self care skills, edema, decreased ROM, decreased lower extremity function.      Pt pleasantly confused and motivated to participate; pt tolerated up from bed>chair with a few steps and CGA using RW. OT rec. Return to SNF at d/c in order to regain PLOF (MOD I with functional t/f with RW and increased independence with ADLs) and reduce risk of falls/unplanned readmission/morbidity.     Rehab Prognosis: Good; patient would benefit from acute skilled OT services to address these deficits and reach maximum level of function.       Plan:     Patient to be seen 5 x/week to address the above listed problems via self-care/home management, therapeutic activities, therapeutic exercises  · Plan of Care Expires: 02/14/20  · Plan of Care Reviewed with: patient    Subjective     Chief Complaint: unable to provide PLOF- didn't remember   Patient/Family Comments/goals: wants to go to be well to go visit her daughter, Sariah, in [place]     Occupational Profile: OT discussed PLOF with nurse, Lori, at NH d/t pt is poor historian:   Living Environment: Pt is a resident at [facility].   Previous level of function: Pt was d/c from [facility] on 01/09/20- prior to that admission, pt had some assist with dressin, fed self, wore pull-ups, and MOD I with functional t/f. Now, pt is receiving  SNF services. Per Lori, pt feeds herself, assist with: baths,  functional t/fs, wheelchair propulsions, and bed mobility.  Roles and Routines: SNF therapy   Equipment Used at Home:  walker, rolling, wheelchair, oxygen  Assistance upon Discharge: NH staff     Pain/Comfort:  · Pain Rating 1: (c/o L arm pain- did not rate)    Patients cultural, spiritual, Ep[employer]alian conflicts given the current situation: no    Objective:     Communicated with: nurseIleana, prior to session.  Patient found right sidelying with LUE elevated with bed alarm, peripheral IV, le catheter(O2 via NC not on) upon OT entry to room. Pt was not wearing her NC- unable to retrieve vitals and nurseIleana, notified/present- O2 RA 88%, HR 75L- NC donned back on.     General Precautions: Standard, fall, hearing impaired, diabetic   Orthopedic Precautions:N/A   Braces: N/A     Occupational Performance:    Bed Mobility:    · Patient completed Scooting/Bridging with contact guard assistance  · Patient completed Supine to Sit with minimum assistance and HOB elevated    Functional Mobility/Transfers:  · Patient completed Sit <> Stand Transfer with contact guard assistance and of 2 persons  with  rolling walker   · Patient completed Bed <> Chair Transfer using Step Transfer technique with contact guard assistance with rolling walker  · Functional Mobility: Pt required hand-over-hand assist to place LUE onto the RW prior to functional transfer d/t edema limiting AROM. Pt required verbal cueing for BUE correct hand placement once standing. Pt took a few steps from bed>chair with min verbal cueing on proper body mechanics and safety.     Activities of Daily Living:  · Upper Body Dressing: moderate assistance to chu back gown    Cognitive/Visual Perceptual:  Cognitive/Psychosocial Skills:     -       Oriented to: Person, Place and pt did not remember recent admission and PLOF/became upset, but able to re-direct   -       Follows  Commands/attention:Follows two-step commands  -       Communication: clear/fluent  -       Memory: Impaired STM and Poor immediate recall  -       Safety awareness/insight to disability: impaired   -       Mood/Affect/Coping skills/emotional control: Cooperative and Pleasant  Visual/Perceptual:      -Intact  R/L discrimination (glasses)    Physical Exam:  Balance:    -       seated: SBA/SUP; standing: CGA with RW  Postural examination/scapula alignment:    -       Rounded shoulders  -       Forward head  Skin integrity: Visible skin intact  Edema:  Severe LUE  Sensation:    -       Intact  light/touch BUE  Dominant hand:    -       Right  Upper Extremity Range of Motion:     -       Right Upper Extremity: WFL  -       Left Upper Extremity: Deficits: limited with all range of motion d/t severe edema; unable to complete full elbow ROM in order to complete feeding/grooming; shoulder flexion to ~45* AROM  Upper Extremity Strength:    -       Right Upper Extremity: WFL  -       Left Upper Extremity: 2/5   Strength:    -       Right Upper Extremity: WFL  -       Left Upper Extremity: Deficits: d/t edema   Fine Motor Coordination:    -       Impaired  Left hand, manipulation of objects    Gross motor coordination:   minimally impaired 2* confusion     AMPAC 6 Click ADL:  AMPAC Total Score: 14    Treatment & Education:  · Pt educated on OT role/POC.   · Importance of OOB activity with staff assistance.  · Encouraged up to the chair daily   · Handout given on AROM for LUE.   · Pt completed 1 x 10 BUE AROM in seated position:  · Digits I-V flexion/extension   · Wrist flexion/extension  · Forearm pronation/supination  · Elbow flexion/extension  · Shoulder flexion/extension   · Handout placed on lap tray to encourage increased carryover; white board also updated for staff to encourage pt d/t confusion    · Pt encouraged to complete 3x10 reps a day  · Safety during functional t/f and mobility   · Increased time with bed  mobility in order to promote increased independence with functional task- pt tolerated this well   · White board updated   · Multiple self-care tasks/functional mobility completed- assistance level noted above   · All questions/concerns answered within OT scope of practice     Education:    Patient left up in chair with all lines intact, call button in reach, nurseIleana, notified, NC on, L elbow and B/L LE elevated on pillows, and all needs within reach with door open    GOALS:   Multidisciplinary Problems     Occupational Therapy Goals        Problem: Occupational Therapy Goal    Goal Priority Disciplines Outcome Interventions   Occupational Therapy Goal     OT, PT/OT Ongoing, Progressing    Description:  Goals to be met by: 02/14/20    Patient will increase functional independence with ADLs by performing:    Feeding with Set-up Assistance.  UE Dressing with Set-up Assistance.  Grooming while seated with Set-up Assistance.  Toileting from bedside commode with Contact Guard Assistance for hygiene and clothing management.   Rolling to Bilateral with Stand-by Assistance.   Supine to sit with Stand-by Assistance.  Step transfer with Stand-by Assistance  Toilet transfer to bedside commode with Stand-by Assistance.  Upper extremity exercise program x15 reps per handout, with assistance as needed.                      History:     Past Medical History:   Diagnosis Date    Acute renal failure 1/1/2020    Allergy     Anemia of chronic disease 1/1/2020    Anticoagulant long-term use     Coumadin    Anxiety     Arthritis     Atrial fibrillation     Cataract     Closed head injury 09/10/2018    Clotting disorder     Depression     Diabetes mellitus     Encounter for blood transfusion     Fall 09/10/2018    GERD (gastroesophageal reflux disease)     Heart murmur     [place] (hard of hearing)     wears bilateral hearing aids    Hypertension     Thyroid disease     Uses roller walker        Past Surgical  History:   Procedure Laterality Date    HYSTERECTOMY      INJECTION OF FACET JOINT Bilateral 4/5/2019    Procedure: INJECTION, FACET JOINT, L4-L5;  Surgeon: Clayton Valle MD;  Location: [facility];  Service: Pain Management;  Laterality: Bilateral;    JOINT REPLACEMENT Right     Hip    TONSILLECTOMY      TOTAL HIP ARTHROPLASTY Left 2004       Time Tracking:     OT Date of Treatment: 01/31/20  OT Start Time: 1130  OT Stop Time: 1209  OT Total Time (min): 39 min    Billable Minutes:Evaluation 15 min  Therapeutic Exercise 12 min  Total Time 39 min (co-eval with PT)    Tonie Mandujano OT  1/31/2020

## 2020-02-01 LAB
ANION GAP SERPL CALC-SCNC: 6 MMOL/L (ref 8–16)
BASOPHILS # BLD AUTO: 0 K/UL (ref 0–0.2)
BASOPHILS NFR BLD: 0 % (ref 0–1.9)
BUN SERPL-MCNC: 22 MG/DL (ref 8–23)
CALCIUM SERPL-MCNC: 8.3 MG/DL (ref 8.7–10.5)
CHLORIDE SERPL-SCNC: 89 MMOL/L (ref 95–110)
CO2 SERPL-SCNC: 41 MMOL/L (ref 23–29)
CREAT SERPL-MCNC: 0.9 MG/DL (ref 0.5–1.4)
DIFFERENTIAL METHOD: ABNORMAL
EOSINOPHIL # BLD AUTO: 0 K/UL (ref 0–0.5)
EOSINOPHIL NFR BLD: 0.3 % (ref 0–8)
ERYTHROCYTE [DISTWIDTH] IN BLOOD BY AUTOMATED COUNT: 15.4 % (ref 11.5–14.5)
EST. GFR  (AFRICAN AMERICAN): >60 ML/MIN/1.73 M^2
EST. GFR  (NON AFRICAN AMERICAN): 58 ML/MIN/1.73 M^2
GLUCOSE SERPL-MCNC: 101 MG/DL (ref 70–110)
HCT VFR BLD AUTO: 27.2 % (ref 37–48.5)
HGB BLD-MCNC: 8.1 G/DL (ref 12–16)
IMM GRANULOCYTES # BLD AUTO: 0.04 K/UL (ref 0–0.04)
IMM GRANULOCYTES NFR BLD AUTO: 1.1 % (ref 0–0.5)
LYMPHOCYTES # BLD AUTO: 1.8 K/UL (ref 1–4.8)
LYMPHOCYTES NFR BLD: 47.8 % (ref 18–48)
MCH RBC QN AUTO: 26 PG (ref 27–31)
MCHC RBC AUTO-ENTMCNC: 29.8 G/DL (ref 32–36)
MCV RBC AUTO: 87 FL (ref 82–98)
MONOCYTES # BLD AUTO: 0.7 K/UL (ref 0.3–1)
MONOCYTES NFR BLD: 18.8 % (ref 4–15)
NEUTROPHILS # BLD AUTO: 1.2 K/UL (ref 1.8–7.7)
NEUTROPHILS NFR BLD: 32 % (ref 38–73)
NRBC BLD-RTO: 0 /100 WBC
PLATELET # BLD AUTO: 134 K/UL (ref 150–350)
PMV BLD AUTO: 11.3 FL (ref 9.2–12.9)
POCT GLUCOSE: 113 MG/DL (ref 70–110)
POCT GLUCOSE: 116 MG/DL (ref 70–110)
POCT GLUCOSE: 133 MG/DL (ref 70–110)
POCT GLUCOSE: 167 MG/DL (ref 70–110)
POTASSIUM SERPL-SCNC: 3.8 MMOL/L (ref 3.5–5.1)
RBC # BLD AUTO: 3.12 M/UL (ref 4–5.4)
SODIUM SERPL-SCNC: 136 MMOL/L (ref 136–145)
WBC # BLD AUTO: 3.68 K/UL (ref 3.9–12.7)

## 2020-02-01 PROCEDURE — 97110 THERAPEUTIC EXERCISES: CPT

## 2020-02-01 PROCEDURE — 97530 THERAPEUTIC ACTIVITIES: CPT

## 2020-02-01 PROCEDURE — 85025 COMPLETE CBC W/AUTO DIFF WBC: CPT

## 2020-02-01 PROCEDURE — 97116 GAIT TRAINING THERAPY: CPT | Mod: CQ

## 2020-02-01 PROCEDURE — 80048 BASIC METABOLIC PNL TOTAL CA: CPT

## 2020-02-01 PROCEDURE — 97530 THERAPEUTIC ACTIVITIES: CPT | Mod: CQ

## 2020-02-01 PROCEDURE — 36415 COLL VENOUS BLD VENIPUNCTURE: CPT

## 2020-02-01 PROCEDURE — 25000003 PHARM REV CODE 250: Performed by: EMERGENCY MEDICINE

## 2020-02-01 PROCEDURE — 63600175 PHARM REV CODE 636 W HCPCS: Performed by: EMERGENCY MEDICINE

## 2020-02-01 PROCEDURE — 11000001 HC ACUTE MED/SURG PRIVATE ROOM

## 2020-02-01 PROCEDURE — 97110 THERAPEUTIC EXERCISES: CPT | Mod: CQ

## 2020-02-01 PROCEDURE — 25000003 PHARM REV CODE 250: Performed by: HOSPITALIST

## 2020-02-01 RX ADMIN — METOPROLOL TARTRATE 200 MG: 50 TABLET, FILM COATED ORAL at 09:02

## 2020-02-01 RX ADMIN — GABAPENTIN 300 MG: 300 CAPSULE ORAL at 09:02

## 2020-02-01 RX ADMIN — MEMANTINE HYDROCHLORIDE 5 MG: 5 TABLET ORAL at 09:02

## 2020-02-01 RX ADMIN — DILTIAZEM HYDROCHLORIDE 180 MG: 180 CAPSULE, COATED, EXTENDED RELEASE ORAL at 09:02

## 2020-02-01 RX ADMIN — POLYETHYLENE GLYCOL 3350 17 G: 17 POWDER, FOR SOLUTION ORAL at 09:02

## 2020-02-01 RX ADMIN — APIXABAN 5 MG: 5 TABLET, FILM COATED ORAL at 09:02

## 2020-02-01 RX ADMIN — SIMVASTATIN 40 MG: 40 TABLET, FILM COATED ORAL at 09:02

## 2020-02-01 RX ADMIN — MELATONIN 2000 UNITS: at 09:02

## 2020-02-01 RX ADMIN — FUROSEMIDE 40 MG: 10 INJECTION, SOLUTION INTRAMUSCULAR; INTRAVENOUS at 07:02

## 2020-02-01 RX ADMIN — Medication 9 MG: at 10:02

## 2020-02-01 RX ADMIN — LEVOTHYROXINE SODIUM 137 MCG: 25 TABLET ORAL at 05:02

## 2020-02-01 RX ADMIN — METOLAZONE 5 MG: 2.5 TABLET ORAL at 09:02

## 2020-02-01 RX ADMIN — FUROSEMIDE 40 MG: 10 INJECTION, SOLUTION INTRAMUSCULAR; INTRAVENOUS at 09:02

## 2020-02-01 RX ADMIN — ESCITALOPRAM OXALATE 10 MG: 10 TABLET ORAL at 09:02

## 2020-02-01 RX ADMIN — FLUTICASONE PROPIONATE 50 MCG: 50 SPRAY, METERED NASAL at 10:02

## 2020-02-01 NOTE — PLAN OF CARE
Problem: Physical Therapy Goal  Goal: Physical Therapy Goal  Description  Goals to be met by: 2020     Patient will increase functional independence with mobility by performin. Sit to stand transfer with Stand-by Assistance  2. Gait  x 25-50 feet with Stand-by Assistance using Rolling Walker.   3. Perform B LE ther ex per handout x 10 reps with assistance as needed   Outcome: Ongoing, Progressing   Pt will benefit from further skilled therapy in order to return to PLOF.

## 2020-02-01 NOTE — ASSESSMENT & PLAN NOTE
IV diuresis  Daily weights  Fluid restriction  Monitor renal function   Albumin 2.6- contributing to fluid retention     D/w cardiology - input while inpatient and for dc planning

## 2020-02-01 NOTE — SUBJECTIVE & OBJECTIVE
Interval History: pt reports no chest pain or SOB at this time     Review of Systems   Constitutional: Positive for fatigue.   HENT: Negative.    Respiratory: Positive for shortness of breath.    Genitourinary: Negative.    Musculoskeletal: Positive for gait problem.   Neurological: Positive for weakness.     Objective:     Vital Signs (Most Recent):  Temp: 98 °F (36.7 °C) (02/01/20 0734)  Pulse: 73 (02/01/20 0734)  Resp: 17 (02/01/20 0734)  BP: 107/71 (02/01/20 0734)  SpO2: 99 % (02/01/20 0734) Vital Signs (24h Range):  Temp:  [97.7 °F (36.5 °C)-98.6 °F (37 °C)] 98 °F (36.7 °C)  Pulse:  [73-95] 73  Resp:  [16-18] 17  SpO2:  [92 %-100 %] 99 %  BP: (103-142)/(47-78) 107/71     Weight: 98.7 kg (217 lb 9.5 oz)  Body mass index is 37.35 kg/m².    Intake/Output Summary (Last 24 hours) at 2/1/2020 0951  Last data filed at 2/1/2020 0600  Gross per 24 hour   Intake 480 ml   Output 3800 ml   Net -3320 ml      Physical Exam   Constitutional: She is oriented to person, place, and time. She appears well-developed and well-nourished. No distress.   HENT:   Head: Normocephalic and atraumatic.   Eyes: Pupils are equal, round, and reactive to light. EOM are normal.   Neck: Normal range of motion. Neck supple.   Cardiovascular: Normal rate and regular rhythm.   Pulmonary/Chest: Effort normal. No respiratory distress. She has no wheezes. She has rales.   Abdominal: Soft. Bowel sounds are normal. She exhibits no distension. There is no tenderness.   Musculoskeletal: She exhibits edema and deformity.   Neurological: She is alert and oriented to person, place, and time.   Skin: Skin is warm and dry. Capillary refill takes 2 to 3 seconds.       Significant Labs: All pertinent labs within the past 24 hours have been reviewed.    Significant Imaging: I have reviewed and interpreted all pertinent imaging results/findings within the past 24 hours.

## 2020-02-01 NOTE — ASSESSMENT & PLAN NOTE
Pt has drop from one month ago hemoglobin 10  On eliquis   Monitor h/h  Transfuse accordingly  Check stool for occult blood

## 2020-02-01 NOTE — PT/OT/SLP PROGRESS
Physical Therapy Treatment    Patient Name:  Vashti Muñoz   MRN:  [id number]    Recommendations:     Discharge Recommendations:  (Return to SNf vs return to NH with PT)   Discharge Equipment Recommendations: none   Barriers to discharge: None    Assessment:     Vashti Muñoz is a 87 y.o. female admitted with a medical diagnosis of Anasarca.  She presents with the following impairments/functional limitations:  weakness, impaired endurance, impaired functional mobilty, gait instability, impaired self care skills, impaired balance, decreased upper extremity function, decreased lower extremity function, pain, decreased ROM, decreased safety awareness, edema, impaired cardiopulmonary response to activity .    Rehab Prognosis: Good; patient would benefit from acute skilled PT services to address these deficits and reach maximum level of function.    Recent Surgery: * No surgery found *      Plan:     During this hospitalization, patient to be seen 3 x/week to address the identified rehab impairments via gait training, therapeutic activities, therapeutic exercises and progress toward the following goals:    · Plan of Care Expires:  02/07/20    Subjective     Chief Complaint: weakness and fatigue   Patient/Family Comments/goals: pt agreeable to treatment   Pain/Comfort:  · Pain Rating 1: 0/10  · Pain Rating Post-Intervention 1: 0/10      Objective:     Communicated with nurse  prior to session.  Patient found up in chair with bed alarm, telemetry, oxygen, le catheter upon PT entry to room.     General Precautions: Standard, fall, respiratory   Orthopedic Precautions:N/A   Braces: N/A     Functional Mobility:  · Transfers:     · Sit to Stand: x 2 trials from chair moderate assistance with rolling walker. V/T cues for safety, proper technique and hand placement on RW.    · Gait: trained ~ 10 ft x 2 trials  on level tile with Rolling Walker with Minimal Assistance (chair followed, 2L O2NC) .  Pt with demonserin blackman   3-point gait with decreased mynor,decreased velocity of limb motion, decreased step length, decreased swing-to-stance ratio. Impairments contributing to gait deviations include impaired balance, decreased flexibility,decreased ROM and decreased strength. V/T cues for safety technique and walker management.   · Balance: fair-      AM-PAC 6 CLICK MOBILITY  Turning over in bed (including adjusting bedclothes, sheets and blankets)?: 3  Sitting down on and standing up from a chair with arms (e.g., wheelchair, bedside commode, etc.): 2  Moving from lying on back to sitting on the side of the bed?: 3  Moving to and from a bed to a chair (including a wheelchair)?: 3  Need to walk in hospital room?: 3  Climbing 3-5 steps with a railing?: 2  Basic Mobility Total Score: 16       Therapeutic Activities and Exercises:   Pt performed seated BLE 10 reps x 2 trials: GS,  AP, LAQ, HS,  Hip abd/add with pillow , Hip flexion and BLE x 10 reps in reclined chair : AP, QS, GS , hip ABD/ADD . V/T's cues for technique and sequence. Pt required frequent rest breaks throughout treatment 2* fatigue. VC's for pursed lip breathing technique.   BLE ex's handout given to pt with instruction and demonstration, pt demonstrated good understanding. Encouraged  pt to perform BLE ex's per handout throughout the day, pt verbalized understanding.     Educated pt on safety awareness with all OOB mobility , transfer and gait training.     Patient left up in reclined chair on air cushion with BUE/BLE elevated on pillow  all lines intact, call button in reach, chair alarm on and nurse notified..    GOALS:   Multidisciplinary Problems     Physical Therapy Goals        Problem: Physical Therapy Goal    Goal Priority Disciplines Outcome Goal Variances Interventions   Physical Therapy Goal     PT, PT/OT Ongoing, Progressing     Description:  Goals to be met by: 2020     Patient will increase functional independence with mobility by performin. Sit  to stand transfer with Stand-by Assistance  2. Gait  x 25-50 feet with Stand-by Assistance using Rolling Walker.   3. Perform B LE ther ex per handout x 10 reps with assistance as needed                    Time Tracking:     PT Received On: 02/01/20  PT Start Time: 1330     PT Stop Time: 1409  PT Total Time (min): 39 min     Billable Minutes: Gait Training 15, Therapeutic Activity 10 and Therapeutic Exercise 14    Treatment Type: Treatment  PT/PTA: PTA     PTA Visit Number: 1     Lea Cheney, PTA  02/01/2020

## 2020-02-01 NOTE — HOSPITAL COURSE
Pt admitted with acute on chronic diastolic heart failure and anasarca.  Patient started on IV lasix with great diuresis and improvement of symptoms.  Patient had I/O's of -12 L during hospital stay.  Much decreased swelling to lower extremities.  Elderly lady with diastolic heart failure and moderate to severe aortic stenosis.  Palliative Care consulted.  Patient has a [place] with DNR status.  Patient spiked a fever.  Started empirically on Augmentin secondary to recurrent UTI.  UA benign, but CXR with left lower lung opacity.  Stopped Augmentin and started on IV Rocephin for pneumonia.  Possible aspiration pneumonia.  Fevers resolved.  BCx negative.  She has remained afebrile and hemodynamically stable.  Episodes of hypokalemia replaced with oral potassium.  Lasix has been switched to oral.  Patient is doing well and ready for discharge back to NH.  PT/OT consulted and recommending further therapy.  Patient will be discharged with SNF and Hospice consult.  Daughter planning to transition patient to hospice at some point.

## 2020-02-01 NOTE — PLAN OF CARE
Problem: Occupational Therapy Goal  Goal: Occupational Therapy Goal  Description  Goals to be met by: 02/14/20    Patient will increase functional independence with ADLs by performing:    Feeding with Set-up Assistance.  UE Dressing with Set-up Assistance.  Grooming while seated with Set-up Assistance.  Toileting from bedside commode with Contact Guard Assistance for hygiene and clothing management.   Rolling to Bilateral with Stand-by Assistance.   Supine to sit with Stand-by Assistance.  Step transfer with Stand-by Assistance  Toilet transfer to bedside commode with Stand-by Assistance.  Upper extremity exercise program x15 reps per handout, with assistance as needed.     Outcome: Ongoing, Progressing   Pt tolerated session well, completing static standing for 2.5 min with CGA and RW. Pt complete LUE AROM with rest breaks d/t fatigue. OT rec. SNF at d/c in order to regain PLOF.

## 2020-02-01 NOTE — PT/OT/SLP PROGRESS
Occupational Therapy   Treatment    Name: Vashti Muñoz  MRN: [id number]  Admitting Diagnosis:  Anasarca       Recommendations:     Discharge Recommendations: (return to SNF)  Discharge Equipment Recommendations:  none  Barriers to discharge:  None    Assessment:     Vashti Muñoz is a 87 y.o. female with a medical diagnosis of Anasarca. Performance deficits affecting function are weakness, impaired functional mobilty, impaired cognition, decreased safety awareness, impaired cardiopulmonary response to activity, impaired endurance, gait instability, pain, impaired fine motor, impaired balance, decreased upper extremity function, impaired skin, impaired self care skills, decreased lower extremity function, decreased ROM, edema. Pt presents as pleasantly confused and motivated to participate in order to regain PLOF. MIN A for bed mobility, CGA with RW for static standing for 2.5 min- pt then became fatigued, needing to sit down. Pt became fatigued with LUE AROM exercises, needing rest breaks prn.     Rehab Prognosis:  Good; patient would benefit from acute skilled OT services to address these deficits and reach maximum level of function.       Plan:     Patient to be seen 5 x/week to address the above listed problems via self-care/home management, therapeutic activities, therapeutic exercises  · Plan of Care Expires: 02/14/20  · Plan of Care Reviewed with: patient    Subjective     Chief complaint: wish she didn't have her le cath in   Patient's comments/goals: pleasantly agreeable; enjoys listening to Theo Pace during therapy     Pain/Comfort:  · Pain Rating 1: (some LUE discomfort with movement)  · Pain Addressed 1: Reposition, Cessation of Activity  · Pain Rating Post-Intervention 1: (reduced at rest)    Objective:     Communicated with: nurseIleana, prior to session.  Patient found HOB elevated right sidelying with LUE elevated on a pillow with bed alarm, le catheter, pressure relief boots, telemetry,  peripheral IV, oxygen upon OT entry to room.    General Precautions: Standard, fall, diabetic, hearing impaired   Orthopedic Precautions:N/A   Braces: N/A     Occupational Performance:     Bed Mobility:    · Patient completed Rolling/Turning to Right with minimum assistance  · Patient completed Scooting/Bridging with minimum assistance  · Patient completed Supine to Sit with minimum assistance and HOB elevated     Functional Mobility/Transfers:  · Patient completed Sit <> Stand Transfer with contact guard assistance  with  rolling walker   · Patient completed Bed <> Chair Transfer using Step Transfer technique with contact guard assistance with rolling walker  · Functional Mobility: Pt participated in static standing activity to increase B/L LE strength in order to complete ADLs and transfers, completing with CGA. Pt required frequent cueing for body mechanics and hand placement on RW for improved balance and safety.     Activities of Daily Living:  · Feeding:  set-up with cup of small sip of water- nurseIleana, made aware (fluid restrictions)    · Grooming: set-up seated with wash cloth to wash her face in between exercises    · Upper Body Dressing: maximal assistance to chu back gown       AMPA 6 Click ADL: 14    Treatment & Education:  · Pt re-educated on OT role/POC.   · Importance of OOB activity with staff assistance.   · Encouraged pt to sit up in the chair until after lunch then call for staff assist  · Safety during functional t/f and mobility using RW  · Pt completed the following BUE AROM in unsupported sit position:  · Digits I-V flexion/extension 1x15  · Wrist flexion/extension 1x15  · Forearm pronation/supination 1x15  · Elbow flexion/extension 1x10   · Shoulder elevation/depression 1x10   · Forward punches 1x5   · Handout on lap tray in sight of pt to promote increased daily carryover    · Pt encouraged to complete 2-3x10-15 reps a day  · Rest breaks as needed in between sets; OT demo with minimal  verbal cueing for pursed lip breathing exercise on rest breaks   · White board updated   · Multiple self-care tasks/functional mobility completed- assistance level noted above   · All questions/concerns answered within OT scope of practice       Patient left up in chair with all lines intact, call button in reach, chair alarm on, nurse, Ileana, notified, LUE elevated, B/L LE elevated, and all needs within reachEducation:      GOALS:   Multidisciplinary Problems     Occupational Therapy Goals        Problem: Occupational Therapy Goal    Goal Priority Disciplines Outcome Interventions   Occupational Therapy Goal     OT, PT/OT Ongoing, Progressing    Description:  Goals to be met by: 02/14/20    Patient will increase functional independence with ADLs by performing:    Feeding with Set-up Assistance.  UE Dressing with Set-up Assistance.  Grooming while seated with Set-up Assistance.  Toileting from bedside commode with Contact Guard Assistance for hygiene and clothing management.   Rolling to Bilateral with Stand-by Assistance.   Supine to sit with Stand-by Assistance.  Step transfer with Stand-by Assistance  Toilet transfer to bedside commode with Stand-by Assistance.  Upper extremity exercise program x15 reps per handout, with assistance as needed.                      Time Tracking:     OT Date of Treatment: 02/01/20  OT Start Time: 1054  OT Stop Time: 1124  OT Total Time (min): 30 min    Billable Minutes:Therapeutic Activity 15 min  Therapeutic Exercise 15 min  Total Time 30 min    Tonie Mandujano OT  2/1/2020

## 2020-02-01 NOTE — PLAN OF CARE
"TN introduced herself and explained the 's role. TN utilized 2 patient identifiers: Name & .  TN placed Name and spectralink number on whiteboard.TN provided and reviewed with patient "Blue My Health Packet". TN discussed what Help At Home means to the patient and the name of the person LARA/TONY, who helps at home. TN discussed with patient the things the patient is responsible for to HELP manage patient's  healthcare at home. TN taught Symptoms and Problems with patient forRENETTA .Patient verbalized understanding & teachback:1.SOB, 2.SWELLING OF FEET . Resident of [facility] since 2019, presently SNF authorized by [employer] Patient is originally alf but may go back SNF.     20 1327   Discharge Assessment   Assessment Type Discharge Planning Assessment   Confirmed/corrected address and phone number on facesheet? Yes   Assessment information obtained from? Caregiver;Patient   Communicated expected length of stay with patient/caregiver no   Prior to hospitilization cognitive status: Alert/Oriented   Prior to hospitalization functional status: Assistive Equipment;Needs Assistance   Current cognitive status: Alert/Oriented   Current Functional Status: Assistive Equipment;Needs Assistance   Lives With facility resident   Able to Return to Prior Arrangements no   Is patient able to care for self after discharge? No   Who are your caregiver(s) and their phone number(s)?   (resident of [facility], recently SNF then will go back to alf)   Patient's perception of discharge disposition admitted as an inpatient   Readmission Within the Last 30 Days no previous admission in last 30 days   Patient currently being followed by outpatient case management? No   Patient currently receives any other outside agency services? No   Equipment Currently Used at Home wheelchair;oxygen   Do you have any problems affording any of your prescribed medications? No   Is the patient taking " medications as prescribed? no   Does the patient have transportation home? Yes   Transportation Anticipated health plan transportation   Does the patient receive services at the [facility]? No   Discharge Plan A Skilled Nursing Facility   Discharge Plan B Return to Nursing Home   DME Needed Upon Discharge  none   Patient/Family in Agreement with Plan yes   Does the patient have transportation to healthcare appointments? Yes   Readmission Questionnaire   Have you felt down, depressed, or hopeless? 0   Sleina Cano RN, BSN, STN [facility]  1/31/2020

## 2020-02-01 NOTE — ASSESSMENT & PLAN NOTE
Pt presents with anasarca with concern from daughter that she needs more aggressive diuersis  Pt is not SOB  Followed by Dr. Kline - recent clinic visit 1/28  Lasix dose increased 20 to 40mg  Agree with plan for defined code status - d/w daughter again     ECHO: 1/1/20  Conclusion     · Normal left ventricular systolic function. The estimated ejection fraction is 70%  · Concentric left ventricular hypertrophy.  · Grade II (moderate) left ventricular diastolic dysfunction consistent with pseudonormalization.  · Normal right ventricular systolic function.  · Moderate-to-severe aortic valve stenosis.  · Aortic valve area is 0.64 cm2; peak velocity is 3.86 m/s; mean gradient is 35 mmHg.  · Mild mitral regurgitation.  · Mild tricuspid regurgitation.  · The estimated PA systolic pressure is 70 mm Hg  · Pulmonary hypertension present.

## 2020-02-01 NOTE — PROGRESS NOTES
[facility] Hospital Medicine  Progress Note    Patient Name: Vashti Muñoz  MRN: [id number]  Patient Class: IP- Inpatient   Admission Date: 1/30/2020  Length of Stay: 2 days  Attending Physician: Anne Marie Kidd MD  Primary Care Provider: Alesha Baum MD        Subjective:     Principal Problem:Anasarca        HPI:  Vashti Muñoz is a 87 y.o. female that (in part)  has a past medical history of Acute renal failure, Allergy, Anemia of chronic disease, Anticoagulant long-term use, Anxiety, Arthritis, Atrial fibrillation, Cataract, Closed head injury, Clotting disorder, Depression, Diabetes mellitus, Encounter for blood transfusion, Fall, GERD (gastroesophageal reflux disease), Heart murmur, [place] (hard of hearing), Hypertension, Thyroid disease, and Uses roller walker.  has a past surgical history that includes Hysterectomy; Tonsillectomy; Total hip arthroplasty (Left, 2004); Joint replacement (Right); and Injection of facet joint (Bilateral, 4/5/2019). Presents to [facility] Emergency Department from her nursing home with report of acute on chronic edema with progressive worsening.  Involves bilateral lower extremities as well as upper extremities to the level of her hands and wrist.  Marked unintentional weight gain.  Has pain in all extremities due to the edema. Also complaining of palpitations.  Reports compliance with home medication regimen.  She has dementia.  Therefore the history is somewhat limited.  Four weeks ago she was admitted to the hospital and stayed for 8 days for treatment of heart failure and atrial fibrillation.  She was started on supplemental oxygen and Lasix at that time prior to discharge.  Patient saw her cardiologist 2 days ago in clinic for follow-up for CHF, atrial fibrillation, and aortic stenosis.  Dr. Kline increased Lasix from 20-40 mg twice a day for 1-2 weeks.    In the emergency department routine laboratory studies EKG, cardiac  enzymes, and chest x-ray was performed.  On physical exam patient had evidence of anasarca.  Hypoalbuminemia was noted as well as a history of heart failure.  She was given Lasix intravenously and albumin in the ED.  She is being admitted for acute on chronic heart failure, anasarca, and need for diuresis.    Hospital medicine has been asked to admit to inpatient for further evaluation and treatment.     Overview/Hospital Course:  Pt admitted with acute on chronic CHF and anasarca. Pt reports increased dose of lasix this week and now requiring IV diuresis. Pt poor historian - info taken from records.     Interval History: pt reports no chest pain or SOB at this time     Review of Systems   Constitutional: Positive for fatigue.   HENT: Negative.    Respiratory: Positive for shortness of breath.    Genitourinary: Negative.    Musculoskeletal: Positive for gait problem.   Neurological: Positive for weakness.     Objective:     Vital Signs (Most Recent):  Temp: 98 °F (36.7 °C) (02/01/20 0734)  Pulse: 73 (02/01/20 0734)  Resp: 17 (02/01/20 0734)  BP: 107/71 (02/01/20 0734)  SpO2: 99 % (02/01/20 0734) Vital Signs (24h Range):  Temp:  [97.7 °F (36.5 °C)-98.6 °F (37 °C)] 98 °F (36.7 °C)  Pulse:  [73-95] 73  Resp:  [16-18] 17  SpO2:  [92 %-100 %] 99 %  BP: (103-142)/(47-78) 107/71     Weight: 98.7 kg (217 lb 9.5 oz)  Body mass index is 37.35 kg/m².    Intake/Output Summary (Last 24 hours) at 2/1/2020 0951  Last data filed at 2/1/2020 0600  Gross per 24 hour   Intake 480 ml   Output 3800 ml   Net -3320 ml      Physical Exam   Constitutional: She is oriented to person, place, and time. She appears well-developed and well-nourished. No distress.   HENT:   Head: Normocephalic and atraumatic.   Eyes: Pupils are equal, round, and reactive to light. EOM are normal.   Neck: Normal range of motion. Neck supple.   Cardiovascular: Normal rate and regular rhythm.   Pulmonary/Chest: Effort normal. No respiratory distress. She has no  wheezes. She has rales.   Abdominal: Soft. Bowel sounds are normal. She exhibits no distension. There is no tenderness.   Musculoskeletal: She exhibits edema and deformity.   Neurological: She is alert and oriented to person, place, and time.   Skin: Skin is warm and dry. Capillary refill takes 2 to 3 seconds.       Significant Labs: All pertinent labs within the past 24 hours have been reviewed.    Significant Imaging: I have reviewed and interpreted all pertinent imaging results/findings within the past 24 hours.      Assessment/Plan:      * Anasarca    IV diuresis  Daily weights  Fluid restriction  Monitor renal function   Albumin 2.6- contributing to fluid retention     D/w cardiology - input while inpatient and for dc planning       Chronic heart failure with preserved ejection fraction  Pt presents with anasarca with concern from daughter that she needs more aggressive diuersis  Pt is not SOB  Followed by Dr. Kline - recent clinic visit 1/28  Lasix dose increased 20 to 40mg  Agree with plan for defined code status - d/w daughter again     ECHO: 1/1/20  Conclusion     · Normal left ventricular systolic function. The estimated ejection fraction is 70%  · Concentric left ventricular hypertrophy.  · Grade II (moderate) left ventricular diastolic dysfunction consistent with pseudonormalization.  · Normal right ventricular systolic function.  · Moderate-to-severe aortic valve stenosis.  · Aortic valve area is 0.64 cm2; peak velocity is 3.86 m/s; mean gradient is 35 mmHg.  · Mild mitral regurgitation.  · Mild tricuspid regurgitation.  · The estimated PA systolic pressure is 70 mm Hg  · Pulmonary hypertension present.           Dementia without behavioral disturbance  No acute issues   Resume namenda      Anemia of chronic disease  Pt has drop from one month ago hemoglobin 10  On eliquis   Monitor h/h  Transfuse accordingly  Check stool for occult blood     Mixed hyperlipidemia  Resume statin       Essential  hypertension  BP controlled   on diltiazem CD  Metoprolol 200mg BID - listed on home med list       Chronic anticoagulation  Resume eliquis, but mindful that there is drop in h/h        Acquired hypothyroidism  Resume synthroid       Chronic atrial fibrillation  Rate controlled on BB   eliquis             VTE Risk Mitigation (From admission, onward)         Ordered     apixaban tablet 5 mg  2 times daily      01/30/20 2354     IP VTE HIGH RISK PATIENT  Once      01/30/20 2354     Reason for No Pharmacological VTE Prophylaxis  Once     Question:  Reasons:  Answer:  Already adequately anticoagulated on oral Anticoagulants    01/30/20 2354                      Anne Marie Kidd MD  Department of Hospital Medicine   [facility]

## 2020-02-01 NOTE — PLAN OF CARE
Patient remained free from injury throughout shift.  Patient is turned every two hours.  No discomfort voiced throughout shift.  Call light within reach.  Patient remains on 1500 fluid restriction.  Will continue to monitor.          Problem: Fall Injury Risk  Goal: Absence of Fall and Fall-Related Injury  Outcome: Ongoing, Progressing     Problem: Adult Inpatient Plan of Care  Goal: Plan of Care Review  Outcome: Ongoing, Progressing  Flowsheets (Taken 2/1/2020 0103)  Plan of Care Reviewed With: patient  Goal: Patient-Specific Goal (Individualization)  Outcome: Ongoing, Progressing  Goal: Absence of Hospital-Acquired Illness or Injury  Outcome: Ongoing, Progressing  Goal: Optimal Comfort and Wellbeing  Outcome: Ongoing, Progressing  Goal: Readiness for Transition of Care  Outcome: Ongoing, Progressing  Goal: Rounds/Family Conference  Outcome: Ongoing, Progressing     Problem: Skin Injury Risk Increased  Goal: Skin Health and Integrity  Outcome: Ongoing, Progressing     Problem: Mobility Impairment  Goal: Optimal Mobility  Outcome: Ongoing, Progressing     Problem: Pain Acute  Goal: Optimal Pain Control  Outcome: Ongoing, Progressing

## 2020-02-02 LAB
ANION GAP SERPL CALC-SCNC: 6 MMOL/L (ref 8–16)
BASOPHILS # BLD AUTO: 0.01 K/UL (ref 0–0.2)
BASOPHILS NFR BLD: 0.3 % (ref 0–1.9)
BUN SERPL-MCNC: 21 MG/DL (ref 8–23)
CALCIUM SERPL-MCNC: 8.5 MG/DL (ref 8.7–10.5)
CHLORIDE SERPL-SCNC: 84 MMOL/L (ref 95–110)
CO2 SERPL-SCNC: 45 MMOL/L (ref 23–29)
CREAT SERPL-MCNC: 1 MG/DL (ref 0.5–1.4)
DIFFERENTIAL METHOD: ABNORMAL
EOSINOPHIL # BLD AUTO: 0 K/UL (ref 0–0.5)
EOSINOPHIL NFR BLD: 0.3 % (ref 0–8)
ERYTHROCYTE [DISTWIDTH] IN BLOOD BY AUTOMATED COUNT: 15.3 % (ref 11.5–14.5)
EST. GFR  (AFRICAN AMERICAN): 59 ML/MIN/1.73 M^2
EST. GFR  (NON AFRICAN AMERICAN): 51 ML/MIN/1.73 M^2
GLUCOSE SERPL-MCNC: 103 MG/DL (ref 70–110)
HCT VFR BLD AUTO: 28.3 % (ref 37–48.5)
HGB BLD-MCNC: 8.5 G/DL (ref 12–16)
IMM GRANULOCYTES # BLD AUTO: 0.02 K/UL (ref 0–0.04)
IMM GRANULOCYTES NFR BLD AUTO: 0.5 % (ref 0–0.5)
LYMPHOCYTES # BLD AUTO: 1.6 K/UL (ref 1–4.8)
LYMPHOCYTES NFR BLD: 40.6 % (ref 18–48)
MCH RBC QN AUTO: 25.6 PG (ref 27–31)
MCHC RBC AUTO-ENTMCNC: 30 G/DL (ref 32–36)
MCV RBC AUTO: 85 FL (ref 82–98)
MONOCYTES # BLD AUTO: 0.7 K/UL (ref 0.3–1)
MONOCYTES NFR BLD: 17.2 % (ref 4–15)
NEUTROPHILS # BLD AUTO: 1.6 K/UL (ref 1.8–7.7)
NEUTROPHILS NFR BLD: 41.1 % (ref 38–73)
NRBC BLD-RTO: 0 /100 WBC
PLATELET # BLD AUTO: 143 K/UL (ref 150–350)
PMV BLD AUTO: 11.2 FL (ref 9.2–12.9)
POCT GLUCOSE: 113 MG/DL (ref 70–110)
POCT GLUCOSE: 141 MG/DL (ref 70–110)
POCT GLUCOSE: 177 MG/DL (ref 70–110)
POCT GLUCOSE: 191 MG/DL (ref 70–110)
POTASSIUM SERPL-SCNC: 3.6 MMOL/L (ref 3.5–5.1)
RBC # BLD AUTO: 3.32 M/UL (ref 4–5.4)
SODIUM SERPL-SCNC: 135 MMOL/L (ref 136–145)
WBC # BLD AUTO: 3.89 K/UL (ref 3.9–12.7)

## 2020-02-02 PROCEDURE — 94761 N-INVAS EAR/PLS OXIMETRY MLT: CPT

## 2020-02-02 PROCEDURE — 25000003 PHARM REV CODE 250: Performed by: HOSPITALIST

## 2020-02-02 PROCEDURE — 63600175 PHARM REV CODE 636 W HCPCS: Performed by: EMERGENCY MEDICINE

## 2020-02-02 PROCEDURE — 25000003 PHARM REV CODE 250: Performed by: EMERGENCY MEDICINE

## 2020-02-02 PROCEDURE — 11000001 HC ACUTE MED/SURG PRIVATE ROOM

## 2020-02-02 PROCEDURE — 80048 BASIC METABOLIC PNL TOTAL CA: CPT

## 2020-02-02 PROCEDURE — 85025 COMPLETE CBC W/AUTO DIFF WBC: CPT

## 2020-02-02 PROCEDURE — 94799 UNLISTED PULMONARY SVC/PX: CPT

## 2020-02-02 PROCEDURE — 27000221 HC OXYGEN, UP TO 24 HOURS

## 2020-02-02 PROCEDURE — 36415 COLL VENOUS BLD VENIPUNCTURE: CPT

## 2020-02-02 RX ORDER — FUROSEMIDE 10 MG/ML
40 INJECTION INTRAMUSCULAR; INTRAVENOUS DAILY
Status: DISCONTINUED | OUTPATIENT
Start: 2020-02-03 | End: 2020-02-03

## 2020-02-02 RX ADMIN — APIXABAN 5 MG: 5 TABLET, FILM COATED ORAL at 09:02

## 2020-02-02 RX ADMIN — POLYETHYLENE GLYCOL 3350 17 G: 17 POWDER, FOR SOLUTION ORAL at 09:02

## 2020-02-02 RX ADMIN — FUROSEMIDE 40 MG: 10 INJECTION, SOLUTION INTRAMUSCULAR; INTRAVENOUS at 09:02

## 2020-02-02 RX ADMIN — MEMANTINE HYDROCHLORIDE 5 MG: 5 TABLET ORAL at 09:02

## 2020-02-02 RX ADMIN — LEVOTHYROXINE SODIUM 137 MCG: 25 TABLET ORAL at 05:02

## 2020-02-02 RX ADMIN — MELATONIN 2000 UNITS: at 09:02

## 2020-02-02 RX ADMIN — GABAPENTIN 300 MG: 300 CAPSULE ORAL at 09:02

## 2020-02-02 RX ADMIN — DILTIAZEM HYDROCHLORIDE 180 MG: 180 CAPSULE, COATED, EXTENDED RELEASE ORAL at 09:02

## 2020-02-02 RX ADMIN — ESCITALOPRAM OXALATE 10 MG: 10 TABLET ORAL at 09:02

## 2020-02-02 RX ADMIN — FLUTICASONE PROPIONATE 50 MCG: 50 SPRAY, METERED NASAL at 09:02

## 2020-02-02 RX ADMIN — FUROSEMIDE 40 MG: 10 INJECTION, SOLUTION INTRAMUSCULAR; INTRAVENOUS at 05:02

## 2020-02-02 RX ADMIN — SIMVASTATIN 40 MG: 40 TABLET, FILM COATED ORAL at 09:02

## 2020-02-02 NOTE — NURSING
Pt aaox2, sat in recliner for most of the day.le catheter in place draining clear yellow urine. Bed alarm on. Bilateral offloading boots on.

## 2020-02-02 NOTE — PROGRESS NOTES
[facility] Hospital Medicine  Progress Note    Patient Name: Vashti Muñoz  MRN: [id number]  Patient Class: IP- Inpatient   Admission Date: 1/30/2020  Length of Stay: 3 days  Attending Physician: Anne Marie Kidd MD  Primary Care Provider: Alesha Baum MD        Subjective:     Principal Problem:Anasarca        HPI:  Vashti Muñoz is a 87 y.o. female that (in part)  has a past medical history of Acute renal failure, Allergy, Anemia of chronic disease, Anticoagulant long-term use, Anxiety, Arthritis, Atrial fibrillation, Cataract, Closed head injury, Clotting disorder, Depression, Diabetes mellitus, Encounter for blood transfusion, Fall, GERD (gastroesophageal reflux disease), Heart murmur, [place] (hard of hearing), Hypertension, Thyroid disease, and Uses roller walker.  has a past surgical history that includes Hysterectomy; Tonsillectomy; Total hip arthroplasty (Left, 2004); Joint replacement (Right); and Injection of facet joint (Bilateral, 4/5/2019). Presents to [facility] Emergency Department from her nursing home with report of acute on chronic edema with progressive worsening.  Involves bilateral lower extremities as well as upper extremities to the level of her hands and wrist.  Marked unintentional weight gain.  Has pain in all extremities due to the edema. Also complaining of palpitations.  Reports compliance with home medication regimen.  She has dementia.  Therefore the history is somewhat limited.  Four weeks ago she was admitted to the hospital and stayed for 8 days for treatment of heart failure and atrial fibrillation.  She was started on supplemental oxygen and Lasix at that time prior to discharge.  Patient saw her cardiologist 2 days ago in clinic for follow-up for CHF, atrial fibrillation, and aortic stenosis.  Dr. Kline increased Lasix from 20-40 mg twice a day for 1-2 weeks.    In the emergency department routine laboratory studies EKG, cardiac  enzymes, and chest x-ray was performed.  On physical exam patient had evidence of anasarca.  Hypoalbuminemia was noted as well as a history of heart failure.  She was given Lasix intravenously and albumin in the ED.  She is being admitted for acute on chronic heart failure, anasarca, and need for diuresis.    Hospital medicine has been asked to admit to inpatient for further evaluation and treatment.     Overview/Hospital Course:  Pt admitted with acute on chronic CHF and anasarca. Pt reports increased dose of lasix this week and now requiring IV diuresis. Pt poor historian - info taken from records.     2/1- UOP 5 liters - monitoring renal function     Interval History: pt has no new complaints     Review of Systems   Constitutional: Positive for fatigue.   HENT: Negative.    Respiratory: Positive for shortness of breath.    Genitourinary: Negative.    Musculoskeletal: Positive for gait problem.   Neurological: Positive for weakness.     Objective:     Vital Signs (Most Recent):  Temp: 97.8 °F (36.6 °C) (02/02/20 0824)  Pulse: 98 (02/02/20 0824)  Resp: 17 (02/02/20 0824)  BP: (!) 109/59 (02/02/20 0824)  SpO2: 95 % (02/02/20 0824) Vital Signs (24h Range):  Temp:  [97.8 °F (36.6 °C)-98.6 °F (37 °C)] 97.8 °F (36.6 °C)  Pulse:  [] 98  Resp:  [16-18] 17  SpO2:  [93 %-97 %] 95 %  BP: ()/(58-85) 109/59     Weight: 95.6 kg (210 lb 12.2 oz)  Body mass index is 36.18 kg/m².    Intake/Output Summary (Last 24 hours) at 2/2/2020 1058  Last data filed at 2/2/2020 0933  Gross per 24 hour   Intake 1560 ml   Output 2000 ml   Net -440 ml      Physical Exam   Constitutional: She is oriented to person, place, and time. She appears well-developed and well-nourished. No distress.   HENT:   Head: Normocephalic and atraumatic.   Eyes: Pupils are equal, round, and reactive to light. EOM are normal.   Neck: Normal range of motion. Neck supple.   Cardiovascular: Normal rate and regular rhythm.   Pulmonary/Chest: Effort normal. No  respiratory distress. She has no wheezes. She has rales.   Abdominal: Soft. Bowel sounds are normal. She exhibits no distension. There is no tenderness.   Musculoskeletal: She exhibits edema and deformity.   Neurological: She is alert and oriented to person, place, and time.   Skin: Skin is warm and dry. Capillary refill takes 2 to 3 seconds.       Significant Labs: All pertinent labs within the past 24 hours have been reviewed.    Significant Imaging: I have reviewed and interpreted all pertinent imaging results/findings within the past 24 hours.      Assessment/Plan:      * Anasarca    IV diuresis  Daily weights  Fluid restriction  Monitor renal function   Albumin 2.6- contributing to fluid retention     D/w cardiology - input while inpatient and for dc planning       Chronic heart failure with preserved ejection fraction  Pt presents with anasarca with concern from daughter that she needs more aggressive diuersis  Pt is not SOB  Followed by Dr. Kline - recent clinic visit 1/28  Lasix dose increased 20 to 40mg  Agree with plan for defined code status - d/w daughter again     ECHO: 1/1/20  Conclusion     · Normal left ventricular systolic function. The estimated ejection fraction is 70%  · Concentric left ventricular hypertrophy.  · Grade II (moderate) left ventricular diastolic dysfunction consistent with pseudonormalization.  · Normal right ventricular systolic function.  · Moderate-to-severe aortic valve stenosis.  · Aortic valve area is 0.64 cm2; peak velocity is 3.86 m/s; mean gradient is 35 mmHg.  · Mild mitral regurgitation.  · Mild tricuspid regurgitation.  · The estimated PA systolic pressure is 70 mm Hg  · Pulmonary hypertension present.           Dementia without behavioral disturbance  No acute issues   Resume namenda      Anemia of chronic disease  Pt has drop from one month ago hemoglobin 10  On eliquis   Monitor h/h  Transfuse accordingly  Check stool for occult blood     Mixed  hyperlipidemia  Resume statin       Essential hypertension  BP controlled   on diltiazem CD  Metoprolol 200mg BID - listed on home med list       Chronic anticoagulation  Resume eliquis, but mindful that there is drop in h/h        Acquired hypothyroidism  Resume synthroid       Chronic atrial fibrillation  Rate controlled on BB   eliquis             VTE Risk Mitigation (From admission, onward)         Ordered     apixaban tablet 5 mg  2 times daily      01/30/20 2354     IP VTE HIGH RISK PATIENT  Once      01/30/20 2354     Reason for No Pharmacological VTE Prophylaxis  Once     Question:  Reasons:  Answer:  Already adequately anticoagulated on oral Anticoagulants    01/30/20 9094                      Anne Marie Kidd MD  Department of Hospital Medicine   [facility]

## 2020-02-02 NOTE — SUBJECTIVE & OBJECTIVE
Interval History: pt has no new complaints     Review of Systems   Constitutional: Positive for fatigue.   HENT: Negative.    Respiratory: Positive for shortness of breath.    Genitourinary: Negative.    Musculoskeletal: Positive for gait problem.   Neurological: Positive for weakness.     Objective:     Vital Signs (Most Recent):  Temp: 97.8 °F (36.6 °C) (02/02/20 0824)  Pulse: 98 (02/02/20 0824)  Resp: 17 (02/02/20 0824)  BP: (!) 109/59 (02/02/20 0824)  SpO2: 95 % (02/02/20 0824) Vital Signs (24h Range):  Temp:  [97.8 °F (36.6 °C)-98.6 °F (37 °C)] 97.8 °F (36.6 °C)  Pulse:  [] 98  Resp:  [16-18] 17  SpO2:  [93 %-97 %] 95 %  BP: ()/(58-85) 109/59     Weight: 95.6 kg (210 lb 12.2 oz)  Body mass index is 36.18 kg/m².    Intake/Output Summary (Last 24 hours) at 2/2/2020 1058  Last data filed at 2/2/2020 0933  Gross per 24 hour   Intake 1560 ml   Output 2000 ml   Net -440 ml      Physical Exam   Constitutional: She is oriented to person, place, and time. She appears well-developed and well-nourished. No distress.   HENT:   Head: Normocephalic and atraumatic.   Eyes: Pupils are equal, round, and reactive to light. EOM are normal.   Neck: Normal range of motion. Neck supple.   Cardiovascular: Normal rate and regular rhythm.   Pulmonary/Chest: Effort normal. No respiratory distress. She has no wheezes. She has rales.   Abdominal: Soft. Bowel sounds are normal. She exhibits no distension. There is no tenderness.   Musculoskeletal: She exhibits edema and deformity.   Neurological: She is alert and oriented to person, place, and time.   Skin: Skin is warm and dry. Capillary refill takes 2 to 3 seconds.       Significant Labs: All pertinent labs within the past 24 hours have been reviewed.    Significant Imaging: I have reviewed and interpreted all pertinent imaging results/findings within the past 24 hours.

## 2020-02-03 LAB
ANION GAP SERPL CALC-SCNC: 10 MMOL/L (ref 8–16)
BASOPHILS # BLD AUTO: 0 K/UL (ref 0–0.2)
BASOPHILS NFR BLD: 0 % (ref 0–1.9)
BILIRUB UR QL STRIP: NEGATIVE
BUN SERPL-MCNC: 23 MG/DL (ref 8–23)
CALCIUM SERPL-MCNC: 8.9 MG/DL (ref 8.7–10.5)
CHLORIDE SERPL-SCNC: 83 MMOL/L (ref 95–110)
CLARITY UR: CLEAR
CO2 SERPL-SCNC: 42 MMOL/L (ref 23–29)
COLOR UR: ABNORMAL
CREAT SERPL-MCNC: 1.1 MG/DL (ref 0.5–1.4)
DIFFERENTIAL METHOD: ABNORMAL
EOSINOPHIL # BLD AUTO: 0 K/UL (ref 0–0.5)
EOSINOPHIL NFR BLD: 0 % (ref 0–8)
ERYTHROCYTE [DISTWIDTH] IN BLOOD BY AUTOMATED COUNT: 15.5 % (ref 11.5–14.5)
EST. GFR  (AFRICAN AMERICAN): 52 ML/MIN/1.73 M^2
EST. GFR  (NON AFRICAN AMERICAN): 45 ML/MIN/1.73 M^2
GLUCOSE SERPL-MCNC: 98 MG/DL (ref 70–110)
GLUCOSE UR QL STRIP: NEGATIVE
HCT VFR BLD AUTO: 33.5 % (ref 37–48.5)
HGB BLD-MCNC: 10.1 G/DL (ref 12–16)
HGB UR QL STRIP: NEGATIVE
IMM GRANULOCYTES # BLD AUTO: 0.02 K/UL (ref 0–0.04)
IMM GRANULOCYTES NFR BLD AUTO: 0.4 % (ref 0–0.5)
KETONES UR QL STRIP: NEGATIVE
LEUKOCYTE ESTERASE UR QL STRIP: ABNORMAL
LYMPHOCYTES # BLD AUTO: 2.4 K/UL (ref 1–4.8)
LYMPHOCYTES NFR BLD: 50.2 % (ref 18–48)
MCH RBC QN AUTO: 25.8 PG (ref 27–31)
MCHC RBC AUTO-ENTMCNC: 30.1 G/DL (ref 32–36)
MCV RBC AUTO: 86 FL (ref 82–98)
MICROSCOPIC COMMENT: ABNORMAL
MONOCYTES # BLD AUTO: 0.8 K/UL (ref 0.3–1)
MONOCYTES NFR BLD: 16 % (ref 4–15)
NEUTROPHILS # BLD AUTO: 1.6 K/UL (ref 1.8–7.7)
NEUTROPHILS NFR BLD: 33.4 % (ref 38–73)
NITRITE UR QL STRIP: NEGATIVE
NRBC BLD-RTO: 0 /100 WBC
PH UR STRIP: 8 [PH] (ref 5–8)
PLATELET # BLD AUTO: 137 K/UL (ref 150–350)
PMV BLD AUTO: 10.5 FL (ref 9.2–12.9)
POCT GLUCOSE: 114 MG/DL (ref 70–110)
POCT GLUCOSE: 156 MG/DL (ref 70–110)
POCT GLUCOSE: 186 MG/DL (ref 70–110)
POCT GLUCOSE: 207 MG/DL (ref 70–110)
POTASSIUM SERPL-SCNC: 3.7 MMOL/L (ref 3.5–5.1)
PROT UR QL STRIP: NEGATIVE
RBC # BLD AUTO: 3.92 M/UL (ref 4–5.4)
SODIUM SERPL-SCNC: 135 MMOL/L (ref 136–145)
SP GR UR STRIP: 1 (ref 1–1.03)
SQUAMOUS #/AREA URNS HPF: ABNORMAL /HPF
URN SPEC COLLECT METH UR: ABNORMAL
UROBILINOGEN UR STRIP-ACNC: NEGATIVE EU/DL
WBC # BLD AUTO: 4.7 K/UL (ref 3.9–12.7)
WBC #/AREA URNS HPF: 6 /HPF (ref 0–5)
YEAST URNS QL MICRO: ABNORMAL

## 2020-02-03 PROCEDURE — 80048 BASIC METABOLIC PNL TOTAL CA: CPT

## 2020-02-03 PROCEDURE — 36415 COLL VENOUS BLD VENIPUNCTURE: CPT

## 2020-02-03 PROCEDURE — 85025 COMPLETE CBC W/AUTO DIFF WBC: CPT

## 2020-02-03 PROCEDURE — 25000003 PHARM REV CODE 250: Performed by: HOSPITALIST

## 2020-02-03 PROCEDURE — 63600175 PHARM REV CODE 636 W HCPCS: Performed by: HOSPITALIST

## 2020-02-03 PROCEDURE — 99223 1ST HOSP IP/OBS HIGH 75: CPT | Mod: ,,, | Performed by: INTERNAL MEDICINE

## 2020-02-03 PROCEDURE — 11000001 HC ACUTE MED/SURG PRIVATE ROOM

## 2020-02-03 PROCEDURE — 25000003 PHARM REV CODE 250: Performed by: EMERGENCY MEDICINE

## 2020-02-03 PROCEDURE — 97530 THERAPEUTIC ACTIVITIES: CPT

## 2020-02-03 PROCEDURE — 81000 URINALYSIS NONAUTO W/SCOPE: CPT

## 2020-02-03 PROCEDURE — 97530 THERAPEUTIC ACTIVITIES: CPT | Mod: CQ

## 2020-02-03 PROCEDURE — 99223 PR INITIAL HOSPITAL CARE,LEVL III: ICD-10-PCS | Mod: ,,, | Performed by: INTERNAL MEDICINE

## 2020-02-03 RX ORDER — FUROSEMIDE 10 MG/ML
20 INJECTION INTRAMUSCULAR; INTRAVENOUS DAILY
Status: DISCONTINUED | OUTPATIENT
Start: 2020-02-04 | End: 2020-02-04

## 2020-02-03 RX ORDER — AMOXICILLIN AND CLAVULANATE POTASSIUM 250; 62.5 MG/5ML; MG/5ML
500 POWDER, FOR SUSPENSION ORAL EVERY 12 HOURS
Status: DISCONTINUED | OUTPATIENT
Start: 2020-02-03 | End: 2020-02-05

## 2020-02-03 RX ADMIN — GABAPENTIN 300 MG: 300 CAPSULE ORAL at 09:02

## 2020-02-03 RX ADMIN — APIXABAN 5 MG: 5 TABLET, FILM COATED ORAL at 09:02

## 2020-02-03 RX ADMIN — MEMANTINE HYDROCHLORIDE 5 MG: 5 TABLET ORAL at 08:02

## 2020-02-03 RX ADMIN — FUROSEMIDE 40 MG: 10 INJECTION, SOLUTION INTRAVENOUS at 08:02

## 2020-02-03 RX ADMIN — DILTIAZEM HYDROCHLORIDE 180 MG: 180 CAPSULE, COATED, EXTENDED RELEASE ORAL at 08:02

## 2020-02-03 RX ADMIN — MEMANTINE HYDROCHLORIDE 5 MG: 5 TABLET ORAL at 09:02

## 2020-02-03 RX ADMIN — MELATONIN 2000 UNITS: at 08:02

## 2020-02-03 RX ADMIN — APIXABAN 5 MG: 5 TABLET, FILM COATED ORAL at 08:02

## 2020-02-03 RX ADMIN — Medication 9 MG: at 09:02

## 2020-02-03 RX ADMIN — FLUTICASONE PROPIONATE 50 MCG: 50 SPRAY, METERED NASAL at 08:02

## 2020-02-03 RX ADMIN — LEVOTHYROXINE SODIUM 137 MCG: 25 TABLET ORAL at 06:02

## 2020-02-03 RX ADMIN — INSULIN ASPART 1 UNITS: 100 INJECTION, SOLUTION INTRAVENOUS; SUBCUTANEOUS at 08:02

## 2020-02-03 RX ADMIN — POLYETHYLENE GLYCOL 3350 17 G: 17 POWDER, FOR SOLUTION ORAL at 08:02

## 2020-02-03 RX ADMIN — METOPROLOL TARTRATE 200 MG: 50 TABLET, FILM COATED ORAL at 08:02

## 2020-02-03 RX ADMIN — AMOXICILLIN AND CLAVULANATE POTASSIUM 500 MG: 250; 62.5 POWDER, FOR SUSPENSION ORAL at 10:02

## 2020-02-03 RX ADMIN — ACETAMINOPHEN 650 MG: 325 TABLET ORAL at 04:02

## 2020-02-03 RX ADMIN — ESCITALOPRAM OXALATE 10 MG: 10 TABLET ORAL at 08:02

## 2020-02-03 RX ADMIN — GABAPENTIN 300 MG: 300 CAPSULE ORAL at 08:02

## 2020-02-03 RX ADMIN — SIMVASTATIN 40 MG: 40 TABLET, FILM COATED ORAL at 09:02

## 2020-02-03 NOTE — PT/OT/SLP PROGRESS
"Occupational Therapy   Treatment    Name: Vashti Muñoz  MRN: [id number]  Admitting Diagnosis:  Anasarca       Recommendations:     Discharge Recommendations: (return to SNF)  Discharge Equipment Recommendations:  none  Barriers to discharge:  None    Assessment:     Vashti Muñoz is a 87 y.o. female with a medical diagnosis of Anasarca. Performance deficits affecting function are weakness, impaired self care skills, impaired balance, decreased coordination, decreased safety awareness, decreased ROM, impaired skin, impaired endurance, impaired functional mobilty, decreased upper extremity function, pain, edema, gait instability, impaired cognition, decreased lower extremity function, impaired fine motor, impaired cardiopulmonary response to activity.     Pt with limited participation and engagement today compared to previous session- daughter commented on the same observation today compared to yesterday. Pt attempted a couple ADLs, but stopped with fatigue and difficulty following one-step commands. NurseDonna, notified.     Rehab Prognosis:  Fair; patient would benefit from acute skilled OT services to address these deficits and reach maximum level of function.       Plan:     Patient to be seen 5 x/week to address the above listed problems via self-care/home management, therapeutic activities, therapeutic exercises  · Plan of Care Expires: 02/14/20  · Plan of Care Reviewed with: patient, daughter    Subjective     Chief complaint: "I'm just tired"  Patient's/family comments/goals: daughter commented that she had more energy and conversing yesterday     Pain/Comfort:  · Pain Rating 1: 0/10    Objective:     Communicated with: Donna richards, prior to session.  Patient found up in chair with oxygen, telemetry, le catheter upon OT entry to room.    General Precautions: Standard, fall, hearing impaired, respiratory   Orthopedic Precautions:N/A   Braces: N/A     Occupational Performance:     Bed Mobility:    · NT; pt " found/left in the chair     Functional Mobility/Transfers:  · Functional Mobility: NT; pt presented with increased fatigue with minimal exertion; functional mobility not appropriate at this time     Activities of Daily Living:  · Grooming: OT offered warm wash cloth to wash her face- pt did half her face then stopped and looked fatigued; pt combed 1/3 of her hair then stopped with same c/o. OT assisted with the rest of her hair         [facility] 6 Click ADL: 12    Treatment & Education:  · Pt re-educated on OT role/POC. Daughter present and educated on role of OT and update on her progress with OT   · Time taken to try to attempt ROM and ADLs with little to no participation and difficulty following one-step commands. Pt was not appropriate for further OT and nurse notified.   · OT showed dtr the handout I provided at [facility] on UE AROM and edu her that if pt is more alert and engaging later, pt can complete this; OT demo'd each to dtr. Dtr also edu on PROM and elevating LUE d/t increased edema compared to when OT observed on 02/01/20  · All questions/concerns answered within OT scope of practice       Patient left up in chair with B/L LE and LUE elevated with all lines intact, call button in reach, nurse, Donna, notified and daughter presentEducation:      GOALS:   Multidisciplinary Problems     Occupational Therapy Goals        Problem: Occupational Therapy Goal    Goal Priority Disciplines Outcome Interventions   Occupational Therapy Goal     OT, PT/OT Ongoing, Progressing    Description:  Goals to be met by: 02/14/20    Patient will increase functional independence with ADLs by performing:    Feeding with Set-up Assistance.  UE Dressing with Set-up Assistance.  Grooming while seated with Set-up Assistance.  Toileting from bedside commode with Contact Guard Assistance for hygiene and clothing management.   Rolling to Bilateral with Stand-by Assistance.   Supine to sit with Stand-by Assistance.  Step transfer with Stand-by  Assistance  Toilet transfer to bedside commode with Stand-by Assistance.  Upper extremity exercise program x15 reps per handout, with assistance as needed.                      Time Tracking:     OT Date of Treatment: 02/03/20  OT Start Time: 1456  OT Stop Time: 1513  OT Total Time (min): 17 min    Billable Minutes:Therapeutic Activity 17 min    Tonie Mandujano OT  2/3/2020

## 2020-02-03 NOTE — CONSULTS
Palliative consult received from Dr Kline.  Discussed with Dr Kline. Also discussed with Dr Kidd. The code status evidently had been unclear and daughter has been waxing and waning.   Patient has a LaPOST in [employer] with DNR. It is signed by daughter Jackelyn 9/2019 but no MD signature. Patient is now a DNR per discussion with Dr Kline    Patient just finishing with therapy when I arrived to room. Therapist states patient did not want to do much today and c/o being tired. Patient is SOB at rest with oxygen on. She reports she is tired. Patient's sister at bedside. I will contact daughter to discuss GOC.   Nurse reports daughter was here but may have left after discussion with Dr Kline      Full consult to follow

## 2020-02-03 NOTE — PLAN OF CARE
Problem: Occupational Therapy Goal  Goal: Occupational Therapy Goal  Description  Goals to be met by: 02/14/20    Patient will increase functional independence with ADLs by performing:    Feeding with Set-up Assistance.  UE Dressing with Set-up Assistance.  Grooming while seated with Set-up Assistance.  Toileting from bedside commode with Contact Guard Assistance for hygiene and clothing management.   Rolling to Bilateral with Stand-by Assistance.   Supine to sit with Stand-by Assistance.  Step transfer with Stand-by Assistance  Toilet transfer to bedside commode with Stand-by Assistance.  Upper extremity exercise program x15 reps per handout, with assistance as needed.     Outcome: Ongoing, Progressing  Pt with limited participation and engagement today compared to previous session- daughter commented on the same observation today compared to yesterday. Pt attempted a couple ADLs, but stopped with fatigue and difficulty following one-step commands. Nurse, Donna, notified.

## 2020-02-03 NOTE — ASSESSMENT & PLAN NOTE
R sided CHF  Much improved vs admission with >9L UOP  Contraction alkalosis noted  Suggest backing off on IV lasix +/- diamox prn  Discussed outpat plan with daughter at bedside: once stable lasix dose achieved, will plan monitoring of weight and inc PO lasix for >2 lbs/day or 5 lbs/week.

## 2020-02-03 NOTE — PROGRESS NOTES
[facility] Hospital Medicine  Progress Note    Patient Name: Vashti Muñoz  MRN: [id number]  Patient Class: IP- Inpatient   Admission Date: 1/30/2020  Length of Stay: 3 days  Attending Physician: Anne Marie Kidd MD  Primary Care Provider: Alesha Baum MD        Subjective:     Principal Problem:Anasarca        HPI:  Vashti Muñoz is a 87 y.o. female that (in part)  has a past medical history of Acute renal failure, Allergy, Anemia of chronic disease, Anticoagulant long-term use, Anxiety, Arthritis, Atrial fibrillation, Cataract, Closed head injury, Clotting disorder, Depression, Diabetes mellitus, Encounter for blood transfusion, Fall, GERD (gastroesophageal reflux disease), Heart murmur, [place] (hard of hearing), Hypertension, Thyroid disease, and Uses roller walker.  has a past surgical history that includes Hysterectomy; Tonsillectomy; Total hip arthroplasty (Left, 2004); Joint replacement (Right); and Injection of facet joint (Bilateral, 4/5/2019). Presents to [facility] Emergency Department from her nursing home with report of acute on chronic edema with progressive worsening.  Involves bilateral lower extremities as well as upper extremities to the level of her hands and wrist.  Marked unintentional weight gain.  Has pain in all extremities due to the edema. Also complaining of palpitations.  Reports compliance with home medication regimen.  She has dementia.  Therefore the history is somewhat limited.  Four weeks ago she was admitted to the hospital and stayed for 8 days for treatment of heart failure and atrial fibrillation.  She was started on supplemental oxygen and Lasix at that time prior to discharge.  Patient saw her cardiologist 2 days ago in clinic for follow-up for CHF, atrial fibrillation, and aortic stenosis.  Dr. Kline increased Lasix from 20-40 mg twice a day for 1-2 weeks.    In the emergency department routine laboratory studies EKG, cardiac  enzymes, and chest x-ray was performed.  On physical exam patient had evidence of anasarca.  Hypoalbuminemia was noted as well as a history of heart failure.  She was given Lasix intravenously and albumin in the ED.  She is being admitted for acute on chronic heart failure, anasarca, and need for diuresis.    Hospital medicine has been asked to admit to inpatient for further evaluation and treatment.     Overview/Hospital Course:  Pt admitted with acute on chronic CHF and anasarca. Pt reports increased dose of lasix this week and now requiring IV diuresis. Pt poor historian - info taken from records.     2/1- UOP 5 liters - monitoring renal function   2/2- 9 liters UOP, daughter at bedside, discussed concern that serum CO2 increasing and causing contraction alkalosis , cardiology consulted     Interval History: pt has no new complaints     Review of Systems   Constitutional: Positive for fatigue.   HENT: Negative.    Respiratory: Positive for shortness of breath.    Genitourinary: Negative.    Musculoskeletal: Positive for gait problem.   Neurological: Positive for weakness.     Objective:     Vital Signs (Most Recent):  Temp: 98.3 °F (36.8 °C) (02/02/20 1737)  Pulse: 90 (02/02/20 1737)  Resp: 18 (02/02/20 1737)  BP: (!) 99/57 (02/02/20 1737)  SpO2: (!) 94 % (02/02/20 1737) Vital Signs (24h Range):  Temp:  [97.6 °F (36.4 °C)-98.6 °F (37 °C)] 98.3 °F (36.8 °C)  Pulse:  [] 90  Resp:  [17-18] 18  SpO2:  [93 %-98 %] 94 %  BP: ()/(52-85) 99/57     Weight: 95.6 kg (210 lb 12.2 oz)  Body mass index is 36.18 kg/m².    Intake/Output Summary (Last 24 hours) at 2/2/2020 1902  Last data filed at 2/2/2020 1800  Gross per 24 hour   Intake 1680 ml   Output 2050 ml   Net -370 ml      Physical Exam   Constitutional: She is oriented to person, place, and time. She appears well-developed and well-nourished. No distress.   HENT:   Head: Normocephalic and atraumatic.   Eyes: Pupils are equal, round, and reactive to light.  EOM are normal.   Neck: Normal range of motion. Neck supple.   Cardiovascular: Normal rate and regular rhythm.   Pulmonary/Chest: Effort normal. No respiratory distress. She has no wheezes. She has rales.   Abdominal: Soft. Bowel sounds are normal. She exhibits no distension. There is no tenderness.   Musculoskeletal: She exhibits edema and deformity.   Neurological: She is alert and oriented to person, place, and time.   Skin: Skin is warm and dry. Capillary refill takes 2 to 3 seconds.       Significant Labs:   BMP:   Recent Labs   Lab 02/02/20  0433      *   K 3.6   CL 84*   CO2 45*   BUN 21   CREATININE 1.0   CALCIUM 8.5*       Significant Imaging: I have reviewed and interpreted all pertinent imaging results/findings within the past 24 hours.      Assessment/Plan:      * Anasarca    IV diuresis  Daily weights  Fluid restriction  Monitor renal function   Albumin 2.6- contributing to fluid retention     D/w cardiology - input while inpatient and for dc planning       Chronic heart failure with preserved ejection fraction  Pt presents with anasarca with concern from daughter that she needs more aggressive diuersis  Pt is not SOB  Followed by Dr. Kline - recent clinic visit 1/28  Lasix dose increased 20 to 40mg  Agree with plan for defined code status - d/w daughter again     ECHO: 1/1/20  Conclusion     · Normal left ventricular systolic function. The estimated ejection fraction is 70%  · Concentric left ventricular hypertrophy.  · Grade II (moderate) left ventricular diastolic dysfunction consistent with pseudonormalization.  · Normal right ventricular systolic function.  · Moderate-to-severe aortic valve stenosis.  · Aortic valve area is 0.64 cm2; peak velocity is 3.86 m/s; mean gradient is 35 mmHg.  · Mild mitral regurgitation.  · Mild tricuspid regurgitation.  · The estimated PA systolic pressure is 70 mm Hg  · Pulmonary hypertension present.           Dementia without behavioral disturbance  No  acute issues   Resume namenda      Anemia of chronic disease  Pt has drop from one month ago hemoglobin 10  On eliquis   Monitor h/h  Transfuse accordingly  Check stool for occult blood     Mixed hyperlipidemia  Resume statin       Essential hypertension  BP controlled   on diltiazem CD  Metoprolol 200mg BID - listed on home med list       Chronic anticoagulation  Resume eliquis, but mindful that there is drop in h/h        Acquired hypothyroidism  Resume synthroid       Chronic atrial fibrillation  Rate controlled on BB   eliquis             VTE Risk Mitigation (From admission, onward)         Ordered     apixaban tablet 5 mg  2 times daily      01/30/20 2354     IP VTE HIGH RISK PATIENT  Once      01/30/20 2354     Reason for No Pharmacological VTE Prophylaxis  Once     Question:  Reasons:  Answer:  Already adequately anticoagulated on oral Anticoagulants    01/30/20 2354                      Anne Marie Kidd MD  Department of Hospital Medicine   [facility]

## 2020-02-03 NOTE — NURSING
Patient is A&Ox4 with peripheral IV in R arm, and makes needs known. Patient remained free from falls during this shift, and has gotten up to the BSC with 2 assist, and has also been up in the chair most of the day.  Patient O2 via nasal cannula at 1L per minute. VSS.  No acute distress noted at this time. Patient is still up in the chair, and would like to stay there a little while longer. Call bell within reach. Family is at the bedside.

## 2020-02-03 NOTE — PLAN OF CARE
Problem: Physical Therapy Goal  Goal: Physical Therapy Goal  Description  Goals to be met by: 2020     Patient will increase functional independence with mobility by performin. Sit to stand transfer with Stand-by Assistance  2. Gait  x 25-50 feet with Stand-by Assistance using Rolling Walker.   3. Perform B LE ther ex per handout x 10 reps with assistance as needed   Outcome: Ongoing, Progressing   Pt ambulated ~4-5 steps with RW, CGA .

## 2020-02-03 NOTE — ASSESSMENT & PLAN NOTE
As above per anasarca section  No plan for further cardiac testing  Discussed DNR with pt/daughter, they are in agreement  Also asked for palliative care consult

## 2020-02-03 NOTE — CONSULTS
[facility]  Cardiology  Consult Note    Patient Name: Vashti Muñoz  MRN: [id number]  Admission Date: 1/30/2020  Hospital Length of Stay: 4 days  Code Status: DNR   Attending Provider: Anne Marie Kidd MD   Consulting Provider: Ede Kline MD  Primary Care Physician: Alesha Baum MD  Principal Problem:Anasarca    Patient information was obtained from patient, relative(s) and ER records.     Inpatient consult to Cardiology  Consult performed by: Ede Kline MD  Consult ordered by: Anne Marie Kidd MD  Reason for consult: AS, HFpEF, R sided CHF, chr AF        Subjective:     Chief Complaint:  anasarca     HPI:   87 y.o. female that (in part)  has a past medical history of Acute renal failure, Allergy, Anemia of chronic disease, Anticoagulant long-term use, Anxiety, Arthritis, Atrial fibrillation, Cataract, Closed head injury, Clotting disorder, Depression, Diabetes mellitus, Encounter for blood transfusion, Fall, GERD (gastroesophageal reflux disease), Heart murmur, [place] (hard of hearing), Hypertension, Thyroid disease, and Uses roller walker.  has a past surgical history that includes Hysterectomy; Tonsillectomy; Total hip arthroplasty (Left, 2004); Joint replacement (Right); and Injection of facet joint (Bilateral, 4/5/2019). Presents to [facility] Emergency Department from her nursing home with report of acute on chronic edema with progressive worsening.  Involves bilateral lower extremities as well as upper extremities to the level of her hands and wrist.  Marked unintentional weight gain.  Has pain in all extremities due to the edema. Also complaining of palpitations.  Reports compliance with home medication regimen.  She has dementia.  Therefore the history is somewhat limited.  Four weeks ago she was admitted to the hospital and stayed for 8 days for treatment of heart failure and atrial fibrillation.  She was started on supplemental oxygen and Lasix at  that time prior to discharge.  Patient saw her cardiologist 2 days ago in clinic for follow-up for CHF, atrial fibrillation, and aortic stenosis.  Dr. Kline increased Lasix from 20-40 mg twice a day for 1-2 weeks.  In the emergency department routine laboratory studies EKG, cardiac enzymes, and chest x-ray was performed.  On physical exam patient had evidence of anasarca.  Hypoalbuminemia was noted as well as a history of heart failure.  She was given Lasix intravenously and albumin in the ED.  She is being admitted for acute on chronic heart failure, anasarca, and need for diuresis.  Overview/Hospital Course:  Pt admitted with acute on chronic CHF and anasarca. Pt reports increased dose of lasix this week and now requiring IV diuresis. Pt poor historian - info taken from records.   2/1- UOP 5 liters - monitoring renal function   2/2- 9 liters UOP, daughter at bedside, discussed concern that serum CO2 increasing and causing contraction alkalosis , cardiology consulted     Pt follows with me, last seen 1/28/20 (see OV note below).    Patient is seen in the presence of her daughter who is her power-of-.  She presented with anasarca and right sided heart failure.  I had seen her several days prior to this admission in an attempt to avoid this by increasing her Lasix.  Over the past several days, she has diuresed 9 L.  She has developed a contraction alkalosis.  She denies any angina, dyspnea, palpitations, or syncope.  Her edema is much improved.  I have discussed further diagnostic testing verses plans for medical therapy.  They are in agreement with the latter.  They have also agreed to a DNR status.  They have also asked to have palliative care stop by and I will put in this consult.  Case has been discussed with Dr. Kidd.      Raisa OV 1/28/20:  PATIENT IN PACE PROGRAM  The patient returns for follow-up accompanied by her daughter.  She was seen at [facility] earlier in January 2020 with  bradycardia in relation to ARF/hyperkalemia which required temporary wire.  The bradycardia resolved with normalization of her potassium.  At present, the patient's main complaint appears to be related to swelling, as both of her arms and legs appear to be swollen.  The patient's daughter seems to be somewhat frustrated with the PACE program in that it took several weeks before the patient was placed on Lasix.  I suggested that they discuss this with the nursing home as well as with the PACE program providers.  The patient otherwise denies angina, dyspnea, palpitations, or syncope.  There has been no PND, melena, or hematuria.  She is currently in a wheelchair and denies any claudicant symptoms.  The patient's daughter is asking if the patient can travel.  While there is no cardiac contraindication to travel, I would imagine this would be difficult given the patient's wheelchair-bound status.  We also discussed the patient's DNR, and is not entirely clear that the family wants a full DNR in that they would be willing to have mechanical ventilation if this was going to be short-lived.  I suggested that they discuss this to come to a decision as to the patient DNR status.  Medication list per [facility] dated 01/28/2020  Tylenol p.r.n.  Apixaban 5 mg b.i.d.  Biofreeze gel b.i.d. to lower back  Cholecalciferol 2000 IU daily  Cipro 5 mg b.i.d. for 10 days  Diltiazem 180 mg q.a.m.  Colace  Celexa  Fluticasone  Lasix 20 mg daily  Neurontin 300 mg daily  Synthroid 137 mcg daily  Lidocaine cream to lower back  Lisinopril 40 mg daily  Lorazepam  Melatonin  Namenda  Metoprolol tartrate 200 mg twice daily  Multi vitamin  Preparation H  Simvastatin 40 mg q.h.s.  CARDIOVASCULAR HISTORY:   AF, chronic on eliquis  AS, mod-sev (echo 1/2020)  ASSESSMENT:   # AF, chronic, on eliquis, HR controlled, asymptomatic.  Hx of bradycardia (requiring temp wire) d/t elev Creat/K+ in 1/2020.  # AS, mod-sev (echo 1/2020)  #  HTN, uncontrolled  # vol overload/edema on exam  # DM  # HLP on simva 40mg  # BMI 38, up 2 unit(s) vs last OV  # DNR.  Not clear what family's wishes are in regards to mech ventilation.  I have encouraged the family to discuss amongst themselves about what their wishes are.  I have also suggested the daughter present at today's visit be PoA (as opposed to daughter who lives in [place]).     PLAN:   Cont med rx  Cont eliquis 5mg bid  Increase lasix from 20mg qd to 40mg bid for 1-2 weeks.   Check BMP 2 weeks  PCP/PACE provider to reassess swelling and determine if lasix dose can be decreased.  Can titrate dilt as next intervention for HTN  RTC 3 months, sooner if swelling/HTN persists      Past Medical History:   Diagnosis Date    Acute renal failure 1/1/2020    Allergy     Anemia of chronic disease 1/1/2020    Anticoagulant long-term use     Coumadin    Anxiety     Arthritis     Atrial fibrillation     Cataract     Closed head injury 09/10/2018    Clotting disorder     Depression     Diabetes mellitus     Encounter for blood transfusion     Fall 09/10/2018    GERD (gastroesophageal reflux disease)     Heart murmur     [place] (hard of hearing)     wears bilateral hearing aids    Hypertension     Thyroid disease     Uses roller walker        Past Surgical History:   Procedure Laterality Date    HYSTERECTOMY      INJECTION OF FACET JOINT Bilateral 4/5/2019    Procedure: INJECTION, FACET JOINT, L4-L5;  Surgeon: Clayton Valle MD;  [facility];  Service: Pain Management;  Laterality: Bilateral;    JOINT REPLACEMENT Right     Hip    TONSILLECTOMY      TOTAL HIP ARTHROPLASTY Left 2004       Review of patient's allergies indicates:   Allergen Reactions    Sulfa (sulfonamide antibiotics) Hives       No current facility-administered medications on file prior to encounter.      Current Outpatient Medications on File Prior to Encounter   Medication Sig    acetaminophen (TYLENOL) 500 MG  tablet Take 500 mg by mouth every 6 (six) hours as needed for Pain.    apixaban (ELIQUIS) 5 mg Tab Take 1 tablet (5 mg total) by mouth 2 (two) times daily.    diltiaZEM (CARDIZEM CD) 180 MG 24 hr capsule Take 1 capsule (180 mg total) by mouth once daily.    escitalopram oxalate (LEXAPRO) 10 MG tablet Take 1 tablet (10 mg total) by mouth once daily.    fluticasone (FLONASE) 50 mcg/actuation nasal spray 1 spray by Each Nare route once daily.    furosemide (LASIX) 20 MG tablet Take 20 mg by mouth 2 (two) times daily.    gabapentin (NEURONTIN) 300 MG capsule Take 300 mg by mouth 2 (two) times daily.    levothyroxine 137 mcg Cap Take 1 tablet by mouth once daily.    LORazepam (ATIVAN) 0.5 MG tablet Take 0.5 mg by mouth 2 (two) times daily. Take half in the am and a whole at bedtime    memantine (NAMENDA) 5 MG Tab Take 5 mg by mouth 2 (two) times daily.    menthol/camphor (BIOFREEZE-ILEX TOP) Apply topically.    metoprolol tartrate (LOPRESSOR) 100 MG tablet Take 2 tablets (200 mg total) by mouth 2 (two) times daily.    simvastatin (ZOCOR) 40 MG tablet Take 40 mg by mouth every evening.    vitamin D (VITAMIN D3) 1000 units Tab Take 2,000 Units by mouth once daily.     Family History     Problem Relation (Age of Onset)    Diabetes Father, Sister, Brother    No Known Problems Mother    Stroke Father        Tobacco Use    Smoking status: Former Smoker     Last attempt to quit: 1988     Years since quittin.6    Smokeless tobacco: Never Used   Substance and Sexual Activity    Alcohol use: Not Currently     Alcohol/week: 3.0 - 4.0 standard drinks     Types: 1 Glasses of wine, 2 - 3 Standard drinks or equivalent per week    Drug use: No    Sexual activity: Not on file     Review of Systems   Constitution: Negative for chills, diaphoresis, fever and malaise/fatigue.   HENT: Negative for nosebleeds.    Eyes: Negative for blurred vision and double vision.   Cardiovascular: Positive for leg swelling.  Negative for chest pain, claudication, cyanosis, dyspnea on exertion, orthopnea, palpitations, paroxysmal nocturnal dyspnea and syncope.   Respiratory: Negative for cough, shortness of breath and wheezing.    Skin: Negative for dry skin and poor wound healing.   Musculoskeletal: Negative for back pain, joint swelling and myalgias.   Gastrointestinal: Negative for abdominal pain, nausea and vomiting.   Genitourinary: Negative for hematuria.   Neurological: Negative for dizziness, headaches, numbness, seizures and weakness.   Psychiatric/Behavioral: Negative for altered mental status and depression.     Objective:     Vital Signs (Most Recent):  Temp: 98 °F (36.7 °C) (02/03/20 0718)  Pulse: (!) 120 (02/03/20 0718)  Resp: 18 (02/03/20 0718)  BP: 107/73 (02/03/20 0718)  SpO2: (!) 94 % (02/03/20 0718) Vital Signs (24h Range):  Temp:  [97.6 °F (36.4 °C)-98.4 °F (36.9 °C)] 98 °F (36.7 °C)  Pulse:  [] 120  Resp:  [17-20] 18  SpO2:  [89 %-98 %] 94 %  BP: ()/(52-82) 107/73     Weight: 95.6 kg (210 lb 12.2 oz)  Body mass index is 36.18 kg/m².    SpO2: (!) 94 %  O2 Device (Oxygen Therapy): room air      Intake/Output Summary (Last 24 hours) at 2/3/2020 1037  Last data filed at 2/3/2020 0854  Gross per 24 hour   Intake 1080 ml   Output 3650 ml   Net -2570 ml       Lines/Drains/Airways     Drain                 Urethral Catheter 01/30/20 1843 Straight-tip 16 Fr. 3 days          Peripheral Intravenous Line                 Peripheral IV - Single Lumen 01/30/20 1725 20 G Right Antecubital 3 days                Physical Exam   Constitutional: She is oriented to person, place, and time. She appears well-developed and well-nourished. No distress.   HENT:   Head: Normocephalic and atraumatic.   Mouth/Throat: No oropharyngeal exudate.   Eyes: Pupils are equal, round, and reactive to light. Conjunctivae and EOM are normal. No scleral icterus.   Neck: Normal range of motion. Neck supple. No JVD present. No tracheal deviation  present. No thyromegaly present.   Cardiovascular: Normal rate, S1 normal and S2 normal. An irregularly irregular rhythm present. Exam reveals distant heart sounds. Exam reveals no gallop and no friction rub.   Murmur heard.   Systolic murmur is present with a grade of 2/6.  Pulmonary/Chest: Effort normal and breath sounds normal. No respiratory distress. She has no wheezes. She has no rales. She exhibits no tenderness.   Abdominal: Soft. She exhibits no distension.   Musculoskeletal: Normal range of motion. She exhibits edema.   Neurological: She is alert and oriented to person, place, and time. No cranial nerve deficit.   Skin: Skin is warm and dry. She is not diaphoretic.   Psychiatric: She has a normal mood and affect. Her behavior is normal. Judgment normal.       Current Medications:   apixaban  5 mg Oral BID    diltiaZEM  180 mg Oral Daily    escitalopram oxalate  10 mg Oral Daily    fluticasone propionate  1 spray Each Nostril Daily    furosemide  40 mg Intravenous Daily    gabapentin  300 mg Oral BID    levothyroxine  137 mcg Oral Before breakfast    memantine  5 mg Oral BID    metoprolol tartrate  200 mg Oral BID    polyethylene glycol  17 g Oral Daily    simvastatin  40 mg Oral QHS    vitamin D  2,000 Units Oral Daily       acetaminophen, dextrose 50%, dextrose 50%, glucagon (human recombinant), glucose, glucose, influenza, insulin aspart U-100, LORazepam, melatonin, ondansetron, pneumoc 13-ravinder conj-dip cr(PF), promethazine (PHENERGAN) IVPB, senna-docusate 8.6-50 mg, sodium chloride 0.9%    Laboratory (all labs reviewed):  CBC:  Recent Labs   Lab 01/30/20  1727 01/31/20  0450 02/01/20  0404 02/02/20  0433 02/03/20  0421   WBC 5.55 3.75 L 3.68 L 3.89 L 4.70   Hemoglobin 9.3 L 8.8 L 8.1 L 8.5 L 10.1 L   Hematocrit 31.2 L 29.6 L 27.2 L 28.3 L 33.5 L   Platelets 186 146 L 134 L 143 L 137 L       CHEMISTRIES:  Recent Labs   Lab 01/05/20  0403 01/06/20  0313 01/07/20  0306 01/08/20  0542  01/30/20  1727 01/31/20  0450 02/01/20  0404 02/02/20  0433 02/03/20  0421   Glucose 127 H 123 H 108 111 H 117 H 101 101 103 98   Sodium 134 L 136 134 L 134 L 134 L 135 L 136 135 L 135 L   Potassium 3.6 3.9 3.6 4.1 4.3 4.0 3.8 3.6 3.7   BUN, Bld 17 17 15 13 22 20 22 21 23   Creatinine 0.9 0.8 0.8 0.8 1.0 0.8 0.9 1.0 1.1   eGFR if African American >60 >60 >60 >60 59 A >60 >60 59 A 52 A   eGFR if non  58 A >60 >60 >60 51 A >60 58 A 51 A 45 A   Calcium 8.4 L 8.2 L 8.4 L 8.3 L 8.6 L 8.2 L 8.3 L 8.5 L 8.9   Magnesium 1.2 L 1.5 L 1.3 L 1.4 L  --  1.4 L  --   --   --        CARDIAC BIOMARKERS:  Recent Labs   Lab 09/11/18  1658 10/11/19  0927 12/31/19 2142 01/01/20  0051 01/04/20  0417 01/30/20  1727   CPK  --   --   --   --  60  --    Troponin I 0.018 0.012 0.008 0.038 H  --  0.009       COAGS:  Recent Labs   Lab 10/11/19  0927 01/01/20  0051 01/02/20  0402 01/03/20  0503 01/04/20  0418   INR 1.0 1.4 H 1.3 H 1.1 1.1       LIPIDS/LFTS:  Recent Labs   Lab 01/05/20  0403 01/06/20  0313 01/07/20  0306 01/08/20  0542 01/30/20  1727   AST 59 H 37 25 26 86 H    H 272 H 193 H 142 H 59 H       BNP:  Recent Labs   Lab 09/11/18  0932 10/11/19  0927 12/31/19  2142 01/30/20  1727    H 377 H 439 H 270 H       TSH:  Recent Labs   Lab 07/05/18  1429 09/11/18  0932 01/03/20  0503 01/30/20  1727   TSH 4.690 H 0.452 4.633 H 5.231 H       Free T4:  Recent Labs   Lab 01/03/20  0503 01/30/20  1727   Free T4 0.90 1.26       Diagnostic Results:  ECG (personally reviewed and interpreted tracing(s)):  1/30/20 1731 AF 85, PRWP, similar to 1/3/20    Chest X-Ray (personally reviewed and interpreted image(s)): 1/30/20 mild CHF/CMeg    Echo: 1/1/20  · Normal left ventricular systolic function. The estimated ejection fraction is 70%  · Concentric left ventricular hypertrophy.  · Grade II (moderate) left ventricular diastolic dysfunction consistent with pseudonormalization.  · Normal right ventricular systolic  function.  · Moderate-to-severe aortic valve stenosis.  · Aortic valve area is 0.64 cm2; peak velocity is 3.86 m/s; mean gradient is 35 mmHg.  · Mild mitral regurgitation.  · Mild tricuspid regurgitation.  · The estimated PA systolic pressure is 70 mm Hg  · Pulmonary hypertension present.        Assessment and Plan:     * Anasarca  R sided CHF  Much improved vs admission with >9L UOP  Contraction alkalosis noted  Suggest backing off on IV lasix +/- diamox prn  Discussed outpat plan with daughter at bedside: once stable lasix dose achieved, will plan monitoring of weight and inc PO lasix for >2 lbs/day or 5 lbs/week.    Chronic heart failure with preserved ejection fraction  As above per anasarca section  No plan for further cardiac testing  Discussed DNR with pt/daughter, they are in agreement  Also asked for palliative care consult    Chronic anticoagulation  As above    Chronic atrial fibrillation  Cont rate control/OAC    Nonrheumatic aortic valve stenosis  Per echo 1/2020  No plan for valve intervention    Essential hypertension  Cont med rx    Dementia without behavioral disturbance  Per IM  Now DNR        VTE Risk Mitigation (From admission, onward)         Ordered     apixaban tablet 5 mg  2 times daily      01/30/20 2354     IP VTE HIGH RISK PATIENT  Once      01/30/20 2354     Reason for No Pharmacological VTE Prophylaxis  Once     Question:  Reasons:  Answer:  Already adequately anticoagulated on oral Anticoagulants    01/30/20 2354                Thank you for your consult. I will sign off. Please contact us if you have any additional questions.    Ede Kline MD  Cardiology   [facility]

## 2020-02-03 NOTE — PLAN OF CARE
Problem: Fall Injury Risk  Goal: Absence of Fall and Fall-Related Injury  Outcome: Ongoing, Progressing     Problem: Adult Inpatient Plan of Care  Goal: Plan of Care Review  Outcome: Ongoing, Progressing  Goal: Patient-Specific Goal (Individualization)  Outcome: Ongoing, Progressing  Goal: Absence of Hospital-Acquired Illness or Injury  Outcome: Ongoing, Progressing  Goal: Optimal Comfort and Wellbeing  Outcome: Ongoing, Progressing  Goal: Readiness for Transition of Care  Outcome: Ongoing, Progressing  Goal: Rounds/Family Conference  Outcome: Ongoing, Progressing     Problem: Diabetes Comorbidity  Goal: Blood Glucose Level Within Desired Range  Outcome: Ongoing, Progressing     Problem: Skin Injury Risk Increased  Goal: Skin Health and Integrity  Outcome: Ongoing, Progressing     Problem: Mobility Impairment  Goal: Optimal Mobility  Outcome: Ongoing, Progressing     Problem: Fluid Volume Excess  Goal: Fluid Balance  Outcome: Ongoing, Progressing     Problem: Pain Acute  Goal: Optimal Pain Control  Outcome: Ongoing, Progressing     Problem: Infection  Goal: Infection Symptom Resolution  Outcome: Ongoing, Progressing     Problem: Wound  Goal: Optimal Wound Healing  Outcome: Ongoing, Progressing

## 2020-02-03 NOTE — SUBJECTIVE & OBJECTIVE
Past Medical History:   Diagnosis Date    Acute renal failure 1/1/2020    Allergy     Anemia of chronic disease 1/1/2020    Anticoagulant long-term use     Coumadin    Anxiety     Arthritis     Atrial fibrillation     Cataract     Closed head injury 09/10/2018    Clotting disorder     Depression     Diabetes mellitus     Encounter for blood transfusion     Fall 09/10/2018    GERD (gastroesophageal reflux disease)     Heart murmur     [place] (hard of hearing)     wears bilateral hearing aids    Hypertension     Thyroid disease     Uses roller walker        Past Surgical History:   Procedure Laterality Date    HYSTERECTOMY      INJECTION OF FACET JOINT Bilateral 4/5/2019    Procedure: INJECTION, FACET JOINT, L4-L5;  Surgeon: Clayton Valle MD;  Location: [facility];  Service: Pain Management;  Laterality: Bilateral;    JOINT REPLACEMENT Right     Hip    TONSILLECTOMY      TOTAL HIP ARTHROPLASTY Left 2004       Review of patient's allergies indicates:   Allergen Reactions    Sulfa (sulfonamide antibiotics) Hives       No current facility-administered medications on file prior to encounter.      Current Outpatient Medications on File Prior to Encounter   Medication Sig    acetaminophen (TYLENOL) 500 MG tablet Take 500 mg by mouth every 6 (six) hours as needed for Pain.    apixaban (ELIQUIS) 5 mg Tab Take 1 tablet (5 mg total) by mouth 2 (two) times daily.    diltiaZEM (CARDIZEM CD) 180 MG 24 hr capsule Take 1 capsule (180 mg total) by mouth once daily.    escitalopram oxalate (LEXAPRO) 10 MG tablet Take 1 tablet (10 mg total) by mouth once daily.    fluticasone (FLONASE) 50 mcg/actuation nasal spray 1 spray by Each Nare route once daily.    furosemide (LASIX) 20 MG tablet Take 20 mg by mouth 2 (two) times daily.    gabapentin (NEURONTIN) 300 MG capsule Take 300 mg by mouth 2 (two) times daily.    levothyroxine 137 mcg Cap Take 1 tablet by mouth once daily.    LORazepam (ATIVAN) 0.5  MG tablet Take 0.5 mg by mouth 2 (two) times daily. Take half in the am and a whole at bedtime    memantine (NAMENDA) 5 MG Tab Take 5 mg by mouth 2 (two) times daily.    menthol/camphor (BIOFREEZE-ILEX TOP) Apply topically.    metoprolol tartrate (LOPRESSOR) 100 MG tablet Take 2 tablets (200 mg total) by mouth 2 (two) times daily.    simvastatin (ZOCOR) 40 MG tablet Take 40 mg by mouth every evening.    vitamin D (VITAMIN D3) 1000 units Tab Take 2,000 Units by mouth once daily.     Family History     Problem Relation (Age of Onset)    Diabetes Father, Sister, Brother    No Known Problems Mother    Stroke Father        Tobacco Use    Smoking status: Former Smoker     Last attempt to quit: 1988     Years since quittin.6    Smokeless tobacco: Never Used   Substance and Sexual Activity    Alcohol use: Not Currently     Alcohol/week: 3.0 - 4.0 standard drinks     Types: 1 Glasses of wine, 2 - 3 Standard drinks or equivalent per week    Drug use: No    Sexual activity: Not on file     Review of Systems   Constitution: Negative for chills, diaphoresis, fever and malaise/fatigue.   HENT: Negative for nosebleeds.    Eyes: Negative for blurred vision and double vision.   Cardiovascular: Positive for leg swelling. Negative for chest pain, claudication, cyanosis, dyspnea on exertion, orthopnea, palpitations, paroxysmal nocturnal dyspnea and syncope.   Respiratory: Negative for cough, shortness of breath and wheezing.    Skin: Negative for dry skin and poor wound healing.   Musculoskeletal: Negative for back pain, joint swelling and myalgias.   Gastrointestinal: Negative for abdominal pain, nausea and vomiting.   Genitourinary: Negative for hematuria.   Neurological: Negative for dizziness, headaches, numbness, seizures and weakness.   Psychiatric/Behavioral: Negative for altered mental status and depression.     Objective:     Vital Signs (Most Recent):  Temp: 98 °F (36.7 °C) (20 0718)  Pulse: (!) 120  (02/03/20 0718)  Resp: 18 (02/03/20 0718)  BP: 107/73 (02/03/20 0718)  SpO2: (!) 94 % (02/03/20 0718) Vital Signs (24h Range):  Temp:  [97.6 °F (36.4 °C)-98.4 °F (36.9 °C)] 98 °F (36.7 °C)  Pulse:  [] 120  Resp:  [17-20] 18  SpO2:  [89 %-98 %] 94 %  BP: ()/(52-82) 107/73     Weight: 95.6 kg (210 lb 12.2 oz)  Body mass index is 36.18 kg/m².    SpO2: (!) 94 %  O2 Device (Oxygen Therapy): room air      Intake/Output Summary (Last 24 hours) at 2/3/2020 1037  Last data filed at 2/3/2020 0854  Gross per 24 hour   Intake 1080 ml   Output 3650 ml   Net -2570 ml       Lines/Drains/Airways     Drain                 Urethral Catheter 01/30/20 1843 Straight-tip 16 Fr. 3 days          Peripheral Intravenous Line                 Peripheral IV - Single Lumen 01/30/20 1725 20 G Right Antecubital 3 days                Physical Exam   Constitutional: She is oriented to person, place, and time. She appears well-developed and well-nourished. No distress.   HENT:   Head: Normocephalic and atraumatic.   Mouth/Throat: No oropharyngeal exudate.   Eyes: Pupils are equal, round, and reactive to light. Conjunctivae and EOM are normal. No scleral icterus.   Neck: Normal range of motion. Neck supple. No JVD present. No tracheal deviation present. No thyromegaly present.   Cardiovascular: Normal rate, S1 normal and S2 normal. An irregularly irregular rhythm present. Exam reveals distant heart sounds. Exam reveals no gallop and no friction rub.   Murmur heard.   Systolic murmur is present with a grade of 2/6.  Pulmonary/Chest: Effort normal and breath sounds normal. No respiratory distress. She has no wheezes. She has no rales. She exhibits no tenderness.   Abdominal: Soft. She exhibits no distension.   Musculoskeletal: Normal range of motion. She exhibits edema.   Neurological: She is alert and oriented to person, place, and time. No cranial nerve deficit.   Skin: Skin is warm and dry. She is not diaphoretic.   Psychiatric: She has a  normal mood and affect. Her behavior is normal. Judgment normal.       Current Medications:   apixaban  5 mg Oral BID    diltiaZEM  180 mg Oral Daily    escitalopram oxalate  10 mg Oral Daily    fluticasone propionate  1 spray Each Nostril Daily    furosemide  40 mg Intravenous Daily    gabapentin  300 mg Oral BID    levothyroxine  137 mcg Oral Before breakfast    memantine  5 mg Oral BID    metoprolol tartrate  200 mg Oral BID    polyethylene glycol  17 g Oral Daily    simvastatin  40 mg Oral QHS    vitamin D  2,000 Units Oral Daily       acetaminophen, dextrose 50%, dextrose 50%, glucagon (human recombinant), glucose, glucose, influenza, insulin aspart U-100, LORazepam, melatonin, ondansetron, pneumoc 13-ravinder conj-dip cr(PF), promethazine (PHENERGAN) IVPB, senna-docusate 8.6-50 mg, sodium chloride 0.9%    Laboratory (all labs reviewed):  CBC:  Recent Labs   Lab 01/30/20  1727 01/31/20  0450 02/01/20  0404 02/02/20  0433 02/03/20  0421   WBC 5.55 3.75 L 3.68 L 3.89 L 4.70   Hemoglobin 9.3 L 8.8 L 8.1 L 8.5 L 10.1 L   Hematocrit 31.2 L 29.6 L 27.2 L 28.3 L 33.5 L   Platelets 186 146 L 134 L 143 L 137 L       CHEMISTRIES:  Recent Labs   Lab 01/05/20  0403 01/06/20  0313 01/07/20  0306 01/08/20  0542 01/30/20  1727 01/31/20  0450 02/01/20  0404 02/02/20  0433 02/03/20  0421   Glucose 127 H 123 H 108 111 H 117 H 101 101 103 98   Sodium 134 L 136 134 L 134 L 134 L 135 L 136 135 L 135 L   Potassium 3.6 3.9 3.6 4.1 4.3 4.0 3.8 3.6 3.7   BUN, Bld 17 17 15 13 22 20 22 21 23   Creatinine 0.9 0.8 0.8 0.8 1.0 0.8 0.9 1.0 1.1   eGFR if African American >60 >60 >60 >60 59 A >60 >60 59 A 52 A   eGFR if non  58 A >60 >60 >60 51 A >60 58 A 51 A 45 A   Calcium 8.4 L 8.2 L 8.4 L 8.3 L 8.6 L 8.2 L 8.3 L 8.5 L 8.9   Magnesium 1.2 L 1.5 L 1.3 L 1.4 L  --  1.4 L  --   --   --        CARDIAC BIOMARKERS:  Recent Labs   Lab 09/11/18  1658 10/11/19  0927 12/31/19  2142 01/01/20  0051 01/04/20  0417 01/30/20  1727    CPK  --   --   --   --  60  --    Troponin I 0.018 0.012 0.008 0.038 H  --  0.009       COAGS:  Recent Labs   Lab 10/11/19  0927 01/01/20  0051 01/02/20  0402 01/03/20  0503 01/04/20  0418   INR 1.0 1.4 H 1.3 H 1.1 1.1       LIPIDS/LFTS:  Recent Labs   Lab 01/05/20  0403 01/06/20  0313 01/07/20  0306 01/08/20  0542 01/30/20  1727   AST 59 H 37 25 26 86 H    H 272 H 193 H 142 H 59 H       BNP:  Recent Labs   Lab 09/11/18  0932 10/11/19  0927 12/31/19  2142 01/30/20  1727    H 377 H 439 H 270 H       TSH:  Recent Labs   Lab 07/05/18  1429 09/11/18  0932 01/03/20  0503 01/30/20  1727   TSH 4.690 H 0.452 4.633 H 5.231 H       Free T4:  Recent Labs   Lab 01/03/20  0503 01/30/20  1727   Free T4 0.90 1.26       Diagnostic Results:  ECG (personally reviewed and interpreted tracing(s)):  1/30/20 1731 AF 85, PRWP, similar to 1/3/20    Chest X-Ray (personally reviewed and interpreted image(s)): 1/30/20 mild CHF/CMeg    Echo: 1/1/20  · Normal left ventricular systolic function. The estimated ejection fraction is 70%  · Concentric left ventricular hypertrophy.  · Grade II (moderate) left ventricular diastolic dysfunction consistent with pseudonormalization.  · Normal right ventricular systolic function.  · Moderate-to-severe aortic valve stenosis.  · Aortic valve area is 0.64 cm2; peak velocity is 3.86 m/s; mean gradient is 35 mmHg.  · Mild mitral regurgitation.  · Mild tricuspid regurgitation.  · The estimated PA systolic pressure is 70 mm Hg  · Pulmonary hypertension present.

## 2020-02-03 NOTE — SUBJECTIVE & OBJECTIVE
Interval History: pt has no new complaints     Review of Systems   Constitutional: Positive for fatigue.   HENT: Negative.    Respiratory: Positive for shortness of breath.    Genitourinary: Negative.    Musculoskeletal: Positive for gait problem.   Neurological: Positive for weakness.     Objective:     Vital Signs (Most Recent):  Temp: 98.3 °F (36.8 °C) (02/02/20 1737)  Pulse: 90 (02/02/20 1737)  Resp: 18 (02/02/20 1737)  BP: (!) 99/57 (02/02/20 1737)  SpO2: (!) 94 % (02/02/20 1737) Vital Signs (24h Range):  Temp:  [97.6 °F (36.4 °C)-98.6 °F (37 °C)] 98.3 °F (36.8 °C)  Pulse:  [] 90  Resp:  [17-18] 18  SpO2:  [93 %-98 %] 94 %  BP: ()/(52-85) 99/57     Weight: 95.6 kg (210 lb 12.2 oz)  Body mass index is 36.18 kg/m².    Intake/Output Summary (Last 24 hours) at 2/2/2020 1902  Last data filed at 2/2/2020 1800  Gross per 24 hour   Intake 1680 ml   Output 2050 ml   Net -370 ml      Physical Exam   Constitutional: She is oriented to person, place, and time. She appears well-developed and well-nourished. No distress.   HENT:   Head: Normocephalic and atraumatic.   Eyes: Pupils are equal, round, and reactive to light. EOM are normal.   Neck: Normal range of motion. Neck supple.   Cardiovascular: Normal rate and regular rhythm.   Pulmonary/Chest: Effort normal. No respiratory distress. She has no wheezes. She has rales.   Abdominal: Soft. Bowel sounds are normal. She exhibits no distension. There is no tenderness.   Musculoskeletal: She exhibits edema and deformity.   Neurological: She is alert and oriented to person, place, and time.   Skin: Skin is warm and dry. Capillary refill takes 2 to 3 seconds.       Significant Labs:   BMP:   Recent Labs   Lab 02/02/20  0433      *   K 3.6   CL 84*   CO2 45*   BUN 21   CREATININE 1.0   CALCIUM 8.5*       Significant Imaging: I have reviewed and interpreted all pertinent imaging results/findings within the past 24 hours.

## 2020-02-03 NOTE — PLAN OF CARE
Problem: Fall Injury Risk  Goal: Absence of Fall and Fall-Related Injury  Outcome: Ongoing, Progressing     Problem: Adult Inpatient Plan of Care  Goal: Plan of Care Review  Outcome: Ongoing, Progressing     Problem: Adult Inpatient Plan of Care  Goal: Absence of Hospital-Acquired Illness or Injury  Outcome: Ongoing, Progressing     Problem: Adult Inpatient Plan of Care  Goal: Rounds/Family Conference  Outcome: Ongoing, Progressing     Problem: Diabetes Comorbidity  Goal: Blood Glucose Level Within Desired Range  Outcome: Ongoing, Progressing

## 2020-02-03 NOTE — PT/OT/SLP PROGRESS
Physical Therapy Treatment    Patient Name:  Vashti Muñoz   MRN:  [id number]    Recommendations:     Discharge Recommendations:  (Return to SNf vs return to NH with PT)   Discharge Equipment Recommendations: none   Barriers to discharge: None    Assessment:     Vashti Muñoz is a 87 y.o. female admitted with a medical diagnosis of Anasarca.  She presents with the following impairments/functional limitations:  weakness, impaired functional mobilty, gait instability, impaired endurance, decreased upper extremity function, decreased lower extremity function, pain, decreased safety awareness, impaired balance, impaired self care skills, edema, impaired cardiopulmonary response to activity, decreased ROM, impaired skin .    Rehab Prognosis: Fair +; patient would benefit from acute skilled PT services to address these deficits and reach maximum level of function.    Recent Surgery: * No surgery found *      Plan:     During this hospitalization, patient to be seen 3 x/week to address the identified rehab impairments via gait training, therapeutic activities, therapeutic exercises and progress toward the following goals:    · Plan of Care Expires:  02/07/20    Subjective     Chief Complaint: weakness and fatigue   Patient/Family Comments/goals: pt wants to sit up in chair   Pain/Comfort:  · Pain Rating 1: 0/10  · Pain Rating Post-Intervention 1: 0/10      Objective:     Communicated with nurse  prior to session.  Patient found HOB elevated with bed alarm, telemetry, oxygen, le catheter upon PT entry to room.     General Precautions: Standard, fall, respiratory   Orthopedic Precautions:N/A   Braces: N/A     Functional Mobility:  · Bed Mobility:     · Rolling Right: contact guard assistance and minimum assistance  · Scooting: contact guard assistance  · Supine to Sit: minimum assistance and moderate assistance, HOB elevated, bedside rail   · Transfers:     · Sit to Stand:  minimum assistance with rolling walker  · Bed to  Chair: contact guard assistance with  rolling walker  using  Step Transfer  · Gait:  Pt ambulated ~ 4-5 steps from bed to chair with RW, CGA (2L O2NC) .  Pt with demonstarting a  3-point gait with decreased mynor,decreased velocity of limb motion, decreased step length, decreased swing-to-stance ratio. Impairments contributing to gait deviations include impaired balance, decreased flexibility,decreased ROM and decreased strength. V/T cues for safety technique and walker management.   · Balance: fair      AM-PAC 6 CLICK MOBILITY  Turning over in bed (including adjusting bedclothes, sheets and blankets)?: 4  Sitting down on and standing up from a chair with arms (e.g., wheelchair, bedside commode, etc.): 3  Moving from lying on back to sitting on the side of the bed?: 3  Moving to and from a bed to a chair (including a wheelchair)?: 3  Need to walk in hospital room?: 3  Climbing 3-5 steps with a railing?: 2  Basic Mobility Total Score: 18       Therapeutic Activities and Exercises:   pt performed seated BLE x 10 reps x 2 : AP.   Pt tolerated sitting balance EOB with S while getting socks changed and for vitals check.     Patient left up in reclined chair on green air cushion and BUE/BLE elevated on pillow  with all lines intact, call button in reach, chair alarm on, Nurse Donna notified and sister and NP  present..    GOALS:   Multidisciplinary Problems     Physical Therapy Goals        Problem: Physical Therapy Goal    Goal Priority Disciplines Outcome Goal Variances Interventions   Physical Therapy Goal     PT, PT/OT Ongoing, Progressing     Description:  Goals to be met by: 2020     Patient will increase functional independence with mobility by performin. Sit to stand transfer with Stand-by Assistance  2. Gait  x 25-50 feet with Stand-by Assistance using Rolling Walker.   3. Perform B LE ther ex per handout x 10 reps with assistance as needed                    Time Tracking:     PT Received On:  02/03/20  PT Start Time: 1152     PT Stop Time: 1218  PT Total Time (min): 26 min     Billable Minutes: Therapeutic Activity 26    Treatment Type: Treatment  PT/PTA: PTA     PTA Visit Number: 2     Lea Cheney PTA  02/03/2020

## 2020-02-03 NOTE — HPI
87 y.o. female that (in part)  has a past medical history of Acute renal failure, Allergy, Anemia of chronic disease, Anticoagulant long-term use, Anxiety, Arthritis, Atrial fibrillation, Cataract, Closed head injury, Clotting disorder, Depression, Diabetes mellitus, Encounter for blood transfusion, Fall, GERD (gastroesophageal reflux disease), Heart murmur, [place] (hard of hearing), Hypertension, Thyroid disease, and Uses roller walker.  has a past surgical history that includes Hysterectomy; Tonsillectomy; Total hip arthroplasty (Left, 2004); Joint replacement (Right); and Injection of facet joint (Bilateral, 4/5/2019). Presents to [facility] from her nursing home with report of acute on chronic edema with progressive worsening.  Involves bilateral lower extremities as well as upper extremities to the level of her hands and wrist.  Marked unintentional weight gain.  Has pain in all extremities due to the edema. Also complaining of palpitations.  Reports compliance with home medication regimen.  She has dementia.  Therefore the history is somewhat limited.  Four weeks ago she was admitted to the hospital and stayed for 8 days for treatment of heart failure and atrial fibrillation.  She was started on supplemental oxygen and Lasix at that time prior to discharge.  Patient saw her cardiologist 2 days ago in clinic for follow-up for CHF, atrial fibrillation, and aortic stenosis.  Dr. Kline increased Lasix from 20-40 mg twice a day for 1-2 weeks.  In the emergency department routine laboratory studies EKG, cardiac enzymes, and chest x-ray was performed.  On physical exam patient had evidence of anasarca.  Hypoalbuminemia was noted as well as a history of heart failure.  She was given Lasix intravenously and albumin in the ED.  She is being admitted for acute on chronic heart failure, anasarca, and need for diuresis.  Overview/Hospital Course:  Pt admitted with acute on  chronic CHF and anasarca. Pt reports increased dose of lasix this week and now requiring IV diuresis. Pt poor historian - info taken from records.   2/1- UOP 5 liters - monitoring renal function   2/2- 9 liters UOP, daughter at bedside, discussed concern that serum CO2 increasing and causing contraction alkalosis , cardiology consulted     Pt follows with me, last seen 1/28/20 (see OV note below).    Patient is seen in the presence of her daughter who is her power-of-.  She presented with anasarca and right sided heart failure.  I had seen her several days prior to this admission in an attempt to avoid this by increasing her Lasix.  Over the past several days, she has diuresed 9 L.  She has developed a contraction alkalosis.  She denies any angina, dyspnea, palpitations, or syncope.  Her edema is much improved.  I have discussed further diagnostic testing verses plans for medical therapy.  They are in agreement with the latter.  They have also agreed to a DNR status.  They have also asked to have palliative care stop by and I will put in this consult.  Case has been discussed with Dr. Kidd.      Raisa OV 1/28/20:  PATIENT IN PACE PROGRAM  The patient returns for follow-up accompanied by her daughter.  She was seen at [facility] earlier in January 2020 with bradycardia in relation to ARF/hyperkalemia which required temporary wire.  The bradycardia resolved with normalization of her potassium.  At present, the patient's main complaint appears to be related to swelling, as both of her arms and legs appear to be swollen.  The patient's daughter seems to be somewhat frustrated with the PACE program in that it took several weeks before the patient was placed on Lasix.  I suggested that they discuss this with the nursing home as well as with the PACE program providers.  The patient otherwise denies angina, dyspnea, palpitations, or syncope.  There has been no PND, melena, or hematuria.  She is currently in  a wheelchair and denies any claudicant symptoms.  The patient's daughter is asking if the patient can travel.  While there is no cardiac contraindication to travel, I would imagine this would be difficult given the patient's wheelchair-bound status.  We also discussed the patient's DNR, and is not entirely clear that the family wants a full DNR in that they would be willing to have mechanical ventilation if this was going to be short-lived.  I suggested that they discuss this to come to a decision as to the patient DNR status.  Medication list per [facility] MAR dated 01/28/2020  Tylenol p.r.n.  Apixaban 5 mg b.i.d.  Biofreeze gel b.i.d. to lower back  Cholecalciferol 2000 IU daily  Cipro 5 mg b.i.d. for 10 days  Diltiazem 180 mg q.a.m.  Colace  Celexa  Fluticasone  Lasix 20 mg daily  Neurontin 300 mg daily  Synthroid 137 mcg daily  Lidocaine cream to lower back  Lisinopril 40 mg daily  Lorazepam  Melatonin  Namenda  Metoprolol tartrate 200 mg twice daily  Multi vitamin  Preparation H  Simvastatin 40 mg q.h.s.  CARDIOVASCULAR HISTORY:   AF, chronic on eliquis  AS, mod-sev (echo 1/2020)  ASSESSMENT:   # AF, chronic, on eliquis, HR controlled, asymptomatic.  Hx of bradycardia (requiring temp wire) d/t elev Creat/K+ in 1/2020.  # AS, mod-sev (echo 1/2020)  # HTN, uncontrolled  # vol overload/edema on exam  # DM  # HLP on simva 40mg  # BMI 38, up 2 unit(s) vs last OV  # DNR.  Not clear what family's wishes are in regards to mech ventilation.  I have encouraged the family to discuss amongst themselves about what their wishes are.  I have also suggested the daughter present at today's visit be PoA (as opposed to daughter who lives in [place]).     PLAN:   Cont med rx  Cont eliquis 5mg bid  Increase lasix from 20mg qd to 40mg bid for 1-2 weeks.   Check BMP 2 weeks  PCP/PACE provider to reassess swelling and determine if lasix dose can be decreased.  Can titrate dilt as next intervention for HTN  RTC 3  months, sooner if swelling/HTN persists

## 2020-02-04 LAB
ANION GAP SERPL CALC-SCNC: 8 MMOL/L (ref 8–16)
BASOPHILS # BLD AUTO: 0.01 K/UL (ref 0–0.2)
BASOPHILS NFR BLD: 0.1 % (ref 0–1.9)
BUN SERPL-MCNC: 26 MG/DL (ref 8–23)
CALCIUM SERPL-MCNC: 8.6 MG/DL (ref 8.7–10.5)
CHLORIDE SERPL-SCNC: 81 MMOL/L (ref 95–110)
CO2 SERPL-SCNC: 46 MMOL/L (ref 23–29)
CREAT SERPL-MCNC: 1.1 MG/DL (ref 0.5–1.4)
DIFFERENTIAL METHOD: ABNORMAL
EOSINOPHIL # BLD AUTO: 0 K/UL (ref 0–0.5)
EOSINOPHIL NFR BLD: 0.1 % (ref 0–8)
ERYTHROCYTE [DISTWIDTH] IN BLOOD BY AUTOMATED COUNT: 15.4 % (ref 11.5–14.5)
EST. GFR  (AFRICAN AMERICAN): 52 ML/MIN/1.73 M^2
EST. GFR  (NON AFRICAN AMERICAN): 45 ML/MIN/1.73 M^2
GLUCOSE SERPL-MCNC: 107 MG/DL (ref 70–110)
HCT VFR BLD AUTO: 31.9 % (ref 37–48.5)
HGB BLD-MCNC: 9.5 G/DL (ref 12–16)
IMM GRANULOCYTES # BLD AUTO: 0.04 K/UL (ref 0–0.04)
IMM GRANULOCYTES NFR BLD AUTO: 0.6 % (ref 0–0.5)
LYMPHOCYTES # BLD AUTO: 3 K/UL (ref 1–4.8)
LYMPHOCYTES NFR BLD: 44.9 % (ref 18–48)
MCH RBC QN AUTO: 25.2 PG (ref 27–31)
MCHC RBC AUTO-ENTMCNC: 29.8 G/DL (ref 32–36)
MCV RBC AUTO: 85 FL (ref 82–98)
MONOCYTES # BLD AUTO: 1.7 K/UL (ref 0.3–1)
MONOCYTES NFR BLD: 24.4 % (ref 4–15)
NEUTROPHILS # BLD AUTO: 2 K/UL (ref 1.8–7.7)
NEUTROPHILS NFR BLD: 29.9 % (ref 38–73)
NRBC BLD-RTO: 0 /100 WBC
PLATELET # BLD AUTO: 130 K/UL (ref 150–350)
PMV BLD AUTO: 10.8 FL (ref 9.2–12.9)
POCT GLUCOSE: 105 MG/DL (ref 70–110)
POCT GLUCOSE: 134 MG/DL (ref 70–110)
POCT GLUCOSE: 160 MG/DL (ref 70–110)
POCT GLUCOSE: 175 MG/DL (ref 70–110)
POTASSIUM SERPL-SCNC: 3.3 MMOL/L (ref 3.5–5.1)
RBC # BLD AUTO: 3.77 M/UL (ref 4–5.4)
SODIUM SERPL-SCNC: 135 MMOL/L (ref 136–145)
WBC # BLD AUTO: 6.77 K/UL (ref 3.9–12.7)

## 2020-02-04 PROCEDURE — 99223 1ST HOSP IP/OBS HIGH 75: CPT | Mod: ,,, | Performed by: NURSE PRACTITIONER

## 2020-02-04 PROCEDURE — 11000001 HC ACUTE MED/SURG PRIVATE ROOM

## 2020-02-04 PROCEDURE — 25000003 PHARM REV CODE 250: Performed by: HOSPITALIST

## 2020-02-04 PROCEDURE — 25000003 PHARM REV CODE 250: Performed by: EMERGENCY MEDICINE

## 2020-02-04 PROCEDURE — 36415 COLL VENOUS BLD VENIPUNCTURE: CPT

## 2020-02-04 PROCEDURE — 80048 BASIC METABOLIC PNL TOTAL CA: CPT

## 2020-02-04 PROCEDURE — 99223 PR INITIAL HOSPITAL CARE,LEVL III: ICD-10-PCS | Mod: ,,, | Performed by: NURSE PRACTITIONER

## 2020-02-04 PROCEDURE — 87040 BLOOD CULTURE FOR BACTERIA: CPT

## 2020-02-04 PROCEDURE — 85025 COMPLETE CBC W/AUTO DIFF WBC: CPT

## 2020-02-04 PROCEDURE — 63600175 PHARM REV CODE 636 W HCPCS: Performed by: INTERNAL MEDICINE

## 2020-02-04 RX ORDER — METOPROLOL TARTRATE 1 MG/ML
5 INJECTION, SOLUTION INTRAVENOUS ONCE
Status: COMPLETED | OUTPATIENT
Start: 2020-02-04 | End: 2020-02-04

## 2020-02-04 RX ORDER — FUROSEMIDE 20 MG/1
20 TABLET ORAL 2 TIMES DAILY
Status: DISCONTINUED | OUTPATIENT
Start: 2020-02-04 | End: 2020-02-07 | Stop reason: HOSPADM

## 2020-02-04 RX ADMIN — GABAPENTIN 300 MG: 300 CAPSULE ORAL at 08:02

## 2020-02-04 RX ADMIN — POLYETHYLENE GLYCOL 3350 17 G: 17 POWDER, FOR SOLUTION ORAL at 09:02

## 2020-02-04 RX ADMIN — LEVOTHYROXINE SODIUM 137 MCG: 25 TABLET ORAL at 05:02

## 2020-02-04 RX ADMIN — MELATONIN 2000 UNITS: at 09:02

## 2020-02-04 RX ADMIN — GABAPENTIN 300 MG: 300 CAPSULE ORAL at 09:02

## 2020-02-04 RX ADMIN — APIXABAN 5 MG: 5 TABLET, FILM COATED ORAL at 09:02

## 2020-02-04 RX ADMIN — MEMANTINE HYDROCHLORIDE 5 MG: 5 TABLET ORAL at 09:02

## 2020-02-04 RX ADMIN — FLUTICASONE PROPIONATE 50 MCG: 50 SPRAY, METERED NASAL at 09:02

## 2020-02-04 RX ADMIN — DILTIAZEM HYDROCHLORIDE 180 MG: 180 CAPSULE, COATED, EXTENDED RELEASE ORAL at 09:02

## 2020-02-04 RX ADMIN — MEMANTINE HYDROCHLORIDE 5 MG: 5 TABLET ORAL at 08:02

## 2020-02-04 RX ADMIN — ESCITALOPRAM OXALATE 10 MG: 10 TABLET ORAL at 09:02

## 2020-02-04 RX ADMIN — APIXABAN 5 MG: 5 TABLET, FILM COATED ORAL at 08:02

## 2020-02-04 RX ADMIN — FUROSEMIDE 20 MG: 10 INJECTION, SOLUTION INTRAMUSCULAR; INTRAVENOUS at 09:02

## 2020-02-04 RX ADMIN — METOPROLOL TARTRATE 5 MG: 1 INJECTION, SOLUTION INTRAVENOUS at 08:02

## 2020-02-04 RX ADMIN — METOPROLOL TARTRATE 200 MG: 50 TABLET, FILM COATED ORAL at 08:02

## 2020-02-04 RX ADMIN — SIMVASTATIN 40 MG: 40 TABLET, FILM COATED ORAL at 08:02

## 2020-02-04 RX ADMIN — AMOXICILLIN AND CLAVULANATE POTASSIUM 500 MG: 250; 62.5 POWDER, FOR SUSPENSION ORAL at 09:02

## 2020-02-04 RX ADMIN — AMOXICILLIN AND CLAVULANATE POTASSIUM 500 MG: 250; 62.5 POWDER, FOR SUSPENSION ORAL at 08:02

## 2020-02-04 NOTE — PROGRESS NOTES
[facility] Hospital Medicine  Progress Note    Patient Name: Vashti Muñoz  MRN: [id number]  Patient Class: IP- Inpatient   Admission Date: 1/30/2020  Length of Stay: 5 days  Attending Physician: Paul Perez MD  Primary Care Provider: Alesha Baum MD        Subjective:     Principal Problem:Anasarca        HPI:  Vashti Muñoz is a 87 y.o. female that (in part)  has a past medical history of Acute renal failure, Allergy, Anemia of chronic disease, Anticoagulant long-term use, Anxiety, Arthritis, Atrial fibrillation, Cataract, Closed head injury, Clotting disorder, Depression, Diabetes mellitus, Encounter for blood transfusion, Fall, GERD (gastroesophageal reflux disease), Heart murmur, [place] (hard of hearing), Hypertension, Thyroid disease, and Uses roller walker.  has a past surgical history that includes Hysterectomy; Tonsillectomy; Total hip arthroplasty (Left, 2004); Joint replacement (Right); and Injection of facet joint (Bilateral, 4/5/2019). Presents to [facility] Emergency Department from her nursing home with report of acute on chronic edema with progressive worsening.  Involves bilateral lower extremities as well as upper extremities to the level of her hands and wrist.  Marked unintentional weight gain.  Has pain in all extremities due to the edema. Also complaining of palpitations.  Reports compliance with home medication regimen.  She has dementia.  Therefore the history is somewhat limited.  Four weeks ago she was admitted to the hospital and stayed for 8 days for treatment of heart failure and atrial fibrillation.  She was started on supplemental oxygen and Lasix at that time prior to discharge.  Patient saw her cardiologist 2 days ago in clinic for follow-up for CHF, atrial fibrillation, and aortic stenosis.  Dr. Kline increased Lasix from 20-40 mg twice a day for 1-2 weeks.    In the emergency department routine laboratory studies EKG, cardiac  enzymes, and chest x-ray was performed.  On physical exam patient had evidence of anasarca.  Hypoalbuminemia was noted as well as a history of heart failure.  She was given Lasix intravenously and albumin in the ED.  She is being admitted for acute on chronic heart failure, anasarca, and need for diuresis.    Hospital medicine has been asked to admit to inpatient for further evaluation and treatment.     Overview/Hospital Course:  Pt admitted with acute on chronic CHF and anasarca. Pt reports increased dose of lasix this week and now requiring IV diuresis. Pt poor historian - info taken from records.     2/1- UOP 5 liters - monitoring renal function   2/2- 9 liters UOP, daughter at bedside, discussed concern that serum CO2 increasing and causing contraction alkalosis , cardiology consulted   2/3- pt spiked fever - blood culture pending, recent UTI and still has infectious cells on UA-trial of augmentin. AFib rate controlled, palliative care consulted    Interval History: Feeling much better.    Review of Systems   HENT: Negative for ear discharge and ear pain.    Eyes: Negative for discharge and itching.   Endocrine: Negative for cold intolerance and heat intolerance.   Neurological: Negative for seizures and syncope.     Objective:     Vital Signs (Most Recent):  Temp: 98.4 °F (36.9 °C) (02/04/20 1148)  Pulse: 97 (02/04/20 1148)  Resp: 16 (02/04/20 1148)  BP: 102/65 (02/04/20 1148)  SpO2: 97 % (02/04/20 1148) Vital Signs (24h Range):  Temp:  [97.7 °F (36.5 °C)-102 °F (38.9 °C)] 98.4 °F (36.9 °C)  Pulse:  [] 97  Resp:  [16-18] 16  SpO2:  [93 %-97 %] 97 %  BP: (102-127)/(53-86) 102/65     Weight: 95.6 kg (210 lb 12.2 oz)  Body mass index is 36.18 kg/m².    Intake/Output Summary (Last 24 hours) at 2/4/2020 1604  Last data filed at 2/4/2020 1200  Gross per 24 hour   Intake 600 ml   Output 2500 ml   Net -1900 ml      Physical Exam   Constitutional: She is oriented to person, place, and time. She appears  well-developed and well-nourished. No distress.   HENT:   Head: Normocephalic and atraumatic.   Eyes: Pupils are equal, round, and reactive to light. EOM are normal.   Neck: Normal range of motion. Neck supple.   Cardiovascular: Normal rate and regular rhythm.   Pulmonary/Chest: Effort normal. No respiratory distress. She has no wheezes. She has rales.   Abdominal: Soft. Bowel sounds are normal. She exhibits no distension. There is no tenderness.   Musculoskeletal: She exhibits edema and deformity.   Neurological: She is alert and oriented to person, place, and time.   Skin: Skin is warm and dry. Capillary refill takes 2 to 3 seconds.       Significant Labs:   BMP:   Recent Labs   Lab 02/04/20  0402      *   K 3.3*   CL 81*   CO2 46*   BUN 26*   CREATININE 1.1   CALCIUM 8.6*     CBC:   Recent Labs   Lab 02/03/20  0421 02/04/20  0402   WBC 4.70 6.77   HGB 10.1* 9.5*   HCT 33.5* 31.9*   * 130*           Assessment/Plan:      * Anasarca  IV diuresis  Daily weights  Fluid restriction  Monitor renal function   Albumin 2.6- contributing to fluid retention   Appreciate Cards input.  Great urine output.  Change Lasix to PO.      Palliative care encounter    See dementia     Chronic heart failure with preserved ejection fraction  Pt presents with anasarca with concern from daughter that she needs more aggressive diuersis  Pt is not SOB  Followed by Dr. Kline - recent clinic visit 1/28  Lasix dose increased 20 to 40mg  Agree with plan for defined code status - d/w daughter again     ECHO: 1/1/20  Conclusion     · Normal left ventricular systolic function. The estimated ejection fraction is 70%  · Concentric left ventricular hypertrophy.  · Grade II (moderate) left ventricular diastolic dysfunction consistent with pseudonormalization.  · Normal right ventricular systolic function.  · Moderate-to-severe aortic valve stenosis.  · Aortic valve area is 0.64 cm2; peak velocity is 3.86 m/s; mean gradient is 35  mmHg.  · Mild mitral regurgitation.  · Mild tricuspid regurgitation.  · The estimated PA systolic pressure is 70 mm Hg  · Pulmonary hypertension present.           Dementia without behavioral disturbance  No acute issues   Resume namealejandro    Pt is DNR  Palliative care consulted       Anemia of chronic disease  Pt has drop from one month ago hemoglobin 10  On eliquis   Monitor H/H    Nonrheumatic aortic valve stenosis        Mixed hyperlipidemia  Resume statin       Essential hypertension  BP controlled   on diltiazem CD  Metoprolol 200mg BID - listed on home med list       Chronic anticoagulation  Resume eliquis        Acquired hypothyroidism  Resume synthroid       Chronic atrial fibrillation  Rate controlled on BB   eliquis             VTE Risk Mitigation (From admission, onward)         Ordered     apixaban tablet 5 mg  2 times daily      01/30/20 2354     IP VTE HIGH RISK PATIENT  Once      01/30/20 2354     Reason for No Pharmacological VTE Prophylaxis  Once     Question:  Reasons:  Answer:  Already adequately anticoagulated on oral Anticoagulants    01/30/20 2354                      Paul Perez MD  Department of Hospital Medicine   [facility]

## 2020-02-04 NOTE — SUBJECTIVE & OBJECTIVE
Interval History: pt [place] but denies CP, increased left arm swelling, LE edema improved     Review of Systems   Constitutional: Positive for fatigue.   HENT: Negative.    Respiratory: Positive for shortness of breath.    Genitourinary: Negative.    Musculoskeletal: Positive for gait problem.   Neurological: Positive for weakness.     Objective:     Vital Signs (Most Recent):  Temp: 100.2 °F (37.9 °C) (02/03/20 1645)  Pulse: 85 (02/03/20 1608)  Resp: 18 (02/03/20 1608)  BP: 127/86 (02/03/20 1608)  SpO2: (!) 93 % (02/03/20 1608) Vital Signs (24h Range):  Temp:  [97.9 °F (36.6 °C)-102 °F (38.9 °C)] 100.2 °F (37.9 °C)  Pulse:  [] 85  Resp:  [18-20] 18  SpO2:  [89 %-96 %] 93 %  BP: ()/(60-86) 127/86     Weight: 95.6 kg (210 lb 12.2 oz)  Body mass index is 36.18 kg/m².    Intake/Output Summary (Last 24 hours) at 2/3/2020 1801  Last data filed at 2/3/2020 0854  Gross per 24 hour   Intake 600 ml   Output 2150 ml   Net -1550 ml      Physical Exam   Constitutional: She is oriented to person, place, and time. She appears well-developed and well-nourished. No distress.   HENT:   Head: Normocephalic and atraumatic.   Eyes: Pupils are equal, round, and reactive to light. EOM are normal.   Neck: Normal range of motion. Neck supple.   Cardiovascular: Normal rate and regular rhythm.   Pulmonary/Chest: Effort normal. No respiratory distress. She has no wheezes. She has rales.   Abdominal: Soft. Bowel sounds are normal. She exhibits no distension. There is no tenderness.   Musculoskeletal: She exhibits edema and deformity.   Neurological: She is alert and oriented to person, place, and time.   Skin: Skin is warm and dry. Capillary refill takes 2 to 3 seconds.       Significant Labs: All pertinent labs within the past 24 hours have been reviewed.    Significant Imaging: I have reviewed and interpreted all pertinent imaging results/findings within the past 24 hours.

## 2020-02-04 NOTE — SUBJECTIVE & OBJECTIVE
Interval History: Feeling much better.    Review of Systems   HENT: Negative for ear discharge and ear pain.    Eyes: Negative for discharge and itching.   Endocrine: Negative for cold intolerance and heat intolerance.   Neurological: Negative for seizures and syncope.     Objective:     Vital Signs (Most Recent):  Temp: 98.4 °F (36.9 °C) (02/04/20 1148)  Pulse: 97 (02/04/20 1148)  Resp: 16 (02/04/20 1148)  BP: 102/65 (02/04/20 1148)  SpO2: 97 % (02/04/20 1148) Vital Signs (24h Range):  Temp:  [97.7 °F (36.5 °C)-102 °F (38.9 °C)] 98.4 °F (36.9 °C)  Pulse:  [] 97  Resp:  [16-18] 16  SpO2:  [93 %-97 %] 97 %  BP: (102-127)/(53-86) 102/65     Weight: 95.6 kg (210 lb 12.2 oz)  Body mass index is 36.18 kg/m².    Intake/Output Summary (Last 24 hours) at 2/4/2020 1604  Last data filed at 2/4/2020 1200  Gross per 24 hour   Intake 600 ml   Output 2500 ml   Net -1900 ml      Physical Exam   Constitutional: She is oriented to person, place, and time. She appears well-developed and well-nourished. No distress.   HENT:   Head: Normocephalic and atraumatic.   Eyes: Pupils are equal, round, and reactive to light. EOM are normal.   Neck: Normal range of motion. Neck supple.   Cardiovascular: Normal rate and regular rhythm.   Pulmonary/Chest: Effort normal. No respiratory distress. She has no wheezes. She has rales.   Abdominal: Soft. Bowel sounds are normal. She exhibits no distension. There is no tenderness.   Musculoskeletal: She exhibits edema and deformity.   Neurological: She is alert and oriented to person, place, and time.   Skin: Skin is warm and dry. Capillary refill takes 2 to 3 seconds.       Significant Labs:   BMP:   Recent Labs   Lab 02/04/20  0402      *   K 3.3*   CL 81*   CO2 46*   BUN 26*   CREATININE 1.1   CALCIUM 8.6*     CBC:   Recent Labs   Lab 02/03/20  0421 02/04/20  0402   WBC 4.70 6.77   HGB 10.1* 9.5*   HCT 33.5* 31.9*   * 130*

## 2020-02-04 NOTE — ASSESSMENT & PLAN NOTE
No acute issues   Resume arin    Pt is DNR  Palliative care consulted   Daughter would like to discuss hospice options

## 2020-02-04 NOTE — ASSESSMENT & PLAN NOTE
IV diuresis  Daily weights  Fluid restriction  Monitor renal function   Albumin 2.6- contributing to fluid retention   Appreciate Cards input.  Great urine output.  Change Lasix to PO.

## 2020-02-04 NOTE — ASSESSMENT & PLAN NOTE
IV diuresis  Daily weights  Fluid restriction  Monitor renal function   Albumin 2.6- contributing to fluid retention     D/w cardiology - input while inpatient and for dc planning   +/- diamox- review am labs, if contraction alkalosis worsening

## 2020-02-04 NOTE — PLAN OF CARE
Problem: Adult Inpatient Plan of Care  Goal: Plan of Care Review  Outcome: Ongoing, Progressing     Patient oriented x2 and [place]; hearing aids not available. Tolerating diet; CBG monitored. Pain assessed frequently; turn q2h. Salazar in place.  Frequent checks made. Family/friends at bedside.  Will continue to monitor.

## 2020-02-04 NOTE — PLAN OF CARE
Problem: Fall Injury Risk  Goal: Absence of Fall and Fall-Related Injury  Outcome: Ongoing, Progressing     Problem: Adult Inpatient Plan of Care  Goal: Plan of Care Review  Outcome: Ongoing, Progressing  Goal: Patient-Specific Goal (Individualization)  Outcome: Ongoing, Progressing  Goal: Absence of Hospital-Acquired Illness or Injury  Outcome: Ongoing, Progressing  Goal: Optimal Comfort and Wellbeing  Outcome: Ongoing, Progressing  Goal: Readiness for Transition of Care  Outcome: Ongoing, Progressing  Goal: Rounds/Family Conference  Outcome: Ongoing, Progressing     Problem: Skin Injury Risk Increased  Goal: Skin Health and Integrity  Outcome: Ongoing, Progressing     Problem: Mobility Impairment  Goal: Optimal Mobility  Outcome: Ongoing, Progressing     Problem: Fluid Volume Excess  Goal: Fluid Balance  Outcome: Ongoing, Progressing

## 2020-02-04 NOTE — CONSULTS
Patient in bed today. She is alert and oriented to self. Edema has decreased. Patient denies pain and SOB.  She states she feels better today. Both daughters are present.     Advance Care Planning     [facility]  I engaged the family and healthcare power of   in a conversation about advance care planning and we specifically addressed what the goals of care would be moving forward in light of the patient's change in clinical status. We discussed current clinical condition, frequent hospitalizations, expected outcomes, and options.   We did not specifically address the patient's likely prognosis but I did let them know she does meet hospice criteria. Daughters state they realize things are changing and they do not want their mother to suffer. We explored the patient's values and preferences for future care. We did discuss hospice in detail as an option and the daughter both had many misconceptions and questions about hospice and these were addressed and they state they feel much better about hospice.   They have many questions about nursing home payment, PACE and other questions that I could not answer but let them know CM could assist with.   Emotional support provided  Updated Dr Perez    -continue current treatment plan  -confirmed DNR  -considering hospice   -Palliative to continue to follow        >50 % of 70 mins spent in face to face encounter, review of documentation, symptom management and coordination of care

## 2020-02-04 NOTE — PROGRESS NOTES
[facility] Hospital Medicine  Progress Note    Patient Name: Vashti Muñoz  MRN: [id number]  Patient Class: IP- Inpatient   Admission Date: 1/30/2020  Length of Stay: 4 days  Attending Physician: Anne Marie Kidd MD  Primary Care Provider: Alesha Baum MD        Subjective:     Principal Problem:Anasarca        HPI:  Vashti Muñoz is a 87 y.o. female that (in part)  has a past medical history of Acute renal failure, Allergy, Anemia of chronic disease, Anticoagulant long-term use, Anxiety, Arthritis, Atrial fibrillation, Cataract, Closed head injury, Clotting disorder, Depression, Diabetes mellitus, Encounter for blood transfusion, Fall, GERD (gastroesophageal reflux disease), Heart murmur, [place] (hard of hearing), Hypertension, Thyroid disease, and Uses roller walker.  has a past surgical history that includes Hysterectomy; Tonsillectomy; Total hip arthroplasty (Left, 2004); Joint replacement (Right); and Injection of facet joint (Bilateral, 4/5/2019). Presents to [facility] Emergency Department from her nursing home with report of acute on chronic edema with progressive worsening.  Involves bilateral lower extremities as well as upper extremities to the level of her hands and wrist.  Marked unintentional weight gain.  Has pain in all extremities due to the edema. Also complaining of palpitations.  Reports compliance with home medication regimen.  She has dementia.  Therefore the history is somewhat limited.  Four weeks ago she was admitted to the hospital and stayed for 8 days for treatment of heart failure and atrial fibrillation.  She was started on supplemental oxygen and Lasix at that time prior to discharge.  Patient saw her cardiologist 2 days ago in clinic for follow-up for CHF, atrial fibrillation, and aortic stenosis.  Dr. Kline increased Lasix from 20-40 mg twice a day for 1-2 weeks.    In the emergency department routine laboratory studies EKG, cardiac  enzymes, and chest x-ray was performed.  On physical exam patient had evidence of anasarca.  Hypoalbuminemia was noted as well as a history of heart failure.  She was given Lasix intravenously and albumin in the ED.  She is being admitted for acute on chronic heart failure, anasarca, and need for diuresis.    Hospital medicine has been asked to admit to inpatient for further evaluation and treatment.     Overview/Hospital Course:  Pt admitted with acute on chronic CHF and anasarca. Pt reports increased dose of lasix this week and now requiring IV diuresis. Pt poor historian - info taken from records.     2/1- UOP 5 liters - monitoring renal function   2/2- 9 liters UOP, daughter at bedside, discussed concern that serum CO2 increasing and causing contraction alkalosis , cardiology consulted   2/3- pt spiked fever - blood culture pending, recent UTI and still has infectious cells on UA-trial of augmentin. AFib rate controlled, palliative care consulted    Interval History: pt [place] but denies CP, increased left arm swelling, LE edema improved     Review of Systems   Constitutional: Positive for fatigue.   HENT: Negative.    Respiratory: Positive for shortness of breath.    Genitourinary: Negative.    Musculoskeletal: Positive for gait problem.   Neurological: Positive for weakness.     Objective:     Vital Signs (Most Recent):  Temp: 100.2 °F (37.9 °C) (02/03/20 1645)  Pulse: 85 (02/03/20 1608)  Resp: 18 (02/03/20 1608)  BP: 127/86 (02/03/20 1608)  SpO2: (!) 93 % (02/03/20 1608) Vital Signs (24h Range):  Temp:  [97.9 °F (36.6 °C)-102 °F (38.9 °C)] 100.2 °F (37.9 °C)  Pulse:  [] 85  Resp:  [18-20] 18  SpO2:  [89 %-96 %] 93 %  BP: ()/(60-86) 127/86     Weight: 95.6 kg (210 lb 12.2 oz)  Body mass index is 36.18 kg/m².    Intake/Output Summary (Last 24 hours) at 2/3/2020 1801  Last data filed at 2/3/2020 0854  Gross per 24 hour   Intake 600 ml   Output 2150 ml   Net -1550 ml      Physical Exam   Constitutional:  She is oriented to person, place, and time. She appears well-developed and well-nourished. No distress.   HENT:   Head: Normocephalic and atraumatic.   Eyes: Pupils are equal, round, and reactive to light. EOM are normal.   Neck: Normal range of motion. Neck supple.   Cardiovascular: Normal rate and regular rhythm.   Pulmonary/Chest: Effort normal. No respiratory distress. She has no wheezes. She has rales.   Abdominal: Soft. Bowel sounds are normal. She exhibits no distension. There is no tenderness.   Musculoskeletal: She exhibits edema and deformity.   Neurological: She is alert and oriented to person, place, and time.   Skin: Skin is warm and dry. Capillary refill takes 2 to 3 seconds.       Significant Labs: All pertinent labs within the past 24 hours have been reviewed.    Significant Imaging: I have reviewed and interpreted all pertinent imaging results/findings within the past 24 hours.      Assessment/Plan:      * Anasarca    IV diuresis  Daily weights  Fluid restriction  Monitor renal function   Albumin 2.6- contributing to fluid retention     D/w cardiology - input while inpatient and for dc planning   +/- diamox- review am labs, if contraction alkalosis worsening       Palliative care encounter    See dementia     Chronic heart failure with preserved ejection fraction  Pt presents with anasarca with concern from daughter that she needs more aggressive diuersis  Pt is not SOB  Followed by Dr. Kline - recent clinic visit 1/28  Lasix dose increased 20 to 40mg  Agree with plan for defined code status - d/w daughter again     ECHO: 1/1/20  Conclusion     · Normal left ventricular systolic function. The estimated ejection fraction is 70%  · Concentric left ventricular hypertrophy.  · Grade II (moderate) left ventricular diastolic dysfunction consistent with pseudonormalization.  · Normal right ventricular systolic function.  · Moderate-to-severe aortic valve stenosis.  · Aortic valve area is 0.64 cm2; peak  velocity is 3.86 m/s; mean gradient is 35 mmHg.  · Mild mitral regurgitation.  · Mild tricuspid regurgitation.  · The estimated PA systolic pressure is 70 mm Hg  · Pulmonary hypertension present.           Dementia without behavioral disturbance  No acute issues   Resume arin    Pt is DNR  Palliative care consulted   Daughter would like to discuss hospice options       Anemia of chronic disease  Pt has drop from one month ago hemoglobin 10  On eliquis   Monitor h/h  Transfuse accordingly  Check stool for occult blood     Mixed hyperlipidemia  Resume statin       Essential hypertension  BP controlled   on diltiazem CD  Metoprolol 200mg BID - listed on home med list       Chronic anticoagulation  Resume eliquis, but mindful that there is drop in h/h        Acquired hypothyroidism  Resume synthroid       Chronic atrial fibrillation  Rate controlled on BB   eliquis             VTE Risk Mitigation (From admission, onward)         Ordered     apixaban tablet 5 mg  2 times daily      01/30/20 2354     IP VTE HIGH RISK PATIENT  Once      01/30/20 2354     Reason for No Pharmacological VTE Prophylaxis  Once     Question:  Reasons:  Answer:  Already adequately anticoagulated on oral Anticoagulants    01/30/20 2354                      Anne Marie Kidd MD  Department of Hospital Medicine   [facility]

## 2020-02-05 PROBLEM — R50.9 FEVER: Status: ACTIVE | Noted: 2020-02-05

## 2020-02-05 LAB
ANION GAP SERPL CALC-SCNC: 9 MMOL/L (ref 8–16)
BASOPHILS # BLD AUTO: 0 K/UL (ref 0–0.2)
BASOPHILS NFR BLD: 0 % (ref 0–1.9)
BUN SERPL-MCNC: 26 MG/DL (ref 8–23)
CALCIUM SERPL-MCNC: 8.1 MG/DL (ref 8.7–10.5)
CHLORIDE SERPL-SCNC: 79 MMOL/L (ref 95–110)
CO2 SERPL-SCNC: 45 MMOL/L (ref 23–29)
CREAT SERPL-MCNC: 1.2 MG/DL (ref 0.5–1.4)
DIFFERENTIAL METHOD: ABNORMAL
EOSINOPHIL # BLD AUTO: 0.1 K/UL (ref 0–0.5)
EOSINOPHIL NFR BLD: 1.3 % (ref 0–8)
ERYTHROCYTE [DISTWIDTH] IN BLOOD BY AUTOMATED COUNT: 15.7 % (ref 11.5–14.5)
EST. GFR  (AFRICAN AMERICAN): 47 ML/MIN/1.73 M^2
EST. GFR  (NON AFRICAN AMERICAN): 41 ML/MIN/1.73 M^2
GLUCOSE SERPL-MCNC: 123 MG/DL (ref 70–110)
HCT VFR BLD AUTO: 29.3 % (ref 37–48.5)
HGB BLD-MCNC: 9.2 G/DL (ref 12–16)
IMM GRANULOCYTES # BLD AUTO: 0.05 K/UL (ref 0–0.04)
IMM GRANULOCYTES NFR BLD AUTO: 0.9 % (ref 0–0.5)
LYMPHOCYTES # BLD AUTO: 1.8 K/UL (ref 1–4.8)
LYMPHOCYTES NFR BLD: 31.4 % (ref 18–48)
MCH RBC QN AUTO: 26.1 PG (ref 27–31)
MCHC RBC AUTO-ENTMCNC: 31.4 G/DL (ref 32–36)
MCV RBC AUTO: 83 FL (ref 82–98)
MONOCYTES # BLD AUTO: 1.1 K/UL (ref 0.3–1)
MONOCYTES NFR BLD: 19.2 % (ref 4–15)
NEUTROPHILS # BLD AUTO: 2.6 K/UL (ref 1.8–7.7)
NEUTROPHILS NFR BLD: 47.2 % (ref 38–73)
NRBC BLD-RTO: 0 /100 WBC
PLATELET # BLD AUTO: 128 K/UL (ref 150–350)
PMV BLD AUTO: 11.4 FL (ref 9.2–12.9)
POCT GLUCOSE: 126 MG/DL (ref 70–110)
POCT GLUCOSE: 153 MG/DL (ref 70–110)
POCT GLUCOSE: 183 MG/DL (ref 70–110)
POCT GLUCOSE: 224 MG/DL (ref 70–110)
POTASSIUM SERPL-SCNC: 3.3 MMOL/L (ref 3.5–5.1)
RBC # BLD AUTO: 3.52 M/UL (ref 4–5.4)
SODIUM SERPL-SCNC: 133 MMOL/L (ref 136–145)
WBC # BLD AUTO: 5.57 K/UL (ref 3.9–12.7)

## 2020-02-05 PROCEDURE — 97530 THERAPEUTIC ACTIVITIES: CPT | Mod: CQ

## 2020-02-05 PROCEDURE — 25000003 PHARM REV CODE 250: Performed by: HOSPITALIST

## 2020-02-05 PROCEDURE — 97110 THERAPEUTIC EXERCISES: CPT

## 2020-02-05 PROCEDURE — 97110 THERAPEUTIC EXERCISES: CPT | Mod: CQ

## 2020-02-05 PROCEDURE — 25000003 PHARM REV CODE 250: Performed by: EMERGENCY MEDICINE

## 2020-02-05 PROCEDURE — 80048 BASIC METABOLIC PNL TOTAL CA: CPT

## 2020-02-05 PROCEDURE — 11000001 HC ACUTE MED/SURG PRIVATE ROOM

## 2020-02-05 PROCEDURE — 85025 COMPLETE CBC W/AUTO DIFF WBC: CPT

## 2020-02-05 PROCEDURE — 36415 COLL VENOUS BLD VENIPUNCTURE: CPT

## 2020-02-05 RX ADMIN — APIXABAN 5 MG: 5 TABLET, FILM COATED ORAL at 10:02

## 2020-02-05 RX ADMIN — GABAPENTIN 300 MG: 300 CAPSULE ORAL at 10:02

## 2020-02-05 RX ADMIN — FUROSEMIDE 20 MG: 20 TABLET ORAL at 05:02

## 2020-02-05 RX ADMIN — MEMANTINE HYDROCHLORIDE 5 MG: 5 TABLET ORAL at 10:02

## 2020-02-05 RX ADMIN — LEVOTHYROXINE SODIUM 137 MCG: 25 TABLET ORAL at 06:02

## 2020-02-05 RX ADMIN — MEMANTINE HYDROCHLORIDE 5 MG: 5 TABLET ORAL at 08:02

## 2020-02-05 RX ADMIN — METOPROLOL TARTRATE 200 MG: 50 TABLET, FILM COATED ORAL at 10:02

## 2020-02-05 RX ADMIN — GABAPENTIN 300 MG: 300 CAPSULE ORAL at 08:02

## 2020-02-05 RX ADMIN — AMOXICILLIN AND CLAVULANATE POTASSIUM 500 MG: 250; 62.5 POWDER, FOR SUSPENSION ORAL at 10:02

## 2020-02-05 RX ADMIN — INSULIN ASPART 2 UNITS: 100 INJECTION, SOLUTION INTRAVENOUS; SUBCUTANEOUS at 05:02

## 2020-02-05 RX ADMIN — POLYETHYLENE GLYCOL 3350 17 G: 17 POWDER, FOR SOLUTION ORAL at 10:02

## 2020-02-05 RX ADMIN — FLUTICASONE PROPIONATE 50 MCG: 50 SPRAY, METERED NASAL at 10:02

## 2020-02-05 RX ADMIN — ESCITALOPRAM OXALATE 10 MG: 10 TABLET ORAL at 10:02

## 2020-02-05 RX ADMIN — FUROSEMIDE 20 MG: 20 TABLET ORAL at 10:02

## 2020-02-05 RX ADMIN — SIMVASTATIN 40 MG: 40 TABLET, FILM COATED ORAL at 08:02

## 2020-02-05 RX ADMIN — APIXABAN 5 MG: 5 TABLET, FILM COATED ORAL at 08:02

## 2020-02-05 RX ADMIN — DILTIAZEM HYDROCHLORIDE 180 MG: 180 CAPSULE, COATED, EXTENDED RELEASE ORAL at 10:02

## 2020-02-05 RX ADMIN — MELATONIN 2000 UNITS: at 10:02

## 2020-02-05 NOTE — ASSESSMENT & PLAN NOTE
Started on Augmentin for possible UTI, but UA looks pretty benign.  Negative BCx.  CXR with left lower lobe opacity.  Patient does complain of cough.  Will stop Augmentin and start on Rocephin for possible aspiration pneumonitis.  Will get ST evaluation.

## 2020-02-05 NOTE — PT/OT/SLP PROGRESS
Physical Therapy Treatment    Patient Name:  Vashti Muñoz   MRN:  [id number]    Recommendations:     Discharge Recommendations:  (Return to SNf vs return to NH with PT)   Discharge Equipment Recommendations: none   Barriers to discharge: None    Assessment:     Vashti Muñoz is a 87 y.o. female admitted with a medical diagnosis of Anasarca.  She presents with the following impairments/functional limitations:  weakness, impaired endurance, decreased lower extremity function, pain, decreased safety awareness, gait instability, impaired balance, impaired functional mobilty, impaired self care skills, impaired cognition, decreased coordination, edema, impaired coordination, decreased upper extremity function, decreased ROM, impaired cardiopulmonary response to activity .    Rehab Prognosis: Fair; patient would benefit from acute skilled PT services to address these deficits and reach maximum level of function.    Recent Surgery: * No surgery found *      Plan:     During this hospitalization, patient to be seen 3 x/week to address the identified rehab impairments via gait training, therapeutic activities, therapeutic exercises and progress toward the following goals:    · Plan of Care Expires:  02/07/20    Subjective     Chief Complaint: weakness and fatigue   Patient/Family Comments/goals: pt agreeable to treatment   Pain/Comfort:  · Pain Rating 1: 0/10  · Pain Rating Post-Intervention 1: 0/10      Objective:     Communicated with nurse  prior to session.  Patient found up in chair with telemetry, oxygen, le catheter upon PT entry to room.     General Precautions: Standard, fall, respiratory   Orthopedic Precautions:N/A   Braces: N/A     Functional Mobility:  · Transfers:     · Sit to Stand: x 2 trials from chair maximal assistance with rolling walker. Pt unable to achieve upright standing despite max V/T cues .       AM-PAC 6 CLICK MOBILITY  Turning over in bed (including adjusting bedclothes, sheets and  blankets)?: 3  Sitting down on and standing up from a chair with arms (e.g., wheelchair, bedside commode, etc.): 2  Moving from lying on back to sitting on the side of the bed?: 3  Moving to and from a bed to a chair (including a wheelchair)?: 2  Need to walk in hospital room?: 2  Climbing 3-5 steps with a railing?: 1  Basic Mobility Total Score: 13       Therapeutic Activities and Exercises:   Pt performed seated BLE 10 reps x 2 trials:   AP, LAQ, HS,  Hip abd/add with pillow , Hip flexion and seated BLE x 10 reps in reclined chair : AP, QS, GS . V/T's cues for technique and sequence. Pt required seated rest breaks throughout treatment 2* fatigue and SOB.VC's for pursed lip breathing technique .     Patient left up in chair with all lines intact, call button in reach, nurse notified and daughter present..    GOALS:   Multidisciplinary Problems     Physical Therapy Goals        Problem: Physical Therapy Goal    Goal Priority Disciplines Outcome Goal Variances Interventions   Physical Therapy Goal     PT, PT/OT Ongoing, Progressing     Description:  Goals to be met by: 2020     Patient will increase functional independence with mobility by performin. Sit to stand transfer with Stand-by Assistance  2. Gait  x 25-50 feet with Stand-by Assistance using Rolling Walker.   3. Perform B LE ther ex per handout x 10 reps with assistance as needed                    Time Tracking:     PT Received On: 20  PT Start Time: 1145     PT Stop Time: 1209  PT Total Time (min): 24 min     Billable Minutes: Therapeutic Activity 8 and Therapeutic Exercise 16    Treatment Type: Treatment  PT/PTA: PTA     PTA Visit Number: 3     Lea Cheney PTA  2020

## 2020-02-05 NOTE — PLAN OF CARE
Problem: Physical Therapy Goal  Goal: Physical Therapy Goal  Description  Goals to be met by: 2020     Patient will increase functional independence with mobility by performin. Sit to stand transfer with Stand-by Assistance  2. Gait  x 25-50 feet with Stand-by Assistance using Rolling Walker.   3. Perform B LE ther ex per handout x 10 reps with assistance as needed   Outcome: Ongoing, Progressing   Pt required more assistance with mobility today 2* fatigue and weakness.  Pt able to perform BLE ex's with min A.

## 2020-02-05 NOTE — NURSING
Will give scheduled oral lopressor now.    2004  BRITTANY Sahni notified of heart rate and at bedside. Give oral lopressor.

## 2020-02-05 NOTE — PROGRESS NOTES
[facility] Hospital Medicine  Progress Note    Patient Name: Vashti Muñoz  MRN: [id number]  Patient Class: IP- Inpatient   Admission Date: 1/30/2020  Length of Stay: 6 days  Attending Physician: Paul Perez MD  Primary Care Provider: Alesha Baum MD        Subjective:     Principal Problem:Anasarca        HPI:  Vashti Muñoz is a 87 y.o. female that (in part)  has a past medical history of Acute renal failure, Allergy, Anemia of chronic disease, Anticoagulant long-term use, Anxiety, Arthritis, Atrial fibrillation, Cataract, Closed head injury, Clotting disorder, Depression, Diabetes mellitus, Encounter for blood transfusion, Fall, GERD (gastroesophageal reflux disease), Heart murmur, [place] (hard of hearing), Hypertension, Thyroid disease, and Uses roller walker.  has a past surgical history that includes Hysterectomy; Tonsillectomy; Total hip arthroplasty (Left, 2004); Joint replacement (Right); and Injection of facet joint (Bilateral, 4/5/2019). Presents to [facility] Emergency Department from her nursing home with report of acute on chronic edema with progressive worsening.  Involves bilateral lower extremities as well as upper extremities to the level of her hands and wrist.  Marked unintentional weight gain.  Has pain in all extremities due to the edema. Also complaining of palpitations.  Reports compliance with home medication regimen.  She has dementia.  Therefore the history is somewhat limited.  Four weeks ago she was admitted to the hospital and stayed for 8 days for treatment of heart failure and atrial fibrillation.  She was started on supplemental oxygen and Lasix at that time prior to discharge.  Patient saw her cardiologist 2 days ago in clinic for follow-up for CHF, atrial fibrillation, and aortic stenosis.  Dr. Kline increased Lasix from 20-40 mg twice a day for 1-2 weeks.    In the emergency department routine laboratory studies EKG, cardiac  enzymes, and chest x-ray was performed.  On physical exam patient had evidence of anasarca.  Hypoalbuminemia was noted as well as a history of heart failure.  She was given Lasix intravenously and albumin in the ED.  She is being admitted for acute on chronic heart failure, anasarca, and need for diuresis.    Hospital medicine has been asked to admit to inpatient for further evaluation and treatment.     Overview/Hospital Course:  Pt admitted with acute on chronic CHF and anasarca. Pt reports increased dose of lasix this week and now requiring IV diuresis. Pt poor historian - info taken from records.     2/1- UOP 5 liters - monitoring renal function   2/2- 9 liters UOP, daughter at bedside, discussed concern that serum CO2 increasing and causing contraction alkalosis , cardiology consulted   2/3- pt spiked fever - blood culture pending, recent UTI and still has infectious cells on UA-trial of augmentin. AFib rate controlled, palliative care consulted.  Again febrile overnight.    Interval History: Doing well with no complaints.    Review of Systems   HENT: Negative for ear discharge and ear pain.    Eyes: Negative for discharge and itching.   Endocrine: Negative for cold intolerance and heat intolerance.   Neurological: Negative for seizures and syncope.     Objective:     Vital Signs (Most Recent):  Temp: (!) 100.7 °F (38.2 °C) (02/05/20 1559)  Pulse: 71 (02/05/20 1559)  Resp: 20 (02/05/20 1559)  BP: (!) 107/55 (02/05/20 1559)  SpO2: (!) 94 % (02/05/20 1559) Vital Signs (24h Range):  Temp:  [98.3 °F (36.8 °C)-101.1 °F (38.4 °C)] 100.7 °F (38.2 °C)  Pulse:  [] 71  Resp:  [18-20] 20  SpO2:  [94 %-97 %] 94 %  BP: ()/(55-93) 107/55     Weight: 95.6 kg (210 lb 12.2 oz)  Body mass index is 36.18 kg/m².    Intake/Output Summary (Last 24 hours) at 2/5/2020 1720  Last data filed at 2/5/2020 1055  Gross per 24 hour   Intake 420 ml   Output 1400 ml   Net -980 ml      Physical Exam   Constitutional: She is oriented  to person, place, and time. She appears well-developed and well-nourished. No distress.   HENT:   Head: Normocephalic and atraumatic.   Eyes: Pupils are equal, round, and reactive to light. EOM are normal.   Neck: Normal range of motion. Neck supple.   Cardiovascular: Normal rate and regular rhythm.   Pulmonary/Chest: Effort normal. No respiratory distress. She has no wheezes. She has rales.   Abdominal: Soft. Bowel sounds are normal. She exhibits no distension. There is no tenderness.   Musculoskeletal: She exhibits edema and deformity.   Neurological: She is alert and oriented to person, place, and time.   Skin: Skin is warm and dry. Capillary refill takes 2 to 3 seconds.       Significant Labs:   BMP:   Recent Labs   Lab 02/05/20  0437   *   *   K 3.3*   CL 79*   CO2 45*   BUN 26*   CREATININE 1.2   CALCIUM 8.1*     CBC:   Recent Labs   Lab 02/04/20  0402 02/05/20  0437   WBC 6.77 5.57   HGB 9.5* 9.2*   HCT 31.9* 29.3*   * 128*           Assessment/Plan:      * Anasarca  IV diuresis  Daily weights  Fluid restriction  Monitor renal function   Albumin 2.6- contributing to fluid retention   Appreciate Cards input.  Great urine output.  Changed Lasix to PO.        Fever  Started on Augmentin for possible UTI, but UA looks pretty benign.  Negative BCx.  CXR with left lower lobe opacity.  Patient does complain of cough.  Will stop Augmentin and start on Rocephin for possible aspiration pneumonitis.  Will get ST evaluation.    Palliative care encounter    See dementia     Chronic heart failure with preserved ejection fraction  Pt presents with anasarca with concern from daughter that she needs more aggressive diuersis  Pt is not SOB  Followed by Dr. Kline - recent clinic visit 1/28  Lasix dose increased 20 to 40mg  Agree with plan for defined code status - d/w daughter again     ECHO: 1/1/20  Conclusion     · Normal left ventricular systolic function. The estimated ejection fraction is  70%  · Concentric left ventricular hypertrophy.  · Grade II (moderate) left ventricular diastolic dysfunction consistent with pseudonormalization.  · Normal right ventricular systolic function.  · Moderate-to-severe aortic valve stenosis.  · Aortic valve area is 0.64 cm2; peak velocity is 3.86 m/s; mean gradient is 35 mmHg.  · Mild mitral regurgitation.  · Mild tricuspid regurgitation.  · The estimated PA systolic pressure is 70 mm Hg  · Pulmonary hypertension present.           Dementia without behavioral disturbance  No acute issues   Resume arin    Pt is DNR  Palliative care consulted       Anemia of chronic disease  Pt has drop from one month ago hemoglobin 10  On eliquis   Monitor H/H    Nonrheumatic aortic valve stenosis        Mixed hyperlipidemia  Resume statin       Essential hypertension  BP controlled   on diltiazem CD  Metoprolol 200mg BID - listed on home med list       Chronic anticoagulation  Resume eliquis        Acquired hypothyroidism  Resume synthroid       Chronic atrial fibrillation  Rate controlled on BB   eliquis             VTE Risk Mitigation (From admission, onward)         Ordered     apixaban tablet 5 mg  2 times daily      01/30/20 2354     IP VTE HIGH RISK PATIENT  Once      01/30/20 2354     Reason for No Pharmacological VTE Prophylaxis  Once     Question:  Reasons:  Answer:  Already adequately anticoagulated on oral Anticoagulants    01/30/20 2354                      Paul Perez MD  Department of Hospital Medicine   [facility]

## 2020-02-05 NOTE — ASSESSMENT & PLAN NOTE
IV diuresis  Daily weights  Fluid restriction  Monitor renal function   Albumin 2.6- contributing to fluid retention   Appreciate Cards input.  Great urine output.  Changed Lasix to PO.

## 2020-02-05 NOTE — SUBJECTIVE & OBJECTIVE
Interval History: Doing well with no complaints.    Review of Systems   HENT: Negative for ear discharge and ear pain.    Eyes: Negative for discharge and itching.   Endocrine: Negative for cold intolerance and heat intolerance.   Neurological: Negative for seizures and syncope.     Objective:     Vital Signs (Most Recent):  Temp: (!) 100.7 °F (38.2 °C) (02/05/20 1559)  Pulse: 71 (02/05/20 1559)  Resp: 20 (02/05/20 1559)  BP: (!) 107/55 (02/05/20 1559)  SpO2: (!) 94 % (02/05/20 1559) Vital Signs (24h Range):  Temp:  [98.3 °F (36.8 °C)-101.1 °F (38.4 °C)] 100.7 °F (38.2 °C)  Pulse:  [] 71  Resp:  [18-20] 20  SpO2:  [94 %-97 %] 94 %  BP: ()/(55-93) 107/55     Weight: 95.6 kg (210 lb 12.2 oz)  Body mass index is 36.18 kg/m².    Intake/Output Summary (Last 24 hours) at 2/5/2020 1720  Last data filed at 2/5/2020 1055  Gross per 24 hour   Intake 420 ml   Output 1400 ml   Net -980 ml      Physical Exam   Constitutional: She is oriented to person, place, and time. She appears well-developed and well-nourished. No distress.   HENT:   Head: Normocephalic and atraumatic.   Eyes: Pupils are equal, round, and reactive to light. EOM are normal.   Neck: Normal range of motion. Neck supple.   Cardiovascular: Normal rate and regular rhythm.   Pulmonary/Chest: Effort normal. No respiratory distress. She has no wheezes. She has rales.   Abdominal: Soft. Bowel sounds are normal. She exhibits no distension. There is no tenderness.   Musculoskeletal: She exhibits edema and deformity.   Neurological: She is alert and oriented to person, place, and time.   Skin: Skin is warm and dry. Capillary refill takes 2 to 3 seconds.       Significant Labs:   BMP:   Recent Labs   Lab 02/05/20  0437   *   *   K 3.3*   CL 79*   CO2 45*   BUN 26*   CREATININE 1.2   CALCIUM 8.1*     CBC:   Recent Labs   Lab 02/04/20  0402 02/05/20  0437   WBC 6.77 5.57   HGB 9.5* 9.2*   HCT 31.9* 29.3*   * 128*

## 2020-02-06 LAB
ANION GAP SERPL CALC-SCNC: 10 MMOL/L (ref 8–16)
BASOPHILS # BLD AUTO: 0.01 K/UL (ref 0–0.2)
BASOPHILS NFR BLD: 0.2 % (ref 0–1.9)
BUN SERPL-MCNC: 30 MG/DL (ref 8–23)
CALCIUM SERPL-MCNC: 8 MG/DL (ref 8.7–10.5)
CHLORIDE SERPL-SCNC: 79 MMOL/L (ref 95–110)
CO2 SERPL-SCNC: 43 MMOL/L (ref 23–29)
CREAT SERPL-MCNC: 1.2 MG/DL (ref 0.5–1.4)
DIFFERENTIAL METHOD: ABNORMAL
EOSINOPHIL # BLD AUTO: 0 K/UL (ref 0–0.5)
EOSINOPHIL NFR BLD: 0.2 % (ref 0–8)
ERYTHROCYTE [DISTWIDTH] IN BLOOD BY AUTOMATED COUNT: 15.6 % (ref 11.5–14.5)
EST. GFR  (AFRICAN AMERICAN): 47 ML/MIN/1.73 M^2
EST. GFR  (NON AFRICAN AMERICAN): 41 ML/MIN/1.73 M^2
GLUCOSE SERPL-MCNC: 108 MG/DL (ref 70–110)
HCT VFR BLD AUTO: 26.8 % (ref 37–48.5)
HGB BLD-MCNC: 8.3 G/DL (ref 12–16)
IMM GRANULOCYTES # BLD AUTO: 0.03 K/UL (ref 0–0.04)
IMM GRANULOCYTES NFR BLD AUTO: 0.5 % (ref 0–0.5)
LYMPHOCYTES # BLD AUTO: 2 K/UL (ref 1–4.8)
LYMPHOCYTES NFR BLD: 33.8 % (ref 18–48)
MCH RBC QN AUTO: 25.6 PG (ref 27–31)
MCHC RBC AUTO-ENTMCNC: 31 G/DL (ref 32–36)
MCV RBC AUTO: 83 FL (ref 82–98)
MONOCYTES # BLD AUTO: 1 K/UL (ref 0.3–1)
MONOCYTES NFR BLD: 17.6 % (ref 4–15)
NEUTROPHILS # BLD AUTO: 2.8 K/UL (ref 1.8–7.7)
NEUTROPHILS NFR BLD: 47.7 % (ref 38–73)
NRBC BLD-RTO: 0 /100 WBC
PLATELET # BLD AUTO: 112 K/UL (ref 150–350)
PMV BLD AUTO: 10.9 FL (ref 9.2–12.9)
POCT GLUCOSE: 114 MG/DL (ref 70–110)
POCT GLUCOSE: 179 MG/DL (ref 70–110)
POCT GLUCOSE: 233 MG/DL (ref 70–110)
POTASSIUM SERPL-SCNC: 3 MMOL/L (ref 3.5–5.1)
RBC # BLD AUTO: 3.24 M/UL (ref 4–5.4)
SODIUM SERPL-SCNC: 132 MMOL/L (ref 136–145)
WBC # BLD AUTO: 5.92 K/UL (ref 3.9–12.7)

## 2020-02-06 PROCEDURE — 25000003 PHARM REV CODE 250: Performed by: HOSPITALIST

## 2020-02-06 PROCEDURE — 63600175 PHARM REV CODE 636 W HCPCS: Performed by: HOSPITALIST

## 2020-02-06 PROCEDURE — C1751 CATH, INF, PER/CENT/MIDLINE: HCPCS

## 2020-02-06 PROCEDURE — 97110 THERAPEUTIC EXERCISES: CPT | Mod: CQ

## 2020-02-06 PROCEDURE — 11000001 HC ACUTE MED/SURG PRIVATE ROOM

## 2020-02-06 PROCEDURE — 25000003 PHARM REV CODE 250: Performed by: EMERGENCY MEDICINE

## 2020-02-06 PROCEDURE — 99233 SBSQ HOSP IP/OBS HIGH 50: CPT | Mod: ,,, | Performed by: NURSE PRACTITIONER

## 2020-02-06 PROCEDURE — 80048 BASIC METABOLIC PNL TOTAL CA: CPT

## 2020-02-06 PROCEDURE — 92610 EVALUATE SWALLOWING FUNCTION: CPT

## 2020-02-06 PROCEDURE — 85025 COMPLETE CBC W/AUTO DIFF WBC: CPT

## 2020-02-06 PROCEDURE — 97110 THERAPEUTIC EXERCISES: CPT

## 2020-02-06 PROCEDURE — 36410 VNPNXR 3YR/> PHY/QHP DX/THER: CPT

## 2020-02-06 PROCEDURE — 99233 PR SUBSEQUENT HOSPITAL CARE,LEVL III: ICD-10-PCS | Mod: ,,, | Performed by: NURSE PRACTITIONER

## 2020-02-06 PROCEDURE — 36415 COLL VENOUS BLD VENIPUNCTURE: CPT

## 2020-02-06 RX ORDER — POTASSIUM CHLORIDE 20 MEQ/1
40 TABLET, EXTENDED RELEASE ORAL ONCE
Status: COMPLETED | OUTPATIENT
Start: 2020-02-06 | End: 2020-02-06

## 2020-02-06 RX ADMIN — MEMANTINE HYDROCHLORIDE 5 MG: 5 TABLET ORAL at 08:02

## 2020-02-06 RX ADMIN — MEMANTINE HYDROCHLORIDE 5 MG: 5 TABLET ORAL at 09:02

## 2020-02-06 RX ADMIN — MELATONIN 2000 UNITS: at 09:02

## 2020-02-06 RX ADMIN — ESCITALOPRAM OXALATE 10 MG: 10 TABLET ORAL at 09:02

## 2020-02-06 RX ADMIN — GABAPENTIN 300 MG: 300 CAPSULE ORAL at 09:02

## 2020-02-06 RX ADMIN — APIXABAN 5 MG: 5 TABLET, FILM COATED ORAL at 09:02

## 2020-02-06 RX ADMIN — METOPROLOL TARTRATE 200 MG: 50 TABLET, FILM COATED ORAL at 08:02

## 2020-02-06 RX ADMIN — GABAPENTIN 300 MG: 300 CAPSULE ORAL at 08:02

## 2020-02-06 RX ADMIN — FUROSEMIDE 20 MG: 20 TABLET ORAL at 09:02

## 2020-02-06 RX ADMIN — FLUTICASONE PROPIONATE 50 MCG: 50 SPRAY, METERED NASAL at 09:02

## 2020-02-06 RX ADMIN — POTASSIUM CHLORIDE 40 MEQ: 1500 TABLET, EXTENDED RELEASE ORAL at 04:02

## 2020-02-06 RX ADMIN — SIMVASTATIN 40 MG: 40 TABLET, FILM COATED ORAL at 08:02

## 2020-02-06 RX ADMIN — APIXABAN 5 MG: 5 TABLET, FILM COATED ORAL at 08:02

## 2020-02-06 RX ADMIN — DILTIAZEM HYDROCHLORIDE 180 MG: 180 CAPSULE, COATED, EXTENDED RELEASE ORAL at 09:02

## 2020-02-06 RX ADMIN — LEVOTHYROXINE SODIUM 137 MCG: 25 TABLET ORAL at 06:02

## 2020-02-06 RX ADMIN — CEFTRIAXONE 1 G: 1 INJECTION, SOLUTION INTRAVENOUS at 04:02

## 2020-02-06 RX ADMIN — FUROSEMIDE 20 MG: 20 TABLET ORAL at 06:02

## 2020-02-06 RX ADMIN — INSULIN ASPART 2 UNITS: 100 INJECTION, SOLUTION INTRAVENOUS; SUBCUTANEOUS at 11:02

## 2020-02-06 NOTE — PLAN OF CARE
Problem: Occupational Therapy Goal  Goal: Occupational Therapy Goal  Description  Goals to be met by: 02/14/20    Patient will increase functional independence with ADLs by performing:    Feeding with Set-up Assistance.  UE Dressing with Set-up Assistance.  Grooming while seated with Set-up Assistance.  Toileting from bedside commode with Contact Guard Assistance for hygiene and clothing management.   Rolling to Bilateral with Stand-by Assistance.   Supine to sit with Stand-by Assistance.  Step transfer with Stand-by Assistance  Toilet transfer to bedside commode with Stand-by Assistance.  Upper extremity exercise program x15 reps per handout, with assistance as needed.     Outcome: Ongoing, Not Progressing   Pt was just helped back to bed using Olivia Abdalla with 3 nursing staff assist d/t increased weakness within the hour before OT arrival. Pt agreeable to BUE AROM with HOB elevated.

## 2020-02-06 NOTE — PT/OT/SLP PROGRESS
Occupational Therapy   Treatment    Name: Vashti Muñoz  MRN: [id number]  Admitting Diagnosis:  Anasarca       Recommendations:     Discharge Recommendations: (SNF vs. return to NH with OT)  Discharge Equipment Recommendations:  (may need stan lift and hospital bed )  Barriers to discharge:  (increased assist for functional transfers )    Assessment:     Vashti Muñoz is a 87 y.o. female with a medical diagnosis of Anasarca.  Performance deficits affecting function are gait instability, weakness, decreased upper extremity function, decreased ROM, impaired cardiopulmonary response to activity, impaired endurance, impaired balance, decreased lower extremity function, decreased safety awareness, impaired fine motor, impaired self care skills, impaired cognition, pain, impaired skin, impaired functional mobilty, decreased coordination, edema.     Pt was just helped back to bed using Olivia Abdalla with 3 nursing staff assist d/t increased weakness within the hour before OT arrival. Pt agreeable to BUE AROM with HOB elevated.     Rehab Prognosis:  Fair; patient would benefit from acute skilled OT services to address these deficits and reach maximum level of function.       Plan:     Patient to be seen 5 x/week to address the above listed problems via self-care/home management, therapeutic activities, therapeutic exercises  · Plan of Care Expires: 02/14/20  · Plan of Care Reviewed with: patient, sibling    Subjective     Chief complaint: weak and tired   Patient's comments/goals: pt more interactive today compared to 02/03/20 session; agreeable to do BUE AROM only in bed- did not want to sit EOB with OT    Pain/Comfort:  · Pain Rating 1: (not rated)  · Location 1: (L arm)  · Pain Addressed 1: Reposition, Cessation of Activity    Objective:     Communicated with: Patient found HOB elevated with bed alarm, telemetry, oxygen, peripheral IV, le catheter upon OT entry to room.    General Precautions: Standard, fall,  respiratory, hearing impaired   Orthopedic Precautions:N/A   Braces:   N/A    Occupational Performance:     Bed Mobility:    · NT; pt just got back to bed with nursing (3 staff members)    Functional Mobility/Transfers:  · Functional Mobility: NT    Activities of Daily Living:  · NT      AMPAC 6 Click ADL:  13    Treatment & Education:  · Pt completed 1 x 10 BUE AROM in supine/HOB elevated position:  · Digits I-V flexion/extension   · Wrist flexion/extension  · Forearm pronation/supination  · Elbow flexion/extension  · Shoulder flexion/extension  · LUE AROM flexion to ~90*   · Forward punches  · Pt required rest breaks in between sets and in between some reps d/t fatigue   · Pt encouraged to implement pursed lip breathing- OT demo'd and pt demo'd back well   · Pt received handout on eval day with board outlining for staff/family to encourage daily arm exercises; handout left on lap tray for reminder to pt/family    · Pt encouraged to complete 2x10 reps a day      Patient left HOB elevated and LUE elevated with all lines intact, call button in reach, bed alarm on, sister present and all needs within reachEducation:      GOALS:   Multidisciplinary Problems     Occupational Therapy Goals        Problem: Occupational Therapy Goal    Goal Priority Disciplines Outcome Interventions   Occupational Therapy Goal     OT, PT/OT Ongoing, Not Progressing    Description:  Goals to be met by: 02/14/20    Patient will increase functional independence with ADLs by performing:    Feeding with Set-up Assistance.  UE Dressing with Set-up Assistance.  Grooming while seated with Set-up Assistance.  Toileting from bedside commode with Contact Guard Assistance for hygiene and clothing management.   Rolling to Bilateral with Stand-by Assistance.   Supine to sit with Stand-by Assistance.  Step transfer with Stand-by Assistance  Toilet transfer to bedside commode with Stand-by Assistance.  Upper extremity exercise program x15 reps per  handout, with assistance as needed.                      Time Tracking:     OT Date of Treatment: 02/05/20  OT Start Time: 1631  OT Stop Time: 1644  OT Total Time (min): 13 min    Billable Minutes:Therapeutic Exercise 13 min    Tonie Mandujano OT  2/6/2020

## 2020-02-06 NOTE — PT/OT/SLP EVAL
Speech Language Pathology Evaluation  Bedside Swallow/Discharge    Patient Name:  Vashti Muñoz   MRN:  [id number]  Admitting Diagnosis: Anasarca    Recommendations:                 General Recommendations:  Follow-up not indicated  Diet recommendations:  Regular, Thin   Aspiration Precautions: 1 bite/sip at a time, Alternating bites/sips, Meds whole 1 at a time, Small bites/sips and Standard aspiration precautions   General Precautions: Standard, other (see comments)([place])  Communication strategies:  speak loudly, pt [place]    History:     Past Medical History:   Diagnosis Date    Acute renal failure 1/1/2020    Allergy     Anemia of chronic disease 1/1/2020    Anticoagulant long-term use     Coumadin    Anxiety     Arthritis     Atrial fibrillation     Cataract     Closed head injury 09/10/2018    Clotting disorder     Depression     Diabetes mellitus     Encounter for blood transfusion     Fall 09/10/2018    GERD (gastroesophageal reflux disease)     Heart murmur     [place] (hard of hearing)     wears bilateral hearing aids    Hypertension     Thyroid disease     Uses roller walker        Past Surgical History:   Procedure Laterality Date    HYSTERECTOMY      INJECTION OF FACET JOINT Bilateral 4/5/2019    Procedure: INJECTION, FACET JOINT, L4-L5;  Surgeon: Clayton Valle MD;  [facility];  Service: Pain Management;  Laterality: Bilateral;    JOINT REPLACEMENT Right     Hip    TONSILLECTOMY      TOTAL HIP ARTHROPLASTY Left 2004       Social History: Patient lives [facility]    Modified Barium Swallow: none on file    Chest X-Rays: 2/5/2020 There is modest patchy heterogeneous opacity in the medial aspect of the left lower lung zone obscuring the medial aspect of the left hemidiaphragm in the lateral margin of the descending thoracic aorta. I suspect this represents compressive atelectasis from overlying cardiac structures although aspiration or pneumonia could present in a  similar fashion.  Source of the patient's fever is not definitely established on this examination.    Prior diet: unrestricted      Subjective     Pt awake in bed upon SLP arrival. Pt extremely [place]. Pt denied swallowing problems at the NH.    Patient goals: none stated    Pain/Comfort:  · Pain Rating 1: 0/10    Objective:     Oral Musculature Evaluation  · Oral Musculature: WFL  · Dentition: upper dentures, lower dentures  · Secretion Management: adequate  · Mucosal Quality: good  · Mandibular Strength and Mobility: WFL  · Oral Labial Strength and Mobility: WFL  · Lingual Strength and Mobility: WFL  · Velar Elevation: WFL  · Buccal Strength and Mobility: WFL  · Voice Prior to PO Intake: clear vocal quality  · Oral Musculature Comments: labial & lingual musculature is WFL for mastication & deglutition    Bedside Swallow Eval:   Consistencies Assessed:  · Thin liquids via cup x2 and straw x5 self presented  · Puree x5 trials  · Solids x4 trials     Oral Phase:   · Prolonged mastication with solids    Pharyngeal Phase:   · no overt clinical signs/symptoms of aspiration    Compensatory Strategies  · None    Treatment: Rec: con't current diet. No follow up is indicated at this time.Pt educated on safe swallowing strategies in which she verbalized understanding of education provided. 1 bite/sip at a time, Alternating bites/sips, Meds whole 1 at a time, Small bites/sips.    Assessment:     Vashti Muñoz is a 87 y.o. female with dx of Anasarca. Pt presents with functional swallow. No over s/s of aspiration observed. No follow-up indicated at this time.    Goals:   Multidisciplinary Problems     SLP Goals     Not on file          Multidisciplinary Problems (Resolved)        Problem: SLP Goal    Goal Priority Disciplines Outcome   SLP Goal   (Resolved)    Low SLP Met   Description:  ST. Pt will participate in swallow eval to determine least restrictive diet without overt s/s of aspiration. - GOAL met 2020                     Plan:     · Patient to be seen:      · Plan of Care expires:  02/06/20  · Plan of Care reviewed with:  patient   · SLP Follow-Up:  No       Discharge recommendations:      Barriers to Discharge:  None    Time Tracking:     SLP Treatment Date:   02/06/20  Speech Start Time:  0917  Speech Stop Time:  0933     Speech Total Time (min):  16 min    Billable Minutes: Eval Swallow and Oral Function 16 min    Shagufta Palmer CCC-SLP  02/06/2020

## 2020-02-06 NOTE — NURSING
Patient is A&Ox3 with some disorientation to time and makes needs known. Patient remained free from falls during this shift. Patient was able to get up to the chair with 2 person assist, but was unable to get back in the bed due to being unable to stand.  We were able to use the Odalis steady device with 3 person assist to get the patient back in bed safely. Patient had le catheter removed per MD order. Patient's IV was not able to to be accessed and was removed, but a new IV was unable to be started.  Charge nurse has notified the house supervisor who will attempt to start a new IV during night shift.  Patient O2 via nasal cannula at 1L per minute. VSS. No acute distress noted at this time. Bed in low position, bed alarm on, call bell within reach.

## 2020-02-06 NOTE — PT/OT/SLP PROGRESS
"Physical Therapy Treatment    Patient Name:  Vashti Muñoz   MRN:  [id number]    Recommendations:     Discharge Recommendations:  (Return to SNf vs return to NH with PT)   Discharge Equipment Recommendations: none   Barriers to discharge: None    Assessment:     Vashti Muñoz is a 87 y.o. female admitted with a medical diagnosis of Anasarca.  She presents with the following impairments/functional limitations:  weakness, impaired endurance, impaired self care skills, impaired balance, gait instability, decreased upper extremity function, decreased lower extremity function, decreased ROM, impaired functional mobilty, impaired cognition, decreased safety awareness, decreased coordination, pain, edema, impaired cardiopulmonary response to activity .Pt able to perform BLE ex's in chair and chair push up with B arm rests and Max A x 2 persons. Patient required frequent rest breaks throughout treatment 2* fatigue.     Rehab Prognosis: Fair; patient would benefit from acute skilled PT services to address these deficits and reach maximum level of function.    Recent Surgery: * No surgery found *      Plan:     During this hospitalization, patient to be seen 3 x/week to address the identified rehab impairments via gait training, therapeutic activities, therapeutic exercises and progress toward the following goals:    · Plan of Care Expires:  02/07/20    Subjective     Chief Complaint: fatigue   Patient/Family Comments/goals: " I am tired" . Pt agreeable to treatment  Pain/Comfort:  · Pain Rating 1: 0/10  · Pain Rating Post-Intervention 1: 0/10      Objective:     Communicated with nurse prior to session.  Patient found up in chair with telemetry, oxygen upon PT entry to room.     General Precautions: Standard, fall, respiratory   Orthopedic Precautions:N/A   Braces: N/A       AM-PAC 6 CLICK MOBILITY  Turning over in bed (including adjusting bedclothes, sheets and blankets)?: 2  Sitting down on and standing up from a chair " with arms (e.g., wheelchair, bedside commode, etc.): 2  Moving from lying on back to sitting on the side of the bed?: 2  Moving to and from a bed to a chair (including a wheelchair)?: 2  Need to walk in hospital room?: 2  Climbing 3-5 steps with a railing?: 1  Basic Mobility Total Score: 11       Therapeutic Activities and Exercises:   Pt performed seated BLE 10 reps:   AP (10 reps x 2 in reclined chair) , LAQ, HS,  Hip abd/add with pillow , Hip flexion, GS . V/T's cues for technique and sequence.   Performed  chair push up with B arm rests and Max A x 2 persons x 3 trials ( pt able to clear the buttock and able to come up haft standing ). Patient required frequent rest breaks throughout treatment 2* fatigue.     Patient left up in chair with all lines intact, call button in reach, chair alarm on and daughter and OT present..    GOALS:   Multidisciplinary Problems     Physical Therapy Goals        Problem: Physical Therapy Goal    Goal Priority Disciplines Outcome Goal Variances Interventions   Physical Therapy Goal     PT, PT/OT Ongoing, Progressing     Description:  Goals to be met by: 2020     Patient will increase functional independence with mobility by performin. Sit to stand transfer with Stand-by Assistance  2. Gait  x 25-50 feet with Stand-by Assistance using Rolling Walker.   3. Perform B LE ther ex per handout x 10 reps with assistance as needed                    Time Tracking:     PT Received On: 20  PT Start Time: 1458     PT Stop Time: 1517  PT Total Time (min): 19 min Partial co-treat with OT     Billable Minutes: Therapeutic Exercise 10    Treatment Type: Treatment  PT/PTA: PTA     PTA Visit Number: 4     Lea Cheney PTA  2020

## 2020-02-06 NOTE — PROGRESS NOTES
F/U with patient and daughter to address any concerns and questions.   Patient is alert and oriented to self. She reports she feels better. She denies pain or SOB at present. Breathing effort normal with continuous oxygen. Edema has decreased. Talked more about patient's clinical condition and expected outcome.    Daughter still had questions for CM which were not answered to her satisfaction.   CM supervisor notified of concerns and will address.   Daughter states her mother did better with therapy yesterday and after talking with her mother last night she reports her mother is willing to do therapy as long as she is able. Daughter reports [facility] is to continue until the end of February, and then she would like to transfer to hospice. Discussed that her mother does not have to return to hospital if she makes a decline or cannot complete therapy and can immediately transfer to hospice. If her mother is not approved to return with therapy they will go ahead and do hospice prior to hospital discharge. Daughter would like [facility] after speaking with staff at [facility].  Emotional support provided.   Addressed all questions and concerns to her satisfaction.    >50 % of 35 mins spent in face to face encounter, review of documentation, symptom mangement and coordination of care.

## 2020-02-06 NOTE — PT/OT/SLP PROGRESS
Occupational Therapy   Treatment    Name: Vashti Muñoz  MRN: [id number]  Admitting Diagnosis:  Anasarca       Recommendations:     Discharge Recommendations: (SNF vs. return to NH with OT)  Discharge Equipment Recommendations:  (may need stan lift and hospital bed )  Barriers to discharge:  (increased assist for functional transfers )    Assessment:     Vashti Muñoz is a 87 y.o. female with a medical diagnosis of Anasarca. Performance deficits affecting function are weakness, decreased upper extremity function, gait instability, decreased ROM, impaired cardiopulmonary response to activity, impaired endurance, impaired balance, decreased lower extremity function, decreased safety awareness, impaired fine motor, impaired self care skills, impaired cognition, pain, impaired functional mobilty, edema. Pt was more alert today and agreeable to therapy- dtr present.     Rehab Prognosis:  Fair +; patient would benefit from acute skilled OT services to address these deficits and reach maximum level of function.       Plan:     Patient to be seen 5 x/week to address the above listed problems via self-care/home management, therapeutic activities, therapeutic exercises  · Plan of Care Expires: 02/14/20  · Plan of Care Reviewed with: patient, daughter    Subjective     Chief complaint: wanted a nap; tried to accommodate pt and came back an hour later- unfortunately she had visitors in that hour and did not get a nap  Patient's comments/goals: pt was agreeable to participate with therapy and reported that she felt better afterwards     Pain/Comfort:  · Pain Rating 1: (pain with some elbow flexion)  · Pain Addressed 1: Reposition, Cessation of Activity  · Pain Rating Post-Intervention 1: (pt comfortable at rest)    Objective:     Communicated with: nurseAva, prior to session.  Patient found up in chair with telemetry, oxygen, peripheral IV upon OT entry to room.    General Precautions: Standard, hearing impaired, fall    Orthopedic Precautions:N/A   Braces: N/A     Occupational Performance:     Bed Mobility:    · NT; pt found/left in the chair     Functional Mobility/Transfers:  · Functional Mobility: Pt initially attempted sit to stand transfer with MAX A x2 with RW with assist for B/L LE and LUE correct positioning, but pt was unable to clear her bottom from the chair. Then, pt was assisted with hand-held assist x2 for chair push-ups- pt completed 3 trials of this with MOD/MAX A x2, holding each for ~3 seconds.     Activities of Daily Living:  · Pt just finished receiving a bath with PCT.       [facility] 6 Click ADL: 11    Treatment & Education:  · Pt completed BUE AROM in supported sitting position:  · Digits I-V flexion/extension 1x20 BUE simultaneously   · Wrist flexion/extension 1x20 BUE simultaneously   · Elbow flexion/extension 1x10  · LUE AAROM  · RUE AROM  · Shoulder flexion/extension 2x5  · LUE AAROM  · RUE AROM  · Shoulder elevation/depression 1x15 BUE simultaneously   · Shoulder ab/dduction 2x5   · LUE AAROM   · RUE AROM   · Pursed lip breathing exercises in between sets of exercises.   · Pt's daughter reports no compliance with incentive spirometer; pt practiced 5x with OT.     Patient left up in chair with all lines intact, call button in reach, daughter present and B/L LE elevated and LUE elevatedEducation:      GOALS:   Multidisciplinary Problems     Occupational Therapy Goals        Problem: Occupational Therapy Goal    Goal Priority Disciplines Outcome Interventions   Occupational Therapy Goal     OT, PT/OT Ongoing, Progressing    Description:  Goals to be met by: 02/14/20    Patient will increase functional independence with ADLs by performing:    Feeding with Set-up Assistance.  UE Dressing with Set-up Assistance.  Grooming while seated with Set-up Assistance.  Toileting from bedside commode with Contact Guard Assistance for hygiene and clothing management.   Rolling to Bilateral with Stand-by Assistance.   Supine  to sit with Stand-by Assistance.  Step transfer with Stand-by Assistance  Toilet transfer to bedside commode with Stand-by Assistance.  Upper extremity exercise program x15 reps per handout, with assistance as needed.                      Time Tracking:     OT Date of Treatment: 02/06/20  OT Start Time: 1458  OT Stop Time: 1529  OT Total Time (min): 31 min    Billable Minutes:Therapeutic Exercise 15 min  Total Time 31 min (co-treat with PTA)    Tonie Mandujano, OT  2/6/2020

## 2020-02-06 NOTE — PLAN OF CARE
Monitored. Safety precautions maintained. Patient and daughter instructed on POC in reguard to IV. Verbalizes understanding.Blood sugars monitored.

## 2020-02-06 NOTE — PLAN OF CARE
Problem: Physical Therapy Goal  Goal: Physical Therapy Goal  Description  Goals to be met by: 2020     Patient will increase functional independence with mobility by performin. Sit to stand transfer with Stand-by Assistance  2. Gait  x 25-50 feet with Stand-by Assistance using Rolling Walker.   3. Perform B LE ther ex per handout x 10 reps with assistance as needed   Outcome: Ongoing, Progressing   Pt able to perform BLE ex's in chair and chair push up with B arm rests and Max A x 2 persons. Patient required frequent rest breaks throughout treatment 2* fatigue.

## 2020-02-06 NOTE — PLAN OF CARE
DAVID supervisor was notified by Palliative Care Nurse, Marisol Laws, RN, regarding pt's dtr. reporting she had difficulty getting necessary information for her mother's return to the NH with hospice.  Pt. Is a penitentiary resident at [facility].   DAVID supervisor met with pt's dtr, Chuyita Oro ([phone]), to discuss her mother's d/c planning. She had been calling the NH SWTammy, but Tammy was not at work, and she could not get answers to her questions. Pt. is a resident at [facility], and she will be returning with hospice.  Pt's dtr. stated she did not know what hospice agency was contracted with pt's NH. She stated she spoke with pt's nurse at the NH, and she told her [facility] was a provider at [facility].  Pt's dtr. also stated she wanted pt. to be d/c'd from [facility].  reported she understands that [facility] was paying pt's NHP, therefore, she needed know how much money she would need to pay for pt. to remain in the NH. DAVID advised  that she would need to complete a Medicaid Long-Term Care application at the NH for penitentiary payment.  Pt's dtr. also reported she wanted to withdraw her mother from [facility] effective 3/1/2020.  DAVID explained to  that she would be responsible for the Medicare 20% co-pays for any medical care for pt. until pt. is approved for Medicaid LTC.   DAVID advised her that she would need to meet with Tammy to complete the Medicaid LTC application, withdraw from [facility], and verify what monetary amount she would need to pay for pt. to remain at the NH. Pt's dtr. appreciated the information and explanation, and DAVID reassured her that DAVID supervisor would speak with Tammy to express her needs and verify a time for  to meet with Tammy.  DAVID supervisor called DAVID Munoz at [facility] ( [phone], [phone]. [phone]), to discuss pt's d/c planning issues.  Tammy stated she was available to meet with pt's dtr. at 1:00 P.M. today to discuss her concerns, discuss  payment and financial issues, and complete necessary Medicaid and Medicare paperwork.  DAVID called pt's dtr. to report the information provided by Tammy, and she agreed to go to the NH at 1:00 P.M. Today to finalize pt's NH issues.  DAVID supervisor spoke with RADHA Escobar,  [employer] [place], to notify her that pt's family prefers [facility] once the hospice order I written. DAVID supervisor to continue care mgt. as needed.

## 2020-02-06 NOTE — PLAN OF CARE
Problem: Fall Injury Risk  Goal: Absence of Fall and Fall-Related Injury  Outcome: Ongoing, Progressing     Problem: Adult Inpatient Plan of Care  Goal: Plan of Care Review  Outcome: Ongoing, Progressing  Goal: Patient-Specific Goal (Individualization)  Outcome: Ongoing, Progressing  Goal: Absence of Hospital-Acquired Illness or Injury  Outcome: Ongoing, Progressing  Goal: Optimal Comfort and Wellbeing  Outcome: Ongoing, Progressing  Goal: Readiness for Transition of Care  Outcome: Ongoing, Progressing  Goal: Rounds/Family Conference  Outcome: Ongoing, Progressing     Problem: Diabetes Comorbidity  Goal: Blood Glucose Level Within Desired Range  Outcome: Ongoing, Progressing     Problem: Skin Injury Risk Increased  Goal: Skin Health and Integrity  Outcome: Ongoing, Progressing     Problem: Mobility Impairment  Goal: Optimal Mobility  Outcome: Ongoing, Progressing     Problem: Fluid Volume Excess  Goal: Fluid Balance  Outcome: Ongoing, Progressing     Problem: Pain Acute  Goal: Optimal Pain Control  Outcome: Ongoing, Progressing     Problem: Infection  Goal: Infection Symptom Resolution  Outcome: Ongoing, Progressing     Problem: Wound  Goal: Optimal Wound Healing  Outcome: Ongoing, Progressing     Problem: Coping Ineffective  Goal: Effective Coping  Outcome: Ongoing, Progressing

## 2020-02-06 NOTE — SUBJECTIVE & OBJECTIVE
Interval History: Feeling well.    Review of Systems   HENT: Negative for ear discharge and ear pain.    Eyes: Negative for discharge and itching.   Endocrine: Negative for cold intolerance and heat intolerance.   Neurological: Negative for seizures and syncope.     Objective:     Vital Signs (Most Recent):  Temp: 98.6 °F (37 °C) (02/06/20 1043)  Pulse: (!) 112 (02/06/20 1043)  Resp: 18 (02/06/20 1043)  BP: 111/61 (02/06/20 1043)  SpO2: 97 % (02/06/20 1043) Vital Signs (24h Range):  Temp:  [98 °F (36.7 °C)-100.7 °F (38.2 °C)] 98.6 °F (37 °C)  Pulse:  [] 112  Resp:  [17-20] 18  SpO2:  [94 %-97 %] 97 %  BP: ()/(52-62) 111/61     Weight: 95.6 kg (210 lb 12.2 oz)  Body mass index is 36.18 kg/m².  No intake or output data in the 24 hours ending 02/06/20 1551   Physical Exam   Constitutional: She is oriented to person, place, and time. She appears well-developed and well-nourished. No distress.   HENT:   Head: Normocephalic and atraumatic.   Eyes: Pupils are equal, round, and reactive to light. EOM are normal.   Neck: Normal range of motion. Neck supple.   Cardiovascular: Normal rate and regular rhythm.   Pulmonary/Chest: Effort normal. No respiratory distress. She has no wheezes. She has rales.   Abdominal: Soft. Bowel sounds are normal. She exhibits no distension. There is no tenderness.   Musculoskeletal: She exhibits edema and deformity.   Neurological: She is alert and oriented to person, place, and time.   Skin: Skin is warm and dry. Capillary refill takes 2 to 3 seconds.       Significant Labs:   BMP:   Recent Labs   Lab 02/06/20  0343      *   K 3.0*   CL 79*   CO2 43*   BUN 30*   CREATININE 1.2   CALCIUM 8.0*     CBC:   Recent Labs   Lab 02/05/20  0437 02/06/20  0343   WBC 5.57 5.92   HGB 9.2* 8.3*   HCT 29.3* 26.8*   * 112*

## 2020-02-06 NOTE — PROGRESS NOTES
[facility] Hospital Medicine  Progress Note    Patient Name: Vashti Muñoz  MRN: [id number]  Patient Class: IP- Inpatient   Admission Date: 1/30/2020  Length of Stay: 7 days  Attending Physician: Paul Perez MD  Primary Care Provider: Alesha Baum MD        Subjective:     Principal Problem:Anasarca        HPI:  Vashti Muñoz is a 87 y.o. female that (in part)  has a past medical history of Acute renal failure, Allergy, Anemia of chronic disease, Anticoagulant long-term use, Anxiety, Arthritis, Atrial fibrillation, Cataract, Closed head injury, Clotting disorder, Depression, Diabetes mellitus, Encounter for blood transfusion, Fall, GERD (gastroesophageal reflux disease), Heart murmur, [place] (hard of hearing), Hypertension, Thyroid disease, and Uses roller walker.  has a past surgical history that includes Hysterectomy; Tonsillectomy; Total hip arthroplasty (Left, 2004); Joint replacement (Right); and Injection of facet joint (Bilateral, 4/5/2019). Presents to [facility] Emergency Department from her nursing home with report of acute on chronic edema with progressive worsening.  Involves bilateral lower extremities as well as upper extremities to the level of her hands and wrist.  Marked unintentional weight gain.  Has pain in all extremities due to the edema. Also complaining of palpitations.  Reports compliance with home medication regimen.  She has dementia.  Therefore the history is somewhat limited.  Four weeks ago she was admitted to the hospital and stayed for 8 days for treatment of heart failure and atrial fibrillation.  She was started on supplemental oxygen and Lasix at that time prior to discharge.  Patient saw her cardiologist 2 days ago in clinic for follow-up for CHF, atrial fibrillation, and aortic stenosis.  Dr. Kline increased Lasix from 20-40 mg twice a day for 1-2 weeks.    In the emergency department routine laboratory studies EKG, cardiac  enzymes, and chest x-ray was performed.  On physical exam patient had evidence of anasarca.  Hypoalbuminemia was noted as well as a history of heart failure.  She was given Lasix intravenously and albumin in the ED.  She is being admitted for acute on chronic heart failure, anasarca, and need for diuresis.    Hospital medicine has been asked to admit to inpatient for further evaluation and treatment.     Overview/Hospital Course:  Pt admitted with acute on chronic CHF and anasarca. Pt reports increased dose of lasix this week and now requiring IV diuresis. Pt poor historian - info taken from records.     2/1- UOP 5 liters - monitoring renal function   2/2- 9 liters UOP, daughter at bedside, discussed concern that serum CO2 increasing and causing contraction alkalosis , cardiology consulted   2/3- pt spiked fever - blood culture pending, recent UTI and still has infectious cells on UA-trial of augmentin. AFib rate controlled, palliative care consulted.  Again febrile overnight.  CXR showing left lower lobe opacity.  Possible aspiration pneumonia.  Started on Rocephin and ST consulted.    Interval History: Feeling well.    Review of Systems   HENT: Negative for ear discharge and ear pain.    Eyes: Negative for discharge and itching.   Endocrine: Negative for cold intolerance and heat intolerance.   Neurological: Negative for seizures and syncope.     Objective:     Vital Signs (Most Recent):  Temp: 98.6 °F (37 °C) (02/06/20 1043)  Pulse: (!) 112 (02/06/20 1043)  Resp: 18 (02/06/20 1043)  BP: 111/61 (02/06/20 1043)  SpO2: 97 % (02/06/20 1043) Vital Signs (24h Range):  Temp:  [98 °F (36.7 °C)-100.7 °F (38.2 °C)] 98.6 °F (37 °C)  Pulse:  [] 112  Resp:  [17-20] 18  SpO2:  [94 %-97 %] 97 %  BP: ()/(52-62) 111/61     Weight: 95.6 kg (210 lb 12.2 oz)  Body mass index is 36.18 kg/m².  No intake or output data in the 24 hours ending 02/06/20 1551   Physical Exam   Constitutional: She is oriented to person, place,  and time. She appears well-developed and well-nourished. No distress.   HENT:   Head: Normocephalic and atraumatic.   Eyes: Pupils are equal, round, and reactive to light. EOM are normal.   Neck: Normal range of motion. Neck supple.   Cardiovascular: Normal rate and regular rhythm.   Pulmonary/Chest: Effort normal. No respiratory distress. She has no wheezes. She has rales.   Abdominal: Soft. Bowel sounds are normal. She exhibits no distension. There is no tenderness.   Musculoskeletal: She exhibits edema and deformity.   Neurological: She is alert and oriented to person, place, and time.   Skin: Skin is warm and dry. Capillary refill takes 2 to 3 seconds.       Significant Labs:   BMP:   Recent Labs   Lab 02/06/20  0343      *   K 3.0*   CL 79*   CO2 43*   BUN 30*   CREATININE 1.2   CALCIUM 8.0*     CBC:   Recent Labs   Lab 02/05/20  0437 02/06/20  0343   WBC 5.57 5.92   HGB 9.2* 8.3*   HCT 29.3* 26.8*   * 112*           Assessment/Plan:      * Anasarca  IV diuresis  Daily weights  Fluid restriction  Monitor renal function   Albumin 2.6- contributing to fluid retention   Appreciate Cards input.  Great urine output.  Changed Lasix to PO.        Fever  Started on Augmentin for possible UTI, but UA looks pretty benign.  Negative BCx.  CXR with left lower lobe opacity.  Patient does complain of cough.  Will stop Augmentin and start on Rocephin for possible aspiration pneumonitis.  Will get ST evaluation.    Palliative care encounter    See dementia     Chronic heart failure with preserved ejection fraction  Pt presents with anasarca with concern from daughter that she needs more aggressive diuersis  Pt is not SOB  Followed by Dr. Kline - recent clinic visit 1/28  Lasix dose increased 20 to 40mg  Agree with plan for defined code status - d/w daughter again     ECHO: 1/1/20  Conclusion     · Normal left ventricular systolic function. The estimated ejection fraction is 70%  · Concentric left  ventricular hypertrophy.  · Grade II (moderate) left ventricular diastolic dysfunction consistent with pseudonormalization.  · Normal right ventricular systolic function.  · Moderate-to-severe aortic valve stenosis.  · Aortic valve area is 0.64 cm2; peak velocity is 3.86 m/s; mean gradient is 35 mmHg.  · Mild mitral regurgitation.  · Mild tricuspid regurgitation.  · The estimated PA systolic pressure is 70 mm Hg  · Pulmonary hypertension present.           Dementia without behavioral disturbance  No acute issues   Resume namenda    Pt is DNR  Palliative care consulted       Anemia of chronic disease  Pt has drop from one month ago hemoglobin 10  On eliquis   Monitor H/H    Nonrheumatic aortic valve stenosis        Mixed hyperlipidemia  Resume statin       Essential hypertension  BP controlled   on diltiazem CD  Metoprolol 200mg BID - listed on home med list       Chronic anticoagulation  Resume eliquis        Acquired hypothyroidism  Resume synthroid       Chronic atrial fibrillation  Rate controlled on BB   eliquis             VTE Risk Mitigation (From admission, onward)         Ordered     apixaban tablet 5 mg  2 times daily      01/30/20 2354     IP VTE HIGH RISK PATIENT  Once      01/30/20 2354     Reason for No Pharmacological VTE Prophylaxis  Once     Question:  Reasons:  Answer:  Already adequately anticoagulated on oral Anticoagulants    01/30/20 2354                      Paul Perez MD  Department of Hospital Medicine   [facility]

## 2020-02-06 NOTE — PLAN OF CARE
Problem: Occupational Therapy Goal  Goal: Occupational Therapy Goal  Description  Goals to be met by: 02/14/20    Patient will increase functional independence with ADLs by performing:    Feeding with Set-up Assistance.  UE Dressing with Set-up Assistance.  Grooming while seated with Set-up Assistance.  Toileting from bedside commode with Contact Guard Assistance for hygiene and clothing management.   Rolling to Bilateral with Stand-by Assistance.   Supine to sit with Stand-by Assistance.  Step transfer with Stand-by Assistance  Toilet transfer to bedside commode with Stand-by Assistance.  Upper extremity exercise program x15 reps per handout, with assistance as needed.     2/6/2020 1531 by Tonie Mandujano OT  Outcome: Ongoing, Progressing  Pt was more alert today and agreeable to therapy- dtr present.

## 2020-02-07 VITALS
SYSTOLIC BLOOD PRESSURE: 114 MMHG | HEIGHT: 64 IN | TEMPERATURE: 98 F | DIASTOLIC BLOOD PRESSURE: 60 MMHG | WEIGHT: 210.75 LBS | OXYGEN SATURATION: 97 % | HEART RATE: 120 BPM | BODY MASS INDEX: 35.98 KG/M2 | RESPIRATION RATE: 17 BRPM

## 2020-02-07 PROBLEM — R50.9 FEVER: Status: RESOLVED | Noted: 2020-02-05 | Resolved: 2020-02-07

## 2020-02-07 PROBLEM — R60.1 ANASARCA: Status: RESOLVED | Noted: 2020-01-30 | Resolved: 2020-02-07

## 2020-02-07 LAB
POCT GLUCOSE: 138 MG/DL (ref 70–110)
POCT GLUCOSE: 138 MG/DL (ref 70–110)
POCT GLUCOSE: 159 MG/DL (ref 70–110)

## 2020-02-07 PROCEDURE — 99231 SBSQ HOSP IP/OBS SF/LOW 25: CPT | Mod: ,,, | Performed by: NURSE PRACTITIONER

## 2020-02-07 PROCEDURE — 25000003 PHARM REV CODE 250: Performed by: HOSPITALIST

## 2020-02-07 PROCEDURE — 63600175 PHARM REV CODE 636 W HCPCS: Performed by: HOSPITALIST

## 2020-02-07 PROCEDURE — 90471 IMMUNIZATION ADMIN: CPT | Performed by: HOSPITALIST

## 2020-02-07 PROCEDURE — 90472 IMMUNIZATION ADMIN EACH ADD: CPT | Performed by: HOSPITALIST

## 2020-02-07 PROCEDURE — G0009 ADMIN PNEUMOCOCCAL VACCINE: HCPCS | Performed by: HOSPITALIST

## 2020-02-07 PROCEDURE — 99231 PR SUBSEQUENT HOSPITAL CARE,LEVL I: ICD-10-PCS | Mod: ,,, | Performed by: NURSE PRACTITIONER

## 2020-02-07 PROCEDURE — G0008 ADMIN INFLUENZA VIRUS VAC: HCPCS | Performed by: HOSPITALIST

## 2020-02-07 PROCEDURE — 90662 IIV NO PRSV INCREASED AG IM: CPT | Performed by: HOSPITALIST

## 2020-02-07 PROCEDURE — 25000003 PHARM REV CODE 250: Performed by: EMERGENCY MEDICINE

## 2020-02-07 PROCEDURE — 90670 PCV13 VACCINE IM: CPT | Performed by: HOSPITALIST

## 2020-02-07 RX ORDER — TALC
9 POWDER (GRAM) TOPICAL NIGHTLY PRN
Refills: 0 | COMMUNITY
Start: 2020-02-07

## 2020-02-07 RX ORDER — ACETAMINOPHEN 500 MG
500 TABLET ORAL EVERY 6 HOURS PRN
Refills: 0
Start: 2020-02-07

## 2020-02-07 RX ORDER — LORAZEPAM 0.5 MG/1
0.5 TABLET ORAL EVERY 12 HOURS PRN
Qty: 15 TABLET | Refills: 0 | Status: SHIPPED | OUTPATIENT
Start: 2020-02-07

## 2020-02-07 RX ORDER — POTASSIUM CHLORIDE 750 MG/1
10 TABLET, EXTENDED RELEASE ORAL DAILY
Qty: 1 TABLET | Refills: 0
Start: 2020-02-07

## 2020-02-07 RX ORDER — AMOXICILLIN AND CLAVULANATE POTASSIUM 875; 125 MG/1; MG/1
1 TABLET, FILM COATED ORAL 2 TIMES DAILY
Qty: 10 TABLET | Refills: 0
Start: 2020-02-07 | End: 2020-02-12

## 2020-02-07 RX ADMIN — MELATONIN 2000 UNITS: at 08:02

## 2020-02-07 RX ADMIN — GABAPENTIN 300 MG: 300 CAPSULE ORAL at 08:02

## 2020-02-07 RX ADMIN — ESCITALOPRAM OXALATE 10 MG: 10 TABLET ORAL at 08:02

## 2020-02-07 RX ADMIN — ACETAMINOPHEN 650 MG: 325 TABLET ORAL at 10:02

## 2020-02-07 RX ADMIN — FUROSEMIDE 20 MG: 20 TABLET ORAL at 08:02

## 2020-02-07 RX ADMIN — POLYETHYLENE GLYCOL 3350 17 G: 17 POWDER, FOR SOLUTION ORAL at 08:02

## 2020-02-07 RX ADMIN — LEVOTHYROXINE SODIUM 137 MCG: 25 TABLET ORAL at 05:02

## 2020-02-07 RX ADMIN — PNEUMOCOCCAL 13-VALENT CONJUGATE VACCINE 0.5 ML: 2.2; 2.2; 2.2; 2.2; 2.2; 4.4; 2.2; 2.2; 2.2; 2.2; 2.2; 2.2; 2.2 INJECTION, SUSPENSION INTRAMUSCULAR at 03:02

## 2020-02-07 RX ADMIN — APIXABAN 5 MG: 5 TABLET, FILM COATED ORAL at 08:02

## 2020-02-07 RX ADMIN — FLUTICASONE PROPIONATE 50 MCG: 50 SPRAY, METERED NASAL at 08:02

## 2020-02-07 RX ADMIN — MEMANTINE HYDROCHLORIDE 5 MG: 5 TABLET ORAL at 09:02

## 2020-02-07 RX ADMIN — INFLUENZA A VIRUS A/MICHIGAN/45/2015 X-275 (H1N1) ANTIGEN (FORMALDEHYDE INACTIVATED), INFLUENZA A VIRUS A/SINGAPORE/INFIMH-16-0019/2016 IVR-186 (H3N2) ANTIGEN (FORMALDEHYDE INACTIVATED), AND INFLUENZA B VIRUS B/MARYLAND/15/2016 BX-69A (A B/COLORADO/6/2017-LIKE VIRUS) ANTIGEN (FORMALDEHYDE INACTIVATED) 0.5 ML: 60; 60; 60 INJECTION, SUSPENSION INTRAMUSCULAR at 03:02

## 2020-02-07 NOTE — NURSING
Patient is A&Ox3 with some disorientation to time and makes needs known. Patient remained free from falls during this shift. Patient was able to get up to the chair with thierno steady, and was not ready to get back in the bed prior to shift change.  Patient has midline IV in upper right arm. Patient O2 via nasal cannula at 1L per minute. VSS. No acute distress noted at this time. Bed in low position, bed alarm on, call bell within reach.

## 2020-02-07 NOTE — PROGRESS NOTES
TN awaiting Samantha Bowser to review orders and provide call report information..Selina Cano RN, BSN, STN [facility]  2/7/2020  '

## 2020-02-07 NOTE — PLAN OF CARE
Pt remained free from falls and injury during shift, medication administered per MD orders, 2LNC, turned q 2 hours, patient safety maintained, bed in low lock position with call bell in reach  bed alarm set, will continue to monitor.

## 2020-02-07 NOTE — PLAN OF CARE
02/07/20 1205   Medicare Message   Important Message from Medicare regarding Discharge Appeal Rights Given to patient/caregiver;Explained to patient/caregiver;Signed/date by patient/caregiver   Date IMM was signed 02/07/20   Time IMM was signed 1205   Selina Cano RN, BSN, STN [facility]  2/7/2020

## 2020-02-07 NOTE — PROGRESS NOTES
TN received call from [facility] that [facility] denied SNF and to return to [facility] senior living..Selina Cano RN, BSN, STN [facility]  2/7/2020  '

## 2020-02-07 NOTE — PROGRESS NOTES
Patient feeling better this am. Breathing effort normal with continuous oxygen. Denies SOB or pain. Edema has decreased.   Daughter at bedside. There was some confusion as to what level of care patient is to return to facility with and she is to be discharged today back to facility. Confirmed with daughter that patient wants to go back with SNF and therapy and will continue this until end of month and then transfer to [facility]. If patient reyez decline before this she will request for facility not to send her mother back to hospital but to transfer to hospice. Patient states she  Prefers not to come back to hospital.  Addressed all questions and concerns to daughters satisfaction. Emotional support provided.           >50 % of 25 mins spent in face to face encounter, review of documentation, symptom mangement and coordination of care.

## 2020-02-07 NOTE — PROGRESS NOTES
ADT 30 order placed for Van Transportation.  Requested  time:3:30PM bring wheel chair  If transportation does not arrive at ETA time nurse will be instructed to follow protocol for transportation below:   How can I get in touch directly with dispatch, if needed?                 Non-emergent dispatch: [phone]      +++NURSING:  If Van does not arrive at requested time please call the above Non Emergent Dispatcher.  If issue not resolved please escalate to your charge nurse for further instructions.  .Selina Cano RN, BSN, STN [facility]  2/7/2020  '

## 2020-02-07 NOTE — NURSING
Midline removed, cath tip intact @ 12cm. Pt tolerated well, pressure held, no S&S of bleeding. Pt in no distress. Will cont to monitor.

## 2020-02-07 NOTE — DISCHARGE SUMMARY
[facility] Hospital Medicine  Discharge Summary      Patient Name: Vashti Muñoz  MRN: [id number]  Admission Date: 1/30/2020  Hospital Length of Stay: 8 days  Discharge Date and Time:  02/07/2020 9:57 AM  Attending Physician: Paul Perez MD   Discharging Provider: Paul Perez MD  Primary Care Provider: Alesha Baum MD      HPI:   Vashti Muñoz is a 87 y.o. female that (in part)  has a past medical history of Acute renal failure, Allergy, Anemia of chronic disease, Anticoagulant long-term use, Anxiety, Arthritis, Atrial fibrillation, Cataract, Closed head injury, Clotting disorder, Depression, Diabetes mellitus, Encounter for blood transfusion, Fall, GERD (gastroesophageal reflux disease), Heart murmur, [place] (hard of hearing), Hypertension, Thyroid disease, and Uses roller walker.  has a past surgical history that includes Hysterectomy; Tonsillectomy; Total hip arthroplasty (Left, 2004); Joint replacement (Right); and Injection of facet joint (Bilateral, 4/5/2019). Presents to [facility] Emergency Department from her nursing home with report of acute on chronic edema with progressive worsening.  Involves bilateral lower extremities as well as upper extremities to the level of her hands and wrist.  Marked unintentional weight gain.  Has pain in all extremities due to the edema. Also complaining of palpitations.  Reports compliance with home medication regimen.  She has dementia.  Therefore the history is somewhat limited.  Four weeks ago she was admitted to the hospital and stayed for 8 days for treatment of heart failure and atrial fibrillation.  She was started on supplemental oxygen and Lasix at that time prior to discharge.  Patient saw her cardiologist 2 days ago in clinic for follow-up for CHF, atrial fibrillation, and aortic stenosis.  Dr. Kline increased Lasix from 20-40 mg twice a day for 1-2 weeks.    In the emergency department routine laboratory  studies EKG, cardiac enzymes, and chest x-ray was performed.  On physical exam patient had evidence of anasarca.  Hypoalbuminemia was noted as well as a history of heart failure.  She was given Lasix intravenously and albumin in the ED.  She is being admitted for acute on chronic heart failure, anasarca, and need for diuresis.    Hospital medicine has been asked to admit to inpatient for further evaluation and treatment.     * No surgery found *      Hospital Course:   Pt admitted with acute on chronic diastolic heart failure and anasarca.  Patient started on IV lasix with great diuresis and improvement of symptoms.  Patient had I/O's of -12 L during hospital stay.  Much decreased swelling to lower extremities.  Elderly lady with diastolic heart failure and moderate to severe aortic stenosis.  Palliative Care consulted.  Patient has a [place] with DNR status.  Patient spiked a fever.  Started empirically on Augmentin secondary to recurrent UTI.  UA benign, but CXR with left lower lung opacity.  Stopped Augmentin and started on IV Rocephin for pneumonia.  Possible aspiration pneumonia.  Fevers resolved.  BCx negative.  She has remained afebrile and hemodynamically stable.  Episodes of hypokalemia replaced with oral potassium.  Lasix has been switched to oral.  Patient is doing well and ready for discharge back to NH.  PT/OT consulted and recommending further therapy.  Patient will be discharged with SNF and Hospice consult.  Daughter planning to transition patient to hospice at some point.       Consults:   Consults (From admission, onward)        Status Ordering Provider     Inpatient consult to Cardiology  Once     Provider:  Rodri Machuca MD    Completed CHERYL PAIGE     Inpatient consult to Palliative Care  Once     Provider:  Marsiol Laws NP    Completed RODRI MACHUCA     Inpatient consult to PICC team ([facility])  Once     Provider:  (Not yet assigned)    Acknowledged RODRI HAMMOND           No new Assessment & Plan notes have been filed under this hospital service since the last note was generated.  Service: Hospital Medicine    Final Active Diagnoses:    Diagnosis Date Noted POA    Palliative care encounter [Z51.5]  Not Applicable    Chronic heart failure with preserved ejection fraction [I50.32] 01/28/2020 Yes    Anemia of chronic disease [D63.8] 01/01/2020 Yes     Chronic    Dementia without behavioral disturbance [F03.90] 01/01/2020 Yes     Chronic    Nonrheumatic aortic valve stenosis [I35.0] 04/16/2019 Yes    Mixed hyperlipidemia [E78.2] 10/01/2018 Yes     Chronic    Chronic atrial fibrillation [I48.20] 07/05/2018 Yes     Chronic    Acquired hypothyroidism [E03.9] 07/05/2018 Yes     Chronic    Chronic anticoagulation [Z79.01] 07/05/2018 Not Applicable     Chronic    Essential hypertension [I10] 07/05/2018 Yes     Chronic      Problems Resolved During this Admission:    Diagnosis Date Noted Date Resolved POA    PRINCIPAL PROBLEM:  Anasarca [R60.1] 01/30/2020 02/07/2020 Yes    Fever [R50.9] 02/05/2020 02/07/2020 No       Discharged Condition: stable    Disposition: Skilled Nursing Facility    Follow Up:  Follow-up Information     [facility].    Specialties:  Nursing Home Agency, SNF Agency  Contact information:  [address] [address]  [phone]             Alesha Baum MD In 1 week.    Specialties:  Internal Medicine, Geriatric Medicine  Contact information:  [address]  [facility] [address]  [phone]                 Patient Instructions:      Diet Cardiac     Notify your health care provider if you experience any of the following:  temperature >100.4     Notify your health care provider if you experience any of the following:  persistent nausea and vomiting or diarrhea     Notify your health care provider if you experience any of the following:  severe uncontrolled pain     Notify your health care provider if you experience any of the following:   difficulty breathing or increased cough     Notify your health care provider if you experience any of the following:  persistent dizziness, light-headedness, or visual disturbances     Notify your health care provider if you experience any of the following:  increased confusion or weakness     Activity as tolerated         Pending Diagnostic Studies:     None         Medications:  Reconciled Home Medications:      Medication List      START taking these medications    amoxicillin-clavulanate 875-125mg 875-125 mg per tablet  Commonly known as:  AUGMENTIN  Take 1 tablet by mouth 2 (two) times daily. for 5 days     artificial tears 0.5 % ophthalmic solution  Commonly known as:  ISOPTO TEARS  Place 1 drop into both eyes as needed.     melatonin 3 mg tablet  Commonly known as:  MELATIN  Take 3 tablets (9 mg total) by mouth nightly as needed for Insomnia.     potassium chloride 10 MEQ Tbsr  Commonly known as:  KLOR-CON  Take 1 tablet (10 mEq total) by mouth once daily.        CHANGE how you take these medications    acetaminophen 500 MG tablet  Commonly known as:  TYLENOL  Take 1 tablet (500 mg total) by mouth every 6 (six) hours as needed for Pain or Temperature greater than (100).  What changed:  reasons to take this     LORazepam 0.5 MG tablet  Commonly known as:  ATIVAN  Take 1 tablet (0.5 mg total) by mouth every 12 (twelve) hours as needed for Anxiety. Take half in the am and a whole at bedtime  What changed:    · when to take this  · reasons to take this        CONTINUE taking these medications    apixaban 5 mg Tab  Commonly known as:  ELIQUIS  Take 1 tablet (5 mg total) by mouth 2 (two) times daily.     diltiaZEM 180 MG 24 hr capsule  Commonly known as:  CARDIZEM CD  Take 1 capsule (180 mg total) by mouth once daily.     escitalopram oxalate 10 MG tablet  Commonly known as:  Lexapro  Take 1 tablet (10 mg total) by mouth once daily.     fluticasone propionate 50 mcg/actuation nasal spray  Commonly known as:   FLONASE  1 spray by Each Nare route once daily.     furosemide 20 MG tablet  Commonly known as:  LASIX  Take 20 mg by mouth 2 (two) times daily.     gabapentin 300 MG capsule  Commonly known as:  NEURONTIN  Take 300 mg by mouth 2 (two) times daily.     levothyroxine 137 mcg Cap  Commonly known as:  TIROSINT  Take 1 tablet by mouth once daily.     memantine 5 MG Tab  Commonly known as:  NAMENDA  Take 5 mg by mouth 2 (two) times daily.     metoprolol tartrate 100 MG tablet  Commonly known as:  LOPRESSOR  Take 2 tablets (200 mg total) by mouth 2 (two) times daily.     simvastatin 40 MG tablet  Commonly known as:  ZOCOR  Take 40 mg by mouth every evening.     vitamin D 1000 units Tab  Commonly known as:  VITAMIN D3  Take 2,000 Units by mouth once daily.        STOP taking these medications    BIOFREEZE-ILEX TOP            Indwelling Lines/Drains at time of discharge:   Lines/Drains/Airways     None                 Time spent on the discharge of patient: >30 minutes  Patient was seen and examined on the date of discharge and determined to be suitable for discharge.         Paul Perez MD  Department of Hospital Medicine  [facility]

## 2020-02-07 NOTE — PLAN OF CARE
[facility] [address] [place] [address]  [phone]    Facility Transfer Orders                        02/07/2020    Admit to: NH    Diagnoses:  Active Hospital Problems    Diagnosis  POA    Palliative care encounter [Z51.5]  Not Applicable    Chronic heart failure with preserved ejection fraction [I50.32]  Yes    Anemia of chronic disease [D63.8]  Yes     Chronic    Dementia without behavioral disturbance [F03.90]  Yes     Chronic    Nonrheumatic aortic valve stenosis [I35.0]  Yes    Mixed hyperlipidemia [E78.2]  Yes     Chronic    Chronic atrial fibrillation [I48.20]  Yes     Chronic    Acquired hypothyroidism [E03.9]  Yes     Chronic    Chronic anticoagulation [Z79.01]  Not Applicable     Chronic    Essential hypertension [I10]  Yes     Chronic      Resolved Hospital Problems    Diagnosis Date Resolved POA    *Anasarca [R60.1] 02/07/2020 Yes     Priority: 1 - High    Fever [R50.9] 02/07/2020 No     Priority: 2        Allergies:  Review of patient's allergies indicates:   Allergen Reactions    Sulfa (sulfonamide antibiotics) Hives       Vitals:  Once weekly    Diet: Cardiac       Activity:     - Up in a chair each morning as tolerated   - Ambulate with assistance to bathroom    Nursing Precautions:  - Aspiration precautions:             - Assistance with meals            -  Upright 90 degrees befor during and after meals        - Fall precautions   - Seizure precaution   - Decubitus precautions:        -  for positioning   - Pressure reducing foam mattress   - Turn patient every two hours. Use wedge pillows to anchor patient    Consult to Hospice  O2 per NC prn to keep sats>90%    CONSULTS:      PT to evaluate and treat - five times a week     OT to evaluate and treat - five times a week     ST to evaluate and treat     Nutrition to evaluate and recommend diet        Medications: Discontinue all previous medication orders, if any. See new list below.       Vashti Muñoz   Home Medication Instructions BEVERLY:[id number]    Printed on:02/07/20 9651   Medication Information                      acetaminophen (TYLENOL) 500 MG tablet  Take 1 tablet (500 mg total) by mouth every 6 (six) hours as needed for Pain or Temperature greater than (100).             amoxicillin-clavulanate 875-125mg (AUGMENTIN) 875-125 mg per tablet  Take 1 tablet by mouth 2 (two) times daily. for 5 days             apixaban (ELIQUIS) 5 mg Tab  Take 1 tablet (5 mg total) by mouth 2 (two) times daily.             artificial tears (ISOPTO TEARS) 0.5 % ophthalmic solution  Place 1 drop into both eyes as needed for dry eyes.             diltiaZEM (CARDIZEM CD) 180 MG 24 hr capsule  Take 1 capsule (180 mg total) by mouth once daily.             escitalopram oxalate (LEXAPRO) 10 MG tablet  Take 1 tablet (10 mg total) by mouth once daily.             fluticasone (FLONASE) 50 mcg/actuation nasal spray  1 spray by Each Nare route once daily.             furosemide (LASIX) 20 MG tablet  Take 20 mg by mouth 2 (two) times daily.             gabapentin (NEURONTIN) 300 MG capsule  Take 300 mg by mouth 2 (two) times daily.             levothyroxine 137 mcg Cap  Take 1 tablet by mouth once daily.             LORazepam (ATIVAN) 0.5 MG tablet  Take 1 tablet (0.5 mg total) by mouth every 12 (twelve) hours as needed for Anxiety. Take half in the am and a whole at bedtime             melatonin (MELATIN) 3 mg tablet  Take 3 tablets (9 mg total) by mouth nightly as needed for Insomnia.             memantine (NAMENDA) 5 MG Tab  Take 5 mg by mouth 2 (two) times daily.             metoprolol tartrate (LOPRESSOR) 100 MG tablet  Take 2 tablets (200 mg total) by mouth 2 (two) times daily.             potassium chloride (KLOR-CON) 10 MEQ TbSR  Take 1 tablet (10 mEq total) by mouth once daily.             simvastatin (ZOCOR) 40 MG tablet  Take 40 mg by mouth every evening.             vitamin D (VITAMIN D3) 1000 units Tab  Take  2,000 Units by mouth once daily.                       _________________________________  Paul Perez MD  02/07/2020

## 2020-02-07 NOTE — PROGRESS NOTES
Samantha [facility] says that an authorization for snf will need to be obtained from [place].  TN awaiting call back.Selina Cano RN, BSN, STN [facility]  2/7/2020

## 2020-02-07 NOTE — PLAN OF CARE
3:10PM TN informed med surg nurse Ingrid that pt is cleared for [facility].  Back to [facility] [phone] Room 10B.     02/07/20 1510   Final Note   Assessment Type Final Discharge Note   Anticipated Discharge Disposition [facility]   What phone number can be called within the next 1-3 days to see how you are doing after discharge?   (see chart)   Hospital Follow Up  Appt(s) scheduled? Yes   Discharge plans and expectations educations in teach back method with documentation complete? Yes   Right Care Referral Info   Facility Name [facility]   Post-Acute Status   Post-Acute Authorization Other   Post-Acute Placement Status Set-up Complete   Discharge Delays None known at this time   .Selina Cano, RN, BSN, STN [facility]  2/7/2020

## 2020-02-08 NOTE — NURSING
Pt transport is here and has all her belongings packed and she is leaving with transport to [facility] via wheelchair, JARRED.

## 2020-02-09 LAB
BACTERIA BLD CULT: NORMAL
BACTERIA BLD CULT: NORMAL

## 2020-02-10 NOTE — PT/OT/SLP DISCHARGE
Occupational Therapy Discharge Summary    Vashti Muñoz  MRN: [id number]   Principal Problem: Anasarca      Patient Discharged from acute Occupational Therapy on 02/07/20.  Please refer to prior OT notes for functional status.    Assessment:      Patient appropriate for care in another setting.    Objective:     GOALS:   Multidisciplinary Problems     Occupational Therapy Goals     Not on file          Multidisciplinary Problems (Resolved)        Problem: Occupational Therapy Goal    Goal Priority Disciplines Outcome Interventions   Occupational Therapy Goal   (Resolved)     OT, PT/OT Met    Description:  Goals to be met by: 02/14/20    Patient will increase functional independence with ADLs by performing:    Feeding with Set-up Assistance.  UE Dressing with Set-up Assistance.  Grooming while seated with Set-up Assistance.  Toileting from bedside commode with Contact Guard Assistance for hygiene and clothing management.   Rolling to Bilateral with Stand-by Assistance.   Supine to sit with Stand-by Assistance.  Step transfer with Stand-by Assistance  Toilet transfer to bedside commode with Stand-by Assistance.  Upper extremity exercise program x15 reps per handout, with assistance as needed.                      Reasons for Discontinuation of Therapy Services  Transfer to alternate level of care.      Plan:     Patient Discharged to: intermediate Nursing Home ([facility])    Tonie Mandujano OT  2/10/2020

## 2020-06-03 ENCOUNTER — TELEPHONE (OUTPATIENT)
Dept: OPHTHALMOLOGY | Facility: CLINIC | Age: 85
End: 2020-06-03

## 2020-06-03 NOTE — TELEPHONE ENCOUNTER
----- Message from Valeria Crow sent at 6/3/2020 12:20 PM CDT -----  Contact: Donna ([employer])  Donna from [employer] was calling to schedule pt 3 month f/u with Dr. Gallego. I tried to schedule this appt but I couldn't. Donna contact number is [phone] or [phone].

## 2020-11-05 NOTE — SUBJECTIVE & OBJECTIVE
Interval History: feels ok. Has no complaints. RVR improved but not at goal.     Review of Systems   Respiratory: Negative.    Cardiovascular: Positive for leg swelling (better ).   Gastrointestinal: Negative.    Neurological: Positive for weakness.     Objective:     Vital Signs (Most Recent):  Temp: 97.9 °F (36.6 °C) (01/08/20 1202)  Pulse: 80 (01/08/20 1202)  Resp: 18 (01/08/20 1202)  BP: 135/73 (01/08/20 1202)  SpO2: 100 % (01/08/20 1202) Vital Signs (24h Range):  Temp:  [97.9 °F (36.6 °C)-99.3 °F (37.4 °C)] 97.9 °F (36.6 °C)  Pulse:  [] 80  Resp:  [16-20] 18  SpO2:  [93 %-100 %] 100 %  BP: (116-165)/(59-85) 135/73     Weight: 94 kg (207 lb 3.7 oz)  Body mass index is 37.9 kg/m².    Intake/Output Summary (Last 24 hours) at 1/8/2020 1247  Last data filed at 1/8/2020 0500  Gross per 24 hour   Intake --   Output 750 ml   Net -750 ml      Physical Exam   Constitutional: She is oriented to person, place, and time. She appears well-developed. No distress.   Cardiovascular: An irregularly irregular rhythm present. Tachycardia present.   Pulmonary/Chest: Effort normal and breath sounds normal.   Abdominal: Soft. Bowel sounds are normal.   Musculoskeletal: She exhibits edema (pitting to arms, hand and legs (improved)).   Neurological: She is alert and oriented to person, place, and time.   Skin: She is not diaphoretic.   Psychiatric: She has a normal mood and affect. Her behavior is normal. Judgment and thought content normal.   Nursing note and vitals reviewed.      Significant Labs: All pertinent labs within the past 24 hours have been reviewed.    Significant Imaging: I have reviewed all pertinent imaging results/findings within the past 24 hours.  I have reviewed and interpreted all pertinent imaging results/findings within the past 24 hours.   Doxycycline Pregnancy And Lactation Text: This medication is Pregnancy Category D and not consider safe during pregnancy. It is also excreted in breast milk but is considered safe for shorter treatment courses.

## 2021-10-28 ENCOUNTER — DOCUMENTATION ONLY (OUTPATIENT)
Dept: OTOLARYNGOLOGY | Facility: CLINIC | Age: 86
End: 2021-10-28

## 2022-11-04 NOTE — PT/OT/SLP EVAL
Occupational Therapy   Evaluation    Name: Vashti Muñoz  MRN: [id number]  Admitting Diagnosis:  Chronic atrial fibrillation with rapid ventricular response 6 Days Post-Op    Recommendations:     Discharge Recommendations: nursing facility, skilled  Discharge Equipment Recommendations:  none  Barriers to discharge:  (TOTAL A for bed mobility)    Assessment:     Vashti Muñoz is a 87 y.o. female with a medical diagnosis of Chronic atrial fibrillation with rapid ventricular response. Performance deficits affecting function: weakness, impaired self care skills, impaired balance, decreased coordination, decreased safety awareness, decreased ROM, impaired skin, impaired endurance, impaired functional mobilty, decreased upper extremity function, pain, edema, gait instability, decreased lower extremity function, impaired fine motor.      Pt will continue to benefit from acute OT in order to increase safety awareness and independence with ADLs and all aspects of functional mobility. OT rec. SNF at d/c to regain PLOF and reduce risk of falls, unplanned readmission, and morbidity.      TOTAL A for bed mobility. Pt took a few sidesteps at EOB using RW with MIN A.      Rehab Prognosis: Good; patient would benefit from acute skilled OT services to address these deficits and reach maximum level of function.       Plan:     Patient to be seen 5 x/week to address the above listed problems via self-care/home management, therapeutic activities, therapeutic exercises  · Plan of Care Expires: 01/20/20  · Plan of Care Reviewed with: patient    Subjective     Chief Complaint: R arm pain since admission- hurts with any A/PROM  Patient/Family Comments/goals: agreeable to therapy session; wanted to eat her lunch with bed in chair position instead of seated EOB    Occupational Profile:  Living Environment: Pt is a resident at [facility].   Previous level of function: Pt had some assist with dressing, but was able to feed herself. Pt used  Spoke with patient. Same day appointment scheduled for next week. Home care order placed.    pull-ups and was able to complete functional transfers with MOD I. Pt was ambulating to and from the dining gann with MOD I, using RW. Per chart review, pt is oriented to person and place at baseline (h/o dementia).   Roles and Routines: NH activities   Equipment Used at Home:  wheelchair, oxygen, walker, rolling  Assistance upon Discharge: NH staff; dtr nearby (Sariah)     Pain/Comfort:  Pain Rating 1: 8/10  Location - Side 1: Right  Location - Orientation 1: generalized  Location 1: arm(from shoulder to elbow )  Pain Addressed 1: Reposition, Nurse notified, Cessation of Activity  Pain Rating Post-Intervention 1: (reduced at rest)    Patients cultural, spiritual, Lutheran conflicts given the current situation: no    Objective:     Communicated with: nurseJoaquina, prior to session.  Patient found HOB elevated with bed alarm, PureWick, peripheral IV, oxygen upon OT entry to room.    General Precautions: Standard, diabetic, hearing impaired, fall   Orthopedic Precautions:N/A   Braces: N/A     Occupational Performance:    Bed Mobility:    · Patient completed Rolling/Turning to Left with  total assistance (increased pain with RUE movement)  · Patient completed Rolling/Turning to Right with maximal assistance  · Patient completed Scooting/Bridging with total assistance and 2 persons  · Patient completed Scooting to EOB with minimal assistance with increased time required to promote increased independence   · Patient completed Supine to Sit with maximal assistance and 2 persons  · Patient completed Sit to Supine with maximal assistance and 2 persons    Functional Mobility/Transfers:  · Patient completed Sit <> Stand Transfer with moderate assistance  with  rolling walker   · Functional Mobility: Pt took a few sidesteps at the EOB with MIN A using RW.     Activities of Daily Living:  · Feeding:  assist d/t pain with dominant arm (R); spilling food and unable to cut her meat    · Grooming: pt only able to comb L side of  her head with LUE (d/t pain with RUE ROM) while seated EOB with SBA     · Lower Body Dressing: pt attempted to doff sock seated EOB with max assist for leg lift, but pt unable to complete this functional task    · Toileting: total assistance for pericare d/t soiled brief with PureWick     Cognitive/Visual Perceptual:  Cognitive/Psychosocial Skills:     -       Oriented to: Person and Place   -       Follows Commands/attention:Follows multistep  commands  -       Communication: clear/fluent  -       Memory: Poor immediate recall  -       Safety awareness/insight to disability: impaired   -       Mood/Affect/Coping skills/emotional control: Cooperative and Pleasant  Visual/Perceptual:      -Impaired  acuity (needs glasses)   -Intact R/L discrimination     Physical Exam:  Balance:    -       seated: SBA/SUP; standing: MIN A with RW  Postural examination/scapula alignment:    -       Rounded shoulders  -       Forward head  -       Obese  Skin integrity: Bruising of R anterior elbow   Edema:  Mild BUE  Sensation:    -       Intact  light/touch BUE  Dominant hand:    -       Right  Upper Extremity Range of Motion:     -       Right Upper Extremity: pain with any AROM/PROM; pt participatory with shoulder PROM to ~90*  -       Left Upper Extremity: WFL  Upper Extremity Strength:    -       Right Upper Extremity: MMT NT d/t increase pain with ROM; pt was able to use her RUE when scooting to EOB with MIN A.   -       Left Upper Extremity: WFL   Strength:    -       Right Upper Extremity: WFL  -       Left Upper Extremity: WFL  Fine Motor Coordination:    -       Intact  Left hand, manipulation of objects and Right hand, manipulation of objects  Gross motor coordination:   impaired 2* pain and weakness    AMPAC 6 Click ADL:  AMPAC Total Score: 12    Treatment & Education:  · Pt educated on OT role/POC.   · Importance of OOB activity with staff assistance.  · Safety during functional t/f and mobility   · Increased time  taken for ADLs and functional mobility to promote increased active participation by pt as described above  · Multiple self-care tasks/functional mobility completed- assistance level noted above   · All questions/concerns answered within OT scope of practice     Education:    Patient left with bed in chair position with all lines intact, call button in reach, bed alarm on, nursing notified and 2 family/friends  present    GOALS:   Multidisciplinary Problems     Occupational Therapy Goals        Problem: Occupational Therapy Goal    Goal Priority Disciplines Outcome Interventions   Occupational Therapy Goal     OT, PT/OT Ongoing, Progressing    Description:  Goals to be met by: 01/20/20     Patient will increase functional independence with ADLs by performing:    Feeding with Set-up Assistance.  UE Dressing with Set-up Assistance.  LE Dressing with Minimal Assistance.  Grooming while standing with Contact Guard Assistance.  Toileting from bedside commode with Minimal Assistance for hygiene and clothing management.   Sitting at edge of bed x15 minutes with Supervision.  Rolling to Bilateral with Contact Guard Assistance.   Supine to sit with Contact Guard Assistance.  Step transfer with Stand-by Assistance  Toilet transfer to bedside commode with Stand-by Assistance.  Upper extremity exercise program x15 reps per handout, with assistance as needed.                      History:     Past Medical History:   Diagnosis Date    Acute renal failure 1/1/2020    Allergy     Anemia of chronic disease 1/1/2020    Anticoagulant long-term use     Coumadin    Anxiety     Arthritis     Atrial fibrillation     Cataract     Closed head injury 09/10/2018    Clotting disorder     Depression     Diabetes mellitus     Encounter for blood transfusion     Fall 09/10/2018    GERD (gastroesophageal reflux disease)     Heart murmur     [place] (hard of hearing)     wears bilateral hearing aids    Hypertension     Thyroid disease      Uses roller walker          Past Surgical History:   Procedure Laterality Date    HYSTERECTOMY      INJECTION OF FACET JOINT Bilateral 4/5/2019    Procedure: INJECTION, FACET JOINT, L4-L5;  Surgeon: Clayton Valle MD;  Location: [facility];  Service: Pain Management;  Laterality: Bilateral;    JOINT REPLACEMENT Right     Hip    TONSILLECTOMY      TOTAL HIP ARTHROPLASTY Left 2004       Time Tracking:     OT Date of Treatment: 01/06/20  OT Start Time: 1149  OT Stop Time: 1216  OT Total Time (min): 27 min    Billable Minutes:Evaluation 15 min  Self Care/Home Management 12 min  Total Time 27 min (co-eval with PT)    Tonie Mandujano, OT  1/6/2020

## (undated) DEVICE — DRESSING LEUKOPLAST FLEX 1X3IN

## (undated) DEVICE — PACING ELECTRODE CATH 5F 125CM

## (undated) DEVICE — PAD DEFIB CADENCE ADULT R2

## (undated) DEVICE — PACK CATH LAB

## (undated) DEVICE — INTRODUCER CATH 6F 11CM